# Patient Record
Sex: FEMALE | Race: BLACK OR AFRICAN AMERICAN | NOT HISPANIC OR LATINO | Employment: FULL TIME | URBAN - METROPOLITAN AREA
[De-identification: names, ages, dates, MRNs, and addresses within clinical notes are randomized per-mention and may not be internally consistent; named-entity substitution may affect disease eponyms.]

---

## 2017-01-16 ENCOUNTER — HISTORICAL (OUTPATIENT)
Dept: INTERNAL MEDICINE | Facility: CLINIC | Age: 52
End: 2017-01-16

## 2017-02-20 ENCOUNTER — HISTORICAL (OUTPATIENT)
Dept: INTERNAL MEDICINE | Facility: CLINIC | Age: 52
End: 2017-02-20

## 2017-04-12 ENCOUNTER — HISTORICAL (OUTPATIENT)
Dept: ADMINISTRATIVE | Facility: HOSPITAL | Age: 52
End: 2017-04-12

## 2017-04-18 ENCOUNTER — HISTORICAL (OUTPATIENT)
Dept: RADIOLOGY | Facility: HOSPITAL | Age: 52
End: 2017-04-18

## 2017-05-26 ENCOUNTER — HISTORICAL (OUTPATIENT)
Dept: RADIOLOGY | Facility: HOSPITAL | Age: 52
End: 2017-05-26

## 2017-07-06 ENCOUNTER — HISTORICAL (OUTPATIENT)
Dept: INTERNAL MEDICINE | Facility: CLINIC | Age: 52
End: 2017-07-06

## 2017-07-06 LAB
ABS NEUT (OLG): 2.17 X10(3)/MCL (ref 2.1–9.2)
ALBUMIN SERPL-MCNC: 4 GM/DL (ref 3.4–5)
ALBUMIN/GLOB SERPL: 1 RATIO (ref 1–2)
ALP SERPL-CCNC: 75 UNIT/L (ref 45–117)
ALT SERPL-CCNC: 31 UNIT/L (ref 12–78)
APPEARANCE, UA: ABNORMAL
AST SERPL-CCNC: 19 UNIT/L (ref 15–37)
BACTERIA #/AREA URNS AUTO: ABNORMAL /[HPF]
BASOPHILS # BLD AUTO: 0.03 X10(3)/MCL
BASOPHILS NFR BLD AUTO: 1 % (ref 0–1)
BILIRUB SERPL-MCNC: 0.2 MG/DL (ref 0.2–1)
BILIRUB UR QL STRIP: NEGATIVE
BILIRUBIN DIRECT+TOT PNL SERPL-MCNC: 0.1 MG/DL
BILIRUBIN DIRECT+TOT PNL SERPL-MCNC: 0.1 MG/DL
BUN SERPL-MCNC: 8 MG/DL (ref 7–18)
CALCIUM SERPL-MCNC: 9.5 MG/DL (ref 8.5–10.1)
CHLORIDE SERPL-SCNC: 102 MMOL/L (ref 98–107)
CO2 SERPL-SCNC: 29 MMOL/L (ref 21–32)
COLOR UR: YELLOW
CREAT SERPL-MCNC: 0.7 MG/DL (ref 0.6–1.3)
CREAT UR-MCNC: 140 MG/DL
EOSINOPHIL # BLD AUTO: 0.13 X10(3)/MCL
EOSINOPHIL NFR BLD AUTO: 2 % (ref 0–5)
ERYTHROCYTE [DISTWIDTH] IN BLOOD BY AUTOMATED COUNT: 13.9 % (ref 11.5–14.5)
EST. AVERAGE GLUCOSE BLD GHB EST-MCNC: 189 MG/DL
GLOBULIN SER-MCNC: 4 GM/ML (ref 2.3–3.5)
GLUCOSE (UA): NORMAL
GLUCOSE SERPL-MCNC: 159 MG/DL (ref 74–106)
HBA1C MFR BLD: 8.2 % (ref 4.2–6.3)
HCT VFR BLD AUTO: 39.5 % (ref 35–46)
HGB BLD-MCNC: 12.2 GM/DL (ref 12–16)
HGB UR QL STRIP: NEGATIVE
HYALINE CASTS #/AREA URNS LPF: ABNORMAL /[LPF]
IMM GRANULOCYTES # BLD AUTO: 0.01 10*3/UL
IMM GRANULOCYTES NFR BLD AUTO: 0 %
KETONES UR QL STRIP: NEGATIVE
LEUKOCYTE ESTERASE UR QL STRIP: 500 LEU/UL
LYMPHOCYTES # BLD AUTO: 2.53 X10(3)/MCL
LYMPHOCYTES NFR BLD AUTO: 48 % (ref 15–40)
MCH RBC QN AUTO: 25.5 PG (ref 26–34)
MCHC RBC AUTO-ENTMCNC: 30.9 GM/DL (ref 31–37)
MCV RBC AUTO: 82.6 FL (ref 80–100)
MICROALBUMIN UR-MCNC: 64.6 MG/L (ref 0–19)
MICROALBUMIN/CREAT RATIO PNL UR: 46.1 MCG/MG CR (ref 0–29)
MONOCYTES # BLD AUTO: 0.46 X10(3)/MCL
MONOCYTES NFR BLD AUTO: 9 % (ref 4–12)
NEUTROPHILS # BLD AUTO: 2.17 X10(3)/MCL
NEUTROPHILS NFR BLD AUTO: 41 X10(3)/MCL
NITRITE UR QL STRIP: NEGATIVE
PH UR STRIP: 5.5 [PH] (ref 4.5–8)
PLATELET # BLD AUTO: 270 X10(3)/MCL (ref 130–400)
PMV BLD AUTO: 11.6 FL (ref 7.4–10.4)
POTASSIUM SERPL-SCNC: 3.8 MMOL/L (ref 3.5–5.1)
PROT SERPL-MCNC: 8 GM/DL (ref 6.4–8.2)
PROT UR QL STRIP: 20 MG/DL
RBC # BLD AUTO: 4.78 X10(6)/MCL (ref 4–5.2)
RBC #/AREA URNS AUTO: ABNORMAL /[HPF]
SODIUM SERPL-SCNC: 140 MMOL/L (ref 136–145)
SP GR UR STRIP: 1.01 (ref 1–1.03)
SQUAMOUS #/AREA URNS LPF: >100 /[LPF]
UROBILINOGEN UR STRIP-ACNC: NORMAL
WBC # SPEC AUTO: 5.3 X10(3)/MCL (ref 4.5–11)
WBC #/AREA URNS AUTO: ABNORMAL /HPF

## 2017-08-16 ENCOUNTER — HISTORICAL (OUTPATIENT)
Dept: ADMINISTRATIVE | Facility: HOSPITAL | Age: 52
End: 2017-08-16

## 2017-08-16 LAB
ALBUMIN SERPL-MCNC: 4.2 GM/DL (ref 3.4–5)
ALBUMIN/GLOB SERPL: 1 RATIO (ref 1–2)
ALP SERPL-CCNC: 96 UNIT/L (ref 45–117)
ALT SERPL-CCNC: 32 UNIT/L (ref 12–78)
AST SERPL-CCNC: 20 UNIT/L (ref 15–37)
BILIRUB SERPL-MCNC: 0.2 MG/DL (ref 0.2–1)
BILIRUBIN DIRECT+TOT PNL SERPL-MCNC: <0.1 MG/DL
BILIRUBIN DIRECT+TOT PNL SERPL-MCNC: >0.1 MG/DL
BUN SERPL-MCNC: 11 MG/DL (ref 7–18)
CALCIUM SERPL-MCNC: 10.1 MG/DL (ref 8.5–10.1)
CHLORIDE SERPL-SCNC: 104 MMOL/L (ref 98–107)
CO2 SERPL-SCNC: 29 MMOL/L (ref 21–32)
CREAT SERPL-MCNC: 1 MG/DL (ref 0.6–1.3)
ERYTHROCYTE [DISTWIDTH] IN BLOOD BY AUTOMATED COUNT: 14.2 % (ref 11.5–14.5)
GLOBULIN SER-MCNC: 3.8 GM/ML (ref 2.3–3.5)
GLUCOSE SERPL-MCNC: 176 MG/DL (ref 74–106)
HAV IGM SERPL QL IA: NONREACTIVE
HBV CORE IGM SERPL QL IA: NONREACTIVE
HBV SURFACE AG SERPL QL IA: NEGATIVE
HCT VFR BLD AUTO: 38.2 % (ref 35–46)
HCV AB SERPL QL IA: NONREACTIVE
HGB BLD-MCNC: 12.2 GM/DL (ref 12–16)
MCH RBC QN AUTO: 26.3 PG (ref 26–34)
MCHC RBC AUTO-ENTMCNC: 31.9 GM/DL (ref 31–37)
MCV RBC AUTO: 82.5 FL (ref 80–100)
PLATELET # BLD AUTO: 330 X10(3)/MCL (ref 130–400)
PMV BLD AUTO: 11.9 FL (ref 7.4–10.4)
POTASSIUM SERPL-SCNC: 4.1 MMOL/L (ref 3.5–5.1)
PROT SERPL-MCNC: 8 GM/DL (ref 6.4–8.2)
RBC # BLD AUTO: 4.63 X10(6)/MCL (ref 4–5.2)
SODIUM SERPL-SCNC: 141 MMOL/L (ref 136–145)
T PALLIDUM AB SER QL: NONREACTIVE
WBC # SPEC AUTO: 6.5 X10(3)/MCL (ref 4.5–11)

## 2017-09-08 ENCOUNTER — HISTORICAL (OUTPATIENT)
Dept: ADMINISTRATIVE | Facility: HOSPITAL | Age: 52
End: 2017-09-08

## 2017-09-08 LAB
APPEARANCE, UA: CLEAR
BACTERIA #/AREA URNS AUTO: ABNORMAL /[HPF]
BILIRUB UR QL STRIP: NEGATIVE
COLOR UR: ABNORMAL
GLUCOSE (UA): NORMAL
HGB UR QL STRIP: 0.1 MG/DL
HYALINE CASTS #/AREA URNS LPF: ABNORMAL /[LPF]
KETONES UR QL STRIP: NEGATIVE
LEUKOCYTE ESTERASE UR QL STRIP: 75 LEU/UL
NITRITE UR QL STRIP: NEGATIVE
PH UR STRIP: 5.5 [PH] (ref 4.5–8)
PROT UR QL STRIP: 20 MG/DL
RBC #/AREA URNS AUTO: ABNORMAL /[HPF]
SP GR UR STRIP: 1.02 (ref 1–1.03)
SQUAMOUS #/AREA URNS LPF: ABNORMAL /[LPF]
UROBILINOGEN UR STRIP-ACNC: NORMAL
WBC #/AREA URNS AUTO: ABNORMAL /HPF

## 2017-10-09 ENCOUNTER — HISTORICAL (OUTPATIENT)
Dept: INTERNAL MEDICINE | Facility: CLINIC | Age: 52
End: 2017-10-09

## 2017-10-09 LAB
ALBUMIN SERPL-MCNC: 4.2 GM/DL (ref 3.4–5)
ALBUMIN/GLOB SERPL: 1 RATIO (ref 1–2)
ALP SERPL-CCNC: 85 UNIT/L (ref 45–117)
ALT SERPL-CCNC: 20 UNIT/L (ref 12–78)
AST SERPL-CCNC: 12 UNIT/L (ref 15–37)
BILIRUB SERPL-MCNC: 0.2 MG/DL (ref 0.2–1)
BILIRUBIN DIRECT+TOT PNL SERPL-MCNC: 0.1 MG/DL
BILIRUBIN DIRECT+TOT PNL SERPL-MCNC: 0.1 MG/DL
BUN SERPL-MCNC: 11 MG/DL (ref 7–18)
CALCIUM SERPL-MCNC: 10 MG/DL (ref 8.5–10.1)
CHLORIDE SERPL-SCNC: 103 MMOL/L (ref 98–107)
CHOLEST SERPL-MCNC: 99 MG/DL
CHOLEST/HDLC SERPL: 2.6 {RATIO} (ref 0–4.4)
CO2 SERPL-SCNC: 29 MMOL/L (ref 21–32)
CREAT SERPL-MCNC: 0.8 MG/DL (ref 0.6–1.3)
EST. AVERAGE GLUCOSE BLD GHB EST-MCNC: 206 MG/DL
GLOBULIN SER-MCNC: 3.8 GM/ML (ref 2.3–3.5)
GLUCOSE SERPL-MCNC: 139 MG/DL (ref 74–106)
HBA1C MFR BLD: 8.8 % (ref 4.2–6.3)
HDLC SERPL-MCNC: 38 MG/DL
LDLC SERPL CALC-MCNC: 36 MG/DL (ref 0–130)
POTASSIUM SERPL-SCNC: 4 MMOL/L (ref 3.5–5.1)
PROT SERPL-MCNC: 8 GM/DL (ref 6.4–8.2)
SODIUM SERPL-SCNC: 138 MMOL/L (ref 136–145)
TRIGL SERPL-MCNC: 125 MG/DL
TSH SERPL-ACNC: 1.56 MIU/L (ref 0.36–3.74)
VLDLC SERPL CALC-MCNC: 25 MG/DL

## 2018-03-01 ENCOUNTER — HISTORICAL (OUTPATIENT)
Dept: ADMINISTRATIVE | Facility: HOSPITAL | Age: 53
End: 2018-03-01

## 2018-03-01 LAB
ABS NEUT (OLG): 2.93 X10(3)/MCL (ref 2.1–9.2)
BASOPHILS # BLD AUTO: 0.05 X10(3)/MCL
BASOPHILS NFR BLD AUTO: 1 %
CRP SERPL-MCNC: 0.3 MG/DL
EOSINOPHIL # BLD AUTO: 0.14 X10(3)/MCL
EOSINOPHIL NFR BLD AUTO: 2 %
ERYTHROCYTE [DISTWIDTH] IN BLOOD BY AUTOMATED COUNT: 14.3 % (ref 11.5–14.5)
ERYTHROCYTE [SEDIMENTATION RATE] IN BLOOD: 8 MM/HR (ref 0–20)
EST. AVERAGE GLUCOSE BLD GHB EST-MCNC: 206 MG/DL
HBA1C MFR BLD: 8.8 % (ref 4.2–6.3)
HCT VFR BLD AUTO: 38.9 % (ref 35–46)
HGB BLD-MCNC: 12.4 GM/DL (ref 12–16)
IMM GRANULOCYTES # BLD AUTO: 0.02 10*3/UL
IMM GRANULOCYTES NFR BLD AUTO: 0 %
LYMPHOCYTES # BLD AUTO: 4.07 X10(3)/MCL
LYMPHOCYTES NFR BLD AUTO: 51 % (ref 13–40)
MCH RBC QN AUTO: 26.2 PG (ref 26–34)
MCHC RBC AUTO-ENTMCNC: 31.9 GM/DL (ref 31–37)
MCV RBC AUTO: 82.2 FL (ref 80–100)
MONOCYTES # BLD AUTO: 0.73 X10(3)/MCL
MONOCYTES NFR BLD AUTO: 9 % (ref 4–12)
NEUTROPHILS # BLD AUTO: 2.93 X10(3)/MCL
NEUTROPHILS NFR BLD AUTO: 37 X10(3)/MCL
PLATELET # BLD AUTO: 267 X10(3)/MCL (ref 130–400)
PMV BLD AUTO: 11.5 FL (ref 7.4–10.4)
RBC # BLD AUTO: 4.73 X10(6)/MCL (ref 4–5.2)
RHEUMATOID FACT SERPL-ACNC: 10 IU/ML (ref 0–15)
WBC # SPEC AUTO: 7.9 X10(3)/MCL (ref 4.5–11)

## 2018-03-08 ENCOUNTER — HISTORICAL (OUTPATIENT)
Dept: RADIOLOGY | Facility: HOSPITAL | Age: 53
End: 2018-03-08

## 2018-05-07 ENCOUNTER — HISTORICAL (OUTPATIENT)
Dept: RADIOLOGY | Facility: HOSPITAL | Age: 53
End: 2018-05-07

## 2018-05-28 ENCOUNTER — HISTORICAL (OUTPATIENT)
Dept: ADMINISTRATIVE | Facility: HOSPITAL | Age: 53
End: 2018-05-28

## 2018-05-28 LAB
ERYTHROCYTE [SEDIMENTATION RATE] IN BLOOD: 8 MM/HR (ref 0–20)
EST. AVERAGE GLUCOSE BLD GHB EST-MCNC: 206 MG/DL
HBA1C MFR BLD: 8.8 % (ref 4.2–6.3)

## 2018-09-04 ENCOUNTER — HISTORICAL (OUTPATIENT)
Dept: INTERNAL MEDICINE | Facility: CLINIC | Age: 53
End: 2018-09-04

## 2018-09-04 LAB
EST. AVERAGE GLUCOSE BLD GHB EST-MCNC: 183 MG/DL
HBA1C MFR BLD: 8 % (ref 4.2–6.3)

## 2018-09-24 ENCOUNTER — HOSPITAL ENCOUNTER (OUTPATIENT)
Dept: MEDSURG UNIT | Facility: HOSPITAL | Age: 53
End: 2018-09-26
Attending: INTERNAL MEDICINE | Admitting: SPECIALIST

## 2018-09-24 LAB
ABS NEUT (OLG): 6.64 X10(3)/MCL (ref 2.1–9.2)
ALBUMIN SERPL-MCNC: 3.5 GM/DL (ref 3.4–5)
ALBUMIN/GLOB SERPL: 1 RATIO (ref 1–2)
ALP SERPL-CCNC: 165 UNIT/L (ref 45–117)
ALT SERPL-CCNC: 98 UNIT/L (ref 12–78)
APPEARANCE, UA: CLEAR
APTT PPP: 32.4 SECOND(S) (ref 23.3–37)
AST SERPL-CCNC: 65 UNIT/L (ref 15–37)
BACTERIA #/AREA URNS AUTO: ABNORMAL /[HPF]
BASOPHILS # BLD AUTO: 0.04 X10(3)/MCL
BASOPHILS NFR BLD AUTO: 0 %
BILIRUB SERPL-MCNC: 0.4 MG/DL (ref 0.2–1)
BILIRUB UR QL STRIP: NEGATIVE
BILIRUBIN DIRECT+TOT PNL SERPL-MCNC: 0.1 MG/DL
BILIRUBIN DIRECT+TOT PNL SERPL-MCNC: 0.3 MG/DL
BUN SERPL-MCNC: 7 MG/DL (ref 7–18)
CALCIUM SERPL-MCNC: 9.8 MG/DL (ref 8.5–10.1)
CHLORIDE SERPL-SCNC: 102 MMOL/L (ref 98–107)
CO2 SERPL-SCNC: 26 MMOL/L (ref 21–32)
COLOR UR: ABNORMAL
CREAT SERPL-MCNC: 0.7 MG/DL (ref 0.6–1.3)
EOSINOPHIL # BLD AUTO: 0.04 X10(3)/MCL
EOSINOPHIL NFR BLD AUTO: 0 %
ERYTHROCYTE [DISTWIDTH] IN BLOOD BY AUTOMATED COUNT: 13.6 % (ref 11.5–14.5)
FLUAV AG NPH QL IA: NEGATIVE
FLUBV AG NPH QL IA: NEGATIVE
GLOBULIN SER-MCNC: 4.7 GM/ML (ref 2.3–3.5)
GLUCOSE (UA): NORMAL
GLUCOSE SERPL-MCNC: 161 MG/DL (ref 74–106)
HCT VFR BLD AUTO: 36 % (ref 35–46)
HGB BLD-MCNC: 11.4 GM/DL (ref 12–16)
HGB UR QL STRIP: NEGATIVE
HYALINE CASTS #/AREA URNS LPF: ABNORMAL /[LPF]
IMM GRANULOCYTES # BLD AUTO: 0.04 10*3/UL
IMM GRANULOCYTES NFR BLD AUTO: 0 %
INR PPP: 1.12 (ref 0.9–1.2)
KETONES UR QL STRIP: NEGATIVE
LACTATE SERPL-SCNC: 0.5 MMOL/L (ref 0.4–2)
LEUKOCYTE ESTERASE UR QL STRIP: NEGATIVE
LYMPHOCYTES # BLD AUTO: 3.1 X10(3)/MCL
LYMPHOCYTES NFR BLD AUTO: 28 % (ref 13–40)
MCH RBC QN AUTO: 25.9 PG (ref 26–34)
MCHC RBC AUTO-ENTMCNC: 31.7 GM/DL (ref 31–37)
MCV RBC AUTO: 81.6 FL (ref 80–100)
MONOCYTES # BLD AUTO: 1.28 X10(3)/MCL
MONOCYTES NFR BLD AUTO: 12 % (ref 4–12)
NEUTROPHILS # BLD AUTO: 6.64 X10(3)/MCL
NEUTROPHILS NFR BLD AUTO: 60 X10(3)/MCL
NITRITE UR QL STRIP: NEGATIVE
PH UR STRIP: 6.5 [PH] (ref 4.5–8)
PLATELET # BLD AUTO: 207 X10(3)/MCL (ref 130–400)
PMV BLD AUTO: 11.5 FL (ref 7.4–10.4)
POTASSIUM SERPL-SCNC: 3.7 MMOL/L (ref 3.5–5.1)
PROT SERPL-MCNC: 8.2 GM/DL (ref 6.4–8.2)
PROT UR QL STRIP: 10 MG/DL
PROTHROMBIN TIME: 13.7 SECOND(S) (ref 11.9–14.4)
RBC # BLD AUTO: 4.41 X10(6)/MCL (ref 4–5.2)
RBC #/AREA URNS AUTO: ABNORMAL /[HPF]
SODIUM SERPL-SCNC: 135 MMOL/L (ref 136–145)
SP GR UR STRIP: 1 (ref 1–1.03)
SQUAMOUS #/AREA URNS LPF: ABNORMAL /[LPF]
UROBILINOGEN UR STRIP-ACNC: NORMAL
WBC # SPEC AUTO: 11.1 X10(3)/MCL (ref 4.5–11)
WBC #/AREA URNS AUTO: ABNORMAL /HPF

## 2018-09-25 LAB
ABS NEUT (OLG): 4.61 X10(3)/MCL (ref 2.1–9.2)
ALBUMIN SERPL-MCNC: 3 GM/DL (ref 3.4–5)
ALBUMIN/GLOB SERPL: 1 RATIO (ref 1–2)
ALP SERPL-CCNC: 147 UNIT/L (ref 45–117)
ALT SERPL-CCNC: 95 UNIT/L (ref 12–78)
AST SERPL-CCNC: 80 UNIT/L (ref 15–37)
BASOPHILS # BLD AUTO: 0.03 X10(3)/MCL
BASOPHILS NFR BLD AUTO: 0 %
BILIRUB SERPL-MCNC: 0.2 MG/DL (ref 0.2–1)
BILIRUBIN DIRECT+TOT PNL SERPL-MCNC: 0.1 MG/DL
BILIRUBIN DIRECT+TOT PNL SERPL-MCNC: 0.1 MG/DL
BUN SERPL-MCNC: 8 MG/DL (ref 7–18)
CALCIUM SERPL-MCNC: 9.1 MG/DL (ref 8.5–10.1)
CHLORIDE SERPL-SCNC: 103 MMOL/L (ref 98–107)
CO2 SERPL-SCNC: 28 MMOL/L (ref 21–32)
CREAT SERPL-MCNC: 0.8 MG/DL (ref 0.6–1.3)
EOSINOPHIL # BLD AUTO: 0.1 10*3/UL
EOSINOPHIL NFR BLD AUTO: 1 %
ERYTHROCYTE [DISTWIDTH] IN BLOOD BY AUTOMATED COUNT: 13.9 % (ref 11.5–14.5)
GLOBULIN SER-MCNC: 4.2 GM/ML (ref 2.3–3.5)
GLUCOSE SERPL-MCNC: 238 MG/DL (ref 74–106)
HCT VFR BLD AUTO: 33.7 % (ref 35–46)
HGB BLD-MCNC: 10.4 GM/DL (ref 12–16)
IMM GRANULOCYTES # BLD AUTO: 0.03 10*3/UL
IMM GRANULOCYTES NFR BLD AUTO: 0 %
LYMPHOCYTES # BLD AUTO: 2.02 X10(3)/MCL
LYMPHOCYTES NFR BLD AUTO: 25 % (ref 13–40)
MCH RBC QN AUTO: 26 PG (ref 26–34)
MCHC RBC AUTO-ENTMCNC: 30.9 GM/DL (ref 31–37)
MCV RBC AUTO: 84.3 FL (ref 80–100)
MONOCYTES # BLD AUTO: 1.17 X10(3)/MCL
MONOCYTES NFR BLD AUTO: 15 % (ref 4–12)
NEUTROPHILS # BLD AUTO: 4.61 X10(3)/MCL
NEUTROPHILS NFR BLD AUTO: 58 X10(3)/MCL
PLATELET # BLD AUTO: 205 X10(3)/MCL (ref 130–400)
PMV BLD AUTO: 12.1 FL (ref 7.4–10.4)
POTASSIUM SERPL-SCNC: 4 MMOL/L (ref 3.5–5.1)
PROT SERPL-MCNC: 7.2 GM/DL (ref 6.4–8.2)
RBC # BLD AUTO: 4 X10(6)/MCL (ref 4–5.2)
SODIUM SERPL-SCNC: 136 MMOL/L (ref 136–145)
WBC # SPEC AUTO: 8 X10(3)/MCL (ref 4.5–11)

## 2018-09-26 LAB
ABS NEUT (OLG): 3.29 X10(3)/MCL (ref 2.1–9.2)
ALBUMIN SERPL-MCNC: 3 GM/DL (ref 3.4–5)
ALBUMIN/GLOB SERPL: 1 RATIO (ref 1–2)
ALP SERPL-CCNC: 168 UNIT/L (ref 45–117)
ALT SERPL-CCNC: 103 UNIT/L (ref 12–78)
AST SERPL-CCNC: 83 UNIT/L (ref 15–37)
BASOPHILS # BLD AUTO: 0.04 X10(3)/MCL
BASOPHILS NFR BLD AUTO: 0 %
BILIRUB SERPL-MCNC: 0.2 MG/DL (ref 0.2–1)
BILIRUBIN DIRECT+TOT PNL SERPL-MCNC: <0.1 MG/DL
BILIRUBIN DIRECT+TOT PNL SERPL-MCNC: ABNORMAL MG/DL
BUN SERPL-MCNC: 6 MG/DL (ref 7–18)
CALCIUM SERPL-MCNC: 9.4 MG/DL (ref 8.5–10.1)
CHLORIDE SERPL-SCNC: 105 MMOL/L (ref 98–107)
CO2 SERPL-SCNC: 30 MMOL/L (ref 21–32)
CREAT SERPL-MCNC: 0.7 MG/DL (ref 0.6–1.3)
EOSINOPHIL # BLD AUTO: 0.15 10*3/UL
EOSINOPHIL NFR BLD AUTO: 2 %
ERYTHROCYTE [DISTWIDTH] IN BLOOD BY AUTOMATED COUNT: 14 % (ref 11.5–14.5)
FINAL CULTURE: NORMAL
GLOBULIN SER-MCNC: 4.1 GM/ML (ref 2.3–3.5)
GLUCOSE SERPL-MCNC: 109 MG/DL (ref 74–106)
HCT VFR BLD AUTO: 32.8 % (ref 35–46)
HGB BLD-MCNC: 10.3 GM/DL (ref 12–16)
IMM GRANULOCYTES # BLD AUTO: 0.03 10*3/UL
IMM GRANULOCYTES NFR BLD AUTO: 0 %
LYMPHOCYTES # BLD AUTO: 3.1 X10(3)/MCL
LYMPHOCYTES NFR BLD AUTO: 40 % (ref 13–40)
MCH RBC QN AUTO: 26.2 PG (ref 26–34)
MCHC RBC AUTO-ENTMCNC: 31.4 GM/DL (ref 31–37)
MCV RBC AUTO: 83.5 FL (ref 80–100)
MONOCYTES # BLD AUTO: 1.15 X10(3)/MCL
MONOCYTES NFR BLD AUTO: 15 % (ref 4–12)
NEUTROPHILS # BLD AUTO: 3.29 X10(3)/MCL
NEUTROPHILS NFR BLD AUTO: 42 X10(3)/MCL
PLATELET # BLD AUTO: 228 X10(3)/MCL (ref 130–400)
PMV BLD AUTO: 11.2 FL (ref 7.4–10.4)
POTASSIUM SERPL-SCNC: 4.1 MMOL/L (ref 3.5–5.1)
PROT SERPL-MCNC: 7.1 GM/DL (ref 6.4–8.2)
RAPID GROUP A STREP (OHS): NORMAL
RBC # BLD AUTO: 3.93 X10(6)/MCL (ref 4–5.2)
SODIUM SERPL-SCNC: 139 MMOL/L (ref 136–145)
WBC # SPEC AUTO: 7.8 X10(3)/MCL (ref 4.5–11)

## 2018-09-29 LAB
FINAL CULTURE: NORMAL
FINAL CULTURE: NORMAL

## 2018-11-28 ENCOUNTER — HISTORICAL (OUTPATIENT)
Dept: ADMINISTRATIVE | Facility: HOSPITAL | Age: 53
End: 2018-11-28

## 2018-11-28 LAB
APPEARANCE, UA: CLEAR
BACTERIA #/AREA URNS AUTO: ABNORMAL /[HPF]
BILIRUB UR QL STRIP: NEGATIVE
COLOR UR: NORMAL
CREAT UR-MCNC: 58 MG/DL
GLUCOSE (UA): NORMAL
HGB UR QL STRIP: NEGATIVE
HYALINE CASTS #/AREA URNS LPF: ABNORMAL /[LPF]
KETONES UR QL STRIP: NEGATIVE
LEUKOCYTE ESTERASE UR QL STRIP: NEGATIVE
MICROALBUMIN UR-MCNC: 46.6 MG/L (ref 0–19)
MICROALBUMIN/CREAT RATIO PNL UR: 80.3 MCG/MG CR (ref 0–29)
NITRITE UR QL STRIP: NEGATIVE
PH UR STRIP: 6 [PH] (ref 4.5–8)
PROT UR QL STRIP: NEGATIVE
RBC #/AREA URNS AUTO: ABNORMAL /[HPF]
SP GR UR STRIP: 1.01 (ref 1–1.03)
SQUAMOUS #/AREA URNS LPF: ABNORMAL /[LPF]
UROBILINOGEN UR STRIP-ACNC: NORMAL
WBC #/AREA URNS AUTO: ABNORMAL /HPF

## 2019-01-24 ENCOUNTER — HISTORICAL (OUTPATIENT)
Dept: INTERNAL MEDICINE | Facility: CLINIC | Age: 54
End: 2019-01-24

## 2019-01-24 LAB
ABS NEUT (OLG): 2.54 X10(3)/MCL (ref 2.1–9.2)
ALBUMIN SERPL-MCNC: 4.1 GM/DL (ref 3.4–5)
ALBUMIN/GLOB SERPL: 1.05 RATIO (ref 1.1–2)
ALP SERPL-CCNC: 108 UNIT/L (ref 45–117)
ALT SERPL-CCNC: 32 UNIT/L (ref 12–78)
AST SERPL-CCNC: 19 UNIT/L (ref 15–37)
BASOPHILS # BLD AUTO: 0.04 X10(3)/MCL
BASOPHILS NFR BLD AUTO: 1 %
BILIRUB SERPL-MCNC: 0.4 MG/DL (ref 0.2–1)
BILIRUBIN DIRECT+TOT PNL SERPL-MCNC: 0.1 MG/DL
BILIRUBIN DIRECT+TOT PNL SERPL-MCNC: 0.3 MG/DL
BUN SERPL-MCNC: 10 MG/DL (ref 7–18)
CALCIUM SERPL-MCNC: 9.4 MG/DL (ref 8.5–10.1)
CHLORIDE SERPL-SCNC: 103 MMOL/L (ref 98–107)
CHOLEST SERPL-MCNC: 98 MG/DL
CHOLEST/HDLC SERPL: 2.3 {RATIO} (ref 0–4.4)
CO2 SERPL-SCNC: 29 MMOL/L (ref 21–32)
CREAT SERPL-MCNC: 0.7 MG/DL (ref 0.6–1.3)
EOSINOPHIL # BLD AUTO: 0.1 10*3/UL
EOSINOPHIL NFR BLD AUTO: 2 %
ERYTHROCYTE [DISTWIDTH] IN BLOOD BY AUTOMATED COUNT: 13.8 % (ref 11.5–14.5)
EST. AVERAGE GLUCOSE BLD GHB EST-MCNC: 275 MG/DL
GLOBULIN SER-MCNC: 3.9 GM/ML (ref 2.3–3.5)
GLUCOSE SERPL-MCNC: 182 MG/DL (ref 74–106)
HBA1C MFR BLD: 11.2 % (ref 4.2–6.3)
HCT VFR BLD AUTO: 41.6 % (ref 35–46)
HDLC SERPL-MCNC: 42 MG/DL
HGB BLD-MCNC: 12.7 GM/DL (ref 12–16)
IMM GRANULOCYTES # BLD AUTO: 0.02 10*3/UL
IMM GRANULOCYTES NFR BLD AUTO: 0 %
LDLC SERPL CALC-MCNC: 33 MG/DL (ref 0–130)
LYMPHOCYTES # BLD AUTO: 3.51 X10(3)/MCL
LYMPHOCYTES NFR BLD AUTO: 52 % (ref 13–40)
MCH RBC QN AUTO: 25.2 PG (ref 26–34)
MCHC RBC AUTO-ENTMCNC: 30.5 GM/DL (ref 31–37)
MCV RBC AUTO: 82.5 FL (ref 80–100)
MONOCYTES # BLD AUTO: 0.59 X10(3)/MCL
MONOCYTES NFR BLD AUTO: 9 % (ref 4–12)
NEUTROPHILS # BLD AUTO: 2.54 X10(3)/MCL
NEUTROPHILS NFR BLD AUTO: 37 X10(3)/MCL
PLATELET # BLD AUTO: 274 X10(3)/MCL (ref 130–400)
PMV BLD AUTO: 11.2 FL (ref 7.4–10.4)
POTASSIUM SERPL-SCNC: 4.2 MMOL/L (ref 3.5–5.1)
PROT SERPL-MCNC: 8 GM/DL (ref 6.4–8.2)
RBC # BLD AUTO: 5.04 X10(6)/MCL (ref 4–5.2)
SODIUM SERPL-SCNC: 137 MMOL/L (ref 136–145)
TRIGL SERPL-MCNC: 116 MG/DL
TSH SERPL-ACNC: 1.57 MIU/L (ref 0.36–3.74)
VLDLC SERPL CALC-MCNC: 23 MG/DL
WBC # SPEC AUTO: 6.8 X10(3)/MCL (ref 4.5–11)

## 2019-03-06 ENCOUNTER — HISTORICAL (OUTPATIENT)
Dept: INTERNAL MEDICINE | Facility: CLINIC | Age: 54
End: 2019-03-06

## 2019-03-06 LAB
EST. AVERAGE GLUCOSE BLD GHB EST-MCNC: 232 MG/DL
HBA1C MFR BLD: 9.7 % (ref 4.2–6.3)

## 2019-06-14 ENCOUNTER — HISTORICAL (OUTPATIENT)
Dept: INTERNAL MEDICINE | Facility: CLINIC | Age: 54
End: 2019-06-14

## 2019-06-14 LAB
EST. AVERAGE GLUCOSE BLD GHB EST-MCNC: 206 MG/DL
HBA1C MFR BLD: 8.8 % (ref 4.2–6.3)

## 2019-07-17 ENCOUNTER — HISTORICAL (OUTPATIENT)
Dept: RADIOLOGY | Facility: HOSPITAL | Age: 54
End: 2019-07-17

## 2019-08-07 ENCOUNTER — HISTORICAL (OUTPATIENT)
Dept: RADIOLOGY | Facility: HOSPITAL | Age: 54
End: 2019-08-07

## 2019-09-09 ENCOUNTER — HISTORICAL (OUTPATIENT)
Dept: INTERNAL MEDICINE | Facility: CLINIC | Age: 54
End: 2019-09-09

## 2019-09-09 LAB
ABS NEUT (OLG): 2.45 X10(3)/MCL (ref 2.1–9.2)
BASOPHILS # BLD AUTO: 0 X10(3)/MCL (ref 0–0.2)
BASOPHILS NFR BLD AUTO: 0 %
EOSINOPHIL # BLD AUTO: 0.2 X10(3)/MCL (ref 0–0.9)
EOSINOPHIL NFR BLD AUTO: 2 %
ERYTHROCYTE [DISTWIDTH] IN BLOOD BY AUTOMATED COUNT: 13.8 % (ref 11.5–14.5)
EST. AVERAGE GLUCOSE BLD GHB EST-MCNC: 229 MG/DL
HBA1C MFR BLD: 9.6 % (ref 4.2–6.3)
HCT VFR BLD AUTO: 41.3 % (ref 35–46)
HGB BLD-MCNC: 12.5 GM/DL (ref 12–16)
IMM GRANULOCYTES # BLD AUTO: 0.01 10*3/UL
IMM GRANULOCYTES NFR BLD AUTO: 0 %
LYMPHOCYTES # BLD AUTO: 4.1 X10(3)/MCL (ref 0.6–4.6)
LYMPHOCYTES NFR BLD AUTO: 55 %
MCH RBC QN AUTO: 25.8 PG (ref 26–34)
MCHC RBC AUTO-ENTMCNC: 30.3 GM/DL (ref 31–37)
MCV RBC AUTO: 85.3 FL (ref 80–100)
MONOCYTES # BLD AUTO: 0.6 X10(3)/MCL (ref 0.1–1.3)
MONOCYTES NFR BLD AUTO: 9 %
NEUTROPHILS # BLD AUTO: 2.45 X10(3)/MCL (ref 2.1–9.2)
NEUTROPHILS NFR BLD AUTO: 33 %
PLATELET # BLD AUTO: 266 X10(3)/MCL (ref 130–400)
PMV BLD AUTO: 11 FL (ref 7.4–10.4)
RBC # BLD AUTO: 4.84 X10(6)/MCL (ref 4–5.2)
WBC # SPEC AUTO: 7.4 X10(3)/MCL (ref 4.5–11)

## 2019-12-09 ENCOUNTER — HISTORICAL (OUTPATIENT)
Dept: INTERNAL MEDICINE | Facility: CLINIC | Age: 54
End: 2019-12-09

## 2019-12-10 ENCOUNTER — HISTORICAL (OUTPATIENT)
Dept: ADMINISTRATIVE | Facility: HOSPITAL | Age: 54
End: 2019-12-10

## 2019-12-10 LAB
EST. AVERAGE GLUCOSE BLD GHB EST-MCNC: 226 MG/DL
HBA1C MFR BLD: 9.5 % (ref 4.2–6.3)

## 2020-03-06 ENCOUNTER — HISTORICAL (OUTPATIENT)
Dept: INTERNAL MEDICINE | Facility: CLINIC | Age: 55
End: 2020-03-06

## 2020-03-06 LAB
ALBUMIN SERPL-MCNC: 4.3 GM/DL (ref 3.4–5)
ALBUMIN/GLOB SERPL: 1.1 RATIO (ref 1.1–2)
ALP SERPL-CCNC: 129 UNIT/L (ref 45–117)
ALT SERPL-CCNC: 35 UNIT/L (ref 12–78)
APPEARANCE, UA: CLEAR
AST SERPL-CCNC: 19 UNIT/L (ref 15–37)
BACTERIA #/AREA URNS AUTO: ABNORMAL /HPF
BILIRUB SERPL-MCNC: 0.3 MG/DL (ref 0.2–1)
BILIRUB UR QL STRIP: NEGATIVE
BILIRUBIN DIRECT+TOT PNL SERPL-MCNC: <0.1 MG/DL (ref 0–0.2)
BILIRUBIN DIRECT+TOT PNL SERPL-MCNC: ABNORMAL MG/DL
BUN SERPL-MCNC: 12 MG/DL (ref 7–18)
CALCIUM SERPL-MCNC: 9.6 MG/DL (ref 8.5–10.1)
CHLORIDE SERPL-SCNC: 103 MMOL/L (ref 98–107)
CHOLEST SERPL-MCNC: 108 MG/DL
CHOLEST/HDLC SERPL: 3.6 {RATIO} (ref 0–4.4)
CO2 SERPL-SCNC: 29 MMOL/L (ref 21–32)
COLOR UR: COLORLESS
CREAT SERPL-MCNC: 0.8 MG/DL (ref 0.6–1.3)
CREAT UR-MCNC: 31 MG/DL
GLOBULIN SER-MCNC: 4 GM/ML (ref 2.3–3.5)
GLUCOSE (UA): NEGATIVE
GLUCOSE SERPL-MCNC: 189 MG/DL (ref 74–106)
HDLC SERPL-MCNC: 30 MG/DL (ref 40–59)
HGB UR QL STRIP: NEGATIVE
HYALINE CASTS #/AREA URNS LPF: ABNORMAL /LPF
KETONES UR QL STRIP: ABNORMAL
LDLC SERPL CALC-MCNC: 29 MG/DL
LEUKOCYTE ESTERASE UR QL STRIP: NEGATIVE
MICROALBUMIN UR-MCNC: 36 MG/L (ref 0–19)
MICROALBUMIN/CREAT RATIO PNL UR: 116.1 MCG/MG CR (ref 0–29)
NITRITE UR QL STRIP: NEGATIVE
PH UR STRIP: 5.5 [PH] (ref 4.5–8)
POTASSIUM SERPL-SCNC: 4.1 MMOL/L (ref 3.5–5.1)
PROT SERPL-MCNC: 8.3 GM/DL (ref 6.4–8.2)
PROT UR QL STRIP: NEGATIVE
RBC #/AREA URNS AUTO: ABNORMAL /HPF
SODIUM SERPL-SCNC: 137 MMOL/L (ref 136–145)
SP GR UR STRIP: 1 (ref 1–1.03)
SQUAMOUS #/AREA URNS LPF: ABNORMAL /LPF
TRIGL SERPL-MCNC: 245 MG/DL
TSH SERPL-ACNC: 1.73 MIU/L (ref 0.36–3.74)
UROBILINOGEN UR STRIP-ACNC: NORMAL
VLDLC SERPL CALC-MCNC: 49 MG/DL
WBC #/AREA URNS AUTO: ABNORMAL /HPF

## 2020-03-11 ENCOUNTER — HISTORICAL (OUTPATIENT)
Dept: RADIOLOGY | Facility: HOSPITAL | Age: 55
End: 2020-03-11

## 2020-05-16 ENCOUNTER — HISTORICAL (OUTPATIENT)
Dept: LAB | Facility: HOSPITAL | Age: 55
End: 2020-05-16

## 2020-06-05 ENCOUNTER — HISTORICAL (OUTPATIENT)
Dept: RADIOLOGY | Facility: HOSPITAL | Age: 55
End: 2020-06-05

## 2020-07-01 ENCOUNTER — HISTORICAL (OUTPATIENT)
Dept: LAB | Facility: HOSPITAL | Age: 55
End: 2020-07-01

## 2020-07-01 ENCOUNTER — HISTORICAL (OUTPATIENT)
Dept: ADMINISTRATIVE | Facility: HOSPITAL | Age: 55
End: 2020-07-01

## 2020-07-01 LAB
ALBUMIN SERPL-MCNC: 4.4 GM/DL (ref 3.4–5)
ALBUMIN/GLOB SERPL: 1.3 RATIO (ref 1.1–2)
ALP SERPL-CCNC: 113 UNIT/L (ref 45–117)
ALT SERPL-CCNC: 45 UNIT/L (ref 12–78)
AST SERPL-CCNC: 36 UNIT/L (ref 15–37)
BILIRUB SERPL-MCNC: 0.3 MG/DL (ref 0.2–1)
BILIRUBIN DIRECT+TOT PNL SERPL-MCNC: <0.1 MG/DL (ref 0–0.2)
BILIRUBIN DIRECT+TOT PNL SERPL-MCNC: ABNORMAL MG/DL
BUN SERPL-MCNC: 13 MG/DL (ref 7–18)
CALCIUM SERPL-MCNC: 9.5 MG/DL (ref 8.5–10.1)
CHLORIDE SERPL-SCNC: 104 MMOL/L (ref 98–107)
CHOLEST SERPL-MCNC: 157 MG/DL
CHOLEST/HDLC SERPL: 7.5 {RATIO} (ref 0–4.4)
CO2 SERPL-SCNC: 25 MMOL/L (ref 21–32)
CREAT SERPL-MCNC: 0.8 MG/DL (ref 0.6–1.3)
EST. AVERAGE GLUCOSE BLD GHB EST-MCNC: 220 MG/DL
GLOBULIN SER-MCNC: 3.5 GM/ML (ref 2.3–3.5)
GLUCOSE SERPL-MCNC: 117 MG/DL (ref 74–106)
HBA1C MFR BLD: 9.3 % (ref 4.5–6.2)
HDLC SERPL-MCNC: 21 MG/DL (ref 40–59)
LDLC SERPL CALC-MCNC: ABNORMAL MG/DL
POTASSIUM SERPL-SCNC: 4 MMOL/L (ref 3.5–5.1)
PROT SERPL-MCNC: 7.9 GM/DL (ref 6.4–8.2)
SODIUM SERPL-SCNC: 137 MMOL/L (ref 136–145)
TRIGL SERPL-MCNC: 1041 MG/DL
VLDLC SERPL CALC-MCNC: 208 MG/DL

## 2020-07-03 ENCOUNTER — HISTORICAL (OUTPATIENT)
Dept: ADMINISTRATIVE | Facility: HOSPITAL | Age: 55
End: 2020-07-03

## 2020-07-03 LAB
APPEARANCE, UA: ABNORMAL
BACTERIA SPEC CULT: ABNORMAL
BILIRUB UR QL STRIP: ABNORMAL
COLOR UR: YELLOW
GLUCOSE (UA): 500
HGB UR QL STRIP: NEGATIVE
KETONES UR QL STRIP: NEGATIVE
LEUKOCYTE ESTERASE UR QL STRIP: NEGATIVE
NITRITE UR QL STRIP: NEGATIVE
PH UR STRIP: 5 [PH] (ref 5–9)
PROT UR QL STRIP: 30
RBC #/AREA URNS HPF: ABNORMAL /[HPF]
SP GR UR STRIP: >=1.03 (ref 1–1.03)
SQUAMOUS EPITHELIAL, UA: ABNORMAL
UROBILINOGEN UR STRIP-ACNC: 0.2
WBC #/AREA URNS HPF: ABNORMAL /HPF

## 2020-10-13 ENCOUNTER — HISTORICAL (OUTPATIENT)
Dept: INTERNAL MEDICINE | Facility: CLINIC | Age: 55
End: 2020-10-13

## 2020-10-13 LAB
APPEARANCE, UA: CLEAR
BACTERIA #/AREA URNS AUTO: ABNORMAL /HPF
BILIRUB UR QL STRIP: NEGATIVE
BUN SERPL-MCNC: 11 MG/DL (ref 7–18)
CALCIUM SERPL-MCNC: 10 MG/DL (ref 8.5–10.1)
CHLORIDE SERPL-SCNC: 105 MMOL/L (ref 98–107)
CHOLEST SERPL-MCNC: 120 MG/DL
CHOLEST/HDLC SERPL: 3.2 {RATIO} (ref 0–4.4)
CO2 SERPL-SCNC: 31 MMOL/L (ref 21–32)
COLOR UR: YELLOW
CREAT SERPL-MCNC: 0.8 MG/DL (ref 0.6–1.3)
CREAT/UREA NIT SERPL: 14 MG/DL (ref 12–14)
EST. AVERAGE GLUCOSE BLD GHB EST-MCNC: 223 MG/DL
GLUCOSE (UA): NEGATIVE
GLUCOSE SERPL-MCNC: 210 MG/DL (ref 74–106)
HBA1C MFR BLD: 9.4 % (ref 4.2–6.3)
HDLC SERPL-MCNC: 37 MG/DL (ref 40–59)
HGB UR QL STRIP: NEGATIVE
HYALINE CASTS #/AREA URNS LPF: ABNORMAL /LPF
KETONES UR QL STRIP: NEGATIVE
LDLC SERPL CALC-MCNC: 47 MG/DL
LEUKOCYTE ESTERASE UR QL STRIP: NEGATIVE
NITRITE UR QL STRIP: NEGATIVE
PH UR STRIP: 5.5 [PH] (ref 4.5–8)
POTASSIUM SERPL-SCNC: 4.3 MMOL/L (ref 3.5–5.1)
PROT UR QL STRIP: 30 MG/DL
RBC #/AREA URNS AUTO: ABNORMAL /HPF
SODIUM SERPL-SCNC: 138 MMOL/L (ref 136–145)
SP GR UR STRIP: 1.02 (ref 1–1.03)
SQUAMOUS #/AREA URNS LPF: ABNORMAL /LPF
TRIGL SERPL-MCNC: 179 MG/DL
UROBILINOGEN UR STRIP-ACNC: NORMAL
VLDLC SERPL CALC-MCNC: 36 MG/DL
WBC #/AREA URNS AUTO: ABNORMAL /HPF

## 2021-01-07 ENCOUNTER — HISTORICAL (OUTPATIENT)
Dept: LAB | Facility: HOSPITAL | Age: 56
End: 2021-01-07

## 2021-01-07 LAB
EST. AVERAGE GLUCOSE BLD GHB EST-MCNC: 226 MG/DL
HBA1C MFR BLD: 9.5 %

## 2021-01-27 ENCOUNTER — HISTORICAL (OUTPATIENT)
Dept: INTERNAL MEDICINE | Facility: CLINIC | Age: 56
End: 2021-01-27

## 2021-01-27 LAB
DEPRECATED CALCIDIOL+CALCIFEROL SERPL-MC: 27.6 NG/ML (ref 30–80)
EST. AVERAGE GLUCOSE BLD GHB EST-MCNC: 231.7 MG/DL
HBA1C MFR BLD: 9.7 %
VIT B12 SERPL-MCNC: 849 PG/ML (ref 213–816)

## 2021-03-12 ENCOUNTER — HISTORICAL (OUTPATIENT)
Dept: SLEEP MEDICINE | Facility: HOSPITAL | Age: 56
End: 2021-03-12

## 2021-04-05 ENCOUNTER — HISTORICAL (OUTPATIENT)
Dept: LAB | Facility: HOSPITAL | Age: 56
End: 2021-04-05

## 2021-04-05 LAB
BUN SERPL-MCNC: 10.3 MG/DL (ref 9.8–20.1)
CALCIUM SERPL-MCNC: 10.2 MG/DL (ref 8.4–10.2)
CHLORIDE SERPL-SCNC: 104 MMOL/L (ref 98–107)
CHOLEST SERPL-MCNC: 117 MG/DL
CHOLEST/HDLC SERPL: 3 {RATIO} (ref 0–5)
CO2 SERPL-SCNC: 29 MMOL/L (ref 22–29)
CREAT SERPL-MCNC: 0.81 MG/DL (ref 0.55–1.02)
CREAT UR-MCNC: 140.4 MG/DL (ref 45–106)
CREAT/UREA NIT SERPL: 13
DEPRECATED CALCIDIOL+CALCIFEROL SERPL-MC: 34 NG/ML (ref 30–80)
EST. AVERAGE GLUCOSE BLD GHB EST-MCNC: 243.2 MG/DL
GLUCOSE SERPL-MCNC: 125 MG/DL (ref 74–100)
HBA1C MFR BLD: 10.1 %
HDLC SERPL-MCNC: 42 MG/DL (ref 35–60)
LDLC SERPL CALC-MCNC: 25 MG/DL (ref 50–140)
MICROALBUMIN UR-MCNC: 63.4 MG/L
MICROALBUMIN/CREAT RATIO PNL UR: 45.2 MG/GM CR (ref 0–30)
POTASSIUM SERPL-SCNC: 4.5 MMOL/L (ref 3.5–5.1)
SODIUM SERPL-SCNC: 141 MMOL/L (ref 136–145)
TRIGL SERPL-MCNC: 250 MG/DL (ref 37–140)
TSH SERPL-ACNC: 2.73 UIU/ML (ref 0.35–4.94)
VLDLC SERPL CALC-MCNC: 50 MG/DL

## 2021-05-06 ENCOUNTER — HISTORICAL (OUTPATIENT)
Dept: INTERNAL MEDICINE | Facility: CLINIC | Age: 56
End: 2021-05-06

## 2021-05-06 LAB
BUN SERPL-MCNC: 11.7 MG/DL (ref 9.8–20.1)
CALCIUM SERPL-MCNC: 10.3 MG/DL (ref 8.4–10.2)
CHLORIDE SERPL-SCNC: 105 MMOL/L (ref 98–107)
CO2 SERPL-SCNC: 26 MMOL/L (ref 22–29)
CREAT SERPL-MCNC: 0.81 MG/DL (ref 0.55–1.02)
CREAT/UREA NIT SERPL: 14
EST. AVERAGE GLUCOSE BLD GHB EST-MCNC: 226 MG/DL
GLUCOSE SERPL-MCNC: 202 MG/DL (ref 74–100)
HBA1C MFR BLD: 9.5 %
POTASSIUM SERPL-SCNC: 4.7 MMOL/L (ref 3.5–5.1)
SODIUM SERPL-SCNC: 139 MMOL/L (ref 136–145)

## 2021-06-16 ENCOUNTER — HISTORICAL (OUTPATIENT)
Dept: INTERNAL MEDICINE | Facility: CLINIC | Age: 56
End: 2021-06-16

## 2021-06-16 ENCOUNTER — HISTORICAL (OUTPATIENT)
Dept: RADIOLOGY | Facility: HOSPITAL | Age: 56
End: 2021-06-16

## 2021-06-16 LAB
ALBUMIN SERPL-MCNC: 4.3 GM/DL (ref 3.5–5)
ALBUMIN/GLOB SERPL: 1.2 RATIO (ref 1.1–2)
ALP SERPL-CCNC: 137 UNIT/L (ref 40–150)
ALT SERPL-CCNC: 36 UNIT/L (ref 0–55)
APPEARANCE, UA: CLEAR
AST SERPL-CCNC: 17 UNIT/L (ref 5–34)
BACTERIA #/AREA URNS AUTO: ABNORMAL /HPF
BILIRUB SERPL-MCNC: 0.2 MG/DL
BILIRUB UR QL STRIP: NEGATIVE
BILIRUBIN DIRECT+TOT PNL SERPL-MCNC: 0.1 MG/DL (ref 0–0.5)
BILIRUBIN DIRECT+TOT PNL SERPL-MCNC: 0.1 MG/DL (ref 0–0.8)
BUN SERPL-MCNC: 13.6 MG/DL (ref 9.8–20.1)
CALCIUM SERPL-MCNC: 9.8 MG/DL (ref 8.4–10.2)
CHLORIDE SERPL-SCNC: 103 MMOL/L (ref 98–107)
CHOLEST SERPL-MCNC: 113 MG/DL
CHOLEST/HDLC SERPL: 4 {RATIO} (ref 0–5)
CO2 SERPL-SCNC: 26 MMOL/L (ref 22–29)
COLOR UR: ABNORMAL
CREAT SERPL-MCNC: 0.83 MG/DL (ref 0.55–1.02)
GLOBULIN SER-MCNC: 3.5 GM/DL (ref 2.4–3.5)
GLUCOSE (UA): >1000 MG/DL
GLUCOSE SERPL-MCNC: 270 MG/DL (ref 74–100)
HDLC SERPL-MCNC: 26 MG/DL (ref 35–60)
HGB UR QL STRIP: NEGATIVE
HYALINE CASTS #/AREA URNS LPF: ABNORMAL /LPF
KETONES UR QL STRIP: NEGATIVE
LDLC SERPL CALC-MCNC: 11 MG/DL (ref 50–140)
LEUKOCYTE ESTERASE UR QL STRIP: NEGATIVE
NITRITE UR QL STRIP: NEGATIVE
PH UR STRIP: 5.5 [PH] (ref 4.5–8)
POTASSIUM SERPL-SCNC: 4.1 MMOL/L (ref 3.5–5.1)
PROT SERPL-MCNC: 7.8 GM/DL (ref 6.4–8.3)
PROT UR QL STRIP: 20 MG/DL
RBC #/AREA URNS AUTO: ABNORMAL /HPF
SODIUM SERPL-SCNC: 137 MMOL/L (ref 136–145)
SP GR UR STRIP: 1.02 (ref 1–1.03)
SQUAMOUS #/AREA URNS LPF: ABNORMAL /LPF
TRIGL SERPL-MCNC: 379 MG/DL (ref 37–140)
UROBILINOGEN UR STRIP-ACNC: NORMAL
VLDLC SERPL CALC-MCNC: 76 MG/DL
WBC #/AREA URNS AUTO: ABNORMAL /HPF

## 2021-07-13 ENCOUNTER — HISTORICAL (OUTPATIENT)
Dept: LAB | Facility: HOSPITAL | Age: 56
End: 2021-07-13

## 2021-07-13 LAB
BUN SERPL-MCNC: 10.7 MG/DL (ref 9.8–20.1)
CALCIUM SERPL-MCNC: 9.6 MG/DL (ref 8.4–10.2)
CHLORIDE SERPL-SCNC: 106 MMOL/L (ref 98–107)
CHOLEST SERPL-MCNC: 117 MG/DL
CHOLEST/HDLC SERPL: 3 {RATIO} (ref 0–5)
CO2 SERPL-SCNC: 29 MMOL/L (ref 22–29)
CREAT SERPL-MCNC: 0.88 MG/DL (ref 0.55–1.02)
CREAT/UREA NIT SERPL: 12
EST. AVERAGE GLUCOSE BLD GHB EST-MCNC: 208.7 MG/DL
GLUCOSE SERPL-MCNC: 214 MG/DL (ref 74–100)
HBA1C MFR BLD: 8.9 %
HDLC SERPL-MCNC: 36 MG/DL (ref 35–60)
LDLC SERPL CALC-MCNC: 47 MG/DL (ref 50–140)
POTASSIUM SERPL-SCNC: 5.1 MMOL/L (ref 3.5–5.1)
SODIUM SERPL-SCNC: 142 MMOL/L (ref 136–145)
TRIGL SERPL-MCNC: 172 MG/DL (ref 37–140)
VLDLC SERPL CALC-MCNC: 34 MG/DL

## 2021-11-29 ENCOUNTER — HISTORICAL (OUTPATIENT)
Dept: LAB | Facility: HOSPITAL | Age: 56
End: 2021-11-29

## 2021-11-29 LAB
APPEARANCE, UA: CLEAR
BACTERIA SPEC CULT: NORMAL
BILIRUB UR QL STRIP: NEGATIVE
BUN SERPL-MCNC: 9.3 MG/DL (ref 9.8–20.1)
CALCIUM SERPL-MCNC: 10.1 MG/DL (ref 8.7–10.5)
CHLORIDE SERPL-SCNC: 101 MMOL/L (ref 98–107)
CHOLEST SERPL-MCNC: 127 MG/DL
CHOLEST/HDLC SERPL: 4 {RATIO} (ref 0–5)
CO2 SERPL-SCNC: 28 MMOL/L (ref 22–29)
COLOR UR: YELLOW
CREAT SERPL-MCNC: 0.86 MG/DL (ref 0.55–1.02)
CREAT/UREA NIT SERPL: 11
EST. AVERAGE GLUCOSE BLD GHB EST-MCNC: 228.8 MG/DL
GLUCOSE (UA): NEGATIVE
GLUCOSE SERPL-MCNC: 256 MG/DL (ref 74–100)
HBA1C MFR BLD: 9.6 %
HDLC SERPL-MCNC: 36 MG/DL (ref 35–60)
HGB UR QL STRIP: NEGATIVE
KETONES UR QL STRIP: NEGATIVE
LDLC SERPL CALC-MCNC: 57 MG/DL (ref 50–140)
LEUKOCYTE ESTERASE UR QL STRIP: NEGATIVE
NITRITE UR QL STRIP: NEGATIVE
PH UR STRIP: 7 [PH] (ref 5–9)
POTASSIUM SERPL-SCNC: 5.2 MMOL/L (ref 3.5–5.1)
PROT UR QL STRIP: NEGATIVE
RBC #/AREA URNS HPF: NORMAL /HPF
SODIUM SERPL-SCNC: 137 MMOL/L (ref 136–145)
SP GR UR STRIP: 1.02 (ref 1–1.03)
SQUAMOUS EPITHELIAL, UA: NORMAL
TRIGL SERPL-MCNC: 169 MG/DL (ref 37–140)
UROBILINOGEN UR STRIP-ACNC: 0.2
VLDLC SERPL CALC-MCNC: 34 MG/DL
WBC #/AREA URNS HPF: NORMAL /HPF

## 2021-12-13 ENCOUNTER — HISTORICAL (OUTPATIENT)
Dept: LAB | Facility: HOSPITAL | Age: 56
End: 2021-12-13

## 2021-12-13 LAB
ABS NEUT (OLG): 3.03 X10(3)/MCL (ref 2.1–9.2)
BASOPHILS # BLD AUTO: 0 X10(3)/MCL (ref 0–0.2)
BASOPHILS NFR BLD AUTO: 1 %
CHOLEST SERPL-MCNC: 98 MG/DL
CHOLEST/HDLC SERPL: 3 {RATIO} (ref 0–5)
COLOR STL: NORMAL
CONSISTENCY STL: NORMAL
EOSINOPHIL # BLD AUTO: 0.1 X10(3)/MCL (ref 0–0.9)
EOSINOPHIL NFR BLD AUTO: 1 %
ERYTHROCYTE [DISTWIDTH] IN BLOOD BY AUTOMATED COUNT: 13.6 % (ref 11.5–17)
EST. AVERAGE GLUCOSE BLD GHB EST-MCNC: 226 MG/DL
HBA1C MFR BLD: 9.5 %
HCT VFR BLD AUTO: 42.9 % (ref 37–47)
HDLC SERPL-MCNC: 30 MG/DL (ref 35–60)
HEMOCCULT SP1 STL QL: NEGATIVE
HGB BLD-MCNC: 12.9 GM/DL (ref 12–16)
IMM GRANULOCYTES # BLD AUTO: 0.01 % (ref 0–0.02)
IMM GRANULOCYTES NFR BLD AUTO: 0.1 % (ref 0–0.43)
LDLC SERPL CALC-MCNC: 44 MG/DL (ref 50–140)
LYMPHOCYTES # BLD AUTO: 3.2 X10(3)/MCL (ref 0.6–4.6)
LYMPHOCYTES NFR BLD AUTO: 47 %
MCH RBC QN AUTO: 26.1 PG (ref 27–31)
MCHC RBC AUTO-ENTMCNC: 30.1 GM/DL (ref 33–36)
MCV RBC AUTO: 86.8 FL (ref 80–94)
MONOCYTES # BLD AUTO: 0.5 X10(3)/MCL (ref 0.1–1.3)
MONOCYTES NFR BLD AUTO: 8 %
NEUTROPHILS # BLD AUTO: 3.03 X10(3)/MCL (ref 1.4–7.9)
NEUTROPHILS NFR BLD AUTO: 44 %
PLATELET # BLD AUTO: 285 X10(3)/MCL (ref 130–400)
PMV BLD AUTO: 11 FL (ref 9.4–12.4)
RBC # BLD AUTO: 4.94 X10(6)/MCL (ref 4.2–5.4)
TRIGL SERPL-MCNC: 121 MG/DL (ref 37–140)
VLDLC SERPL CALC-MCNC: 24 MG/DL
WBC # SPEC AUTO: 6.9 X10(3)/MCL (ref 4.5–11.5)

## 2022-01-04 ENCOUNTER — HISTORICAL (OUTPATIENT)
Dept: LAB | Facility: HOSPITAL | Age: 57
End: 2022-01-04

## 2022-01-04 LAB — SARS-COV-2 AG RESP QL IA.RAPID: POSITIVE

## 2022-01-12 ENCOUNTER — HISTORICAL (OUTPATIENT)
Dept: LAB | Facility: HOSPITAL | Age: 57
End: 2022-01-12

## 2022-01-12 LAB
BUN SERPL-MCNC: 9.8 MG/DL (ref 9.8–20.1)
CALCIUM SERPL-MCNC: 10.3 MG/DL (ref 8.7–10.5)
CHLORIDE SERPL-SCNC: 100 MMOL/L (ref 98–107)
CO2 SERPL-SCNC: 31 MMOL/L (ref 22–29)
CREAT SERPL-MCNC: 0.8 MG/DL (ref 0.55–1.02)
CREAT/UREA NIT SERPL: 12
GLUCOSE SERPL-MCNC: 259 MG/DL (ref 74–100)
POTASSIUM SERPL-SCNC: 4.5 MMOL/L (ref 3.5–5.1)
SODIUM SERPL-SCNC: 138 MMOL/L (ref 136–145)

## 2022-01-20 ENCOUNTER — HISTORICAL (OUTPATIENT)
Dept: LAB | Facility: HOSPITAL | Age: 57
End: 2022-01-20

## 2022-01-20 LAB
FLUAV AG UPPER RESP QL IA.RAPID: NEGATIVE
FLUBV AG UPPER RESP QL IA.RAPID: NEGATIVE
SARS-COV-2 RNA RESP QL NAA+PROBE: NEGATIVE

## 2022-03-28 ENCOUNTER — HISTORICAL (OUTPATIENT)
Dept: INTERNAL MEDICINE | Facility: CLINIC | Age: 57
End: 2022-03-28

## 2022-03-28 LAB
BUN SERPL-MCNC: 11.3 MG/DL (ref 9.8–20.1)
CALCIUM SERPL-MCNC: 10.8 MG/DL (ref 8.7–10.5)
CHLORIDE SERPL-SCNC: 102 MMOL/L (ref 98–107)
CO2 SERPL-SCNC: 29 MMOL/L (ref 22–29)
CREAT SERPL-MCNC: 0.83 MG/DL (ref 0.55–1.02)
CREAT/UREA NIT SERPL: 14
EST. AVERAGE GLUCOSE BLD GHB EST-MCNC: 246 MG/DL
GLUCOSE SERPL-MCNC: 290 MG/DL (ref 74–100)
HBA1C MFR BLD: 10.2 %
ICTERIC INTERF INDEX SERPL-ACNC: 0
LIPEMIC INTERF INDEX SERPL-ACNC: 7
POTASSIUM SERPL-SCNC: 4.4 MMOL/L (ref 3.5–5.1)
SODIUM SERPL-SCNC: 139 MMOL/L (ref 136–145)

## 2022-04-10 ENCOUNTER — HISTORICAL (OUTPATIENT)
Dept: ADMINISTRATIVE | Facility: HOSPITAL | Age: 57
End: 2022-04-10
Payer: COMMERCIAL

## 2022-04-27 VITALS
SYSTOLIC BLOOD PRESSURE: 119 MMHG | WEIGHT: 187.38 LBS | DIASTOLIC BLOOD PRESSURE: 77 MMHG | BODY MASS INDEX: 31.99 KG/M2 | OXYGEN SATURATION: 98 % | HEIGHT: 64 IN

## 2022-05-03 DIAGNOSIS — I10 HYPERTENSION: Primary | ICD-10-CM

## 2022-05-03 RX ORDER — SERTRALINE HYDROCHLORIDE 50 MG/1
50 TABLET, FILM COATED ORAL DAILY
COMMUNITY
End: 2022-06-17

## 2022-05-03 RX ORDER — INSULIN GLARGINE 100 [IU]/ML
2-12 INJECTION, SOLUTION SUBCUTANEOUS SEE ADMIN INSTRUCTIONS
COMMUNITY
End: 2022-05-04

## 2022-05-03 RX ORDER — ZOLPIDEM TARTRATE 5 MG/1
5 TABLET ORAL NIGHTLY PRN
COMMUNITY
End: 2022-06-08 | Stop reason: SDUPTHER

## 2022-05-03 RX ORDER — GLIPIZIDE 10 MG/1
10 TABLET ORAL
COMMUNITY
End: 2022-09-26 | Stop reason: SDUPTHER

## 2022-05-03 RX ORDER — INSULIN GLARGINE 100 [IU]/ML
52 INJECTION, SOLUTION SUBCUTANEOUS NIGHTLY
COMMUNITY
End: 2022-05-04 | Stop reason: SDUPTHER

## 2022-05-03 RX ORDER — AMLODIPINE BESYLATE 5 MG/1
5 TABLET ORAL DAILY
Qty: 30 TABLET | Refills: 1 | Status: SHIPPED | OUTPATIENT
Start: 2022-05-03 | End: 2022-07-21 | Stop reason: SDUPTHER

## 2022-05-03 RX ORDER — AMLODIPINE BESYLATE 5 MG/1
TABLET ORAL
COMMUNITY
End: 2022-05-03 | Stop reason: SDUPTHER

## 2022-05-03 RX ORDER — PRAVASTATIN SODIUM 20 MG/1
20 TABLET ORAL DAILY
COMMUNITY

## 2022-05-03 RX ORDER — AMLODIPINE BESYLATE 5 MG/1
5 TABLET ORAL DAILY
COMMUNITY
End: 2022-05-03 | Stop reason: SDUPTHER

## 2022-05-03 RX ORDER — GABAPENTIN 300 MG/1
300 CAPSULE ORAL NIGHTLY
COMMUNITY

## 2022-05-03 RX ORDER — ASPIRIN 81 MG/1
81 TABLET ORAL DAILY
COMMUNITY
End: 2024-01-24

## 2022-05-03 RX ORDER — METFORMIN HYDROCHLORIDE 1000 MG/1
1000 TABLET ORAL 2 TIMES DAILY WITH MEALS
COMMUNITY
End: 2022-09-26 | Stop reason: SDUPTHER

## 2022-05-03 RX ORDER — IBUPROFEN 800 MG/1
800 TABLET ORAL 3 TIMES DAILY PRN
COMMUNITY
End: 2022-12-15 | Stop reason: SDUPTHER

## 2022-05-03 NOTE — HISTORICAL [facility]ER
This is a historical note converted from [employer]. Formatting and pictures may have been removed.  Please reference [employer] for original formatting and attached multimedia. Chief Complaint  follow up  History of Present Illness  52 y/o female here for f/u. ?[1]. Pt states she has an appt with Dr Jarquin on 9-17-18 for CTS and left elbow denervation.Pt has hx HTN, DM2 uncontrolled, Abnl CBC, Mild mitral valve regurg, hx 5 mm right upper lung lobe nodule stable since 2014. [1] Pt was treated for pneumonia 9-26-18.  Review of Systems  Constitutional: negative except as stated in HPI  Eye: negative except as stated in HPI  ENT: negative except as stated in HPI  Respiratory: negative except as stated in HPI  Cardiovascular: negative except as stated in HPI  Gastrointestinal: negative except as stated in HPI  Genitourinary: negative except as stated in HPI  Heme/Lymph: negative except as stated in HPI  Endocrine: negative except as stated in HPI  Immunologic: negative except as stated in HPI  Musculoskeletal: negative except as stated in HPI  Integumentary: negative except as stated in HPI  Neurologic: negative except as stated in HPI  ?   All Other ROS_ negative except as stated in HPI  ?  Physical Exam  Vitals & Measurements  T:?36.5? ?C (Oral)? HR:?91(Peripheral)? RR:?20? BP:?133/85?  HT:?163?cm? HT:?163?cm? WT:?80?kg? WT:?80?kg? BMI:?30.11?  General: Alert and oriented, No acute distress.  Eye: Pupils are equal, round and reactive to light, Extraocular movements are intact.  HENT: Normocephalic.  Neck: Supple, Non-tender, No carotid bruit, No lymphadenopathy.  Respiratory: Lungs are clear to auscultation, Respirations are non-labored, Breath sounds are equal, Symmetrical chest wall expansion.  Cardiovascular: Normal rate, Regular rhythm, No murmur.  Gastrointestinal: Soft, Non-tender, Non-distended, Normal bowel sounds.  Musculoskeletal: Normal range of motion.  Integumentary: Warm, Dry, Intact.  Neurologic: No focal  deficits, Cranial Nerves II-XII are grossly intact.  Assessment/Plan  Abnormal CBC?R79.89  ? ?Labs in?2 months. [2]  Ordered:  CBC w/ Auto Diff, Routine collect, *Est. 01/28/19 3:00:00 CST, Blood, Order for future visit, *Est. Stop date 01/28/19 3:00:00 CST, Lab Collect, Abnormal CBC, 11/28/18 13:47:00 CST  Office/Outpatient Visit Level 4 Established [id number] PC, Abnormal CBC  Arthritis  Bilateral hand pain  CTS (carpal tunnel syndrome)  De Quervains tenosynovitis  Diabetes mellitus type 2, uncontrolled, without complications  Heel pain, bilateral  HTN (hypertension)  Obesity  Well adult exam  Encou...  ?  Arthritis?M19.90  ? Diclofenac 75 mg 1 tab po BID prn pain. [3]  Ordered:  Office/Outpatient Visit Level 4 Established [id number] PC, Abnormal CBC  Arthritis  Bilateral hand pain  CTS (carpal tunnel syndrome)  De Quervains tenosynovitis  Diabetes mellitus type 2, uncontrolled, without complications  Heel pain, bilateral  HTN (hypertension)  Obesity  Well adult exam  Encou...  ?  Bilateral hand pain?M79.641  ? ?Keep appt with Dr Jarquin. [4]  Ordered:  Office/Outpatient Visit Level 4 Established [id number] PC, Abnormal CBC  Arthritis  Bilateral hand pain  CTS (carpal tunnel syndrome)  De Quervains tenosynovitis  Diabetes mellitus type 2, uncontrolled, without complications  Heel pain, bilateral  HTN (hypertension)  Obesity  Well adult exam  Encou...  ?  CTS (carpal tunnel syndrome)?G56.00  ? ?Keep appt with Dr Jarquin for mgmt. [5]  Ordered:  Office/Outpatient Visit Level 4 Established [id number] PC, Abnormal CBC  Arthritis  Bilateral hand pain  CTS (carpal tunnel syndrome)  De Quervains tenosynovitis  Diabetes mellitus type 2, uncontrolled, without complications  Heel pain, bilateral  HTN (hypertension)  Obesity  Well adult exam  Encou...  ?  De Quervains tenosynovitis?M65.4  ? Diclofenac 75 mg 1 tab po BID prn pain. [6]  Ordered:  Office/Outpatient Visit Level 4 Established [id number] PC, Abnormal  CBC  Arthritis  Bilateral hand pain  CTS (carpal tunnel syndrome)  De Quervains tenosynovitis  Diabetes mellitus type 2, uncontrolled, without complications  Heel pain, bilateral  HTN (hypertension)  Obesity  Well adult exam  Encou...  ?  Diabetes mellitus type 2, uncontrolled, without complications?E11.65  ? ADA?diet and exercise. A1c 8.80 Cont DM med as prescribed. ?Last eye exam 5-10-18. Pt states was diagnosed with beginning stages of Glaucoma. Pt UTD with flu and pneumovax. [7] Urine microalbumin level in 2 months. DM foot exam done 3-1-18.  Ordered:  Fructosamine-[facility] [id number], Routine collect, *Est. 01/28/19 3:00:00 CST, Blood, Order for future visit, *Est. Stop date 01/28/19 3:00:00 CST, Lab Collect, Diabetes mellitus type 2, uncontrolled, without complications, 11/28/18 13:47:00 CST  Hemoglobin A1C [facility], Routine collect, *Est. 01/28/19 3:00:00 CST, Blood, Order for future visit, *Est. Stop date 01/28/19 3:00:00 CST, Lab Collect, Diabetes mellitus type 2, uncontrolled, without complications, 11/28/18 13:47:00 CST  Microalbum/Creatinine Ratio Urine (Microalb/Creat), Routine collect, Urine, Order for future visit, *Est. 11/28/18 3:00:00 CST, *Est. Stop date 11/28/18 3:00:00 CST, Nurse collect, Diabetes mellitus type 2, uncontrolled, without complications  Office/Outpatient Visit Level 4 Established [id number] PC, Abnormal CBC  Arthritis  Bilateral hand pain  CTS (carpal tunnel syndrome)  De Quervains tenosynovitis  Diabetes mellitus type 2, uncontrolled, without complications  Heel pain, bilateral  HTN (hypertension)  Obesity  Well adult exam  Encou...  Thyroid Stimulating Hormone, Routine collect, *Est. 01/28/19 3:00:00 CST, Blood, Order for future visit, *Est. Stop date 01/28/19 3:00:00 CST, Lab Collect, Diabetes mellitus type 2, uncontrolled, without complications, 11/28/18 13:47:00 CST  ?  Encounter for screening mammogram for malignant neoplasm of breast?Z12.31  ?MMG in 4  months.  Ordered:  MG Screening, Routine, *Est. 03/28/19 3:00:00 CDT, Screening, None, Ambulatory, Patient Has IV?, Rad Type, Order for future visit, Encounter for screening mammogram for malignant neoplasm of breast, Schedule this test, [facility], Follow Breast...  Office/Outpatient Visit Level 4 Established [id number] PC, Abnormal CBC  Arthritis  Bilateral hand pain  CTS (carpal tunnel syndrome)  De Quervains tenosynovitis  Diabetes mellitus type 2, uncontrolled, without complications  Heel pain, bilateral  HTN (hypertension)  Obesity  Well adult exam  Encou...  ?  Heel pain, bilateral?M79.671  ? Diclofenac 75 mg 1 tab po BID prn pain. [8]  Ordered:  Office/Outpatient Visit Level 4 Established [id number] PC, Abnormal CBC  Arthritis  Bilateral hand pain  CTS (carpal tunnel syndrome)  De Quervains tenosynovitis  Diabetes mellitus type 2, uncontrolled, without complications  Heel pain, bilateral  HTN (hypertension)  Obesity  Well adult exam  Encou...  ?  HTN (hypertension)?I10  ? ??Low fat low salt diet and exercise. cont med as prescribed. [9]  Ordered:  Comprehensive Metabolic Panel, Routine collect, *Est. 01/28/19 3:00:00 CST, Blood, Order for future visit, *Est. Stop date 01/28/19 3:00:00 CST, Lab Collect, HTN (hypertension), 11/28/18 13:47:00 CST  Lipid Panel, Routine collect, *Est. 01/28/19 3:00:00 CST, Blood, Order for future visit, *Est. Stop date 01/28/19 3:00:00 CST, Lab Collect, HTN (hypertension), 11/28/18 13:47:00 CST  Office/Outpatient Visit Level 4 Established [id number] PC, Abnormal CBC  Arthritis  Bilateral hand pain  CTS (carpal tunnel syndrome)  De Quervains tenosynovitis  Diabetes mellitus type 2, uncontrolled, without complications  Heel pain, bilateral  HTN (hypertension)  Obesity  Well adult exam  Encou...  Urinalysis with Microscopic if Indicated, Routine collect, Urine, Order for future visit, *Est. 11/28/18 3:00:00 CST, *Est. Stop date 11/28/18 3:00:00  CST, Nurse collect, HTN (hypertension)  ?  Obesity?E66.9  ? ??Encouraged low fat diet and exercise. Education provided. [10]  Ordered:  Office/Outpatient Visit Level 4 Established [id number] PC, Abnormal CBC  Arthritis  Bilateral hand pain  CTS (carpal tunnel syndrome)  De Quervains tenosynovitis  Diabetes mellitus type 2, uncontrolled, without complications  Heel pain, bilateral  HTN (hypertension)  Obesity  Well adult exam  Encou...  ?  Pneumonia?J18.9  ?Pt was admitted for Comm Aquired Pneumonia 9-28-18. States doing better since discharge. CXR today.  Ordered:  Office/Outpatient Visit Level 4 Established [id number] PC, Abnormal CBC  Arthritis  Bilateral hand pain  CTS (carpal tunnel syndrome)  De Quervains tenosynovitis  Diabetes mellitus type 2, uncontrolled, without complications  Heel pain, bilateral  HTN (hypertension)  Obesity  Well adult exam  Encou...  XR Chest 2 Views, Routine, *Est. 11/28/18 3:00:00 CST, Pneumonia, None, Ambulatory, Patient Has IV?, Rad Type, Order for future visit, Pneumonia, Not Scheduled, *Est. 11/28/18 3:00:00 CST  ?  Vaginal candidiasis?B37.3  ?Pt states she has been having a vaginal yeast infection with itching since hospital discharge. RX Diflucan 150 mg 1 tab po X 1 dose may repeat in 3 days if needed.  Ordered:  Office/Outpatient Visit Level 4 Established [id number] PC, Abnormal CBC  Arthritis  Bilateral hand pain  CTS (carpal tunnel syndrome)  De Quervains tenosynovitis  Diabetes mellitus type 2, uncontrolled, without complications  Heel pain, bilateral  HTN (hypertension)  Obesity  Well adult exam  Encou...  ?  Well adult exam?Z00.00  ? Labs?1 week prior to appt in Jan 2019. Pt UTD with GYN exam. MMG in 4 months. FIT test in 2 months.  Ordered:  Office/Outpatient Visit Level 4 Established [id number] PC, Abnormal CBC  Arthritis  Bilateral hand pain  CTS (carpal tunnel syndrome)  De Quervains tenosynovitis  Diabetes mellitus type 2, uncontrolled, without  complications  Heel pain, bilateral  HTN (hypertension)  Obesity  Well adult exam  Encou...  ?  Keep scheduled appt 1-24-19 with labs 1 week prior to appt.   Problem List/Past Medical History  Ongoing  Bilateral carpal tunnel syndrome  DM2 (diabetes mellitus, type 2)  HTN (hypertension)  Insomnia  Knee pain, bilateral  Musculoskeletal pain  Pelvic pain  Post-menopause bleeding  Pulmonary nodules  Radial styloid tenosynovitis of left hand  Radial styloid tenosynovitis of right hand  Radial styloid tenosynovitis [de quervain]  Shift work sleep disorder  Tricuspid regurgitation  Urinary frequency  Historical  No qualifying data  Medications  Ambien 5 mg oral tablet, 5 mg= 1 tab(s), Oral, Once a day (at bedtime), PRN, 5 refills,? ?Not Taking per Prescriber  amlodipine 5 mg oral tablet, 5 mg= 1 tab(s), Oral, Daily, 11 refills  aspirin 81 mg oral tablet, 81 mg= 1 tab(s), Oral, Daily  AZITHROMYCIN 500 MG TABLET, 500 mg= 1 tab(s), Oral, Daily,? ?Not taking  BENZONATATE 100 MG CAPSULE,? ?Not taking  diclofenac sodium 75 mg oral delayed release tablet, 75 mg= 1 tab(s), Oral, BID, PRN, 6 refills  gabapentin 300 mg oral capsule, 300 mg= 1 cap(s), Oral, Once a day (at bedtime), 6 refills  glipiZIDE 10 mg oral tablet, 10 mg= 1 tab(s), Oral, BID, 3 refills  HumaLOG KwikPen 100 units/mL injectable solution, See Instructions, 6 refills,? ?Not taking: NO TAKING AT THIS TIME  Insulin syringes, See Instructions, 11 refills  Levemir 100 units/mL subcutaneous solution, 22 units, Subcutaneous, Once a day (at bedtime), 3 refills  metFORMIN 1000 mg oral tablet, 1000 mg= 1 tab(s), Oral, BID, 11 refills  Ofirmev, 1000 mg= 100 mL, IV Piggyback, Once  Pen Needles, See Instructions, 3 refills  Premarin 0.625 mg/g vaginal cream with applicator, 1 gm, VAG, qPM,? ?Not taking  PROVIGIL 200MG TAB, 200 mg= 1 tab(s), Oral, Daily,? ?Not Taking per Prescriber  TRULICITY 0.75 MG/0.5 ML PEN,? ?Not taking: CHANGED TO LANTUS  Tylenol with Codeine #3  oral tablet, 1 tab(s), Oral, q6hr, PRN,? ?Not taking: OUT OF MED  ZITHROMAX 500MG TAB, 500 mg= 1 tab(s), Oral, Daily,? ?Not taking  Allergies  No Known Allergies  Social History  Alcohol - Denies Alcohol Use, 2013  Never, 2015  Employment/School  Employed, Activity level: Moderate physical work. Highest education level: Some college. Operates hazardous equipment: No. Workplace hazards: Heavy lifting/twisting, Repetitive motion, Shift/Night work., 2015  Exercise - Does not exercise, 2015  Home/Environment  Lives with Children, Siblings. Living situation: Home/Independent. Alcohol abuse in household: No. Substance abuse in household: No. Smoker in household: Yes. Injuries/Abuse/Neglect in household: No. Feels unsafe at home: No. Safe place to go: Yes. Family/Friends available for support: Yes. Concern for family members at home: No., 2017  Nutrition/Health  Regular, Caffeine intake amount: 4 cups coffee a day. 2 to 3 diet cokes a week. Wants to lose weight: Yes. Sleeping concerns: Yes. Feels highly stressed: Yes., 2015  Other  Sexual - No Sexual Activity, 2015  Sexually active: No., 2015  Substance Abuse - Denies Substance Abuse, 2015  Never, 2015  Tobacco - Denies Tobacco Use, 2013  Never smoker Use:. Previous treatment: None., 2018  Family History  : Mother and Father.  Diabetes mellitus type 2: Mother and Father.  Hypertension.: Mother and Father.  Lung cancer: Father.  Primary malignant neoplasm of brain: Sister.  Renal failure syndrome.: Mother.  Thyroid: Sister.  Immunizations  Vaccine Date Status Comments   influenza virus vaccine, inactivated - Not Given Parent Or Guardian Refuses     influenza virus vaccine, inactivated 2017 Recorded    influenza virus vaccine, inactivated 10/03/2016 Recorded    pneumococcal 23-polyvalent vaccine 06/15/2016 Recorded    Health Maintenance  Health Maintenance  ???Pending?(in the next  year)  ??? ??OverDue  ??? ? ? ?Colorectal Screening due??07/18/16??and every 1??year(s)  ??? ? ? ?Diabetes Maintenance-Microalbumin due??07/06/18??and every 1??year(s)  ??? ? ? ?Diabetes Maintenance-Fasting Lipid Profile due??10/09/18??and every 1??year(s)  ??? ??Due?  ??? ? ? ?ADL Screening due??11/28/18??and every 1??year(s)  ??? ? ? ?Influenza Vaccine due??11/28/18??and every?  ??? ??Refused?  ??? ? ? ?Tetanus Vaccine due??11/28/18??and every 10??year(s)  ??? ??Due In Future?  ??? ? ? ?Diabetes Maintenance-Foot Exam not due until??03/01/19??and every 1??year(s)  ??? ? ? ?Diabetes Maintenance-Eye Exam not due until??05/10/19??and every 1??year(s)  ??? ? ? ?Diabetes Maintenance-HgbA1c not due until??09/04/19??and every 1??year(s)  ??? ? ? ?Diabetes Maintenance-Medication Prescribed not due until??09/04/19??and every 1??year(s)  ??? ? ? ?Alcohol Misuse Screening not due until??09/04/19??and every 1??year(s)  ??? ? ? ?Blood Pressure Screening not due until??09/10/19??and every 1??year(s)  ??? ? ? ?Depression Screening not due until??09/10/19??and every 1??year(s)  ??? ? ? ?Hypertension Management-Blood Pressure not due until??09/10/19??and every 1??year(s)  ??? ? ? ?Body Mass Index Check not due until??09/24/19??and every 1??year(s)  ??? ? ? ?Obesity Screening not due until??09/24/19??and every 1??year(s)  ??? ? ? ?Diabetes Maintenance-Urine Dipstick not due until??09/24/19??and every 1??year(s)  ??? ? ? ?Hypertension Management-BMP not due until??09/26/19??and every 1??year(s)  ??? ? ? ?Diabetes Maintenance-Serum Creatinine not due until??09/26/19??and every 1??year(s)  ??? ? ? ?Aspirin Therapy for CVD Prevention not due until??09/26/19??and every 1??year(s)  ???Satisfied?(in the past 1 year)  ??? ??Satisfied?  ??? ? ? ?Alcohol Misuse Screening on??09/04/18.??Satisfied by Vivien HINES, Kelin AGGARWAL.  ??? ? ? ?Aspirin Therapy for CVD Prevention on??09/26/18.??Satisfied by Arturo STOVER, Wandy Lobo  ??? ? ? ?Blood Pressure  Screening on??09/26/18.??Satisfied by Wandy Morris RN  ??? ? ? ?Body Mass Index Check on??09/24/18.??Satisfied by Ora Joseph RN  ??? ? ? ?Breast Cancer Screening on??03/08/18.??Satisfied by Snow Willson  ??? ? ? ?Depression Screening on??09/10/18.??Satisfied by Karol Daniel LPN  ??? ? ? ?Diabetes Maintenance-Eye Exam on??05/10/18.??Satisfied by Javier Covarrubias  ??? ? ? ?Diabetes Maintenance-Foot Exam on??03/01/18.??Satisfied by Kelin Puga NP.  ??? ? ? ?Diabetes Maintenance-HgbA1c on??09/04/18.??Satisfied by Gilberto Patel Jr.  ??? ? ? ?Diabetes Maintenance-Medication Prescribed on??09/04/18.  ??? ? ? ?Diabetes Maintenance-Serum Creatinine on??09/26/18.??Satisfied by Juani Baptiste  ??? ? ? ?Diabetes Maintenance-Urine Dipstick on??09/24/18.??Satisfied by Jeanette Kumari  ??? ? ? ?Diabetes Screening on??09/26/18.??Satisfied by Juani Baptiste  ??? ? ? ?Hypertension Management-Blood Pressure on??09/26/18.??Satisfied by Wandy Morris RN  ??? ? ? ?Hypertension Management-BMP on??09/26/18.??Satisfied by Juani Baptiste  ??? ? ? ?Influenza Vaccine on??09/24/18.??Satisfied by Wandy Morris RN  ??? ? ? ?Obesity Screening on??09/24/18.??Satisfied by Ora Joseph RN  ??? ? ? ?Smoking Cessation (Coronary Artery Disease) on??09/24/18.??Satisfied by Ora Joseph RN  ??? ? ? ?Smoking Cessation (Diabetes) on??09/24/18.??Satisfied by Jake STOVER, Ora Saba  ??? ??Postponed?  ??? ? ? ?Influenza Vaccine on??03/01/18.??Recorded by Kelin Puga NP  ??? ??Refused?  ??? ? ? ?Tetanus Vaccine on??09/04/18.??Recorded by Kelin Puga NP??Reason: Patient Refuses  ?  ?     [1]?Office Visit Note; Kelin Puga NP 09/04/2018 08:06 CDT  [2]?Office Visit Note; Kelin Puga NP 09/04/2018 08:06 CDT  [3]?Office Visit Note; Kelin Puga NP 09/04/2018 08:06 CDT  [4]?Office Visit Note; Kelin Puga NP 09/04/2018  08:06 CDT  [5]?Office Visit Note; Kelin Puga NP 09/04/2018 08:06 CDT  [6]?Office Visit Note; Kelin Puga NP 09/04/2018 08:06 CDT  [7]?Office Visit Note; Kelin Puga NP 09/04/2018 08:06 CDT  [8]?Office Visit Note; Kelin Puga NP 09/04/2018 08:06 CDT  [9]?Office Visit Note; Kelin Puga NP 09/04/2018 08:06 CDT  [10]?Office Visit Note; Kelin Puga NP 09/04/2018 08:06 CDT

## 2022-05-03 NOTE — HISTORICAL [facility]ERNER
This is a historical note converted from [place]. Formatting and pictures may have been removed.  Please reference [place] for original formatting and attached multimedia. Chief Complaint  feet hands wrist ankle joints pain/hx arthritis  History of Present Illness  53 y/o female here for initial visit. Pt has hx HTN, DM2 uncontrolled, Abnl CBC, Mild mitral valve regurg, hx 5 mm right upper lung lobe nodule stable since 2014. Pt was previously followed by MALIA Gallegos NP. Pt followed in Cardio and Ortho cl. Pt requesting refill Provigil- informed pt I do not prescribe this type of medication as it is controlled and regulated by the [facility]. Pt states well Perry did it. I informed pt that is Maria Teresazette and I will not prescribe this medication. Pt states she was prescribed Provigil in AM to stay awake and prescribed Ambien at night to go to sleep. Pt states she had a Hyst in Dec 2017 and since then has been off insulin- states the MD that discharged her messed up what insulins she was suppose to be taking. It is documented in patients chart that she is non compliant with diet and med regimen- see MALIA Gallegos NP note dated 10-12-17.  Review of Systems  Constitutional: negative except as stated in HPI  Eye: negative except as stated in HPI  ENT: negative except as stated in HPI  Respiratory: negative except as stated in HPI  Cardiovascular: negative except as stated in HPI  Gastrointestinal: negative except as stated in HPI  Genitourinary: negative except as stated in HPI  Heme/Lymph: negative except as stated in HPI  Endocrine: negative except as stated in HPI  Immunologic: negative except as stated in HPI  Musculoskeletal: negative except as stated in HPI  Integumentary: negative except as stated in HPI  Neurologic: negative except as stated in HPI  ?   All Other ROS_ negative except as stated in HPI  ?  Physical Exam  Vitals & Measurements  T:?36.9? ?C (Oral)? HR:?90(Peripheral)? RR:?18? BP:?136/75?  HT:?160?cm? HT:?160?cm?  WT:?78.3?kg? WT:?78.3?kg? BMI:?30.59?  General: Alert and oriented, No acute distress.  Eye: Pupils are equal, round and reactive to light, Extraocular movements are intact.  HENT: Normocephalic.  Neck: Supple, Non-tender, No carotid bruit, No lymphadenopathy.  Respiratory: Lungs are clear to auscultation, Respirations are non-labored, Breath sounds are equal, Symmetrical chest wall expansion.  Cardiovascular: Normal rate, Regular rhythm, No murmur.  Gastrointestinal: Soft, Non-tender, Non-distended, Normal bowel sounds.  Musculoskeletal: Decreased ROM to bilat hands and ankles. + bilat swelling noted to hands up slightly passed wrist area. Pt has difficulty closing fist.  Integumentary: Warm, Dry, Intact.  Neurologic: No focal deficits, Cranial Nerves II-XII are grossly intact.  Assessment/Plan  Abnormal CBC  ? Labs today.  Ordered:  CBC w/ Auto Diff, Routine collect, *Est. 03/01/18 3:00:00 CST, Blood, Order for future visit, *Est. Stop date 03/01/18 3:00:00 CST, Lab Collect, Abnormal CBC, 03/01/18 12:31:00 CST  Office/Outpatient Visit Level 4 Established 60132 PC, DM (diabetes mellitus), type 2, uncontrolled  HTN (hypertension)  Tendinitis, de Quervains  Well adult exam  Abnormal CBC, [facility], 03/01/18 13:25:00 CST  ?  Arthritis  ?XR bilat hands today. Auto immune labs today. D/C Meloxicam per pt request due to not helping joint pain. RX Naproxen 500 mg 1 tab po BID prn pain.  Ordered:  naproxen, 500 mg = 1 tab(s), Oral, BID, PRN PRN as needed for arthritis, # 60 tab(s), 6 Refill(s), Pharmacy: [id number]  Antinuclear Antibodies by I[facility] [id number], Routine collect, *Est. 03/01/18 3:00:00 CST, Blood, Order for future visit, *Est. Stop date 03/01/18 3:00:00 CST, Lab Collect, Arthritis, 03/01/18 13:07:00 CST  CRP, Routine collect, *Est. 03/01/18 3:00:00 CST, Blood, Order for future visit, *Est. Stop date 03/01/18 3:00:00 CST, Lab Collect, Arthritis, 03/01/18 13:07:00 CST  Rheumatoid Factor  Quantitative, Routine collect, *Est. 03/01/18 3:00:00 CST, Blood, Order for future visit, *Est. Stop date 03/01/18 3:00:00 CST, Lab Collect, Arthritis, 03/01/18 13:07:00 CST  Sedimentation Rate, Routine collect, *Est. 03/01/18 3:00:00 CST, Blood, Order for future visit, *Est. Stop date 03/01/18 3:00:00 CST, Lab Collect, Arthritis, 03/01/18 13:07:00 CST  XR Hand Left Minimum 3 Views, Routine, 03/01/18 13:08:00 CST, Pain, None, Ambulatory, Rad Type, Order for future visit, Arthritis, 03/01/18 13:08:00 CST  XR Hand Right Minimum 3 Views, Routine, 03/01/18 13:08:00 CST, Pain, None, Ambulatory, Rad Type, Order for future visit, Arthritis, 03/01/18 13:08:00 CST  ?  DM (diabetes mellitus), type 2, uncontrolled  ? ADA?diet and exercise. A1c 8.8. Cont DM med as prescribed. ?Last eye exam 1-6-17. Refer to eye cl for DM eye exam. Pt UTD with flu and pneumovax.  Ordered:  Clinic Follow up, *Est. 09/01/18 3:00:00 CDT, in 6 months with NAYANA Puga, Order for future visit, DM (diabetes mellitus), type 2, uncontrolled  HTN (hypertension)  Tendinitis, de Quervains  Well adult exam, [facility]  Fructosamine[facility] [id number], Routine collect, *Est. 03/01/18 3:00:00 CST, Blood, Order for future visit, *Est. Stop date 03/01/18 3:00:00 CST, Lab Collect, DM (diabetes mellitus), type 2, uncontrolled, 03/01/18 12:31:00 CST  Hemoglobin A1C [facility], Routine collect, *Est. 03/01/18 3:00:00 CST, Blood, Order for future visit, *Est. Stop date 03/01/18 3:00:00 CST, Lab Collect, DM (diabetes mellitus), type 2, uncontrolled, 03/01/18 12:31:00 CST  Internal Referral to Ophthalmology, DM eye exam, *Est. 03/31/18 3:00:00 CDT, Future Visit?, DM (diabetes mellitus), type 2, uncontrolled  Office/Outpatient Visit Level 4 Established 51012 PC, DM (diabetes mellitus), type 2, uncontrolled  HTN (hypertension)  Tendinitis, de Quervains  Well adult exam  Abnormal CBC, [facility], 03/01/18 13:25:00 CST  ?  Encounter for screening for malignant neoplasm  of colon  ? FIT test in 1?month.  Ordered:  Occult Blood, Fecal, Immunoassay [facility] [id number], Routine collect, *Est. 03/01/18 3:00:00 CST, Stool, Order for future visit, *Est. Stop date 03/01/18 3:00:00 CST, Nurse collect, Encounter for screening for malignant neoplasm of colon, 03/01/18 12:32:00 CST  ?  HTN (hypertension)  ?Low fat low salt diet and exercise. cont med as prescribed.  Ordered:  amLODIPine, 5 mg = 1 tab(s), Oral, Daily, # 30 tab(s), 11 Refill(s), Pharmacy: [id number]  Clinic Follow up, *Est. 09/01/18 3:00:00 CDT, in 6 months with NAYANA Puga, Order for future visit, DM (diabetes mellitus), type 2, uncontrolled  HTN (hypertension)  Tendinitis, de Quervains  Well adult exam, [facility]  Office/Outpatient Visit Level 4 Established [id number] PC, DM (diabetes mellitus), type 2, uncontrolled  HTN (hypertension)  Tendinitis, de Quervains  Well adult exam  Abnormal CBC, [facility], 03/01/18 13:25:00 CST  ?  Obesity  ?Encouraged low fat diet and exercise. Education provided.  ?  Tendinitis, de Quervains  ? Cont pain med as prescribed.  Ordered:  Clinic Follow up, *Est. 09/01/18 3:00:00 CDT, in 6 months with NAYANA Puga, Order for future visit, DM (diabetes mellitus), type 2, uncontrolled  HTN (hypertension)  Tendinitis, de Quervains  Well adult exam, [facility]  Office/Outpatient Visit Level 4 Established 30421 PC, DM (diabetes mellitus), type 2, uncontrolled  HTN (hypertension)  Tendinitis, de Quervains  Well adult exam  Abnormal CBC, [facility], 03/01/18 13:25:00 CST  ?  Well adult exam  ? Labs today. Keep gyn and MMG appts as scheduled.  Ordered:  Clinic Follow up, *Est. 09/01/18 3:00:00 CDT, in 6 months with NAYANA Puga, Order for future visit, DM (diabetes mellitus), type 2, uncontrolled  HTN (hypertension)  Tendinitis, de Quervains  Well adult exam, [facility]  Office/Outpatient Visit Level 4 Established 36216 PC, DM (diabetes mellitus), type 2, uncontrolled  HTN  (hypertension)  Tendinitis, de Quervains  Well adult exam  Abnormal CBC, [facility], 03/01/18 13:25:00 CST  ?  Orders:  glipiZIDE, 5 mg = 1 tab(s), Oral, BID, # 60 tab(s), 11 Refill(s), Pharmacy: [id number]  metFORMIN, 1,000 mg = 1 tab(s), Oral, BID, # 60 tab(s), 11 Refill(s), Pharmacy: [id number]  RTC in?6 months.   Problem List/Past Medical History  Ongoing  DM2 (diabetes mellitus, type 2)  HTN (hypertension)  Insomnia  Knee pain, bilateral  Musculoskeletal pain  Pelvic pain  Post-menopause bleeding  Pulmonary nodules  Shift work sleep disorder  Tricuspid regurgitation  Urinary frequency  Historical  No qualifying data  Procedure/Surgical History  Cystoscopy (None) (08/22/2017), Hysterectomy Laparoscopic Assisted Vaginal (None) (08/22/2017), Inspection of Bladder, Via Natural or Artificial Opening Endoscopic (08/22/2017), Laparoscopy w/ Salpingoophrectomy (None) (08/22/2017), Resection of Bilateral Fallopian Tubes, Via Natural or Artificial Opening With Percutaneous Endoscopic Assistance (08/22/2017), Resection of Bilateral Ovaries, Via Natural or Artificial Opening With Percutaneous Endoscopic Assistance (08/22/2017), Resection of Cervix, Via Natural or Artificial Opening (08/22/2017), Resection of Uterus, Via Natural or Artificial Opening With Percutaneous Endoscopic Assistance (08/22/2017), Cholecystectomy Laparoscopic (.) (12/30/2015), Laparoscopy, surgical; cholecystectomy (12/30/2015), Resection of Gallbladder, Percutaneous Endoscopic Approach (12/30/2015), Tubal ligation (1988), Bilateral tubal ligation.  Medications  Ambien 5 mg oral tablet, 5 mg= 1 tab(s), Oral, Once a day (at bedtime), PRN, 5 refills  amlodipine 5 mg oral tablet, 5 mg= 1 tab(s), Oral, Daily, 11 refills  glipiZIDE 5 mg oral tablet, 5 mg= 1 tab(s), Oral, BID, 11 refills  HumaLOG KwikPen 100 units/mL subcutaneous solution, See Instructions, 11 refills,? ?Not taking: Last Dose Date/Time Unknown  Kaleb George  100 units/mL subcutaneous solution, 17 units, Subcutaneous, Daily, 3 refills,? ?Not taking: Last Dose Date/Time Unknown  metFORMIN 1000 mg oral tablet, 1000 mg= 1 tab(s), Oral, BID, 11 refills  naproxen 500 mg oral tablet, 500 mg= 1 tab(s), Oral, BID, PRN, 6 refills  Ofirmev, 1000 mg= 100 mL, IV Piggyback, Once  Pen Needles, See Instructions, 3 refills  Premarin 0.625 mg/g vaginal cream with applicator, 1 gm, VAG, qPM  PROVIGIL 200MG TAB, 200 mg= 1 tab(s), Oral, Daily  Allergies  No Known Allergies  Social History  Alcohol - Denies Alcohol Use, 2013  Never, 2015  Employment/School  Employed, Activity level: Moderate physical work. Highest education level: Some college. Operates hazardous equipment: No. Workplace hazards: Heavy lifting/twisting, Repetitive motion, Shift/Night work., 2015  Exercise - Does not exercise, 2015  Home/Environment  Lives with Children, Siblings. Living situation: Home/Independent. Alcohol abuse in household: No. Substance abuse in household: No. Smoker in household: Yes. Injuries/Abuse/Neglect in household: No. Feels unsafe at home: No. Safe place to go: Yes. Family/Friends available for support: Yes. Concern for family members at home: No., 2017  Nutrition/Health  Regular, Caffeine intake amount: 4 cups coffee a day. 2 to 3 diet cokes a week. Wants to lose weight: Yes. Sleeping concerns: Yes. Feels highly stressed: Yes., 2015  Other  Sexual - No Sexual Activity, 2015  Sexually active: No., 2015  Substance Abuse - Denies Substance Abuse, 2015  Never, 2015  Tobacco - Denies Tobacco Use, 2013  Never smoker, 2015  Family History  : Mother and Father.  Diabetes mellitus type 2: Mother and Father.  Hypertension.: Mother and Father.  Lung cancer: Father.  Primary malignant neoplasm of brain: Sister.  Renal failure syndrome.: Mother.  Thyroid: Sister.  Immunizations  Vaccine Date Status   influenza virus vaccine,  inactivated 10/03/2016 Recorded

## 2022-05-03 NOTE — HISTORICAL [facility]ER
This is a historical note converted from [employer]. Formatting and pictures may have been removed.  Please reference [facility] for original formatting and attached multimedia. Chief Complaint  follow up/vaccinated  History of Present Illness  53 y/o female here for f/u. ?[1]. Pt states she has an appt with Dr Jarquin on 9-17-18 for CTS and left elbow denervation.Pt has hx HTN, DM2 uncontrolled, Abnl CBC, Mild mitral valve regurg, hx 5 mm right upper lung lobe nodule stable since 2014. [1]  Review of Systems  Constitutional: negative except as stated in HPI  Eye: negative except as stated in HPI  ENT: negative except as stated in HPI  Respiratory: negative except as stated in HPI  Cardiovascular: negative except as stated in HPI  Gastrointestinal: negative except as stated in HPI  Genitourinary: negative except as stated in HPI  Heme/Lymph: negative except as stated in HPI  Endocrine: negative except as stated in HPI  Immunologic: negative except as stated in HPI  Musculoskeletal: negative except as stated in HPI  Integumentary: negative except as stated in HPI  Neurologic: negative except as stated in HPI  ?   All Other ROS_ negative except as stated in HPI  ?  Physical Exam  Vitals & Measurements  T:?36.8? ?C (Oral)? HR:?81(Peripheral)? RR:?18? BP:?125/85?  HT:?163?cm? WT:?82?kg? BMI:?30.86?  General: Alert and oriented, No acute distress.  Eye: Pupils are equal, round and reactive to light, Extraocular movements are intact.  HENT: Normocephalic.  Neck: Supple, Non-tender, No carotid bruit, No lymphadenopathy.  Respiratory: Lungs are clear to auscultation, Respirations are non-labored, Breath sounds are equal, Symmetrical chest wall expansion.  Cardiovascular: Normal rate, Regular rhythm, No murmur.  Gastrointestinal: Soft, Non-tender, Non-distended, Normal bowel sounds.  Musculoskeletal: Normal range of motion.  Integumentary: Warm, Dry, Intact.  Neurologic: No focal deficits, Cranial Nerves II-XII are grossly  intact.  Assessment/Plan  Abnormal CBC?R79.89  ??Resolved. [2]  Ordered:  1160F- Medication reconciliation completed during visit, Abnormal CBC  Arthritis  Bilateral hand pain  CTS (carpal tunnel syndrome)  De Quervains tenosynovitis  Heel pain, bilateral  HTN (hypertension)  Insomnia  Microalbuminuria  Obesity  Trigger finger of right hand  Type 2 diabetes mellitus,...  Clinic Follow up, *Est. 03/10/20 3:00:00 CDT, in 3 months with NAYANA Puga, Order for future visit, Abnormal CBC  Arthritis  CTS (carpal tunnel syndrome)  Bilateral hand pain  De Quervains tenosynovitis  Heel pain, bilateral  Insomnia  HTN (hypertension)  Micr...  Office/Outpatient Visit Level 4 Established [id number] PC, Abnormal CBC  Arthritis  Bilateral hand pain  CTS (carpal tunnel syndrome)  De Quervains tenosynovitis  Heel pain, bilateral  HTN (hypertension)  Insomnia  Microalbuminuria  Obesity  Trigger finger of right hand  Type 2 diabetes mellitus,...  ?  Arthritis?M19.90  Cont ?Ibuprofen 800 mg 1 tab po TID prn pain. [3]  Ordered:  ibuprofen, 800 mg = 1 tab(s), Oral, TID, PRN PRN for pain, # 30 tab(s), 6 Refill(s), Pharmacy: [id number]  1160F- Medication reconciliation completed during visit, Abnormal CBC  Arthritis  Bilateral hand pain  CTS (carpal tunnel syndrome)  De Quervains tenosynovitis  Heel pain, bilateral  HTN (hypertension)  Insomnia  Microalbuminuria  Obesity  Trigger finger of right hand  Type 2 diabetes mellitus,...  Clinic Follow up, *Est. 03/10/20 3:00:00 CDT, in 3 months with NAYANA Puga, Order for future visit, Abnormal CBC  Arthritis  CTS (carpal tunnel syndrome)  Bilateral hand pain  De Quervains tenosynovitis  Heel pain, bilateral  Insomnia  HTN (hypertension)  Micr...  Office/Outpatient Visit Level 4 Established [id number] PC, Abnormal CBC  Arthritis  Bilateral hand pain  CTS (carpal tunnel syndrome)  De Quervains tenosynovitis  Heel pain, bilateral  HTN  (hypertension)  Insomnia  Microalbuminuria  Obesity  Trigger finger of right hand  Type 2 diabetes mellitus,...  ?  Bilateral hand pain?M79.641  ???Keep appt with Dr Jarquin. [4]  Ordered:  1160F- Medication reconciliation completed during visit, Abnormal CBC  Arthritis  Bilateral hand pain  CTS (carpal tunnel syndrome)  De Quervains tenosynovitis  Heel pain, bilateral  HTN (hypertension)  Insomnia  Microalbuminuria  Obesity  Trigger finger of right hand  Type 2 diabetes mellitus,...  Clinic Follow up, *Est. 03/10/20 3:00:00 CDT, in 3 months with NAYANA Puga, Order for future visit, Abnormal CBC  Arthritis  CTS (carpal tunnel syndrome)  Bilateral hand pain  De Quervains tenosynovitis  Heel pain, bilateral  Insomnia  HTN (hypertension)  Micr...  Office/Outpatient Visit Level 4 Established [id number] PC, Abnormal CBC  Arthritis  Bilateral hand pain  CTS (carpal tunnel syndrome)  De Quervains tenosynovitis  Heel pain, bilateral  HTN (hypertension)  Insomnia  Microalbuminuria  Obesity  Trigger finger of right hand  Type 2 diabetes mellitus,...  ?  CTS (carpal tunnel syndrome)?G56.00  ???Keep appt with Dr Jarquin for mgmt. [5]  Ordered:  1160F- Medication reconciliation completed during visit, Abnormal CBC  Arthritis  Bilateral hand pain  CTS (carpal tunnel syndrome)  De Quervains tenosynovitis  Heel pain, bilateral  HTN (hypertension)  Insomnia  Microalbuminuria  Obesity  Trigger finger of right hand  Type 2 diabetes mellitus,...  Clinic Follow up, *Est. 03/10/20 3:00:00 CDT, in 3 months with NAYANA Puga, Order for future visit, Abnormal CBC  Arthritis  CTS (carpal tunnel syndrome)  Bilateral hand pain  De Quervains tenosynovitis  Heel pain, bilateral  Insomnia  HTN (hypertension)  Micr...  Office/Outpatient Visit Level 4 Established [id number] PC, Abnormal CBC  Arthritis  Bilateral hand pain  CTS (carpal tunnel syndrome)  De Quervains tenosynovitis  Heel pain, bilateral   HTN (hypertension)  Insomnia  Microalbuminuria  Obesity  Trigger finger of right hand  Type 2 diabetes mellitus,...  ?  De Quervains tenosynovitis?M65.4  Cont ?Ibuprofen 800 mg 1 tab po TID prn pain. [6]  Ordered:  1160F- Medication reconciliation completed during visit, Abnormal CBC  Arthritis  Bilateral hand pain  CTS (carpal tunnel syndrome)  De Quervains tenosynovitis  Heel pain, bilateral  HTN (hypertension)  Insomnia  Microalbuminuria  Obesity  Trigger finger of right hand  Type 2 diabetes mellitus,...  Clinic Follow up, *Est. 03/10/20 3:00:00 CDT, in 3 months with NAYANA Puga, Order for future visit, Abnormal CBC  Arthritis  CTS (carpal tunnel syndrome)  Bilateral hand pain  De Quervains tenosynovitis  Heel pain, bilateral  Insomnia  HTN (hypertension)  Micr...  Office/Outpatient Visit Level 4 Established [id number] PC, Abnormal CBC  Arthritis  Bilateral hand pain  CTS (carpal tunnel syndrome)  De Quervains tenosynovitis  Heel pain, bilateral  HTN (hypertension)  Insomnia  Microalbuminuria  Obesity  Trigger finger of right hand  Type 2 diabetes mellitus,...  ?  Encounter for screening for malignant neoplasm of colon?Z12.11  ?FIT test in 3 months.  Ordered:  1160F- Medication reconciliation completed during visit, Abnormal CBC  Arthritis  Bilateral hand pain  CTS (carpal tunnel syndrome)  De Quervains tenosynovitis  Heel pain, bilateral  HTN (hypertension)  Insomnia  Microalbuminuria  Obesity  Trigger finger of right hand  Type 2 diabetes mellitus,...  Occult Blood Fecal Immunoassay, Routine collect, Stool, Order for future visit, *Est. 03/10/20 3:00:00 CDT, *Est. Stop date 03/10/20 3:00:00 CDT, Nurse collect, Encounter for screening for malignant neoplasm of colon  Office/Outpatient Visit Level 4 Established [id number] PC, Abnormal CBC  Arthritis  Bilateral hand pain  CTS (carpal tunnel syndrome)  De Quervains tenosynovitis  Heel pain, bilateral  HTN (hypertension)   Insomnia  Microalbuminuria  Obesity  Trigger finger of right hand  Type 2 diabetes mellitus,...  ?  Heel pain, bilateral?M79.671  Cont ?Ibuprofen 800 mg 1 tab po TID prn pain. [7]  Ordered:  1160F- Medication reconciliation completed during visit, Abnormal CBC  Arthritis  Bilateral hand pain  CTS (carpal tunnel syndrome)  De Quervains tenosynovitis  Heel pain, bilateral  HTN (hypertension)  Insomnia  Microalbuminuria  Obesity  Trigger finger of right hand  Type 2 diabetes mellitus,...  Clinic Follow up, *Est. 03/10/20 3:00:00 CDT, in 3 months with NAYANA Puga, Order for future visit, Abnormal CBC  Arthritis  CTS (carpal tunnel syndrome)  Bilateral hand pain  De Quervains tenosynovitis  Heel pain, bilateral  Insomnia  HTN (hypertension)  Micr...  Office/Outpatient Visit Level 4 Established [id number] PC, Abnormal CBC  Arthritis  Bilateral hand pain  CTS (carpal tunnel syndrome)  De Quervains tenosynovitis  Heel pain, bilateral  HTN (hypertension)  Insomnia  Microalbuminuria  Obesity  Trigger finger of right hand  Type 2 diabetes mellitus,...  ?  HTN (hypertension)?I10  ???Low fat low salt diet and exercise. cont med as prescribed. [8]  Ordered:  1160F- Medication reconciliation completed during visit, Abnormal CBC  Arthritis  Bilateral hand pain  CTS (carpal tunnel syndrome)  De Quervains tenosynovitis  Heel pain, bilateral  HTN (hypertension)  Insomnia  Microalbuminuria  Obesity  Trigger finger of right hand  Type 2 diabetes mellitus,...  Clinic Follow up, *Est. 03/10/20 3:00:00 CDT, in 3 months with NAYANA Puga, Order for future visit, Abnormal CBC  Arthritis  CTS (carpal tunnel syndrome)  Bilateral hand pain  De Quervains tenosynovitis  Heel pain, bilateral  Insomnia  HTN (hypertension)  Micr...  Lipid Panel, Routine collect, *Est. 03/10/20 3:00:00 CDT, Blood, Order for future visit, *Est. Stop date 03/10/20 3:00:00 CDT, Lab Collect, HTN (hypertension), 12/10/19  9:29:00 [facility]  Office/Outpatient Visit Level 4 Established [id number] PC, Abnormal CBC  Arthritis  Bilateral hand pain  CTS (carpal tunnel syndrome)  De Quervains tenosynovitis  Heel pain, bilateral  HTN (hypertension)  Insomnia  Microalbuminuria  Obesity  Trigger finger of right hand  Type 2 diabetes mellitus,...  Urinalysis with Microscopic if Indicated, Routine collect, Urine, Order for future visit, *Est. 03/10/20 3:00:00 CDT, *Est. Stop date 03/10/20 3:00:00 CDT, Nurse collect, HTN (hypertension)  ?  Insomnia?G47.00  ?Cont Ambien 5 mg 1 tab po Q hs prn insomnia. [9]  Ordered:  1160F- Medication reconciliation completed during visit, Abnormal CBC  Arthritis  Bilateral hand pain  CTS (carpal tunnel syndrome)  De Quervains tenosynovitis  Heel pain, bilateral  HTN (hypertension)  Insomnia  Microalbuminuria  Obesity  Trigger finger of right hand  Type 2 diabetes mellitus,...  Clinic Follow up, *Est. 03/10/20 3:00:00 CDT, in 3 months with NAYANA Puga, Order for future visit, Abnormal CBC  Arthritis  CTS (carpal tunnel syndrome)  Bilateral hand pain  De Quervains tenosynovitis  Heel pain, bilateral  Insomnia  HTN (hypertension)  Micr...  Office/Outpatient Visit Level 4 Established [id number] PC, Abnormal CBC  Arthritis  Bilateral hand pain  CTS (carpal tunnel syndrome)  De Quervains tenosynovitis  Heel pain, bilateral  HTN (hypertension)  Insomnia  Microalbuminuria  Obesity  Trigger finger of right hand  Type 2 diabetes mellitus,...  ?  Microalbuminuria?R80.9  ?Urine microalbumin in 3 months. [10]  Ordered:  1160F- Medication reconciliation completed during visit, Abnormal CBC  Arthritis  Bilateral hand pain  CTS (carpal tunnel syndrome)  De Quervains tenosynovitis  Heel pain, bilateral  HTN (hypertension)  Insomnia  Microalbuminuria  Obesity  Trigger finger of right hand  Type 2 diabetes mellitus,...  Clinic Follow up, *Est. 03/10/20 3:00:00 CDT, in 3 months with NAYANA Puga,  Order for future visit, Abnormal CBC  Arthritis  CTS (carpal tunnel syndrome)  Bilateral hand pain  De Quervains tenosynovitis  Heel pain, bilateral  Insomnia  HTN (hypertension)  Micr...  Office/Outpatient Visit Level 4 Established [id number] PC, Abnormal CBC  Arthritis  Bilateral hand pain  CTS (carpal tunnel syndrome)  De Quervains tenosynovitis  Heel pain, bilateral  HTN (hypertension)  Insomnia  Microalbuminuria  Obesity  Trigger finger of right hand  Type 2 diabetes mellitus,...  ?  Obesity?E66.9  ??Encouraged low fat diet and exercise. Education provided. [11]  Ordered:  1160F- Medication reconciliation completed during visit, Abnormal CBC  Arthritis  Bilateral hand pain  CTS (carpal tunnel syndrome)  De Quervains tenosynovitis  Heel pain, bilateral  HTN (hypertension)  Insomnia  Microalbuminuria  Obesity  Trigger finger of right hand  Type 2 diabetes mellitus,...  Clinic Follow up, *Est. 03/10/20 3:00:00 CDT, in 3 months with NAYANA Puga, Order for future visit, Abnormal CBC  Arthritis  CTS (carpal tunnel syndrome)  Bilateral hand pain  De Quervains tenosynovitis  Heel pain, bilateral  Insomnia  HTN (hypertension)  Micr...  Office/Outpatient Visit Level 4 Established [id number] PC, Abnormal CBC  Arthritis  Bilateral hand pain  CTS (carpal tunnel syndrome)  De Quervains tenosynovitis  Heel pain, bilateral  HTN (hypertension)  Insomnia  Microalbuminuria  Obesity  Trigger finger of right hand  Type 2 diabetes mellitus,...  ?  Trigger finger of right hand?M65.30  ?Dr Jarquin for eval and mgmt per pt request. [12]  Ordered:  1160F- Medication reconciliation completed during visit, Abnormal CBC  Arthritis  Bilateral hand pain  CTS (carpal tunnel syndrome)  De Quervains tenosynovitis  Heel pain, bilateral  HTN (hypertension)  Insomnia  Microalbuminuria  Obesity  Trigger finger of right hand  Type 2 diabetes mellitus,...  Office/Outpatient Visit Level 4 Established  [id number] PC, Abnormal CBC  Arthritis  Bilateral hand pain  CTS (carpal tunnel syndrome)  De Quervains tenosynovitis  Heel pain, bilateral  HTN (hypertension)  Insomnia  Microalbuminuria  Obesity  Trigger finger of right hand  Type 2 diabetes mellitus,...  ?  Type 2 diabetes mellitus, uncontrolled?E11.65  ??ADA?diet and exercise. A1c?9.6. Pt states she misses doses and contributes to elevated A1c. Cont DM med as prescribed. A1c today.??Last eye exam 1-20-19. Pt states was diagnosed with beginning stages of Glaucoma. Pt UTD with pneumovax. Urine microalbumin in 3 months. DM foot exam done 1-24-19.  Ordered:  1160F- Medication reconciliation completed during visit, Abnormal CBC  Arthritis  Bilateral hand pain  CTS (carpal tunnel syndrome)  De Quervains tenosynovitis  Heel pain, bilateral  HTN (hypertension)  Insomnia  Microalbuminuria  Obesity  Trigger finger of right hand  Type 2 diabetes mellitus,...  Comprehensive Metabolic Panel, Routine collect, *Est. 03/10/20 3:00:00 CDT, Blood, Order for future visit, *Est. Stop date 03/10/20 3:00:00 CDT, Lab Collect, Type 2 diabetes mellitus, uncontrolled, 12/10/19 9:29:00 CST  Fructosamine-[facility] [id number], Routine collect, *Est. 12/10/19 3:00:00 CST, Blood, Order for future visit, *Est. Stop date 12/10/19 3:00:00 CST, Lab Collect, Type 2 diabetes mellitus, uncontrolled, 12/10/19 9:30:00 CST  Hemoglobin A1C [facility], Routine collect, *Est. 12/10/19 3:00:00 CST, Blood, Order for future visit, *Est. Stop date 12/10/19 3:00:00 CST, Lab Collect, Type 2 diabetes mellitus, uncontrolled, 12/10/19 9:29:00 CST  Microalbum/Creatinine Ratio Urine (Microalb/Creat), Routine collect, Urine, Order for future visit, *Est. 03/10/20 3:00:00 CDT, *Est. Stop date 03/10/20 3:00:00 CDT, Nurse collect, Type 2 diabetes mellitus, uncontrolled  Office/Outpatient Visit Level 4 Established [id number] PC, Abnormal CBC  Arthritis  Bilateral hand pain  CTS (carpal tunnel syndrome)  De Quervains  tenosynovitis  Heel pain, bilateral  HTN (hypertension)  Insomnia  Microalbuminuria  Obesity  Trigger finger of right hand  Type 2 diabetes mellitus,...  Thyroid Stimulating Hormone, Routine collect, *Est. 03/10/20 3:00:00 CDT, Blood, Order for future visit, *Est. Stop date 03/10/20 3:00:00 CDT, Lab Collect, Type 2 diabetes mellitus, uncontrolled, 12/10/19 9:29:00 CST  ?  Well adult exam?Z00.00  ???Labs today and?in 3 months.? Pt UTD with MMG and GYN exam.?FIT test in 3 months.  Ordered:  1160F- Medication reconciliation completed during visit, Abnormal CBC  Arthritis  Bilateral hand pain  CTS (carpal tunnel syndrome)  De Quervains tenosynovitis  Heel pain, bilateral  HTN (hypertension)  Insomnia  Microalbuminuria  Obesity  Trigger finger of right hand  Type 2 diabetes mellitus,...  Office/Outpatient Visit Level 4 Established [id number] PC, Abnormal CBC  Arthritis  Bilateral hand pain  CTS (carpal tunnel syndrome)  De Quervains tenosynovitis  Heel pain, bilateral  HTN (hypertension)  Insomnia  Microalbuminuria  Obesity  Trigger finger of right hand  Type 2 diabetes mellitus,...  ?  RTC in 3 months with lab 1 week prior to appt. [13]   Problem List/Past Medical History  Ongoing  Bilateral carpal tunnel syndrome  DM2 (diabetes mellitus, type 2)  HTN (hypertension)  Insomnia  Knee pain, bilateral  Musculoskeletal pain  Pelvic pain  Post-menopause bleeding  Pulmonary nodules  Radial styloid tenosynovitis of left hand  Radial styloid tenosynovitis of right hand  Radial styloid tenosynovitis [de quervain]  Shift work sleep disorder  Tricuspid regurgitation  Trigger ring finger of right hand  Urinary frequency  Historical  No qualifying data  Procedure/Surgical History  Cystoscopy (None) (08/22/2017)  Hysterectomy Laparoscopic Assisted Vaginal (None) (08/22/2017)  Inspection of Bladder, Via Natural or Artificial Opening Endoscopic (08/22/2017)  Laparoscopy w/ Salpingoophrectomy (None)  (08/22/2017)  Resection of Bilateral Fallopian Tubes, Via Natural or Artificial Opening With Percutaneous Endoscopic Assistance (08/22/2017)  Resection of Bilateral Ovaries, Via Natural or Artificial Opening With Percutaneous Endoscopic Assistance (08/22/2017)  Resection of Cervix, Via Natural or Artificial Opening (08/22/2017)  Resection of Uterus, Via Natural or Artificial Opening With Percutaneous Endoscopic Assistance (08/22/2017)  Cholecystectomy Laparoscopic (.) (12/30/2015)  Laparoscopy, surgical; cholecystectomy (12/30/2015)  Resection of Gallbladder, Percutaneous Endoscopic Approach (12/30/2015)  Tubal ligation (1988)   Medications  Ambien 5 mg oral tablet, 5 mg= 1 tab(s), Oral, Once a day (at bedtime), PRN, 5 refills  amlodipine 5 mg oral tablet, 5 mg= 1 tab(s), Oral, Daily, 11 refills  aspirin 81 mg oral tablet, 81 mg= 1 tab(s), Oral, Daily,? ?Not taking  gabapentin 300 mg oral capsule, 300 mg= 1 cap(s), Oral, Once a day (at bedtime), 11 refills  glipiZIDE 10 mg oral tablet, 10 mg= 1 tab(s), Oral, BID, 3 refills  HumaLOG KwikPen 100 units/mL injectable solution, See Instructions, 6 refills  ibuprofen 800 mg oral tablet, 800 mg= 1 tab(s), Oral, TID, PRN, 6 refills  Insulin syringes, See Instructions, 11 refills,? ?Not taking  Lantus Solostar Pen 100 units/mL subcutaneous solution, 25 units, Subcutaneous, Once a day (at bedtime), 3 refills  lisinopril 2.5 mg oral tablet, 2.5 mg= 1 tab(s), Oral, Daily, 11 refills  metFORMIN 1000 mg oral tablet, 1000 mg= 1 tab(s), Oral, BID, 11 refills  Ofirmev, 1000 mg= 100 mL, IV Piggyback, Once  Pen Needles, See Instructions, 3 refills,? ?Not taking: Last Dose Date/Time Unknown  Tylenol with Codeine #3 oral tablet, 1 tab(s), Oral, q6hr, PRN,? ?Not taking: OUT OF MED  Allergies  No Known Allergies  Social History  Abuse/Neglect  No, 10/07/2019  Alcohol - Denies Alcohol Use, 06/07/2013  Never, 05/26/2015  Employment/School  Employed, Activity level: Moderate physical work.  Highest education level: Some college. Operates hazardous equipment: No. Workplace hazards: Heavy lifting/twisting, Repetitive motion, Shift/Night work., 2015  Exercise - Does not exercise, 2015  Home/Environment  Lives with Children, Siblings. Living situation: Home/Independent. Alcohol abuse in household: No. Substance abuse in household: No. Smoker in household: Yes. Injuries/Abuse/Neglect in household: No. Feels unsafe at home: No. Safe place to go: Yes. Family/Friends available for support: Yes. Concern for family members at home: No., 2017  Nutrition/Health  Regular, Caffeine intake amount: 4 cups coffee a day. 2 to 3 diet cokes a week. Wants to lose weight: Yes. Sleeping concerns: Yes. Feels highly stressed: Yes., 2015  Other  Sexual - No Sexual Activity, 2015  Gender Identity Identifies as female., 2019  Sexually active: No., 2015  Spiritual/Cultural  Scientology, 09/10/2019  Substance Use - Denies Substance Abuse, 2015  Never, 2015  Tobacco - Denies Tobacco Use, 2013  Never (less than 100 in lifetime), No, 10/07/2019  Family History  : Mother and Father.  Diabetes mellitus type 2: Mother and Father.  Hypertension.: Mother and Father.  Lung cancer: Father.  Primary malignant neoplasm of brain: Sister.  Renal failure syndrome.: Mother.  Thyroid: Sister.  Immunizations  Vaccine Date Status Comments   influenza virus vaccine, inactivated 10/22/2019 Given Nursing Judgment   influenza virus vaccine, inactivated - Not Given Parent Or Guardian Refuses     influenza virus vaccine, inactivated 2018 Recorded    influenza virus vaccine, inactivated 2017 Recorded    influenza virus vaccine, inactivated 10/03/2016 Recorded    pneumococcal 23-polyvalent vaccine 06/15/2016 Recorded    Health Maintenance  Health Maintenance  ???Pending?(in the next year)  ??? ??OverDue  ??? ? ? ?Influenza Vaccine due??and every?  ??? ? ? ?Diabetes Maintenance-Urine  Dipstick due??11/28/19??and every 1??year(s)  ??? ??Due?  ??? ? ? ?Diabetes Maintenance-Microalbumin due??11/28/19??and every 1??year(s)  ??? ??Refused?  ??? ? ? ?Tetanus Vaccine due??12/10/19??and every 10??year(s)  ??? ??Due In Future?  ??? ? ? ?Alcohol Misuse Screening not due until??01/01/20??and every 1??year(s)  ??? ? ? ?Obesity Screening not due until??01/01/20??and every 1??year(s)  ??? ? ? ?Colorectal Screening not due until??01/24/20??and every 1??year(s)  ??? ? ? ?Diabetes Maintenance-Fasting Lipid Profile not due until??01/24/20??and every 1??year(s)  ??? ? ? ?Diabetes Maintenance-Foot Exam not due until??01/24/20??and every 1??year(s)  ??? ? ? ?Hypertension Management-BMP not due until??01/24/20??and every 1??year(s)  ??? ? ? ?Diabetes Maintenance-Serum Creatinine not due until??01/24/20??and every 1??year(s)  ??? ? ? ?Depression Screening not due until??09/09/20??and every 1??year(s)  ??? ? ? ?ADL Screening not due until??09/10/20??and every 1??year(s)  ??? ? ? ?Aspirin Therapy for CVD Prevention not due until??09/10/20??and every 1??year(s)  ??? ? ? ?Diabetes Maintenance-Medication Prescribed not due until??09/10/20??and every 1??year(s)  ??? ? ? ?Diabetes Maintenance-Eye Exam not due until??09/25/20??and every 1??year(s)  ??? ? ? ?Blood Pressure Screening not due until??10/06/20??and every 1??year(s)  ??? ? ? ?Body Mass Index Check not due until??10/06/20??and every 1??year(s)  ??? ? ? ?Diabetes Maintenance-HgbA1c not due until??10/06/20??and every 1??year(s)  ??? ? ? ?Hypertension Management-Blood Pressure not due until??10/06/20??and every 1??year(s)  ??? ? ? ?TB Skin Test not due until??10/24/20??and every 1??year(s)  ???Satisfied?(in the past 1 year)  ??? ??Satisfied?  ??? ? ? ?ADL Screening on??09/10/19.??Satisfied by Ramone PHAM, Vee Gibbs  ??? ? ? ?Alcohol Misuse Screening on??06/14/19.??Satisfied by Vivien HINES, Kelin AGGARWAL  ??? ? ? ?Aspirin Therapy for CVD Prevention  on??09/10/19.??Satisfied by Kelin Puga NP??Reason: Expectation Satisfied Elsewhere  ??? ? ? ?Blood Pressure Screening on??10/07/19.??Satisfied by Zoila Umanzor LPN  ??? ? ? ?Body Mass Index Check on??10/07/19.??Satisfied by Zoila Umanzor LPN  ??? ? ? ?Breast Cancer Screening on??08/07/19.??Satisfied by Folri Grimaldo  ??? ? ? ?Colorectal Screening on??01/24/19.??Satisfied by Karen Last  ??? ? ? ?Depression Screening on??09/10/19.??Satisfied by Vee Pack LPN  ??? ? ? ?Diabetes Maintenance-Eye Exam on??09/26/19.??Satisfied by Vee Monterroso MD  ??? ? ? ?Diabetes Maintenance-Medication Prescribed on??09/10/19.??Satisfied by Kelin Puga NP  ??? ? ? ?Diabetes Maintenance-HgbA1c on??09/09/19.??Satisfied by Tanvi Haji P.  ??? ? ? ?Diabetes Maintenance-Foot Exam on??01/24/19.??Satisfied by Kelin Puga NP  ??? ? ? ?Diabetes Maintenance-Fasting Lipid Profile on??01/24/19.??Satisfied by Tanvi Haji P.  ??? ? ? ?Diabetes Maintenance-Serum Creatinine on??01/24/19.??Satisfied by Radha Hajiissa P.  ??? ? ? ?Diabetes Screening on??09/09/19.??Satisfied by Adithya Tanvi P.  ??? ? ? ?Hypertension Management-Blood Pressure on??10/07/19.??Satisfied by Zoila Umanzor LPN  ??? ? ? ?Hypertension Management-BMP on??01/24/19.??Satisfied by Radha Hajiissa P.  ??? ? ? ?Influenza Vaccine on??10/23/19.  ??? ? ? ?Lipid Screening on??01/24/19.??Satisfied by Tanvi Haji P.  ??? ? ? ?Obesity Screening on??10/07/19.??Satisfied by Zoila Umanzor LPN  ??? ? ? ?TB Skin Test on??10/24/19.  ??? ??Refused?  ??? ? ? ?Tetanus Vaccine on??09/10/19.??Recorded by Kelin Puga NP??Reason: Patient Refuses  ?     [1]?Office Visit Note; Kelin Puga NP 09/10/2019 08:43 CDT  [2]?Office Visit Note; Kelin Puga NP 09/10/2019 08:43 CDT  [3]?Office Visit Note; Kelin Puga NP 09/10/2019 08:43 CDT  [4]?Office Visit Note; Kelin Puga NP 09/10/2019 08:43 CDT  [5]?Office Visit Note;  Vivien HINES, Kelin JONES 09/10/2019 08:43 CDT  [6]?Office Visit Note; Vivien HINES, Kelin AGGARWAL. 09/10/2019 08:43 CDT  [7]?Office Visit Note; Vivien HINES, Kelin AGGARWAL. 09/10/2019 08:43 CDT  [8]?Office Visit Note; Vivien HINES, Kelin JONES 09/10/2019 08:43 CDT  [9]?Office Visit Note; Vivien HINES, Kelin AGGARWAL. 09/10/2019 08:43 CDT  [10]?Office Visit Note; Vivien HINES, Kelin JONES 09/10/2019 08:43 CDT  [11]?Office Visit Note; Vivien HINES, Kelin AGGARWAL. 09/10/2019 08:43 CDT  [12]?Office Visit Note; Kelin Puga NP 09/10/2019 08:43 CDT  [13] Office Visit Note; Kelin Puga NP 09/10/2019 08:43 CDT

## 2022-05-03 NOTE — ASSESSMENT & PLAN NOTE
ADA diet and exercise. A1c 10.3. Informed to increase  Lantus to 58 units sq Q hs. Informed pt to set an alarm on her phone to remind her to take her insulin as uncontrolled DM put her at risk fir complications such as MI, CVA, kidney failure, blindness, amputations, and death. Will repeat BMP and A1c in 1 month. Pt verbalized understanding. Cont Humalog as prescribed per SS. DM eye exam 9-23-20- will get in clinic today. DM foot exam done today. Pt UTD with pneumovax.

## 2022-05-03 NOTE — HISTORICAL [facility]
This is a historical note converted from [employer]. Formatting and pictures may have been removed.  Please reference [employer] for original formatting and attached multimedia. Chief Complaint  F/U  History of Present Illness  51 y/o female here for f/u c/o hand pain.  Review of Systems  Constitutional: negative except as stated in HPI  Eye: negative except as stated in HPI  ENT: negative except as stated in HPI  Respiratory: negative except as stated in HPI  Cardiovascular: negative except as stated in HPI  Gastrointestinal: negative except as stated in HPI  Genitourinary: negative except as stated in HPI  Heme/Lymph: negative except as stated in HPI  Endocrine: negative except as stated in HPI  Immunologic: negative except as stated in HPI  Musculoskeletal: negative except as stated in HPI  Integumentary: negative except as stated in HPI  Neurologic: negative except as stated in HPI  ?   All Other ROS_ negative except as stated in HPI  ?  Physical Exam  Vitals & Measurements  T:?36.8? ?C (Oral)? HR:?90(Peripheral)? RR:?18? BP:?139/82?  HT:?162.56?cm? HT:?162.56?cm? WT:?80.2?kg? WT:?80.2?kg? BMI:?30.35?  General: Alert and oriented, No acute distress.  Eye: Pupils are equal, round and reactive to light, Extraocular movements are intact.  HENT: Normocephalic.  Neck: Supple, Non-tender, No carotid bruit, No lymphadenopathy.  Respiratory: Lungs are clear to auscultation, Respirations are non-labored, Breath sounds are equal, Symmetrical chest wall expansion.  Cardiovascular: Normal rate, Regular rhythm, No murmur.  Gastrointestinal: Soft, Non-tender, Non-distended, Normal bowel sounds.  Musculoskeletal: Normal range of motion. + swelling and stiffness noted to bilat hands and fingers.  Integumentary: Warm, Dry, Intact.  Neurologic: No focal deficits, Cranial Nerves II-XII are grossly intact.  Assessment/Plan  Bilateral hand pain  ?RF and MICHA neg, XR bilat hands WNL. MRI bilat hands show articular erosion at right hand 4th  digit suggesting psoriatic arthritis. Discussed with Dr Martinez. Will get Sed rate and HLA 27 labs today.  Ordered:  Cyclic Citrullinated Peptide (CCP) Abs, IgA, IgG-[facility] [id number], Routine collect, *Est. 05/28/18 3:00:00 CDT, Blood, Order for future visit, *Est. Stop date 05/28/18 3:00:00 CDT, Lab Collect, Bilateral hand pain, 05/28/18 14:32:00 CDT  HLA [employer]-[facility] [id number], Routine collect, *Est. 05/28/18 3:00:00 CDT, Blood, Order for future visit, *Est. Stop date 05/28/18 3:00:00 CDT, Lab Collect, Bilateral hand pain, 05/28/18 14:34:00 CDT  Office/Outpatient Visit Level 4 Established [id number] PC, Bilateral hand pain  DM (diabetes mellitus), type 2, uncontrolled  Encounter for screening for malignant neoplasm of colon, [facility], 05/28/18 14:51:00 CDT  Sedimentation Rate, Routine collect, *Est. 05/28/18 3:00:00 CDT, Blood, Order for future visit, *Est. Stop date 05/28/18 3:00:00 CDT, Lab Collect, Bilateral hand pain, 05/28/18 14:32:00 CDT  ?  DM (diabetes mellitus), type 2, uncontrolled  ?ADA diet and exercise. A1c 8.8. pt requesting to restart s/s for Humalog- states after last surgery and med was discontinued and states her sugar are running high at home. Refilled Humalog as previously prescribed. Keep A1c appt next month.  Ordered:  insulin lispro, See Instructions, Inject 2-12 units sq per s/s after CBG AC & HS., # 15 mL, 6 Refill(s), Pharmacy: [id number]  Office/Outpatient Visit Level 4 Established [id number] PC, Bilateral hand pain  DM (diabetes mellitus), type 2, uncontrolled  Encounter for screening for malignant neoplasm of colon, [facility], 05/28/18 14:51:00 CDT  ?  Encounter for screening for malignant neoplasm of colon  ?FIT test in 1 month.  Ordered:  Occult Blood Fecal Immunoassay, Routine collect, Stool, Order for future visit, *Est. 05/28/18 3:00:00 CDT, *Est. Stop date 05/28/18 3:00:00 CDT, Nurse collect, Encounter for screening for malignant neoplasm of  colon  Office/Outpatient Visit Level 4 Established [id number] PC, Bilateral hand pain  DM (diabetes mellitus), type 2, uncontrolled  Encounter for screening for malignant neoplasm of colon, [facility], 05/28/18 14:51:00 CDT  ?  Keep f/u appt as scheduled.   Problem List/Past Medical History  Ongoing  DM2 (diabetes mellitus, type 2)  HTN (hypertension)  Insomnia  Knee pain, bilateral  Musculoskeletal pain  Pelvic pain  Post-menopause bleeding  Pulmonary nodules  Shift work sleep disorder  Tricuspid regurgitation  Urinary frequency  Historical  No qualifying data  Procedure/Surgical History  Cystoscopy (None) (08/22/2017), Hysterectomy Laparoscopic Assisted Vaginal (None) (08/22/2017), Inspection of Bladder, Via Natural or Artificial Opening Endoscopic (08/22/2017), Laparoscopy w/ Salpingoophrectomy (None) (08/22/2017), Resection of Bilateral Fallopian Tubes, Via Natural or Artificial Opening With Percutaneous Endoscopic Assistance (08/22/2017), Resection of Bilateral Ovaries, Via Natural or Artificial Opening With Percutaneous Endoscopic Assistance (08/22/2017), Resection of Cervix, Via Natural or Artificial Opening (08/22/2017), Resection of Uterus, Via Natural or Artificial Opening With Percutaneous Endoscopic Assistance (08/22/2017), Cholecystectomy Laparoscopic (.) (12/30/2015), Laparoscopy, surgical; cholecystectomy (12/30/2015), Resection of Gallbladder, Percutaneous Endoscopic Approach (12/30/2015), Tubal ligation (1988), Bilateral tubal ligation.  Medications  Ambien 5 mg oral tablet, 5 mg= 1 tab(s), Oral, Once a day (at bedtime), PRN, 5 refills,? ?Not Taking per Prescriber  amlodipine 5 mg oral tablet, 5 mg= 1 tab(s), Oral, Daily, 11 refills  glipiZIDE 10 mg oral tablet, 10 mg= 1 tab(s), Oral, BID, 3 refills  HumaLOG KwikPen 100 units/mL injectable solution, See Instructions, 6 refills  HumaLOG KwikPen 100 units/mL subcutaneous solution, See Instructions, 11 refills,? ?Not taking: Last Dose Date/Time  Unknown  Lantus Solostar Pen 100 units/mL subcutaneous solution, 17 units, Subcutaneous, Daily, 3 refills,? ?Not taking: Last Dose Date/Time Unknown  metFORMIN 1000 mg oral tablet, 1000 mg= 1 tab(s), Oral, BID, 11 refills  naproxen 500 mg oral tablet, 500 mg= 1 tab(s), Oral, BID, PRN, 6 refills  Ofirmev, 1000 mg= 100 mL, IV Piggyback, Once  Pen Needles, See Instructions, 3 refills  Premarin 0.625 mg/g vaginal cream with applicator, 1 gm, VAG, qPM  PROVIGIL 200MG TAB, 200 mg= 1 tab(s), Oral, Daily,? ?Not Taking per Prescriber  Tylenol with Codeine #3 oral tablet, 1 tab(s), Oral, q6hr, PRN  Allergies  No Known Allergies  Social History  Alcohol - Denies Alcohol Use, 2013  Never, 2015  Employment/School  Employed, Activity level: Moderate physical work. Highest education level: Some college. Operates hazardous equipment: No. Workplace hazards: Heavy lifting/twisting, Repetitive motion, Shift/Night work., 2015  Exercise - Does not exercise, 2015  Home/Environment  Lives with Children, Siblings. Living situation: Home/Independent. Alcohol abuse in household: No. Substance abuse in household: No. Smoker in household: Yes. Injuries/Abuse/Neglect in household: No. Feels unsafe at home: No. Safe place to go: Yes. Family/Friends available for support: Yes. Concern for family members at home: No., 2017  Nutrition/Health  Regular, Caffeine intake amount: 4 cups coffee a day. 2 to 3 diet cokes a week. Wants to lose weight: Yes. Sleeping concerns: Yes. Feels highly stressed: Yes., 2015  Other  Sexual - No Sexual Activity, 2015  Sexually active: No., 2015  Substance Abuse - Denies Substance Abuse, 2015  Never, 2015  Tobacco - Denies Tobacco Use, 2013  Never smoker, 2015  Family History  : Mother and Father.  Diabetes mellitus type 2: Mother and Father.  Hypertension.: Mother and Father.  Lung cancer: Father.  Primary malignant neoplasm of brain:  Sister.  Renal failure syndrome.: Mother.  Thyroid: Sister.  Immunizations  Vaccine Date Status   influenza virus vaccine, inactivated 12/21/2017 Recorded   influenza virus vaccine, inactivated 10/03/2016 Recorded   pneumococcal 23-polyvalent vaccine 06/15/2016 Recorded

## 2022-05-03 NOTE — HISTORICAL [facility]
This is a historical note converted from [employer]. Formatting and pictures may have been removed.  Please reference [employer] for original formatting and attached multimedia. Chief Complaint  IOP check and OCT RNFL.  History of Present Illness  No visual or ocular complaints.  No changes in health.  Physical Exam  Vitals & Measurements  HT:?163.00?cm? WT:?81.000?kg? BMI:?30.49?  OD sc : ??20/25?  OS sc : ??20/20?  Visual Acuity Comments : ??OU PHNI?  OS 20/20 -3?  Tonometry Method : ??Electronic indentation?  Tonometry Time of Day : ??12:36 CST?  OD Intraocular Pressure : ??13 mmHg?  OS Intraocular Pressure : ??17 mmHg?  ?  Ta: 13 OU  ?   Slit Lamp examination:  Lids/lash/Adnexae: wnl OU  Conjunctiva/sclera:?White and quiet?OU  Cornea: Clear?OU  Anterior chamber: Deep and Quiet?OU  Iris: RR OU  Lens: Trace NSC  ?   Gonio: 9/23/2020  Open to  OU  ?   Dilated Fundus Exam: 12/2019  ON: 0.45, pink/sharp  M: flat ou  V: nl caliber  P: flat, no breaks, holes, or tears 360 ou, multiple peripheral small yellow spots, some with central hyperpigmentation?OU  ?   OCT RNFL: 9/23/2020  OD: 105, green NST, white I  OS: 104, green INT, white S  ?   HVF: 12/27/2020  OD: 7/11 fixation losses, unreliable, nasal step and inferior arcuate defect  OS: 8/10 fixation losses, unreliable, full  Assessment/Plan  1. At High Risk for POAG OU  - Increased CDR  - Fam Hx: mother was blind from glaucoma  - Discussed with patient about importance of follow up  - Due for HVF and DFE 12/2020  -RTC 3 month for HVF, IOP, DFE  ?   2. NSC  - NVS, monitor  ?   3.?E[place]chnig Spots OU  -?monitor  - encouraged to?follow with PCP  ???????  3. Presbyopia  - Can use OTC readers  ?  RTC 3 month for HVF, IOP, DFE   Problem List/Past Medical History  Ongoing  Bilateral carpal tunnel syndrome  DM2 (diabetes mellitus, type 2)  HTN (hypertension)  Insomnia  Knee pain, bilateral  Musculoskeletal pain  Pelvic pain  Post-menopause bleeding  Pulmonary nodules  Radial  styloid tenosynovitis of left hand  Radial styloid tenosynovitis of right hand  Radial styloid tenosynovitis [de quervain]  Shift work sleep disorder  Tricuspid regurgitation  Trigger ring finger of right hand  Urinary frequency  Historical  Pregnant  Pregnant  Procedure/Surgical History  Cystoscopy (None) (08/22/2017)  Hysterectomy Laparoscopic Assisted Vaginal (None) (08/22/2017)  Inspection of Bladder, Via Natural or Artificial Opening Endoscopic (08/22/2017)  Laparoscopy w/ Salpingoophrectomy (None) (08/22/2017)  Resection of Bilateral Fallopian Tubes, Via Natural or Artificial Opening With Percutaneous Endoscopic Assistance (08/22/2017)  Resection of Bilateral Ovaries, Via Natural or Artificial Opening With Percutaneous Endoscopic Assistance (08/22/2017)  Resection of Cervix, Via Natural or Artificial Opening (08/22/2017)  Resection of Uterus, Via Natural or Artificial Opening With Percutaneous Endoscopic Assistance (08/22/2017)  Cholecystectomy Laparoscopic (.) (12/30/2015)  Laparoscopy, surgical; cholecystectomy (12/30/2015)  Resection of Gallbladder, Percutaneous Endoscopic Approach (12/30/2015)  Tubal ligation (1988)   Medications  amlodipine 5 mg oral tablet, 5 mg= 1 tab(s), Oral, Daily, 11 refills  aspirin 81 mg oral tablet, 81 mg= 1 tab(s), Oral, Daily  gabapentin 300 mg oral capsule, 300 mg= 1 cap(s), Oral, Once a day (at bedtime), 2 refills  glipiZIDE 10 mg oral tablet, 10 mg= 1 tab(s), Oral, BID, 3 refills  HumaLOG KwikPen 100 units/mL injectable solution, See Instructions, 6 refills  ibuprofen 800 mg oral tablet, 800 mg= 1 tab(s), Oral, TID, PRN, 6 refills  indomethacin 25 mg oral capsule, 25 mg= 1 cap(s), Oral, TID, PRN  Lantus Solostar Pen 100 units/mL subcutaneous solution, 30 units, Subcutaneous, Once a day (at bedtime), 3 refills  lisinopril 2.5 mg oral tablet, 2.5 mg= 1 tab(s), Oral, Daily, 2 refills  metFORMIN 1000 mg oral tablet, 1000 mg= 1 tab(s), Oral, BID, 2 refills  Ofirmev, 1000 mg= 100  mL, IV Piggyback, Once  Pen Needles, See Instructions, 3 refills  Tylenol with Codeine #3 oral tablet, 1 tab(s), Oral, q6hr, PRN,? ?Not taking  zolpidem 5 mg oral tablet, See Instructions, 5 refills  Allergies  No Known Allergies  Social History  Abuse/Neglect  No, 2020  Alcohol - Denies Alcohol Use, 2013  Never, 2015  Employment/School  Employed, Activity level: Moderate physical work. Highest education level: Some college. Operates hazardous equipment: No. Workplace hazards: Heavy lifting/twisting, Repetitive motion, Shift/Night work., 2015  Exercise - Does not exercise, 2015  Home/Environment  Lives with Children, Siblings. Living situation: Home/Independent. Alcohol abuse in household: No. Substance abuse in household: No. Smoker in household: Yes. Injuries/Abuse/Neglect in household: No. Feels unsafe at home: No. Safe place to go: Yes. Family/Friends available for support: Yes. Concern for family members at home: No., 2017  Nutrition/Health  Regular, Caffeine intake amount: 4 cups coffee a day. 2 to 3 diet cokes a week. Wants to lose weight: Yes. Sleeping concerns: Yes. Feels highly stressed: Yes., 2015  Other  Sexual - No Sexual Activity, 2015  Gender Identity Identifies as female., 2019  Spiritual/Cultural  Oriental orthodox, 09/10/2019  Substance Use - Denies Substance Abuse, 2015  Never, 2015  Tobacco - Denies Tobacco Use, 2013  Never (less than 100 in lifetime), N/A, 2020  Family History  : Mother and Father.  Diabetes mellitus type 2: Mother and Father.  Hypertension.: Mother and Father.  Lung cancer: Father.  Primary malignant neoplasm of brain: Sister.  Renal failure syndrome.: Mother.  Thyroid: Sister.  Immunizations  Vaccine Date Status Comments   influenza virus vaccine, inactivated 10/22/2019 Given Nursing Judgment   influenza virus vaccine, inactivated - Not Given Parent Or Guardian Refuses     influenza virus vaccine,  inactivated 09/21/2018 Recorded    influenza virus vaccine, inactivated 12/21/2017 Recorded    influenza virus vaccine, inactivated 10/03/2016 Recorded    pneumococcal 23-polyvalent vaccine 06/15/2016 Recorded    Health Maintenance  Health Maintenance  ???Pending?(in the next year)  ??? ??OverDue  ??? ? ? ?Diabetes Maintenance-Microalbumin due??and every?  ??? ? ? ?Influenza Vaccine due??and every?  ??? ? ? ?Colorectal Screening due??01/24/20??and every 1??year(s)  ??? ??Due?  ??? ? ? ?Aspirin Therapy for CVD Prevention due??09/10/20??and every 1??year(s)  ??? ? ? ?Zoster Vaccine due??09/23/20??and every?  ??? ??Refused?  ??? ? ? ?Tetanus Vaccine due??09/23/20??and every 10??year(s)  ??? ??Due In Future?  ??? ? ? ?TB Skin Test not due until??10/24/20??and every 1??year(s)  ??? ? ? ?Diabetes Maintenance-Eye Exam not due until??12/26/20??and every 1??year(s)  ??? ? ? ?Obesity Screening not due until??01/01/21??and every 1??year(s)  ??? ? ? ?Alcohol Misuse Screening not due until??01/02/21??and every 1??year(s)  ??? ? ? ?Diabetes Maintenance-Foot Exam not due until??03/10/21??and every 1??year(s)  ??? ? ? ?Diabetes Maintenance-HgbA1c not due until??07/01/21??and every 1??year(s)  ??? ? ? ?Hypertension Management-BMP not due until??07/01/21??and every 1??year(s)  ??? ? ? ?Diabetes Maintenance-Fasting Lipid Profile not due until??07/01/21??and every 1??year(s)  ??? ? ? ?Diabetes Maintenance-Serum Creatinine not due until??07/01/21??and every 1??year(s)  ??? ? ? ?Blood Pressure Screening not due until??07/09/21??and every 1??year(s)  ??? ? ? ?Depression Screening not due until??07/09/21??and every 1??year(s)  ??? ? ? ?Hypertension Management-Blood Pressure not due until??07/09/21??and every 1??year(s)  ??? ? ? ?ADL Screening not due until??07/09/21??and every 1??year(s)  ??? ? ? ?Diabetes Maintenance-Medication Prescribed not due until??07/09/21??and every 1??year(s)  ???Satisfied?(in the past 1 year)  ???  ??Satisfied?  ??? ? ? ?ADL Screening on??07/09/20.??Satisfied by Karol Urena LPN  ??? ? ? ?Alcohol Misuse Screening on??03/10/20.??Satisfied by Kelin Puga NP  ??? ? ? ?Blood Pressure Screening on??07/09/20.??Satisfied by Karol Urena LPN  ??? ? ? ?Body Mass Index Check on??09/23/20.??Satisfied by Danelle Covarrubias  ??? ? ? ?Breast Cancer Screening on??06/05/20.??Satisfied by Snow Willson  ??? ? ? ?Depression Screening on??07/09/20.??Satisfied by Karol Urena LPN  ??? ? ? ?Diabetes Maintenance-Medication Prescribed on??07/09/20.??Satisfied by Abigail Marlow  ??? ? ? ?Diabetes Maintenance-HgbA1c on??07/01/20.??Satisfied by Mario Wooten  ??? ? ? ?Diabetes Maintenance-Fasting Lipid Profile on??07/01/20.??Satisfied by Adina Sykes  ??? ? ? ?Diabetes Maintenance-Serum Creatinine on??07/01/20.??Satisfied by Adina Sykes  ??? ? ? ?Diabetes Maintenance-Foot Exam on??03/10/20.??Satisfied by Kelin Puga NP  ??? ? ? ?Diabetes Maintenance-Microalbumin on??03/06/20.??Satisfied by Juani Baptiste  ??? ? ? ?Diabetes Maintenance-Eye Exam on??12/27/19.??Satisfied by Niki Doshi  ??? ? ? ?Diabetes Screening on??07/01/20.??Satisfied by Mario Wooten  ??? ? ? ?Hypertension Management-Blood Pressure on??07/09/20.??Satisfied by Karol Urena LPN  ??? ? ? ?Influenza Vaccine on??10/23/19.  ??? ? ? ?Lipid Screening on??07/01/20.??Satisfied by Adina Sykes  ??? ? ? ?Obesity Screening on??09/23/20.??Satisfied by Danelle Covarrubias  ??? ? ? ?TB Skin Test on??10/24/19.  ??? ??Refused?  ??? ? ? ?Tetanus Vaccine on??07/09/20.??Recorded by Karol Urena LPN??Reason: Patient Refuses  ?      I saw the patient and agree with the ?plan of this resident.

## 2022-05-03 NOTE — HISTORICAL [facility]ERNER
This is a historical note converted from [place]. Formatting and pictures may have been removed.  Please reference [place] for original formatting and attached multimedia. Chief Complaint  post op LAVH  History of Present Illness  51 yo  presenting for a postop visit. She is s/p LAVH/BSO/Modified McCalls/cystoscopy for post-menopausal bleeding on 17. Patient reports doing well since surgery, although is complaining of some pain in her lower abdomen/pelvis when her bladder is distended and right before she has to have a BM. Denies any difficulty or pain with urination, and reports regular daily, soft bowel movements (no straining). Has some mild pinkish vaginal discharge on wiping. No yaritza bleeding. 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 Denies any sexual activity. Taking Ibuprofen as needed for pain. No additional complaints today.  Review of Systems  14-point ROS performed and negative except as documented above.  Physical Exam  Vitals & Measurements  HR:?97?(Peripheral)? RR:?20? BP:?123/80?  HT:?160?cm? HT:?160?cm? WT:?77?kg? WT:?77?kg? BMI:?30.08?  General: Alert and oriented, No acute distress.  Neck: Supple, Non-tender, No lymphadenopathy.  Respiratory: Lungs are clear to auscultation, Respirations are non-labored, Breath sounds are equal, Symmetrical chest wall expansion.  Cardiovascular: Normal rate, Regular rhythm, No murmur.  Gastrointestinal: Soft, Non-tender, Non-distended, Normal bowel sounds.  : mild suprapubic tenderness. No CVA tenderness.  Integumentary: Warm, Dry.  ?  Surgical Path: Final Diagnosis  Uterus with left tube & ovary attached, right ovary and cervix, Excision:  - Cervix: ? ? ? ? ? ? ? ? ? ? ? ?No significant histopathologic abnormality.  - Endometrium: ? ? ? ? ? ?Basalis endometrium.  - Myometrium: ? ? ? ? ? ? ?Adenomyosis.  - Right fallopian tube: ?Hydrosalpinx.  - Right ovary: ? ? ? ? ? ? ? ?No significant histopathologic  abnormality.  - Left fallopian tube: ? ? No significant histopathologic abnormality.  - Left ovary: ? ? ? ? ? ? ? ? ? Benign serous cyst.  ?   *Electronically Signed*  ?? Jennifer Givens MD  Verified: 08/24/17 09:49  ?  Assessment/Plan  1.?S/p JAZMINE-BSO  Progressing as expected. Reassurance provided.  U/A today for bladder symptoms  No heavy lifting or sexual activity  Ordered:  Clinic Follow up  Urinalysis with Microscopic if Indicated  ?  ?  RTC in 4 weeks for cuff check   Problem List/Past Medical History  Ongoing  DM2 (diabetes mellitus, type 2)  HTN (hypertension)  Insomnia  Knee pain, bilateral  Musculoskeletal pain  Pelvic pain  Post-menopause bleeding  Pulmonary nodules  Shift work sleep disorder  Tricuspid regurgitation  Urinary frequency  Historical  Procedure/Surgical History  Cystoscopy (None) (08/22/2017)  Hysterectomy Laparoscopic Assisted Vaginal (None) (08/22/2017)  Inspection of Bladder, Via Natural or Artificial Opening Endoscopic (08/22/2017)  Laparoscopy w/ Salpingoophrectomy (None) (08/22/2017)  Resection of Bilateral Fallopian Tubes, Via Natural or Artificial Opening With Percutaneous Endoscopic Assistance (08/22/2017)  Resection of Bilateral Ovaries, Via Natural or Artificial Opening With Percutaneous Endoscopic Assistance (08/22/2017)  Resection of Cervix, Via Natural or Artificial Opening (08/22/2017)  Resection of Uterus, Via Natural or Artificial Opening With Percutaneous Endoscopic Assistance (08/22/2017)  Cholecystectomy Laparoscopic (.) (12/30/2015)  Laparoscopy, surgical; cholecystectomy (12/30/2015)  Resection of Gallbladder, Percutaneous Endoscopic Approach (12/30/2015)  Tubal ligation (1988)  Bilateral tubal ligation  Medications  Ambien 5 mg oral tablet, 5 mg= 1 tab(s), Oral, Once a day (at bedtime), PRN, 5 refills  amlodipine 5 mg oral tablet, 5 mg= 1 tab(s), Oral, Daily, 11 refills  Colace 100 mg oral capsule, 100 mg= 1 cap(s), Oral, BID, PRN,? ?Not Taking, Completed Rx  glipiZIDE  5 mg oral tablet, 5 mg= 1 tab(s), Oral, BID, 11 refills  HumaLOG KwikPen 100 units/mL subcutaneous solution, See Instructions, 11 refills  Lantus Solostar Pen 100 units/mL subcutaneous solution, 17 units, Subcutaneous, Daily, 3 refills,? ?Not taking  metFORMIN 1000 mg oral tablet, 1000 mg= 1 tab(s), Oral, BID, 11 refills  Ofirmev, 1000 mg= 100 mL, IV Piggyback, Once  Pen Needles, See Instructions, 3 refills  Phenergan 25 mg Tab, 25 mg= 1 tab(s), Oral, Once a day (at bedtime),? ?Not Taking, Completed Rx  prednisONE 10 mg oral tablet, 20 mg= 2 tab(s), Oral, Daily,? ?Not taking  PROVIGIL 200MG TAB, 200 mg= 1 tab(s), Oral, Daily  Toujeo SoloStar 300 units/mL subcutaneous solution, 17 units, Subcutaneous, Daily, 11 refills,? ?Not taking: has not taken for about 2months  Trulicity Pen 0.75 mg/0.5 mL subcutaneous solution, 0.75 mg= 0.5 mL, Subcutaneous, qWeek, 3 refills,? ?Not taking  Voltaren 1% topical gel, 1 tracy, TOP, QID, PRN, 6 refills  Allergies  No Known Allergies  Social History  Alcohol - Denies Alcohol Use, 04/18/2017  Never  Employment/School - 04/18/2017  Employed, Activity level: Moderate physical work. Highest education level: Some college. Operates hazardous equipment: No. Workplace hazards: Heavy lifting/twisting, Repetitive motion, Shift/Night work.  Exercise - Does not exercise, 04/18/2017  Home/Environment - 04/18/2017  Lives with Children, Siblings. Living situation: Home/Independent. Alcohol abuse in household: No. Substance abuse in household: No. Smoker in household: Yes. Injuries/Abuse/Neglect in household: No. Feels unsafe at home: No. Safe place to go: Yes. Family/Friends available for support: Yes. Concern for family members at home: No.  Nutrition/Health - 04/18/2017  Regular, Caffeine intake amount: 4 cups coffee a day. 2 to 3 diet cokes a week. Wants to lose weight: Yes. Sleeping concerns: Yes. Feels highly stressed: Yes.  Other - 04/18/2017  Sexual - No Sexual Activity, 04/18/2017  Sexually  active: No.  Substance Abuse - Denies Substance Abuse, 2017  Never  Tobacco - Denies Tobacco Use, 2017  Never smoker  Family History  : Mother and Father.  Diabetes mellitus type 2: Mother and Father.  Hypertension.: Mother and Father.  Lung cancer: Father.  Primary malignant neoplasm of brain: Sister.  Renal failure syndrome.: Mother.  Thyroid: Sister.      Reviewed the patients medical history, residents findings on physical exam, and the diagnosis with treatment plan. Care provided was reasonable and necessary.

## 2022-05-04 ENCOUNTER — OFFICE VISIT (OUTPATIENT)
Dept: INTERNAL MEDICINE | Facility: CLINIC | Age: 57
End: 2022-05-04
Payer: COMMERCIAL

## 2022-05-04 ENCOUNTER — LAB VISIT (OUTPATIENT)
Dept: LAB | Facility: HOSPITAL | Age: 57
End: 2022-05-04
Attending: NURSE PRACTITIONER
Payer: COMMERCIAL

## 2022-05-04 ENCOUNTER — CLINICAL SUPPORT (OUTPATIENT)
Dept: INTERNAL MEDICINE | Facility: CLINIC | Age: 57
End: 2022-05-04
Payer: COMMERCIAL

## 2022-05-04 VITALS
HEIGHT: 63 IN | DIASTOLIC BLOOD PRESSURE: 76 MMHG | TEMPERATURE: 99 F | BODY MASS INDEX: 33.13 KG/M2 | SYSTOLIC BLOOD PRESSURE: 122 MMHG | WEIGHT: 187 LBS | RESPIRATION RATE: 18 BRPM | HEART RATE: 93 BPM

## 2022-05-04 DIAGNOSIS — E11.9 DM (DIABETES MELLITUS), TYPE 2: Primary | ICD-10-CM

## 2022-05-04 DIAGNOSIS — Z13.6 SCREENING FOR HYPERTENSION: ICD-10-CM

## 2022-05-04 DIAGNOSIS — E11.65 UNCONTROLLED TYPE 2 DIABETES MELLITUS WITH HYPERGLYCEMIA: ICD-10-CM

## 2022-05-04 DIAGNOSIS — R25.1 OCCASIONAL TREMORS: Primary | ICD-10-CM

## 2022-05-04 DIAGNOSIS — E11.65 UNCONTROLLED TYPE 2 DIABETES MELLITUS WITH HYPERGLYCEMIA: Primary | ICD-10-CM

## 2022-05-04 LAB
ANION GAP SERPL CALC-SCNC: 8 MEQ/L
BUN SERPL-MCNC: 9.2 MG/DL (ref 9.8–20.1)
CALCIUM SERPL-MCNC: 10.6 MG/DL (ref 8.4–10.2)
CHLORIDE SERPL-SCNC: 101 MMOL/L (ref 98–107)
CO2 SERPL-SCNC: 29 MMOL/L (ref 22–29)
CREAT SERPL-MCNC: 0.9 MG/DL (ref 0.55–1.02)
CREAT/UREA NIT SERPL: 10
EST. AVERAGE GLUCOSE BLD GHB EST-MCNC: 248.9 MG/DL
GLUCOSE SERPL-MCNC: 264 MG/DL (ref 74–100)
HBA1C MFR BLD: 10.3 %
POTASSIUM SERPL-SCNC: 4.3 MMOL/L (ref 3.5–5.1)
SODIUM SERPL-SCNC: 138 MMOL/L (ref 136–145)

## 2022-05-04 PROCEDURE — 3008F BODY MASS INDEX DOCD: CPT | Mod: CPTII,,, | Performed by: NURSE PRACTITIONER

## 2022-05-04 PROCEDURE — 92228 IMG RTA DETC/MNTR DS PHY/QHP: CPT | Mod: TC,PBBFAC | Performed by: INTERNAL MEDICINE

## 2022-05-04 PROCEDURE — 36415 COLL VENOUS BLD VENIPUNCTURE: CPT

## 2022-05-04 PROCEDURE — 3008F PR BODY MASS INDEX (BMI) DOCUMENTED: ICD-10-PCS | Mod: CPTII,,, | Performed by: NURSE PRACTITIONER

## 2022-05-04 PROCEDURE — 1160F RVW MEDS BY RX/DR IN RCRD: CPT | Mod: CPTII,,, | Performed by: NURSE PRACTITIONER

## 2022-05-04 PROCEDURE — 99214 OFFICE O/P EST MOD 30 MIN: CPT | Mod: S$PBB,,, | Performed by: NURSE PRACTITIONER

## 2022-05-04 PROCEDURE — 1160F PR REVIEW ALL MEDS BY PRESCRIBER/CLIN PHARMACIST DOCUMENTED: ICD-10-PCS | Mod: CPTII,,, | Performed by: NURSE PRACTITIONER

## 2022-05-04 PROCEDURE — 83036 HEMOGLOBIN GLYCOSYLATED A1C: CPT

## 2022-05-04 PROCEDURE — 99215 OFFICE O/P EST HI 40 MIN: CPT | Mod: PBBFAC | Performed by: NURSE PRACTITIONER

## 2022-05-04 PROCEDURE — 99214 PR OFFICE/OUTPT VISIT, EST, LEVL IV, 30-39 MIN: ICD-10-PCS | Mod: S$PBB,,, | Performed by: NURSE PRACTITIONER

## 2022-05-04 PROCEDURE — 1159F MED LIST DOCD IN RCRD: CPT | Mod: CPTII,,, | Performed by: NURSE PRACTITIONER

## 2022-05-04 PROCEDURE — 2025F 7 FLD RTA PHOTO W/O RTNOPTHY: CPT | Mod: PBBFAC,CPTII | Performed by: INTERNAL MEDICINE

## 2022-05-04 PROCEDURE — 80048 BASIC METABOLIC PNL TOTAL CA: CPT

## 2022-05-04 PROCEDURE — 3074F SYST BP LT 130 MM HG: CPT | Mod: CPTII,,, | Performed by: NURSE PRACTITIONER

## 2022-05-04 PROCEDURE — 3074F PR MOST RECENT SYSTOLIC BLOOD PRESSURE < 130 MM HG: ICD-10-PCS | Mod: CPTII,,, | Performed by: NURSE PRACTITIONER

## 2022-05-04 PROCEDURE — 3078F PR MOST RECENT DIASTOLIC BLOOD PRESSURE < 80 MM HG: ICD-10-PCS | Mod: CPTII,,, | Performed by: NURSE PRACTITIONER

## 2022-05-04 PROCEDURE — 1159F PR MEDICATION LIST DOCUMENTED IN MEDICAL RECORD: ICD-10-PCS | Mod: CPTII,,, | Performed by: NURSE PRACTITIONER

## 2022-05-04 PROCEDURE — 3078F DIAST BP <80 MM HG: CPT | Mod: CPTII,,, | Performed by: NURSE PRACTITIONER

## 2022-05-04 RX ORDER — INSULIN GLARGINE 100 [IU]/ML
55 INJECTION, SOLUTION SUBCUTANEOUS NIGHTLY
Qty: 60 ML | Refills: 3 | Status: SHIPPED | OUTPATIENT
Start: 2022-05-04 | End: 2022-09-23

## 2022-05-04 RX ORDER — INSULIN LISPRO 100 [IU]/ML
2-12 INJECTION, SOLUTION INTRAVENOUS; SUBCUTANEOUS
COMMUNITY
End: 2022-09-27

## 2022-05-04 NOTE — ASSESSMENT & PLAN NOTE
Pt states she has been noticing her hands and head shaking at times. Denies related to blood sugar level. Pt states father had beginning of parkinson's and sister had brain tumor and she is concerned and request to be seen by specialist. Refer to Neuro for further eval and mgmt.

## 2022-05-04 NOTE — PROGRESS NOTES
Subjective:       Patient ID: Foreign Rosas is a 56 y.o. female.    Chief Complaint: Follow-up    57 y/o female for f/u Uncontrolled DM/ insulin titration.  Pt has hx HTN, DM2 uncontrolled, Abnl CBC, Mild mitral valve regurg, hx 5 mm right upper lung lobe nodule stable since 2014.     Review of Systems   Constitutional: Negative.    HENT: Negative.    Eyes: Negative.    Respiratory: Negative.    Cardiovascular: Negative.    Gastrointestinal: Negative.    Endocrine: Negative.    Genitourinary: Negative.    Musculoskeletal: Negative.    Integumentary:  Negative.   Allergic/Immunologic: Negative.    Neurological: Negative.    Hematological: Negative.    Psychiatric/Behavioral: Negative.          Objective:      Physical Exam  Vitals reviewed.   Constitutional:       Appearance: Normal appearance. She is normal weight.   HENT:      Head: Normocephalic.   Cardiovascular:      Rate and Rhythm: Normal rate and regular rhythm.      Pulses: Normal pulses.      Heart sounds: Normal heart sounds.   Pulmonary:      Effort: Pulmonary effort is normal.      Breath sounds: Normal breath sounds.   Abdominal:      General: Abdomen is flat.      Palpations: Abdomen is soft.   Musculoskeletal:         General: Normal range of motion.      Cervical back: Normal range of motion.   Skin:     General: Skin is warm and dry.   Neurological:      Mental Status: She is alert.   Psychiatric:         Mood and Affect: Mood normal.         Assessment:       Problem List Items Addressed This Visit        Endocrine    DM (diabetes mellitus), type 2, uncontrolled     ADA diet and exercise. A1c 10.3. Informed to increase  Lantus to 58 units sq Q hs. Informed pt to set an alarm on her phone to remind her to take her insulin as uncontrolled DM put her at risk fir complications such as MI, CVA, kidney failure, blindness, amputations, and death. Will repeat BMP and A1c in 1 month. Pt verbalized understanding. Cont Humalog as prescribed per SS. DM eye  exam 9-23-20- will get in clinic today. DM foot exam done today. Pt UTD with pneumovax.               Relevant Medications    insulin glargine (LANTUS) 100 unit/mL injection    metFORMIN (GLUCOPHAGE) 1000 MG tablet    glipiZIDE (GLUCOTROL) 10 MG tablet    insulin glargine (LANTUS) 100 unit/mL injection          Plan:       ***

## 2022-05-24 ENCOUNTER — OFFICE VISIT (OUTPATIENT)
Dept: ORTHOPEDICS | Facility: CLINIC | Age: 57
End: 2022-05-24
Payer: COMMERCIAL

## 2022-05-24 ENCOUNTER — HOSPITAL ENCOUNTER (OUTPATIENT)
Dept: RADIOLOGY | Facility: HOSPITAL | Age: 57
Discharge: HOME | End: 2022-05-24
Attending: STUDENT IN AN ORGANIZED HEALTH CARE EDUCATION/TRAINING PROGRAM
Payer: COMMERCIAL

## 2022-05-24 ENCOUNTER — HOSPITAL ENCOUNTER (OUTPATIENT)
Dept: RADIOLOGY | Facility: HOSPITAL | Age: 57
Discharge: HOME | End: 2022-05-24
Attending: NURSE PRACTITIONER
Payer: COMMERCIAL

## 2022-05-24 VITALS — HEIGHT: 63 IN | BODY MASS INDEX: 31.54 KG/M2 | WEIGHT: 178 LBS

## 2022-05-24 DIAGNOSIS — R25.1 OCCASIONAL TREMORS: ICD-10-CM

## 2022-05-24 DIAGNOSIS — E11.65 UNCONTROLLED TYPE 2 DIABETES MELLITUS WITH HYPERGLYCEMIA: ICD-10-CM

## 2022-05-24 DIAGNOSIS — M17.0 PRIMARY OSTEOARTHRITIS OF BOTH KNEES: Primary | ICD-10-CM

## 2022-05-24 DIAGNOSIS — E66.9 OBESITY, UNSPECIFIED CLASSIFICATION, UNSPECIFIED OBESITY TYPE, UNSPECIFIED WHETHER SERIOUS COMORBIDITY PRESENT: ICD-10-CM

## 2022-05-24 DIAGNOSIS — M17.0 PRIMARY OSTEOARTHRITIS OF BOTH KNEES: ICD-10-CM

## 2022-05-24 PROCEDURE — 1160F RVW MEDS BY RX/DR IN RCRD: CPT | Mod: CPTII,,, | Performed by: STUDENT IN AN ORGANIZED HEALTH CARE EDUCATION/TRAINING PROGRAM

## 2022-05-24 PROCEDURE — 1160F PR REVIEW ALL MEDS BY PRESCRIBER/CLIN PHARMACIST DOCUMENTED: ICD-10-PCS | Mod: CPTII,,, | Performed by: STUDENT IN AN ORGANIZED HEALTH CARE EDUCATION/TRAINING PROGRAM

## 2022-05-24 PROCEDURE — 99214 OFFICE O/P EST MOD 30 MIN: CPT | Mod: PBBFAC | Performed by: STUDENT IN AN ORGANIZED HEALTH CARE EDUCATION/TRAINING PROGRAM

## 2022-05-24 PROCEDURE — 73564 X-RAY EXAM KNEE 4 OR MORE: CPT | Mod: TC,RT

## 2022-05-24 PROCEDURE — 73564 X-RAY EXAM KNEE 4 OR MORE: CPT | Mod: TC,LT

## 2022-05-24 PROCEDURE — 3008F BODY MASS INDEX DOCD: CPT | Mod: CPTII,,, | Performed by: STUDENT IN AN ORGANIZED HEALTH CARE EDUCATION/TRAINING PROGRAM

## 2022-05-24 PROCEDURE — 1159F PR MEDICATION LIST DOCUMENTED IN MEDICAL RECORD: ICD-10-PCS | Mod: CPTII,,, | Performed by: STUDENT IN AN ORGANIZED HEALTH CARE EDUCATION/TRAINING PROGRAM

## 2022-05-24 PROCEDURE — 99214 OFFICE O/P EST MOD 30 MIN: CPT | Mod: S$PBB,,, | Performed by: STUDENT IN AN ORGANIZED HEALTH CARE EDUCATION/TRAINING PROGRAM

## 2022-05-24 PROCEDURE — 1159F MED LIST DOCD IN RCRD: CPT | Mod: CPTII,,, | Performed by: STUDENT IN AN ORGANIZED HEALTH CARE EDUCATION/TRAINING PROGRAM

## 2022-05-24 PROCEDURE — 99214 PR OFFICE/OUTPT VISIT, EST, LEVL IV, 30-39 MIN: ICD-10-PCS | Mod: S$PBB,,, | Performed by: STUDENT IN AN ORGANIZED HEALTH CARE EDUCATION/TRAINING PROGRAM

## 2022-05-24 PROCEDURE — 3008F PR BODY MASS INDEX (BMI) DOCUMENTED: ICD-10-PCS | Mod: CPTII,,, | Performed by: STUDENT IN AN ORGANIZED HEALTH CARE EDUCATION/TRAINING PROGRAM

## 2022-05-24 PROCEDURE — 70450 CT HEAD/BRAIN W/O DYE: CPT | Mod: TC

## 2022-05-24 NOTE — PROGRESS NOTES
"  Subjective:       Existing pt, last seen April 2021   Patient ID: Foreign Rosas is a 56 y.o. female. Non-smoker. Employment HX:  at [employer] in [place], currently employed.        EMR Review: completed with noted [facility] visit April 2021 reviewed    Chief Complaint: Pain of the Left Knee, Pain of the Right Knee, and Follow-up    HPI:    56 Years RHD Female presents [facility] for follow up visit for b/l knee pain. patient was last seen here for this issue January 2022, prior note reviewed. Patient was managed with CSI years ago but we have not repeated 2./2 uncontroled DM2. Today, she states she states she has worsening knee pain interfering with ADLs.       Onset: insidious over years  Current pain level: 4 /10 "rated as worsening"  Quality described as: aching, throbbing, sharp  Modifying factors: worse with activity; stiffness after immobilization; stiffness improved with <30min of activity  Previous treatment: conservative tx with HEP and medications ; previous CSI >3mo ago with improvement  Previous injuries: denies  Associated symptoms: crepitus/grinding; no numbness/tingling; weakness; intermittent swelling; no skin changes; no decrease in ROM  Activity: sedentary, full ADLs; pain interferes with function/daily activity "moderately"      Note: Pt also seen for b/l CTS      Current Outpatient Medications:     amLODIPine (NORVASC) 5 MG tablet, Take 1 tablet (5 mg total) by mouth once daily., Disp: 30 tablet, Rfl: 1    aspirin (ECOTRIN) 81 MG EC tablet, Take 81 mg by mouth once daily., Disp: , Rfl:     gabapentin (NEURONTIN) 300 MG capsule, Take 300 mg by mouth every evening., Disp: , Rfl:     glipiZIDE (GLUCOTROL) 10 MG tablet, Take 10 mg by mouth 2 (two) times daily before meals., Disp: , Rfl:     ibuprofen (ADVIL,MOTRIN) 800 MG tablet, Take 800 mg by mouth 3 (three) times daily as needed for Pain., Disp: , Rfl:     insulin glargine (LANTUS) 100 unit/mL injection, " "Inject 55 Units into the skin every evening., Disp: 60 mL, Rfl: 3    insulin lispro 100 unit/mL injection, Inject 2-12 Units into the skin as needed., Disp: , Rfl:     metFORMIN (GLUCOPHAGE) 1000 MG tablet, Take 1,000 mg by mouth 2 (two) times daily with meals., Disp: , Rfl:     PEN NEEDLE, DIABETIC MISC, 1 each by Misc.(Non-Drug; Combo Route) route 2 (two) times a day. Before AC and HS., Disp: , Rfl:     pravastatin (PRAVACHOL) 20 MG tablet, Take 20 mg by mouth once daily., Disp: , Rfl:     sertraline (ZOLOFT) 50 MG tablet, Take 50 mg by mouth once daily., Disp: , Rfl:     zolpidem (AMBIEN) 5 MG Tab, Take 5 mg by mouth nightly as needed for Insomnia., Disp: , Rfl:     Review of patient's allergies indicates:  No Known Allergies    Hemoglobin A1c:  10.3 from 05/04/2022.  Body mass index is 31.53 kg/m².   Vitals:    05/24/22 0922   Weight: 80.7 kg (178 lb)   Height: 5' 3" (1.6 m)   PainSc:   4        ROS    Review of Systems:  A ten-point review of systems was performed and is negative, except as mentioned above.    Objective:      General: well developed; well nourished; cooperative  PSYCH: alert and oriented with appropriate mood and affect  SKIN: inspection and palpation of skin and soft tissue normal;  CV: vascular integrity noted; +2 symmetrical pulses  Resp: Normal work of breathing, quiet breathing    Left knee   Inspection:   Antalgic gait/station; full weight bearing; normal alignment; mild swelling; no erythema; no bruising; Quad deconditioning noted   Palpation: non-tender; Crepitus: present  ROM:   Active, passive Flexion (0-140): 110,110  Active, passive Extension : 0,0  Strength: Flexion 4/5, Extension 4/5  Special Tests:  Ballotable Effusion: Negative  Fluid Wave: Negative   Anterior Drawer: Negative  Lachman: Negative  Pivot Shift: Negative   Posterior Sag Sign: Negative  Posterior Drawer: Negative  Dial Test: not tested   Varus Stress: LCL stable at 0 and 30 degrees   Valgus Stress: MCL stable " at 0 and 30 degrees   Patellar Grind: Negative   Patella Tracking: normal  Patella Crepitation: none  Ling: Negative  Apleys Compression: Negative  Thessaly: Negative   Noble Compression: not tested  Marcie: not tested   Neurovascular: Intact; 2+ distal pulse, sensation intact to light touch  Reflexes: 2+    Right knee   Inspection:   Antalgic gait/station; full weight bearing; normal alignment; mild swelling; no erythema; no bruising; Quad deconditioning noted   Palpation: non-tender; Crepitus: present  ROM:   Active, passive Flexion (0-140): 110,110  Active, passive Extension : 0,0  Strength: Flexion 4/5, Extension 4/5  Special Tests:  Ballotable Effusion: Negative  Fluid Wave: Negative   Anterior Drawer:Negative   Lachman: Negative  Pivot Shift:Negative   Posterior Sag Sign: Negative   Posterior Drawer: Negative  Dial Test: not tested   Varus Stress: LCL stable at 0 and 30 degrees   Valgus Stress: MCL stable at 0 and 30 degrees   Patellar Grind: Negative   Patella Tracking: normal  Patella Crepitation: none  Ling: Negative   Apleys Compression: Negative  Thessaly: Negative   Noble Compression:not tested  Marcie: not tested   Neurovascular: Intact; 2+ distal pulse, sensation intact to light touch  Reflexes: 2+      Imaging:  X-ray reviewed and independently interpreted by me.  Discussed with patient. As per my interpretation of this patient's bilateralknee plain films there are changes consistent with moderate  DJD as evidenced by medial joint space narrowing, tricompartmental osteophyte presence and subchondral sclerosis. No fracture/dislocations noted.      Assessment:             1. Primary osteoarthritis of both knees    2. Obesity, unspecified classification, unspecified obesity type, unspecified whether serious comorbidity present    3. Uncontrolled type 2 diabetes mellitus with hyperglycemia          Plan:       Orders Placed This Encounter    X-Ray Knee Complete 4 or More Views Left    X-Ray Knee  Complete 4 Or More Views Right     56 Years old patient returning for evaluation of bilateral knee pain likely secondary to osteoarthritis.       Moderate chronic exacerbation  Radiological studies ordered and interpreted by me, my independent interpretation attached, reviewed my findings with patient and showed them their images  CSI discussed however given her elevated hemoglobin A1c steroid injections are not an ideal choice.  we will hold off at this time until her A1c is less than 9.5 for her safety.  Viscosupplementation discussed today and patient is interested given her worsening pain.  We will start Hyalgan prior authorization process.   Activity as tolerated, behavior modification encouraged  Formal PT x 12wks, 3 times weekly, HEP daily, Ice/Heat prn, NSAIDs/Tylenol/topical anasthetics PRN, patient already has prescription strength ibuprofen from outside provider which she will continue, med precautions given,   Follow up p.r.n. when prior authorization has been approved, she will return to start Hyalgan series and bilateral knees    Labs reviewed by myself today, most recent hemoglobin A1c:  10.3 from 05/04/2022.  Hemoglobin A1c will need to be less than 9.5 to consider steroid injections, for her safety.    BMI today: Body mass index is 31.53 kg/m².     Goal BMI: <30    Exercise prescription given to patient to increase fitness and facilitate weight loss. Prescription to include goals of 150 to 300 minutes/week of moderate intensity exercise include activities like brisk walking, light biking or water exercise and/or vigorous activity 75 to 150 minutes/week to include activities like jogging, swimming, fast bicycling or tennis. They were given goal of 7,500-10,000 steps per day. Muscle strength training was also discussed and recommended goal of 2 to 3 days a week to include activities like activities with elastic bands, body weight exercises or dumbbells.      Gerda Doyle MD

## 2022-06-03 ENCOUNTER — PROCEDURE VISIT (OUTPATIENT)
Dept: ORTHOPEDICS | Facility: CLINIC | Age: 57
End: 2022-06-03
Payer: COMMERCIAL

## 2022-06-03 DIAGNOSIS — M17.0 PRIMARY OSTEOARTHRITIS OF BOTH KNEES: Primary | ICD-10-CM

## 2022-06-03 PROCEDURE — 20610 DRAIN/INJ JOINT/BURSA W/O US: CPT | Mod: 50,PBBFAC | Performed by: STUDENT IN AN ORGANIZED HEALTH CARE EDUCATION/TRAINING PROGRAM

## 2022-06-03 PROCEDURE — 99499 UNLISTED E&M SERVICE: CPT | Mod: S$PBB,,, | Performed by: STUDENT IN AN ORGANIZED HEALTH CARE EDUCATION/TRAINING PROGRAM

## 2022-06-03 PROCEDURE — 99499 NO LOS: ICD-10-PCS | Mod: S$PBB,,, | Performed by: STUDENT IN AN ORGANIZED HEALTH CARE EDUCATION/TRAINING PROGRAM

## 2022-06-03 PROCEDURE — 20610: ICD-10-PCS | Mod: 50,S$PBB,, | Performed by: STUDENT IN AN ORGANIZED HEALTH CARE EDUCATION/TRAINING PROGRAM

## 2022-06-03 RX ORDER — INSULIN GLARGINE 100 [IU]/ML
INJECTION, SOLUTION SUBCUTANEOUS
COMMUNITY
Start: 2022-01-13 | End: 2022-06-17

## 2022-06-03 RX ORDER — PRAVASTATIN SODIUM 20 MG/1
20 TABLET ORAL
COMMUNITY
Start: 2021-12-01 | End: 2022-06-17

## 2022-06-03 RX ORDER — IBUPROFEN 800 MG/1
800 TABLET ORAL
COMMUNITY
Start: 2021-12-01 | End: 2022-06-17

## 2022-06-03 RX ORDER — METFORMIN HYDROCHLORIDE 1000 MG/1
1000 TABLET ORAL
COMMUNITY
Start: 2021-12-01 | End: 2022-06-17

## 2022-06-03 RX ORDER — GLIPIZIDE 10 MG/1
10 TABLET ORAL
COMMUNITY
Start: 2021-06-21 | End: 2022-06-17

## 2022-06-03 RX ORDER — SERTRALINE HYDROCHLORIDE 50 MG/1
TABLET, FILM COATED ORAL
COMMUNITY
Start: 2021-12-01 | End: 2022-06-17

## 2022-06-03 RX ORDER — INSULIN LISPRO 100 [IU]/ML
INJECTION, SOLUTION INTRAVENOUS; SUBCUTANEOUS
COMMUNITY
Start: 2021-06-21 | End: 2022-09-27 | Stop reason: SDUPTHER

## 2022-06-03 RX ORDER — ZOLPIDEM TARTRATE 5 MG/1
TABLET ORAL
COMMUNITY
Start: 2021-12-01 | End: 2022-06-27

## 2022-06-03 NOTE — PROCEDURES
Large Joint Aspiration/Injection: B/L knee: bilateral knee    Date/Time: 6/3/2022 7:30 AM  Performed by: Gerda Doyle MD  Authorized by: Gerda Doyle MD     Consent Done?:  Yes (Written)  Indications:  Pain and arthritis  Site marked: the procedure site was marked    Timeout: prior to procedure the correct patient, procedure, and site was verified    Prep: patient was prepped and draped in usual sterile fashion      Local anesthesia used?: Yes    Local anesthetic:  Topical anesthetic    Details:  Needle Size:  21 G  Ultrasonic Guidance for needle placement?: No    Approach:  Anterolateral  Location:  Knee  Laterality:  Bilateral  Site:  Bilateral knee  Patient tolerance:  Patient tolerated the procedure well with no immediate complications     Procedure: Each joint injected: with Hyalgan 10mg/mL, 2cc.     Post Procedure: Patient reports improvement in pain and ROM. Patient alert, moving all extremities. Good peripheral pulses, no signs of vascular compromise, range of motion intact. Patient tolerated procedure well. Aftercare instructions were given to patient at time of discharge. Relative rest for 3 days - avoiding excessive activity. Place ice on area for 15 minutes every 4 to 6 hours. Tylenol 1000mg twice a day or ibuprofen 600 mg three times per day for next 3-4 days if not on medication already. Protect the area for the next 1-8 hours if anesthetic was used. Avoid excessive activity for next 3 to 4 weeks. ER precautions for fever, severe joint pain, or allergic reaction or other new symptoms related to joint injection.

## 2022-06-08 DIAGNOSIS — G47.00 INSOMNIA, UNSPECIFIED TYPE: Primary | ICD-10-CM

## 2022-06-10 ENCOUNTER — PROCEDURE VISIT (OUTPATIENT)
Dept: ORTHOPEDICS | Facility: CLINIC | Age: 57
End: 2022-06-10
Payer: COMMERCIAL

## 2022-06-10 VITALS — HEIGHT: 63 IN | BODY MASS INDEX: 31.54 KG/M2 | WEIGHT: 178 LBS

## 2022-06-10 DIAGNOSIS — M17.0 PRIMARY OSTEOARTHRITIS OF BOTH KNEES: Primary | ICD-10-CM

## 2022-06-10 PROCEDURE — 20610 DRAIN/INJ JOINT/BURSA W/O US: CPT | Mod: 50,PBBFAC | Performed by: STUDENT IN AN ORGANIZED HEALTH CARE EDUCATION/TRAINING PROGRAM

## 2022-06-10 PROCEDURE — 20610: ICD-10-PCS | Mod: 50,S$PBB,, | Performed by: STUDENT IN AN ORGANIZED HEALTH CARE EDUCATION/TRAINING PROGRAM

## 2022-06-10 PROCEDURE — 99499 UNLISTED E&M SERVICE: CPT | Mod: S$PBB,,, | Performed by: STUDENT IN AN ORGANIZED HEALTH CARE EDUCATION/TRAINING PROGRAM

## 2022-06-10 PROCEDURE — 99499 NO LOS: ICD-10-PCS | Mod: S$PBB,,, | Performed by: STUDENT IN AN ORGANIZED HEALTH CARE EDUCATION/TRAINING PROGRAM

## 2022-06-10 RX ADMIN — Medication 20 MG: at 07:06

## 2022-06-10 NOTE — PROCEDURES
Large Joint Aspiration/Injection: bilateral knee Hyalgan #2    Date/Time: 6/10/2022 7:30 AM  Performed by: Gerda Doyle MD  Authorized by: Gerda Doyle MD     Consent Done?:  Yes (Written)  Indications:  Pain and arthritis  Site marked: the procedure site was marked    Timeout: prior to procedure the correct patient, procedure, and site was verified    Prep: patient was prepped and draped in usual sterile fashion      Local anesthesia used?: Yes    Local anesthetic:  Topical anesthetic    Details:  Needle Size:  21 G  Ultrasonic Guidance for needle placement?: No    Approach:  Anterolateral  Location:  Knee  Laterality:  Bilateral  Site:  Bilateral knee  Medications (Right):  20 mg sodium hyaluronate (supartz) 10 mg/mL  Medications (Left):  20 mg sodium hyaluronate (supartz) 10 mg/mL  Patient tolerance:  Patient tolerated the procedure well with no immediate complications     Procedure: Each joint injected: with Hyalgan 10mg/mL, 2cc.     Post Procedure: Patient reports improvement in pain and ROM. Patient alert, moving all extremities. Good peripheral pulses, no signs of vascular compromise, range of motion intact. Patient tolerated procedure well. Aftercare instructions were given to patient at time of discharge. Relative rest for 3 days - avoiding excessive activity. Place ice on area for 15 minutes every 4 to 6 hours. Tylenol 1000mg twice a day or ibuprofen 600 mg three times per day for next 3-4 days if not on medication already. Protect the area for the next 1-8 hours if anesthetic was used. Avoid excessive activity for next 3 to 4 weeks. ER precautions for fever, severe joint pain, or allergic reaction or other new symptoms related to joint injection.

## 2022-06-14 ENCOUNTER — LAB VISIT (OUTPATIENT)
Dept: LAB | Facility: HOSPITAL | Age: 57
End: 2022-06-14
Attending: NURSE PRACTITIONER
Payer: COMMERCIAL

## 2022-06-14 LAB
ANION GAP SERPL CALC-SCNC: 8 MEQ/L
BUN SERPL-MCNC: 11.7 MG/DL (ref 9.8–20.1)
CALCIUM SERPL-MCNC: 10.6 MG/DL (ref 8.4–10.2)
CHLORIDE SERPL-SCNC: 104 MMOL/L (ref 98–107)
CO2 SERPL-SCNC: 29 MMOL/L (ref 22–29)
CREAT SERPL-MCNC: 0.83 MG/DL (ref 0.55–1.02)
CREAT/UREA NIT SERPL: 14
EST. AVERAGE GLUCOSE BLD GHB EST-MCNC: 240.3 MG/DL
GLUCOSE SERPL-MCNC: 197 MG/DL (ref 74–100)
HBA1C MFR BLD: 10 %
POTASSIUM SERPL-SCNC: 4.5 MMOL/L (ref 3.5–5.1)
SODIUM SERPL-SCNC: 141 MMOL/L (ref 136–145)

## 2022-06-14 PROCEDURE — 80048 BASIC METABOLIC PNL TOTAL CA: CPT

## 2022-06-14 PROCEDURE — 36415 COLL VENOUS BLD VENIPUNCTURE: CPT

## 2022-06-14 PROCEDURE — 83036 HEMOGLOBIN GLYCOSYLATED A1C: CPT

## 2022-06-15 NOTE — PROGRESS NOTES
Foreign Rosas is a 56 y.o. female here for a diabetic eye screening with non-dilated fundus photos per NAYANA Puga NP.    Patient cooperative?: Yes  Small pupils?: No  Last eye exam: unknown    For exam results, see Encounter Report.

## 2022-06-16 ENCOUNTER — OFFICE VISIT (OUTPATIENT)
Dept: OPHTHALMOLOGY | Facility: CLINIC | Age: 57
End: 2022-06-16
Payer: COMMERCIAL

## 2022-06-16 VITALS — BODY MASS INDEX: 31.53 KG/M2 | WEIGHT: 177.94 LBS | HEIGHT: 63 IN

## 2022-06-16 DIAGNOSIS — H25.813 COMBINED FORM OF AGE-RELATED CATARACT, BOTH EYES: ICD-10-CM

## 2022-06-16 DIAGNOSIS — H52.4 PRESBYOPIA OF BOTH EYES: ICD-10-CM

## 2022-06-16 DIAGNOSIS — E11.9 DIABETES MELLITUS TYPE 2 WITHOUT RETINOPATHY: ICD-10-CM

## 2022-06-16 DIAGNOSIS — H40.059: ICD-10-CM

## 2022-06-16 DIAGNOSIS — H31.8: ICD-10-CM

## 2022-06-16 DIAGNOSIS — H40.023 OPEN ANGLE WITH BORDERLINE FINDINGS AND HIGH GLAUCOMA RISK IN BOTH EYES: Primary | ICD-10-CM

## 2022-06-16 PROCEDURE — 99214 OFFICE O/P EST MOD 30 MIN: CPT | Mod: PBBFAC,PO | Performed by: STUDENT IN AN ORGANIZED HEALTH CARE EDUCATION/TRAINING PROGRAM

## 2022-06-16 PROCEDURE — 92250 FUNDUS PHOTOGRAPHY W/I&R: CPT | Mod: 26,PBBFAC,PO | Performed by: STUDENT IN AN ORGANIZED HEALTH CARE EDUCATION/TRAINING PROGRAM

## 2022-06-16 NOTE — PROGRESS NOTES
Assessment /Plan     For exam results, see Encounter Report.    Open angle with borderline findings and high glaucoma risk in both eyes    Age-related nuclear cataract of both eyes    Presbyopia of both eyes    Diabetes mellitus type 2 without retinopathy      1. Open angle glaucoma, borderline findings, high risk (3/5) OU  - Increased CDR  - Fam Hx: mother was blind from glaucoma; +AA  - CCT thin 12/15/21 490 // 496  - Gonio: 9/23/2020 - open to  OU  - HVF 12/15/21 unreliable OD; OS with few nasal changes but does not correlate with OCT  - OCT 12/15/21 stable OU  - CTM off drops    2. Combined cataract, ou  - NVS, monitor    3. Hypertensive choroidopathy (Elschnig Spots) OU  - monitor  - encouraged to follow with PCP    4. Presbyopia  - Can use OTC readers    5. ID-DM type 2 without retinopathy  - encouraged good PCP follow up  - last fundus photos 6/16/22    RTC 6 mo IOP check, OCT RNFL, HVF24-2

## 2022-06-17 ENCOUNTER — PROCEDURE VISIT (OUTPATIENT)
Dept: ORTHOPEDICS | Facility: CLINIC | Age: 57
End: 2022-06-17
Payer: COMMERCIAL

## 2022-06-17 ENCOUNTER — PATIENT MESSAGE (OUTPATIENT)
Dept: ADMINISTRATIVE | Facility: HOSPITAL | Age: 57
End: 2022-06-17
Payer: COMMERCIAL

## 2022-06-17 ENCOUNTER — OFFICE VISIT (OUTPATIENT)
Dept: INTERNAL MEDICINE | Facility: CLINIC | Age: 57
End: 2022-06-17
Payer: COMMERCIAL

## 2022-06-17 VITALS — BODY MASS INDEX: 31.89 KG/M2 | HEIGHT: 63 IN | WEIGHT: 180 LBS

## 2022-06-17 DIAGNOSIS — E83.52 HYPERCALCEMIA: ICD-10-CM

## 2022-06-17 DIAGNOSIS — E11.65 UNCONTROLLED TYPE 2 DIABETES MELLITUS WITH HYPERGLYCEMIA: Primary | ICD-10-CM

## 2022-06-17 DIAGNOSIS — M17.0 PRIMARY OSTEOARTHRITIS OF BOTH KNEES: Primary | ICD-10-CM

## 2022-06-17 PROBLEM — G89.29 CHRONIC PAIN OF BOTH KNEES: Status: ACTIVE | Noted: 2022-06-17

## 2022-06-17 PROBLEM — R35.0 INCREASED FREQUENCY OF URINATION: Status: ACTIVE | Noted: 2022-06-17

## 2022-06-17 PROBLEM — M65.4 RADIAL STYLOID TENOSYNOVITIS OF LEFT HAND: Status: ACTIVE | Noted: 2022-06-17

## 2022-06-17 PROBLEM — M25.562 CHRONIC PAIN OF BOTH KNEES: Status: ACTIVE | Noted: 2022-06-17

## 2022-06-17 PROBLEM — N95.0 POSTMENOPAUSAL BLEEDING: Status: ACTIVE | Noted: 2022-06-17

## 2022-06-17 PROBLEM — H25.10 NUCLEAR SENILE CATARACT: Status: ACTIVE | Noted: 2022-06-17

## 2022-06-17 PROBLEM — G56.00 CARPAL TUNNEL SYNDROME: Status: ACTIVE | Noted: 2022-06-17

## 2022-06-17 PROBLEM — M79.18 MUSCULOSKELETAL PAIN: Status: ACTIVE | Noted: 2022-06-17

## 2022-06-17 PROBLEM — I07.1 TRICUSPID VALVE INSUFFICIENCY: Status: ACTIVE | Noted: 2022-06-17

## 2022-06-17 PROBLEM — M25.561 CHRONIC PAIN OF BOTH KNEES: Status: ACTIVE | Noted: 2022-06-17

## 2022-06-17 PROBLEM — R91.8 MULTIPLE PULMONARY NODULES: Status: ACTIVE | Noted: 2022-06-17

## 2022-06-17 PROBLEM — R10.2 PAIN IN PELVIS: Status: ACTIVE | Noted: 2022-06-17

## 2022-06-17 PROBLEM — M65.30 TRIGGERING OF DIGIT: Status: ACTIVE | Noted: 2022-06-17

## 2022-06-17 PROBLEM — I10 HYPERTENSION: Status: ACTIVE | Noted: 2022-06-17

## 2022-06-17 PROBLEM — G47.00 INSOMNIA: Status: ACTIVE | Noted: 2022-06-17

## 2022-06-17 PROCEDURE — 20610 DRAIN/INJ JOINT/BURSA W/O US: CPT | Mod: 50,PBBFAC | Performed by: STUDENT IN AN ORGANIZED HEALTH CARE EDUCATION/TRAINING PROGRAM

## 2022-06-17 PROCEDURE — 99214 PR OFFICE/OUTPT VISIT, EST, LEVL IV, 30-39 MIN: ICD-10-PCS | Mod: 95,,, | Performed by: NURSE PRACTITIONER

## 2022-06-17 PROCEDURE — 1159F MED LIST DOCD IN RCRD: CPT | Mod: CPTII,95,, | Performed by: NURSE PRACTITIONER

## 2022-06-17 PROCEDURE — 99499 NO LOS: ICD-10-PCS | Mod: S$PBB,,, | Performed by: STUDENT IN AN ORGANIZED HEALTH CARE EDUCATION/TRAINING PROGRAM

## 2022-06-17 PROCEDURE — 1160F RVW MEDS BY RX/DR IN RCRD: CPT | Mod: CPTII,95,, | Performed by: NURSE PRACTITIONER

## 2022-06-17 PROCEDURE — 1159F PR MEDICATION LIST DOCUMENTED IN MEDICAL RECORD: ICD-10-PCS | Mod: CPTII,95,, | Performed by: NURSE PRACTITIONER

## 2022-06-17 PROCEDURE — 1160F PR REVIEW ALL MEDS BY PRESCRIBER/CLIN PHARMACIST DOCUMENTED: ICD-10-PCS | Mod: CPTII,95,, | Performed by: NURSE PRACTITIONER

## 2022-06-17 PROCEDURE — 99499 UNLISTED E&M SERVICE: CPT | Mod: S$PBB,,, | Performed by: STUDENT IN AN ORGANIZED HEALTH CARE EDUCATION/TRAINING PROGRAM

## 2022-06-17 PROCEDURE — 20610: ICD-10-PCS | Mod: 50,S$PBB,, | Performed by: STUDENT IN AN ORGANIZED HEALTH CARE EDUCATION/TRAINING PROGRAM

## 2022-06-17 PROCEDURE — 99214 OFFICE O/P EST MOD 30 MIN: CPT | Mod: 95,,, | Performed by: NURSE PRACTITIONER

## 2022-06-17 RX ADMIN — Medication 20 MG: at 07:06

## 2022-06-17 NOTE — PROCEDURES
Large Joint Aspiration/Injection: bilateral knee Hyalgan #3    Date/Time: 6/17/2022 7:30 AM  Performed by: Gerda Doyle MD  Authorized by: Gerda Doyle MD     Consent Done?:  Yes (Written)  Indications:  Pain and arthritis  Site marked: the procedure site was marked    Timeout: prior to procedure the correct patient, procedure, and site was verified    Prep: patient was prepped and draped in usual sterile fashion      Local anesthesia used?: Yes    Local anesthetic:  Topical anesthetic    Details:  Needle Size:  21 G  Ultrasonic Guidance for needle placement?: No    Approach:  Anterolateral  Location:  Knee  Laterality:  Bilateral  Site:  Bilateral knee  Medications (Right):  20 mg sodium hyaluronate (supartz) 10 mg/mL  Medications (Left):  20 mg sodium hyaluronate (supartz) 10 mg/mL  Patient tolerance:  Patient tolerated the procedure well with no immediate complications     Procedure: Each joint injected: with Hyalgan 10mg/mL, 2cc.     Post Procedure: Patient reports improvement in pain and ROM. Patient alert, moving all extremities. Good peripheral pulses, no signs of vascular compromise, range of motion intact. Patient tolerated procedure well. Aftercare instructions were given to patient at time of discharge. Relative rest for 3 days - avoiding excessive activity. Place ice on area for 15 minutes every 4 to 6 hours. Tylenol 1000mg twice a day or ibuprofen 600 mg three times per day for next 3-4 days if not on medication already. Protect the area for the next 1-8 hours if anesthetic was used. Avoid excessive activity for next 3 to 4 weeks. ER precautions for fever, severe joint pain, or allergic reaction or other new symptoms related to joint injection.

## 2022-06-17 NOTE — PROGRESS NOTES
[facility]  NORBERTO Baptiste     Patient Name: Foreign Rosas   : 1965  MRN:[id number]     Review of patient's allergies indicates:  No Known Allergies   Medication List with Changes/Refills   Current Medications    AMLODIPINE (NORVASC) 5 MG TABLET    Take 1 tablet (5 mg total) by mouth once daily.    ASPIRIN (ECOTRIN) 81 MG EC TABLET    Take 81 mg by mouth once daily.    GABAPENTIN (NEURONTIN) 300 MG CAPSULE    Take 300 mg by mouth every evening.    GLIPIZIDE (GLUCOTROL) 10 MG TABLET    Take 10 mg by mouth 2 (two) times daily before meals.    IBUPROFEN (ADVIL,MOTRIN) 800 MG TABLET    Take 800 mg by mouth 3 (three) times daily as needed for Pain.    INSULIN GLARGINE (LANTUS) 100 UNIT/ML INJECTION    Inject 55 Units into the skin every evening.    INSULIN LISPRO 100 UNIT/ML INJECTION    Inject 2-12 Units into the skin as needed.    INSULIN LISPRO 100 UNIT/ML PEN      See Instructions, Inject 2-12 units sq per s/s after CBG AC & HS., # 15 mL, 6 Refill(s), Pharmacy: [facility], 162, cm, Height/Length Dosing, 21 7:49:00 CDT, 81, kg, Weight Dosing, 21 7:49:00 CDT    METFORMIN (GLUCOPHAGE) 1000 MG TABLET    Take 1,000 mg by mouth 2 (two) times daily with meals.    PEN NEEDLE, DIABETIC MISC    1 each by Misc.(Non-Drug; Combo Route) route 2 (two) times a day. Before AC and HS.    PRAVASTATIN (PRAVACHOL) 20 MG TABLET    Take 20 mg by mouth once daily.    ZOLPIDEM (AMBIEN) 5 MG TAB    Take 5 mg by mouth nightly as needed for Insomnia.    ZOLPIDEM (AMBIEN) 5 MG TAB      See Instructions, PRN PRN insomnia, TAKE 1 TABLET BY MOUTH ONCE DAILY AT BEDTIME AS NEEDED FOR INSOMNIA, # 30 tab(s), 5 Refill(s), Pharmacy: [id number], 162, cm, Height/Length Dosing, 21 9:05:00 CDT, 82.2, kg, Weight Dosing, 21 9...   Discontinued Medications    GLIPIZIDE (GLUCOTROL) 10 MG TABLET    Take 10 mg by mouth.    IBUPROFEN (ADVIL,MOTRIN) 800 MG TABLET    Take 800 mg by  mouth.    INSULIN GLARGINE (LANTUS) 100 UNIT/ML INJECTION    Inject into the skin.    METFORMIN (GLUCOPHAGE) 1000 MG TABLET    Take 1,000 mg by mouth.    PRAVASTATIN (PRAVACHOL) 20 MG TABLET    Take 20 mg by mouth.    SERTRALINE (ZOLOFT) 50 MG TABLET    Take 50 mg by mouth once daily.    SERTRALINE (ZOLOFT) 50 MG TABLET      See Instructions, Take 1/2 tab po Q hs X 7 days then increase to 1 tab po  Qhs., # 30 tab(s), 3 Refill(s), Pharmacy: [id number], 162, cm, Height/Length Dosing, 08/17/21 9:05:00 CDT, 82.2, kg, Weight Dosing, 08/17/21 9:05:00 CDT        CC:  Chief Complaint   Patient presents with    Diabetes    Follow-up        HPI  55 y/o female for telemedicine visit for f/u Uncontrolled DM/ insulin titration.  Pt has hx HTN, DM2 uncontrolled, Abnl CBC, Mild mitral valve regurg, hx 5 mm right upper lung lobe nodule stable since 2014.            ROS  Review of Systems   Constitutional: Negative.    HENT: Negative.    Eyes: Negative.    Respiratory: Negative.    Cardiovascular: Negative.    Gastrointestinal: Negative.    Endocrine: Negative.    Genitourinary: Negative.    Musculoskeletal: Negative.    Integumentary:  Negative.   Allergic/Immunologic: Negative.    Neurological: Negative.    Hematological: Negative.    Psychiatric/Behavioral: Negative.         Physical Examination:  There were no vitals filed for this visit.       Physical Exam  Pulmonary:      Effort: Pulmonary effort is normal.   Neurological:      Mental Status: She is alert.   Psychiatric:         Mood and Affect: Mood normal.         Behavior: Behavior normal.            Labs/Imaging:  Reviewed    Assessment/Plan:  1. Uncontrolled type 2 diabetes mellitus with hyperglycemia  - Basic Metabolic Panel; Future  - Hemoglobin A1C; Future  - Microalbumin, Random Urine (w/o Creat); Future  - Microalbumin, Random Urine (w/o Creat)  A1c 10.0 down from 10.3. ADA diet and exercise. Pt admits to only taking Lantus 45 units Q hs. Informed to  increase lantus to 50 units sq Q hs. Will repeat A1c and BMP in 1 month. Encouraged stricter dietary mgmt.    2. Hypercalcemia  - PTH, intact; Future   Calcium level 10.6- same as on 5-4-22. Will get PTH intact with next labs in 1 month.      RTC in 1 month with labs 1 week prior to appt.       Established Patient - Audio Only Telehealth Visit     The patient location is: home  The chief complaint leading to consultation is: f/u uncontrolled DM  Visit type: Virtual visit with audio only (telephone)  Total time spent with patient: 34 minutes       The reason for the audio only service rather than synchronous audio and video virtual visit was related to technical difficulties or patient preference/necessity.     Each patient to whom I provide medical services by telemedicine is:  (1) informed of the relationship between the physician and patient and the respective role of any other health care provider with respect to management of the patient; and (2) notified that they may decline to receive medical services by telemedicine and may withdraw from such care at any time. Patient verbally consented to receive this service via voice-only telephone call.       HPI: see above     Assessment and plan:  See above                      This service was not originating from a related E/M service provided within the previous 7 days nor will  to an E/M service or procedure within the next 24 hours or my soonest available appointment.  Prevailing standard of care was able to be met in this audio-only visit.

## 2022-06-20 ENCOUNTER — HOSPITAL ENCOUNTER (OUTPATIENT)
Dept: RADIOLOGY | Facility: HOSPITAL | Age: 57
Discharge: HOME | End: 2022-06-20
Attending: NURSE PRACTITIONER
Payer: COMMERCIAL

## 2022-06-20 DIAGNOSIS — Z12.31 BREAST CANCER SCREENING BY MAMMOGRAM: ICD-10-CM

## 2022-06-20 PROCEDURE — 77067 SCR MAMMO BI INCL CAD: CPT | Mod: 26,,, | Performed by: RADIOLOGY

## 2022-06-20 PROCEDURE — 77067 SCR MAMMO BI INCL CAD: CPT | Mod: TC

## 2022-06-20 PROCEDURE — 77067 MAMMO DIGITAL SCREENING BILAT: ICD-10-PCS | Mod: 26,,, | Performed by: RADIOLOGY

## 2022-06-27 RX ORDER — ZOLPIDEM TARTRATE 5 MG/1
5 TABLET ORAL NIGHTLY PRN
Qty: 30 TABLET | Refills: 5 | Status: SHIPPED | OUTPATIENT
Start: 2022-06-27 | End: 2023-04-20 | Stop reason: SDUPTHER

## 2022-08-03 ENCOUNTER — CLINICAL SUPPORT (OUTPATIENT)
Dept: OTHER | Facility: CLINIC | Age: 57
End: 2022-08-03
Payer: COMMERCIAL

## 2022-08-03 DIAGNOSIS — Z00.8 ENCOUNTER FOR OTHER GENERAL EXAMINATION: ICD-10-CM

## 2022-08-04 VITALS
DIASTOLIC BLOOD PRESSURE: 81 MMHG | WEIGHT: 175.81 LBS | BODY MASS INDEX: 29.29 KG/M2 | HEIGHT: 65 IN | SYSTOLIC BLOOD PRESSURE: 152 MMHG

## 2022-08-04 LAB
GLUCOSE SERPL-MCNC: 372 MG/DL (ref 60–140)
HDLC SERPL-MCNC: 26 MG/DL
POC CHOLESTEROL, TOTAL: 99 MG/DL
TRIGL SERPL-MCNC: 301 MG/DL

## 2022-09-07 DIAGNOSIS — E11.65 UNCONTROLLED TYPE 2 DIABETES MELLITUS WITH HYPERGLYCEMIA: Primary | ICD-10-CM

## 2022-09-07 DIAGNOSIS — I10 HYPERTENSION: ICD-10-CM

## 2022-09-07 DIAGNOSIS — E83.52 HYPERCALCEMIA: ICD-10-CM

## 2022-09-21 ENCOUNTER — LAB VISIT (OUTPATIENT)
Dept: LAB | Facility: HOSPITAL | Age: 57
End: 2022-09-21
Attending: NURSE PRACTITIONER
Payer: COMMERCIAL

## 2022-09-21 DIAGNOSIS — E11.65 UNCONTROLLED TYPE 2 DIABETES MELLITUS WITH HYPERGLYCEMIA: ICD-10-CM

## 2022-09-21 DIAGNOSIS — E83.52 HYPERCALCEMIA: ICD-10-CM

## 2022-09-21 LAB
CREAT UR-MCNC: 323 MG/DL (ref 47–110)
MICROALBUMIN UR-MCNC: 177.4 UG/ML
MICROALBUMIN/CREAT RATIO PNL UR: 54.9 MG/GM CR (ref 0–30)
PTH-INTACT SERPL-MCNC: 87.8 PG/ML (ref 8.7–77)

## 2022-09-21 PROCEDURE — 80048 BASIC METABOLIC PNL TOTAL CA: CPT

## 2022-09-21 PROCEDURE — 36415 COLL VENOUS BLD VENIPUNCTURE: CPT

## 2022-09-21 PROCEDURE — 83036 HEMOGLOBIN GLYCOSYLATED A1C: CPT

## 2022-09-21 PROCEDURE — 82043 UR ALBUMIN QUANTITATIVE: CPT

## 2022-09-21 PROCEDURE — 83970 ASSAY OF PARATHORMONE: CPT

## 2022-09-22 ENCOUNTER — OFFICE VISIT (OUTPATIENT)
Dept: INTERNAL MEDICINE | Facility: CLINIC | Age: 57
End: 2022-09-22
Payer: COMMERCIAL

## 2022-09-22 DIAGNOSIS — E11.65 UNCONTROLLED TYPE 2 DIABETES MELLITUS WITH HYPERGLYCEMIA: Primary | ICD-10-CM

## 2022-09-22 DIAGNOSIS — R79.89 ELEVATED PTHRP LEVEL: ICD-10-CM

## 2022-09-22 PROCEDURE — 99213 PR OFFICE/OUTPT VISIT, EST, LEVL III, 20-29 MIN: ICD-10-PCS | Mod: 95,,, | Performed by: NURSE PRACTITIONER

## 2022-09-22 PROCEDURE — 1160F PR REVIEW ALL MEDS BY PRESCRIBER/CLIN PHARMACIST DOCUMENTED: ICD-10-PCS | Mod: CPTII,95,, | Performed by: NURSE PRACTITIONER

## 2022-09-22 PROCEDURE — 1160F RVW MEDS BY RX/DR IN RCRD: CPT | Mod: CPTII,95,, | Performed by: NURSE PRACTITIONER

## 2022-09-22 PROCEDURE — 99213 OFFICE O/P EST LOW 20 MIN: CPT | Mod: 95,,, | Performed by: NURSE PRACTITIONER

## 2022-09-22 PROCEDURE — 1159F MED LIST DOCD IN RCRD: CPT | Mod: CPTII,95,, | Performed by: NURSE PRACTITIONER

## 2022-09-22 PROCEDURE — 3066F NEPHROPATHY DOC TX: CPT | Mod: CPTII,95,, | Performed by: NURSE PRACTITIONER

## 2022-09-22 PROCEDURE — 3060F POS MICROALBUMINURIA REV: CPT | Mod: CPTII,95,, | Performed by: NURSE PRACTITIONER

## 2022-09-22 PROCEDURE — 1159F PR MEDICATION LIST DOCUMENTED IN MEDICAL RECORD: ICD-10-PCS | Mod: CPTII,95,, | Performed by: NURSE PRACTITIONER

## 2022-09-22 PROCEDURE — 3060F PR POS MICROALBUMINURIA RESULT DOCUMENTED/REVIEW: ICD-10-PCS | Mod: CPTII,95,, | Performed by: NURSE PRACTITIONER

## 2022-09-22 PROCEDURE — 3066F PR DOCUMENTATION OF TREATMENT FOR NEPHROPATHY: ICD-10-PCS | Mod: CPTII,95,, | Performed by: NURSE PRACTITIONER

## 2022-09-22 RX ORDER — SERTRALINE HYDROCHLORIDE 50 MG/1
TABLET, FILM COATED ORAL
COMMUNITY
Start: 2022-07-19 | End: 2023-03-09 | Stop reason: SDUPTHER

## 2022-09-22 NOTE — PROGRESS NOTES
Internal Medicine Clinic  NORBERTO Baptiste     Patient Name: Foreign Rosas   : 1965  MRN:[id number]     Review of patient's allergies indicates:  No Known Allergies   Medication List with Changes/Refills   Current Medications    AMLODIPINE (NORVASC) 5 MG TABLET    Take 1 tablet (5 mg total) by mouth once daily.    ASPIRIN (ECOTRIN) 81 MG EC TABLET    Take 81 mg by mouth once daily.    GABAPENTIN (NEURONTIN) 300 MG CAPSULE    Take 300 mg by mouth every evening.    GLIPIZIDE (GLUCOTROL) 10 MG TABLET    Take 10 mg by mouth 2 (two) times daily before meals.    IBUPROFEN (ADVIL,MOTRIN) 800 MG TABLET    Take 800 mg by mouth 3 (three) times daily as needed for Pain.    INSULIN GLARGINE (LANTUS) 100 UNIT/ML INJECTION    Inject 55 Units into the skin every evening.    INSULIN LISPRO 100 UNIT/ML INJECTION    Inject 2-12 Units into the skin as needed.    INSULIN LISPRO 100 UNIT/ML PEN      See Instructions, Inject 2-12 units sq per s/s after CBG AC & HS., # 15 mL, 6 Refill(s), Pharmacy: [facility], 162, cm, Height/Length Dosing, 21 7:49:00 CDT, 81, kg, Weight Dosing, 21 7:49:00 CDT    METFORMIN (GLUCOPHAGE) 1000 MG TABLET    Take 1,000 mg by mouth 2 (two) times daily with meals.    PEN NEEDLE, DIABETIC MISC    1 each by Misc.(Non-Drug; Combo Route) route 2 (two) times a day. Before AC and HS.    PRAVASTATIN (PRAVACHOL) 20 MG TABLET    Take 20 mg by mouth once daily.    SERTRALINE (ZOLOFT) 50 MG TABLET    TAKE 1/2 (ONE-HALF) TABLET BY MOUTH ONCE DAILY AT BEDTIME FOR 7 DAYS THEN INCREASE TO 1 TABLET BEDTIME THEREAFTER    ZOLPIDEM (AMBIEN) 5 MG TAB    Take 1 tablet (5 mg total) by mouth nightly as needed.        CC:  Chief Complaint   Patient presents with    lab results        HPI  58 y/o female for telemedicine visit for f/u lab results. Pt had labs done yesterday however lab only collected PTH and urine microalbumin test and did not collect A1c, BMP tests.  Pt has hx HTN, DM2  uncontrolled, Abnl CBC, Mild mitral valve regurg, hx 5 mm right upper lung lobe nodule stable since 2014.            ROS  Review of Systems   Constitutional: Negative.    HENT: Negative.     Eyes: Negative.    Respiratory: Negative.     Cardiovascular: Negative.    Gastrointestinal: Negative.    Endocrine: Negative.    Genitourinary: Negative.    Musculoskeletal: Negative.    Integumentary:  Negative.   Allergic/Immunologic: Negative.    Neurological: Negative.    Hematological: Negative.    Psychiatric/Behavioral: Negative.        Physical Examination:  There were no vitals filed for this visit.       Physical Exam  Pulmonary:      Effort: Pulmonary effort is normal.   Neurological:      Mental Status: She is alert.   Psychiatric:         Mood and Affect: Mood normal.          Labs/Imaging:  Reviewed    Assessment/Plan:  1. Uncontrolled type 2 diabetes mellitus with hyperglycemia  - Basic Metabolic Panel; Future  - Hemoglobin A1C; Future  A1c and BMP not collected by lab as ordered. Pt states she will come in tomorrow for labs. Will schedule pt tomorrow for telemed visit for lab results.     2. Elevated PTHrP level  - US Soft Tissue Head Neck Thyroid; Future   PTH level 87.8. Will get parathyroid US in 2 weeks. RTC in 1 month for US results.    RTC 1 month for parathyroid US results.     Established Patient - Audio Only Telehealth Visit     The patient location is: home  The chief complaint leading to consultation is: f/u lab results  Visit type: Virtual visit with audio only (telephone)  Total time spent with patient: 20 minutes       The reason for the audio only service rather than synchronous audio and video virtual visit was related to technical difficulties or patient preference/necessity.     Each patient to whom I provide medical services by telemedicine is:  (1) informed of the relationship between the physician and patient and the respective role of any other health care provider with respect to  management of the patient; and (2) notified that they may decline to receive medical services by telemedicine and may withdraw from such care at any time. Patient verbally consented to receive this service via voice-only telephone call.       HPI: see above     Assessment and plan:  see above                        This service was not originating from a related E/M service provided within the previous 7 days nor will  to an E/M service or procedure within the next 24 hours or my soonest available appointment.  Prevailing standard of care was able to be met in this audio-only visit.

## 2022-09-23 ENCOUNTER — OFFICE VISIT (OUTPATIENT)
Dept: INTERNAL MEDICINE | Facility: CLINIC | Age: 57
End: 2022-09-23
Payer: COMMERCIAL

## 2022-09-23 DIAGNOSIS — E11.65 UNCONTROLLED TYPE 2 DIABETES MELLITUS WITH HYPERGLYCEMIA: Primary | ICD-10-CM

## 2022-09-23 LAB
ANION GAP SERPL CALC-SCNC: 8 MEQ/L
BUN SERPL-MCNC: 9.6 MG/DL (ref 9.8–20.1)
CALCIUM SERPL-MCNC: 10.2 MG/DL (ref 8.4–10.2)
CHLORIDE SERPL-SCNC: 102 MMOL/L (ref 98–107)
CO2 SERPL-SCNC: 29 MMOL/L (ref 22–29)
CREAT SERPL-MCNC: 0.9 MG/DL (ref 0.55–1.02)
CREAT/UREA NIT SERPL: 11
EST. AVERAGE GLUCOSE BLD GHB EST-MCNC: 263.3 MG/DL
GFR SERPLBLD CREATININE-BSD FMLA CKD-EPI: >60 MLS/MIN/1.73/M2
GLUCOSE SERPL-MCNC: 219 MG/DL (ref 74–100)
HBA1C MFR BLD: 10.8 %
POTASSIUM SERPL-SCNC: 4.1 MMOL/L (ref 3.5–5.1)
SODIUM SERPL-SCNC: 139 MMOL/L (ref 136–145)

## 2022-09-23 PROCEDURE — 3060F PR POS MICROALBUMINURIA RESULT DOCUMENTED/REVIEW: ICD-10-PCS | Mod: CPTII,95,, | Performed by: NURSE PRACTITIONER

## 2022-09-23 PROCEDURE — 99213 PR OFFICE/OUTPT VISIT, EST, LEVL III, 20-29 MIN: ICD-10-PCS | Mod: 95,,, | Performed by: NURSE PRACTITIONER

## 2022-09-23 PROCEDURE — 3066F NEPHROPATHY DOC TX: CPT | Mod: CPTII,95,, | Performed by: NURSE PRACTITIONER

## 2022-09-23 PROCEDURE — 1160F RVW MEDS BY RX/DR IN RCRD: CPT | Mod: CPTII,95,, | Performed by: NURSE PRACTITIONER

## 2022-09-23 PROCEDURE — 99213 OFFICE O/P EST LOW 20 MIN: CPT | Mod: 95,,, | Performed by: NURSE PRACTITIONER

## 2022-09-23 PROCEDURE — 1159F MED LIST DOCD IN RCRD: CPT | Mod: CPTII,95,, | Performed by: NURSE PRACTITIONER

## 2022-09-23 PROCEDURE — 1160F PR REVIEW ALL MEDS BY PRESCRIBER/CLIN PHARMACIST DOCUMENTED: ICD-10-PCS | Mod: CPTII,95,, | Performed by: NURSE PRACTITIONER

## 2022-09-23 PROCEDURE — 3060F POS MICROALBUMINURIA REV: CPT | Mod: CPTII,95,, | Performed by: NURSE PRACTITIONER

## 2022-09-23 PROCEDURE — 1159F PR MEDICATION LIST DOCUMENTED IN MEDICAL RECORD: ICD-10-PCS | Mod: CPTII,95,, | Performed by: NURSE PRACTITIONER

## 2022-09-23 PROCEDURE — 3066F PR DOCUMENTATION OF TREATMENT FOR NEPHROPATHY: ICD-10-PCS | Mod: CPTII,95,, | Performed by: NURSE PRACTITIONER

## 2022-09-23 RX ORDER — INSULIN GLARGINE 100 [IU]/ML
60 INJECTION, SOLUTION SUBCUTANEOUS NIGHTLY
Qty: 60 ML | Refills: 3 | Status: SHIPPED | OUTPATIENT
Start: 2022-09-23 | End: 2022-10-24

## 2022-09-23 NOTE — PROGRESS NOTES
[facility] Medicine Clinic  NORBERTO Baptiste     Patient Name: Foreign Rosas   : 1965  MRN:[id number]     Review of patient's allergies indicates:  No Known Allergies   Medication List with Changes/Refills   Current Medications    AMLODIPINE (NORVASC) 5 MG TABLET    Take 1 tablet (5 mg total) by mouth once daily.    ASPIRIN (ECOTRIN) 81 MG EC TABLET    Take 81 mg by mouth once daily.    GABAPENTIN (NEURONTIN) 300 MG CAPSULE    Take 300 mg by mouth every evening.    GLIPIZIDE (GLUCOTROL) 10 MG TABLET    Take 10 mg by mouth 2 (two) times daily before meals.    IBUPROFEN (ADVIL,MOTRIN) 800 MG TABLET    Take 800 mg by mouth 3 (three) times daily as needed for Pain.    INSULIN LISPRO 100 UNIT/ML INJECTION    Inject 2-12 Units into the skin as needed.    INSULIN LISPRO 100 UNIT/ML PEN      See Instructions, Inject 2-12 units sq per s/s after CBG AC & HS., # 15 mL, 6 Refill(s), Pharmacy: [facility], 162, cm, Height/Length Dosing, 21 7:49:00 CDT, 81, kg, Weight Dosing, 21 7:49:00 CDT    METFORMIN (GLUCOPHAGE) 1000 MG TABLET    Take 1,000 mg by mouth 2 (two) times daily with meals.    PEN NEEDLE, DIABETIC MISC    1 each by Misc.(Non-Drug; Combo Route) route 2 (two) times a day. Before AC and HS.    PRAVASTATIN (PRAVACHOL) 20 MG TABLET    Take 20 mg by mouth once daily.    SERTRALINE (ZOLOFT) 50 MG TABLET    TAKE 1/2 (ONE-HALF) TABLET BY MOUTH ONCE DAILY AT BEDTIME FOR 7 DAYS THEN INCREASE TO 1 TABLET BEDTIME THEREAFTER    ZOLPIDEM (AMBIEN) 5 MG TAB    Take 1 tablet (5 mg total) by mouth nightly as needed.   Changed and/or Refilled Medications    Modified Medication Previous Medication    INSULIN GLARGINE (LANTUS) 100 UNIT/ML INJECTION insulin glargine (LANTUS) 100 unit/mL injection       Inject 60 Units into the skin every evening.    Inject 55 Units into the skin every evening.        CC:  Chief Complaint   Patient presents with    lab results        HPI  58 y/o female for  telemedicine visit for f/u A1c lab results.  Pt has hx HTN, DM2 uncontrolled, Abnl CBC, Mild mitral valve regurg, hx 5 mm right upper lung lobe nodule stable since 2014.          ROS  Review of Systems   Constitutional: Negative.    HENT: Negative.     Eyes: Negative.    Respiratory: Negative.     Cardiovascular: Negative.    Gastrointestinal: Negative.    Endocrine: Negative.    Genitourinary: Negative.    Musculoskeletal: Negative.    Integumentary:  Negative.   Allergic/Immunologic: Negative.    Neurological: Negative.    Hematological: Negative.    Psychiatric/Behavioral: Negative.        Physical Examination:  There were no vitals filed for this visit.       Physical Exam  Pulmonary:      Effort: Pulmonary effort is normal.   Neurological:      Mental Status: She is alert.   Psychiatric:         Mood and Affect: Mood normal.          Labs/Imaging:  Reviewed    Assessment/Plan:  1. Uncontrolled type 2 diabetes mellitus with hyperglycemia  - insulin glargine (LANTUS) 100 unit/mL injection; Inject 60 Units into the skin every evening.  Dispense: 60 mL; Refill: 3   A1c done today- 10.8 up from 10.0. ADA Diet and exercise. Increase Lantus insulin to 60 units sq Q hs. A1c, BMP in 1 month.     RTC in 1 month with labs 1 week prior to appt.     Established Patient - Audio Only Telehealth Visit     The patient location is: home  The chief complaint leading to consultation is: f/u A1c results  Visit type: Virtual visit with audio only (telephone)  Total time spent with patient: 13 minutes       The reason for the audio only service rather than synchronous audio and video virtual visit was related to technical difficulties or patient preference/necessity.     Each patient to whom I provide medical services by telemedicine is:  (1) informed of the relationship between the physician and patient and the respective role of any other health care provider with respect to management of the patient; and (2) notified that they may  decline to receive medical services by telemedicine and may withdraw from such care at any time. Patient verbally consented to receive this service via voice-only telephone call.       HPI: See above     Assessment and plan:  See above                        This service was not originating from a related E/M service provided within the previous 7 days nor will  to an E/M service or procedure within the next 24 hours or my soonest available appointment.  Prevailing standard of care was able to be met in this audio-only visit.

## 2022-09-26 DIAGNOSIS — I10 HYPERTENSION: ICD-10-CM

## 2022-09-26 RX ORDER — AMLODIPINE BESYLATE 5 MG/1
5 TABLET ORAL DAILY
Qty: 90 TABLET | Refills: 3 | Status: SHIPPED | OUTPATIENT
Start: 2022-09-26 | End: 2023-11-04 | Stop reason: SDUPTHER

## 2022-09-26 RX ORDER — GLIPIZIDE 10 MG/1
10 TABLET ORAL
Qty: 180 TABLET | Refills: 3 | Status: SHIPPED | OUTPATIENT
Start: 2022-09-26 | End: 2023-09-22 | Stop reason: SDUPTHER

## 2022-09-26 RX ORDER — AMLODIPINE BESYLATE 5 MG/1
5 TABLET ORAL DAILY
Qty: 30 TABLET | Refills: 0 | Status: CANCELLED | OUTPATIENT
Start: 2022-09-26 | End: 2023-09-26

## 2022-09-26 RX ORDER — GLIPIZIDE 10 MG/1
10 TABLET ORAL
Status: CANCELLED | OUTPATIENT
Start: 2022-09-26

## 2022-09-26 RX ORDER — METFORMIN HYDROCHLORIDE 1000 MG/1
1000 TABLET ORAL 2 TIMES DAILY WITH MEALS
Qty: 180 TABLET | Refills: 3 | Status: SHIPPED | OUTPATIENT
Start: 2022-09-26 | End: 2023-09-22 | Stop reason: SDUPTHER

## 2022-09-26 RX ORDER — METFORMIN HYDROCHLORIDE 1000 MG/1
1000 TABLET ORAL 2 TIMES DAILY WITH MEALS
Status: CANCELLED | OUTPATIENT
Start: 2022-09-26

## 2022-09-26 NOTE — PROGRESS NOTES
Med refills as requested.Refilled Amlodipine, Metformin, and Glipizide as previously prescribed.

## 2022-09-27 RX ORDER — INSULIN LISPRO 100 [IU]/ML
INJECTION, SOLUTION INTRAVENOUS; SUBCUTANEOUS
Qty: 15 ML | Refills: 6 | Status: SHIPPED | OUTPATIENT
Start: 2022-09-27 | End: 2023-04-20 | Stop reason: SDUPTHER

## 2022-09-27 RX ORDER — INSULIN LISPRO 100 [IU]/ML
INJECTION, SOLUTION INTRAVENOUS; SUBCUTANEOUS
Qty: 15 ML | Refills: 6 | Status: SHIPPED | OUTPATIENT
Start: 2022-09-27 | End: 2022-09-27 | Stop reason: SDUPTHER

## 2022-09-27 NOTE — PROGRESS NOTES
Pt request refill RX Humalog sent to [facility] pharmacy. Refilled Humalog insulin as prescribed and sent to [facility] pharmacy as requested.

## 2022-10-20 ENCOUNTER — HOSPITAL ENCOUNTER (OUTPATIENT)
Dept: RADIOLOGY | Facility: HOSPITAL | Age: 57
Discharge: HOME | End: 2022-10-20
Attending: NURSE PRACTITIONER
Payer: COMMERCIAL

## 2022-10-20 DIAGNOSIS — R79.89 ELEVATED PTHRP LEVEL: ICD-10-CM

## 2022-10-20 PROCEDURE — 76536 US EXAM OF HEAD AND NECK: CPT | Mod: TC

## 2022-10-21 PROBLEM — Z79.4 UNCONTROLLED DIABETES MELLITUS WITH HYPERGLYCEMIA, WITH LONG-TERM CURRENT USE OF INSULIN: Status: ACTIVE | Noted: 2022-10-21

## 2022-10-21 PROBLEM — E11.65 UNCONTROLLED DIABETES MELLITUS WITH HYPERGLYCEMIA, WITH LONG-TERM CURRENT USE OF INSULIN: Status: ACTIVE | Noted: 2022-10-21

## 2022-10-24 ENCOUNTER — OFFICE VISIT (OUTPATIENT)
Dept: INTERNAL MEDICINE | Facility: CLINIC | Age: 57
End: 2022-10-24
Payer: COMMERCIAL

## 2022-10-24 DIAGNOSIS — E11.65 UNCONTROLLED TYPE 2 DIABETES MELLITUS WITH HYPERGLYCEMIA: ICD-10-CM

## 2022-10-24 DIAGNOSIS — I10 PRIMARY HYPERTENSION: Primary | ICD-10-CM

## 2022-10-24 DIAGNOSIS — E11.65 UNCONTROLLED TYPE 2 DIABETES MELLITUS WITH HYPERGLYCEMIA: Primary | ICD-10-CM

## 2022-10-24 DIAGNOSIS — E04.2 MULTIPLE THYROID NODULES: ICD-10-CM

## 2022-10-24 PROCEDURE — 1160F RVW MEDS BY RX/DR IN RCRD: CPT | Mod: CPTII,95,, | Performed by: NURSE PRACTITIONER

## 2022-10-24 PROCEDURE — 99215 OFFICE O/P EST HI 40 MIN: CPT | Mod: 95,,, | Performed by: NURSE PRACTITIONER

## 2022-10-24 PROCEDURE — 1159F PR MEDICATION LIST DOCUMENTED IN MEDICAL RECORD: ICD-10-PCS | Mod: CPTII,95,, | Performed by: NURSE PRACTITIONER

## 2022-10-24 PROCEDURE — 3066F PR DOCUMENTATION OF TREATMENT FOR NEPHROPATHY: ICD-10-PCS | Mod: CPTII,95,, | Performed by: NURSE PRACTITIONER

## 2022-10-24 PROCEDURE — 3060F POS MICROALBUMINURIA REV: CPT | Mod: CPTII,95,, | Performed by: NURSE PRACTITIONER

## 2022-10-24 PROCEDURE — 1159F MED LIST DOCD IN RCRD: CPT | Mod: CPTII,95,, | Performed by: NURSE PRACTITIONER

## 2022-10-24 PROCEDURE — 3060F PR POS MICROALBUMINURIA RESULT DOCUMENTED/REVIEW: ICD-10-PCS | Mod: CPTII,95,, | Performed by: NURSE PRACTITIONER

## 2022-10-24 PROCEDURE — 3066F NEPHROPATHY DOC TX: CPT | Mod: CPTII,95,, | Performed by: NURSE PRACTITIONER

## 2022-10-24 PROCEDURE — 1160F PR REVIEW ALL MEDS BY PRESCRIBER/CLIN PHARMACIST DOCUMENTED: ICD-10-PCS | Mod: CPTII,95,, | Performed by: NURSE PRACTITIONER

## 2022-10-24 PROCEDURE — 99215 PR OFFICE/OUTPT VISIT, EST, LEVL V, 40-54 MIN: ICD-10-PCS | Mod: 95,,, | Performed by: NURSE PRACTITIONER

## 2022-10-24 RX ORDER — INSULIN GLARGINE 100 [IU]/ML
INJECTION, SOLUTION SUBCUTANEOUS
Qty: 70 ML | Refills: 3 | Status: SHIPPED | OUTPATIENT
Start: 2022-10-24 | End: 2023-02-09 | Stop reason: SDUPTHER

## 2022-10-24 NOTE — PROGRESS NOTES
Internal Medicine Clinic  NORBERTO Baptiste     Patient Name: Foreign Rosas   : 1965  MRN:[id number]     Review of patient's allergies indicates:  No Known Allergies   Medication List with Changes/Refills   Current Medications    AMLODIPINE (NORVASC) 5 MG TABLET    Take 1 tablet (5 mg total) by mouth once daily.    ASPIRIN (ECOTRIN) 81 MG EC TABLET    Take 81 mg by mouth once daily.    GABAPENTIN (NEURONTIN) 300 MG CAPSULE    Take 300 mg by mouth every evening.    GLIPIZIDE (GLUCOTROL) 10 MG TABLET    Take 1 tablet (10 mg total) by mouth 2 (two) times daily before meals.    IBUPROFEN (ADVIL,MOTRIN) 800 MG TABLET    Take 800 mg by mouth 3 (three) times daily as needed for Pain.    INSULIN LISPRO 100 UNIT/ML PEN    See Instructions, Inject 2-12 units sq per s/s after CBG AC & HS., # 15 mL, 6 Refill(s), Pharmacy: [facility], 162, cm, Height/Length Dosing, 21 7:49:00 CDT, 81, kg, Weight Dosing, 21 7:49:00 CDT Strength: 100 Units/mL    METFORMIN (GLUCOPHAGE) 1000 MG TABLET    Take 1 tablet (1,000 mg total) by mouth 2 (two) times daily with meals.    PEN NEEDLE, DIABETIC MISC    1 each by Misc.(Non-Drug; Combo Route) route 2 (two) times a day. Before AC and HS.    PRAVASTATIN (PRAVACHOL) 20 MG TABLET    Take 20 mg by mouth once daily.    SERTRALINE (ZOLOFT) 50 MG TABLET    TAKE 1/2 (ONE-HALF) TABLET BY MOUTH ONCE DAILY AT BEDTIME FOR 7 DAYS THEN INCREASE TO 1 TABLET BEDTIME THEREAFTER    ZOLPIDEM (AMBIEN) 5 MG TAB    Take 1 tablet (5 mg total) by mouth nightly as needed.   Changed and/or Refilled Medications    Modified Medication Previous Medication    INSULIN GLARGINE (LANTUS) 100 UNIT/ML INJECTION insulin glargine (LANTUS) 100 unit/mL injection       Inject 10 units sq Q am and 60 units sq Q hs.    Inject 60 Units into the skin every evening.        CC:  Chief Complaint   Patient presents with    lab results        HPI  58 y/o female for telemedicine visit for f/u lab  results.  Pt has hx HTN, DM2 uncontrolled, Abnl CBC, Mild mitral valve regurg, hx 5 mm right upper lung lobe nodule stable since 2014.          ROS  Review of Systems   Constitutional: Negative.    HENT: Negative.     Eyes: Negative.    Respiratory: Negative.     Cardiovascular: Negative.    Gastrointestinal: Negative.    Endocrine: Negative.    Genitourinary: Negative.    Musculoskeletal: Negative.    Integumentary:  Negative.   Allergic/Immunologic: Negative.    Neurological: Negative.    Hematological: Negative.    Psychiatric/Behavioral: Negative.        Physical Examination:  There were no vitals filed for this visit.       Physical Exam  Pulmonary:      Effort: Pulmonary effort is normal.   Neurological:      Mental Status: She is alert.   Psychiatric:         Mood and Affect: Mood normal.          Labs/Imaging:  Reviewed    Assessment/Plan:  1. Primary hypertension  Low fat low salt diet and exercise. Cont Amlodipine as prescribed.     2. Uncontrolled type 2 diabetes mellitus with hyperglycemia  - Hemoglobin A1C; Future  - Basic Metabolic Panel; Future  - TSH; Future  - T4, Free; Future  - insulin glargine (LANTUS) 100 unit/mL injection; Inject 10 units sq Q am and 60 units sq Q hs.  Dispense: 70 mL; Refill: 3  A1c 10.8. ADA diet and exercise. Increase Lantus to 10 units sq in AM and continue 60 units sq in PM. A1c BMP in 1 month.     3. Multiple thyroid nodules  - US Soft Tissue Head Neck Thyroid; Future   Pt had Thyroid US done 10-20-22 showing  US Soft Tissue Head Neck Thyroid  Order: [id number]  Status: Final result     Visible to patient: Yes (seen)     Next appt: 12/16/2022 at 09:45 AM in Ophthalmology ([facility], US OPHTH)     Dx: Elevated PTHrP level     0 Result Notes  Details    Reading Physician Reading Date Result Priority   Wandy Macias MD  [phone] 10/20/2022 Routine     Narrative & Impression  EXAMINATION:  US SOFT TISSUE HEAD NECK THYROID     CLINICAL HISTORY:  Elevated PTH and  calcium levels;.  Other specified abnormal findings of blood chemistry     TECHNIQUE:  Ultrasound of the thyroid and cervical lymph nodes was performed.     COMPARISON:  No relevant prior available for comparison.     FINDINGS:  SIZE:     Right lobe: 4.1 x 1.8 x 1.7 cm     Left lobe: 4.1 x 1.9 x 1.7 cm     Isthmus: 0.8 cm     PARENCHYMA:     Normal parenchymal echotexture.     NODULES:     Multiple, small, subcentimeter hypoechoic nodules in the both lobes of the thyroid.  The largest in the right lobe measures 7 mm in diameter (TR 4).    The largest in the left lobe measures 12 mm in diameter (TR 4).  There is an isoechoic nodule at the isthmus measuring 11 mm in diameter (TR 3).     LYMPH NODES:     Morphologically normal cervical chain lymph nodes seen.     Impression:     Multinodular goiter.  No nodule meeting imaging criteria for consideration of biopsy.    Recommend 1 year follow-up.     Reference: ACR Thyroid Imaging, Reporting and Data System (TI-RADS): White Paper of the ACR TI-RADS Committee.  2017.     TR1.  Benign.  No FNA.     TR2.  Not suspicious.  No FNA.     TR3.  Mildly suspicious.  FNA if 2.5 cm or greater.  Follow up at 1, 3 and 5 years if >/= 1.5 cm     TR4.  Moderately suspicious.  FNA if 1.5 cm or greater.  Follow up at 1, 2, 3, and 5 years if 1 cm or greater.     TR5.  Highly suspicious.  FNA if 1 cm or greater.  If 0.5 cm or greater recommend annual follow up x 5 years.        Electronically signed by: Wandy Macias  Date:                                            10/20/2022  Time:                                           10:59           Exam Ended: 10/20/22 10:17 Last Resulted: 10/20/22 10:59         Pt informed recommendation is to repeat thyroid US in 1 year due to 7 mm and 11 mm size of nodules. Pt concerned due to sister having thyroid CA and insist to be seen by ENT specialist. Informed will refer to ENT however may not obtain biopsy due to size of nodules. Refer to ENT as  requested.      RTC in 1 month with labs 1 week prior to appt for uncontrolled DM.     Established Patient - Audio Only Telehealth Visit     The patient location is: home  The chief complaint leading to consultation is: f/u lab results  Visit type: Virtual visit with audio only (telephone)  Total time spent with patient: 40 minutes         The reason for the audio only service rather than synchronous audio and video virtual visit was related to technical difficulties or patient preference/necessity.     Each patient to whom I provide medical services by telemedicine is:  (1) informed of the relationship between the physician and patient and the respective role of any other health care provider with respect to management of the patient; and (2) notified that they may decline to receive medical services by telemedicine and may withdraw from such care at any time. Patient verbally consented to receive this service via voice-only telephone call.       HPI: see above     Assessment and plan:  see above                        This service was not originating from a related E/M service provided within the previous 7 days nor will  to an E/M service or procedure within the next 24 hours or my soonest available appointment.  Prevailing standard of care was able to be met in this audio-only visit.

## 2022-10-27 ENCOUNTER — PATIENT MESSAGE (OUTPATIENT)
Dept: ADMINISTRATIVE | Facility: OTHER | Age: 57
End: 2022-10-27
Payer: COMMERCIAL

## 2022-10-31 ENCOUNTER — OFFICE VISIT (OUTPATIENT)
Dept: OTOLARYNGOLOGY | Facility: CLINIC | Age: 57
End: 2022-10-31
Payer: COMMERCIAL

## 2022-10-31 DIAGNOSIS — E04.2 MULTIPLE THYROID NODULES: ICD-10-CM

## 2022-10-31 PROCEDURE — 1159F MED LIST DOCD IN RCRD: CPT | Mod: CPTII,95,, | Performed by: NURSE PRACTITIONER

## 2022-10-31 PROCEDURE — 99212 OFFICE O/P EST SF 10 MIN: CPT | Mod: 95,,, | Performed by: NURSE PRACTITIONER

## 2022-10-31 PROCEDURE — 3060F PR POS MICROALBUMINURIA RESULT DOCUMENTED/REVIEW: ICD-10-PCS | Mod: CPTII,95,, | Performed by: NURSE PRACTITIONER

## 2022-10-31 PROCEDURE — 99212 PR OFFICE/OUTPT VISIT, EST, LEVL II, 10-19 MIN: ICD-10-PCS | Mod: 95,,, | Performed by: NURSE PRACTITIONER

## 2022-10-31 PROCEDURE — 3066F NEPHROPATHY DOC TX: CPT | Mod: CPTII,95,, | Performed by: NURSE PRACTITIONER

## 2022-10-31 PROCEDURE — 1159F PR MEDICATION LIST DOCUMENTED IN MEDICAL RECORD: ICD-10-PCS | Mod: CPTII,95,, | Performed by: NURSE PRACTITIONER

## 2022-10-31 PROCEDURE — 3066F PR DOCUMENTATION OF TREATMENT FOR NEPHROPATHY: ICD-10-PCS | Mod: CPTII,95,, | Performed by: NURSE PRACTITIONER

## 2022-10-31 PROCEDURE — 3060F POS MICROALBUMINURIA REV: CPT | Mod: CPTII,95,, | Performed by: NURSE PRACTITIONER

## 2022-10-31 NOTE — PROGRESS NOTES
Audio Only Telehealth Visit     The patient location is: [facility]  The chief complaint leading to consultation is: thyroid nodules  Visit type: Virtual visit with audio only (telephone)  Total time spent on visit: 16     The reason for the audio only service rather than synchronous audio and video virtual visit was related to technical difficulties or patient preference/necessity.     Each patient to whom I provide medical services by telemedicine is:  (1) informed of the relationship between the physician and patient and the respective role of any other health care provider with respect to management of the patient; and (2) notified that they may decline to receive medical services by telemedicine and may withdraw from such care at any time. Patient verbally consented to receive this service via voice-only telephone call.       HPI: 57yoF referred for thyroid nodules. She denies any dysphonia, dysphagia, or compressive SOB. She requested referral to ENT due to family hx. Her sister had thyroid cancer with total thyroidectomy, dx at age 39. No other family hx of thyroid dz or thyroid cancer. Denies radiation exposure.       Imaging:       Assessment and plan: 57yoF with subcentimeter thyroid nodules. Reviewed u/s results and reassurance provided that she does not have any nodules meeting FNA criteria at this time. TSH WNL 4/5/21. Will follow with u/s in one year per pt request. If stable, will defer to PCP for 3 & 5 year imaging.   - Thyroid u/s in one year  - RTC 1 year         This service was not originating from a related E/M service provided within the previous 7 days nor will  to an E/M service or procedure within the next 24 hours or my soonest available appointment.  Prevailing standard of care was able to be met in this audio-only visit.

## 2023-01-02 RX ORDER — IBUPROFEN 800 MG/1
800 TABLET ORAL 3 TIMES DAILY PRN
OUTPATIENT
Start: 2023-01-02

## 2023-01-02 RX ORDER — IBUPROFEN 800 MG/1
800 TABLET ORAL 3 TIMES DAILY PRN
Qty: 90 TABLET | Refills: 2 | Status: SHIPPED | OUTPATIENT
Start: 2023-01-02 | End: 2023-04-20 | Stop reason: SDUPTHER

## 2023-01-30 ENCOUNTER — OFFICE VISIT (OUTPATIENT)
Dept: OPHTHALMOLOGY | Facility: CLINIC | Age: 58
End: 2023-01-30
Payer: COMMERCIAL

## 2023-01-30 VITALS — WEIGHT: 175.69 LBS | BODY MASS INDEX: 29.27 KG/M2 | HEIGHT: 65 IN

## 2023-01-30 DIAGNOSIS — H40.023 OPEN ANGLE WITH BORDERLINE FINDINGS AND HIGH GLAUCOMA RISK IN BOTH EYES: Primary | ICD-10-CM

## 2023-01-30 PROCEDURE — 92133 CPTRZD OPH DX IMG PST SGM ON: CPT | Mod: PBBFAC,PO,GC | Performed by: STUDENT IN AN ORGANIZED HEALTH CARE EDUCATION/TRAINING PROGRAM

## 2023-01-30 PROCEDURE — 99213 OFFICE O/P EST LOW 20 MIN: CPT | Mod: PBBFAC,PO | Performed by: STUDENT IN AN ORGANIZED HEALTH CARE EDUCATION/TRAINING PROGRAM

## 2023-01-30 PROCEDURE — 92083 EXTENDED VISUAL FIELD XM: CPT | Mod: PBBFAC,PO,GC | Performed by: STUDENT IN AN ORGANIZED HEALTH CARE EDUCATION/TRAINING PROGRAM

## 2023-01-30 NOTE — PROGRESS NOTES
HPI    6 month IOP HVF and OCT   Last edited by Aggie Fernandes MA on 1/30/2023 11:45 AM.            Assessment /Plan     For exam results, see Encounter Report.    Open angle with borderline findings and high glaucoma risk in both eyes                   OCT N 104//100 green or white ou    HVF 1/30/23 OD unreliable uninterpretable // OS borderline unreliable grossly full      1. Open angle glaucoma, borderline findings, high risk (3/5) OU   - Increased CDR  - Fam Hx: mother was blind from glaucoma; +AA  - CCT thin 12/15/21 490 // 496  - Gonio: 9/23/2020 - open to  OU  - HVF 12/15/21 unreliable OD; OS with few nasal changes but does not correlate with OCT  -1/30/23 both unreliable, OS borderline and grossly full  - OCT 1/23 stable OU  - CTM off drops  - Given unreliable fields and stable OCTs with normal pressures, will space out to yearly DFE and OCT RNFL. Back to VF if changes in exam or OCTn    2. Combined cataract, ou  - NVS, monitor    3. Hypertensive choroidopathy ([place] Spots) OU  - monitor  - encouraged to follow with PCP     4. Presbyopia  - Can use OTC readers    5. ID-DM type 2 without retinopathy  - encouraged good PCP follow up  - last fundus photos 6/16/22    RTC 1 year DFE, OCT RNFL

## 2023-02-01 ENCOUNTER — LAB VISIT (OUTPATIENT)
Dept: LAB | Facility: HOSPITAL | Age: 58
End: 2023-02-01
Attending: NURSE PRACTITIONER
Payer: COMMERCIAL

## 2023-02-01 DIAGNOSIS — E11.65 UNCONTROLLED DIABETES MELLITUS WITH HYPERGLYCEMIA, WITH LONG-TERM CURRENT USE OF INSULIN: ICD-10-CM

## 2023-02-01 DIAGNOSIS — Z79.4 UNCONTROLLED DIABETES MELLITUS WITH HYPERGLYCEMIA, WITH LONG-TERM CURRENT USE OF INSULIN: ICD-10-CM

## 2023-02-01 LAB
EST. AVERAGE GLUCOSE BLD GHB EST-MCNC: 228.8 MG/DL
HBA1C MFR BLD: 9.6 %

## 2023-02-01 PROCEDURE — 83036 HEMOGLOBIN GLYCOSYLATED A1C: CPT

## 2023-02-01 PROCEDURE — 36415 COLL VENOUS BLD VENIPUNCTURE: CPT

## 2023-02-01 PROCEDURE — 82043 UR ALBUMIN QUANTITATIVE: CPT

## 2023-02-02 LAB
CREAT UR-MCNC: 81.1 MG/DL (ref 47–110)
MICROALBUMIN UR-MCNC: 29.6 UG/ML
MICROALBUMIN/CREAT RATIO PNL UR: 36.5 MG/GM CR (ref 0–30)

## 2023-02-08 ENCOUNTER — PATIENT MESSAGE (OUTPATIENT)
Dept: ADMINISTRATIVE | Facility: HOSPITAL | Age: 58
End: 2023-02-08
Payer: COMMERCIAL

## 2023-02-09 ENCOUNTER — OFFICE VISIT (OUTPATIENT)
Dept: INTERNAL MEDICINE | Facility: CLINIC | Age: 58
End: 2023-02-09
Payer: COMMERCIAL

## 2023-02-09 DIAGNOSIS — E11.65 UNCONTROLLED TYPE 2 DIABETES MELLITUS WITH HYPERGLYCEMIA: ICD-10-CM

## 2023-02-09 DIAGNOSIS — R25.1 TREMOR OF RIGHT HAND: ICD-10-CM

## 2023-02-09 DIAGNOSIS — N39.41 URGE INCONTINENCE OF URINE: Primary | ICD-10-CM

## 2023-02-09 PROBLEM — R32 URINARY INCONTINENCE: Status: ACTIVE | Noted: 2023-02-09

## 2023-02-09 LAB
APPEARANCE UR: CLEAR
BACTERIA #/AREA URNS AUTO: ABNORMAL /HPF
BILIRUB UR QL STRIP.AUTO: NEGATIVE MG/DL
CASTS URNS MICRO: ABNORMAL /LPF
COLOR UR AUTO: ABNORMAL
GLUCOSE UR QL STRIP.AUTO: ABNORMAL MG/DL
HYALINE CASTS #/AREA URNS LPF: ABNORMAL /LPF
KETONES UR QL STRIP.AUTO: NEGATIVE MG/DL
LEUKOCYTE ESTERASE UR QL STRIP.AUTO: 75 UNIT/L
MUCOUS THREADS URNS QL MICRO: ABNORMAL /LPF
NITRITE UR QL STRIP.AUTO: NEGATIVE
PH UR STRIP.AUTO: 6 [PH]
PROT UR QL STRIP.AUTO: ABNORMAL MG/DL
RBC #/AREA URNS AUTO: ABNORMAL /HPF
RBC UR QL AUTO: NEGATIVE UNIT/L
SP GR UR STRIP.AUTO: 1.02
SQUAMOUS #/AREA URNS LPF: ABNORMAL /HPF
UROBILINOGEN UR STRIP-ACNC: NORMAL MG/DL
WBC #/AREA URNS AUTO: ABNORMAL /HPF

## 2023-02-09 PROCEDURE — 81001 URINALYSIS AUTO W/SCOPE: CPT | Performed by: NURSE PRACTITIONER

## 2023-02-09 PROCEDURE — 99214 OFFICE O/P EST MOD 30 MIN: CPT | Mod: S$PBB,,, | Performed by: NURSE PRACTITIONER

## 2023-02-09 PROCEDURE — 1160F RVW MEDS BY RX/DR IN RCRD: CPT | Mod: CPTII,,, | Performed by: NURSE PRACTITIONER

## 2023-02-09 PROCEDURE — 1159F MED LIST DOCD IN RCRD: CPT | Mod: CPTII,,, | Performed by: NURSE PRACTITIONER

## 2023-02-09 PROCEDURE — 99214 PR OFFICE/OUTPT VISIT, EST, LEVL IV, 30-39 MIN: ICD-10-PCS | Mod: S$PBB,,, | Performed by: NURSE PRACTITIONER

## 2023-02-09 PROCEDURE — 3060F POS MICROALBUMINURIA REV: CPT | Mod: CPTII,,, | Performed by: NURSE PRACTITIONER

## 2023-02-09 PROCEDURE — 3066F PR DOCUMENTATION OF TREATMENT FOR NEPHROPATHY: ICD-10-PCS | Mod: CPTII,,, | Performed by: NURSE PRACTITIONER

## 2023-02-09 PROCEDURE — 1159F PR MEDICATION LIST DOCUMENTED IN MEDICAL RECORD: ICD-10-PCS | Mod: CPTII,,, | Performed by: NURSE PRACTITIONER

## 2023-02-09 PROCEDURE — 3072F LOW RISK FOR RETINOPATHY: CPT | Mod: CPTII,,, | Performed by: NURSE PRACTITIONER

## 2023-02-09 PROCEDURE — 3072F PR LOW RISK FOR RETINOPATHY: ICD-10-PCS | Mod: CPTII,,, | Performed by: NURSE PRACTITIONER

## 2023-02-09 PROCEDURE — 3066F NEPHROPATHY DOC TX: CPT | Mod: CPTII,,, | Performed by: NURSE PRACTITIONER

## 2023-02-09 PROCEDURE — 1160F PR REVIEW ALL MEDS BY PRESCRIBER/CLIN PHARMACIST DOCUMENTED: ICD-10-PCS | Mod: CPTII,,, | Performed by: NURSE PRACTITIONER

## 2023-02-09 PROCEDURE — 99214 OFFICE O/P EST MOD 30 MIN: CPT | Mod: PBBFAC | Performed by: NURSE PRACTITIONER

## 2023-02-09 PROCEDURE — 3060F PR POS MICROALBUMINURIA RESULT DOCUMENTED/REVIEW: ICD-10-PCS | Mod: CPTII,,, | Performed by: NURSE PRACTITIONER

## 2023-02-09 RX ORDER — NITROFURANTOIN 25; 75 MG/1; MG/1
100 CAPSULE ORAL 2 TIMES DAILY
Qty: 14 CAPSULE | Refills: 0 | Status: SHIPPED | OUTPATIENT
Start: 2023-02-09 | End: 2023-02-16

## 2023-02-09 RX ORDER — INSULIN DETEMIR 100 [IU]/ML
INJECTION, SOLUTION SUBCUTANEOUS
Qty: 60 ML | Refills: 3 | Status: SHIPPED | OUTPATIENT
Start: 2023-02-09 | End: 2023-10-20

## 2023-02-09 NOTE — PROGRESS NOTES
Patient ID: [id number]     Chief Complaint: BLADDER PROBLEMS        HPI:     HPI      Foreign Rosas is a 57 y.o. female here today for a follow up.         ----------------------------  Glaucoma  HTN (hypertension)  Type 2 diabetes mellitus with unspecified diabetic retinopathy   without macular edema     Past Surgical History:   Procedure Laterality Date    CHOLECYSTECTOMY      HYSTERECTOMY, VAGINAL, LAPAROSCOPY-ASSISTED, WITH SALPINGO-OOPHORECTOY Bilateral 08/22/2017    TUBAL LIGATION         Review of patient's allergies indicates:  No Known Allergies    Current Outpatient Medications   Medication Instructions    amLODIPine (NORVASC) 5 mg, Oral, Daily    aspirin (ECOTRIN) 81 mg, Oral, Daily    gabapentin (NEURONTIN) 300 mg, Oral, Nightly    glipiZIDE (GLUCOTROL) 10 mg, Oral, 2 times daily before meals    ibuprofen (ADVIL,MOTRIN) 800 mg, Oral, 3 times daily PRN    insulin detemir U-100 (LEVEMIR FLEXPEN) 100 unit/mL (3 mL) InPn pen Inject 15 units sq in AM and 42 units sq in PM.    insulin lispro 100 unit/mL pen See Instructions, Inject 2-12 units sq per s/s after CBG AC & HS., # 15 mL, 6 Refill(s), Pharmacy: [facility], 162, cm, Height/Length Dosing, 06/21/21 7:49:00 CDT, 81, kg, Weight Dosing, 06/21/21 7:49:00 CDT Strength: 100 Units/mL    metFORMIN (GLUCOPHAGE) 1,000 mg, Oral, 2 times daily with meals    nitrofurantoin, macrocrystal-monohydrate, (MACROBID) 100 MG capsule 100 mg, Oral, 2 times daily    PEN NEEDLE, DIABETIC MISC 1 each, Misc.(Non-Drug; Combo Route), 2 times daily, Before AC and HS.    pravastatin (PRAVACHOL) 20 mg, Oral, Daily    sertraline (ZOLOFT) 50 MG tablet TAKE 1/2 (ONE-HALF) TABLET BY MOUTH ONCE DAILY AT BEDTIME FOR 7 DAYS THEN INCREASE TO 1 TABLET BEDTIME THEREAFTER    zolpidem (AMBIEN) 5 mg, Oral, Nightly PRN       Social History     Socioeconomic History    Marital status: Single   Tobacco Use    Smoking status: Never    Smokeless tobacco: Never   Substance and  Sexual Activity    Alcohol use: Never    Drug use: Never    Sexual activity: Not Currently     Partners: Male     Birth control/protection: Abstinence, Injection     Social Determinants of Health     Physical Activity: Inactive    Days of Exercise per Week: 0 days    Minutes of Exercise per Session: 0 min        Family History   Problem Relation Age of Onset    Diabetes Mother     Hypertension Mother     Kidney failure Mother     Asthma Mother     Lung cancer Father     Diabetes Father     Hypertension Father     Thyroid disease Sister     Brain cancer Sister     Diabetes Sister     Hypertension Sister     Cancer Sister         Thyroid    Thyroid disease Sister     Migraines Sister     Cancer Brother         Pancreatic    Hypertension Brother     Seizures Brother     Diabetes Sister     Hypertension Sister         Patient Care Team:  NORBERTO Franks as PCP - General (Family Medicine)  Gilberto Adams MD as Diabetes Digital Medicine Responsible Provider (Cardiology)  Gilberto Adams MD as Hypertension Digital Medicine Responsible Provider (Cardiology)  Jade Carr as Digital Medicine Health   Eliane Shaw PharmD as Hypertension Digital Medicine Clinician  Belkis MitchellD as Diabetes Digital Medicine Clinician  [employer] as Hypertension Digital Medicine Contract  [employer] as Diabetes Digital Medicine Contract     Subjective:     Review of Systems     See HPI for details    Constitutional: Denies Change in appetite. Denies Chills. Denies Fever. Denies Night sweats.  Eye: Denies Blurred vision. Denies Discharge. Denies Eye pain.  ENT: Denies Decreased hearing. Denies Sore throat. Denies Swollen glands.  Respiratory: Denies Cough. Denies Shortness of breath. Denies Shortness of breath with exertion. Denies Wheezing.  Cardiovascular: DeniesChest pain at rest. Denies Chest pain with exertion. Denies Irregular heartbeat. Denies Palpitations. Denies  "Edema.  Gastrointestinal: Denies Abdominal pain. DeniesDiarrhea. Denies Nausea. Denies Vomiting. Denies Hematemesis or Hematochezia.  Genitourinary: Denies Dysuria. Denies Urinary frequency. AdmitsUrinary urgency. Denies Blood in urine.  Endocrine: Denies Cold intolerance. Denies Excessive thirst. Denies Heat intolerance. Denies Weight loss. Denies Weight gain.  Musculoskeletal: Denies Painful joints. Denies Weakness.  Integumentary: Denies Rash. Denies Itching. Denies Dry skin.  Neurologic: Denies Dizziness. Denies Fainting. Denies Headache. Admits right hand tremor.  Psychiatric: Denies Depression. Denies Anxiety. Denies Suicidal/Homicidal ideations.    All Other ROS: Negative except as stated in HPI.       Objective:     Visit Vitals  Resp (P) 18   Ht (P) 5' 5" (1.651 m)   Wt (P) 80.3 kg (177 lb)   BMI (P) 29.45 kg/m²       Physical Exam    General: Alert and oriented, No acute distress.  Head: Normocephalic, Atraumatic.  Eye: Pupils are equal, round and reactive to light, Extraocular movements are intact, Sclera non-icteric.  Ears/Nose/Throat: Normal, Mucosa moist,Clear.  Neck/Thyroid: Supple, Non-tender, No carotid bruit, No lymphadenopathy, No JVD, Full range of motion.  Respiratory: Clear to auscultation bilaterally; No wheezes, rales or rhonchi,Non-labored respirations, Symmetrical chest wall expansion.  Cardiovascular: Regular rate and rhythm, S1/S2 normal, No murmurs, rubs or gallops.  Gastrointestinal: Soft, Non-tender, Non-distended, Normal bowel sounds, No palpable organomegaly.  Musculoskeletal: Normal range of motion.  Integumentary: Warm, Dry, Intact, No suspicious lesions or rashes.  Extremities: No clubbing, cyanosis or edema  Neurologic: No focal deficits, Cranial Nerves II-XII are grossly intact, Motor strength normal upper and lower extremities, Sensory exam intact. + fine tremor noted to right hand with writing.  Psychiatric: Normal interaction, Coherent speech, Euthymic mood, Appropriate " affect       Labs Reviewed:     Chemistry:  Lab Results   Component Value Date     10/24/2022    K 4.0 10/24/2022    CHLORIDE 103 10/24/2022    BUN 10.1 10/24/2022    CREATININE 0.98 10/24/2022    EGFRNORACEVR >60 10/24/2022    GLUCOSE 302 (H) 10/24/2022    CALCIUM 10.4 (H) 10/24/2022    ALKPHOS 118 10/24/2022    LABPROT 7.7 10/24/2022    ALBUMIN 4.3 10/24/2022    BILIDIR 0.1 06/16/2021    IBILI 0.10 06/16/2021    AST 19 10/24/2022    ALT 27 10/24/2022    GEHEKWOR80FN 34.0 04/05/2021        Lab Results   Component Value Date    HGBA1C 9.6 (H) 02/01/2023        Hematology:  Lab Results   Component Value Date    WBC 7.2 10/24/2022    HGB 13.7 10/24/2022    HCT 44.6 10/24/2022     10/24/2022       Lipid Panel:  Lab Results   Component Value Date    CHOL 98 12/13/2021    HDL 30 (L) 12/13/2021    LDL 44.00 (L) 12/13/2021    TRIG 121 12/13/2021    TOTALCHOLEST 3 12/13/2021        Urine:  Lab Results   Component Value Date    APPEARANCEUA Clear 11/29/2021    PHUA 5.5 08/22/2017    PROTEINUA Negative 11/29/2021    LEUKOCYTESUR Negative 11/29/2021    RBCUA 0-2 11/29/2021    WBCUA 0-2 11/29/2021    BACTERIA None Seen 11/29/2021    SQEPUA  (A) 06/16/2021    HYALINECASTS 0-2 (A) 06/16/2021    CREATRANDUR 81.1 02/01/2023        Assessment:       ICD-10-CM ICD-9-CM   1. Urge incontinence of urine  N39.41 788.31   2. Uncontrolled type 2 diabetes mellitus with hyperglycemia  E11.65 250.02   3. Tremor of right hand  R25.1 781.0        Plan:     1. Urge incontinence of urine  Pt states she has been having urge incontinence approx 2-3 months. Denies pain or burning but has urinary leakage for unknown reasons. Pt followed in [place] for borderline glaucoma. Will treat empircally with Macrobid 100 mg 1 tab po BID X 7 days. Refer to Uro cl for eval and mgmt.     2. Uncontrolled Diabetes type 2 with hyperglycemia  A1c 9.6.  ADA diet and exercise. Lantus no longer covered on her insurance. Will switch to Levemir flexpen  inject 15 units sq in AM and 42 units sq in PM. A1c in 1 month. Dm foot done 5-4-22. DM eye exam done in eye clinic 6-16-22. Keep appts. Urine microalbumin 2-1-23.     3. Tremor right hand.   Pt c/o fine tremor to right hand X 9 months and worsening over the past few months. Pt states her father had the same thing. Refer to neuro cl for eval and mgmt.         Follow up in about 1 month (around 3/9/2023) for with labs 1 week prior to appt. In addition to their scheduled follow up, the patient has also been instructed to follow up on as needed basis.     Future Appointments   Date Time Provider Department Center   2/28/2023  9:30 AM NORBERTO Johansen [facility] GYN [place]   11/1/2023  8:30 AM NORBERTO Ca [facility] ENT [place] [facility]   1/30/2024  8:30 AM PROVIDERS, NORBERTO Pugh

## 2023-02-17 ENCOUNTER — OFFICE VISIT (OUTPATIENT)
Dept: UROLOGY | Facility: CLINIC | Age: 58
End: 2023-02-17
Payer: COMMERCIAL

## 2023-02-17 VITALS
TEMPERATURE: 98 F | OXYGEN SATURATION: 100 % | HEIGHT: 65 IN | HEART RATE: 88 BPM | DIASTOLIC BLOOD PRESSURE: 83 MMHG | SYSTOLIC BLOOD PRESSURE: 131 MMHG | RESPIRATION RATE: 20 BRPM | WEIGHT: 173.06 LBS | BODY MASS INDEX: 28.83 KG/M2

## 2023-02-17 DIAGNOSIS — N32.81 OVERACTIVE BLADDER: ICD-10-CM

## 2023-02-17 DIAGNOSIS — N39.41 URGE INCONTINENCE OF URINE: ICD-10-CM

## 2023-02-17 LAB — POC RESIDUAL URINE VOLUME: 0 ML (ref 0–100)

## 2023-02-17 PROCEDURE — 3008F PR BODY MASS INDEX (BMI) DOCUMENTED: ICD-10-PCS | Mod: CPTII,,, | Performed by: NURSE PRACTITIONER

## 2023-02-17 PROCEDURE — 3075F PR MOST RECENT SYSTOLIC BLOOD PRESS GE 130-139MM HG: ICD-10-PCS | Mod: CPTII,,, | Performed by: NURSE PRACTITIONER

## 2023-02-17 PROCEDURE — 3072F PR LOW RISK FOR RETINOPATHY: ICD-10-PCS | Mod: CPTII,,, | Performed by: NURSE PRACTITIONER

## 2023-02-17 PROCEDURE — 3075F SYST BP GE 130 - 139MM HG: CPT | Mod: CPTII,,, | Performed by: NURSE PRACTITIONER

## 2023-02-17 PROCEDURE — 99215 OFFICE O/P EST HI 40 MIN: CPT | Mod: S$PBB,,, | Performed by: NURSE PRACTITIONER

## 2023-02-17 PROCEDURE — 3079F PR MOST RECENT DIASTOLIC BLOOD PRESSURE 80-89 MM HG: ICD-10-PCS | Mod: CPTII,,, | Performed by: NURSE PRACTITIONER

## 2023-02-17 PROCEDURE — 3060F POS MICROALBUMINURIA REV: CPT | Mod: CPTII,,, | Performed by: NURSE PRACTITIONER

## 2023-02-17 PROCEDURE — 3060F PR POS MICROALBUMINURIA RESULT DOCUMENTED/REVIEW: ICD-10-PCS | Mod: CPTII,,, | Performed by: NURSE PRACTITIONER

## 2023-02-17 PROCEDURE — 3066F NEPHROPATHY DOC TX: CPT | Mod: CPTII,,, | Performed by: NURSE PRACTITIONER

## 2023-02-17 PROCEDURE — 99215 PR OFFICE/OUTPT VISIT, EST, LEVL V, 40-54 MIN: ICD-10-PCS | Mod: S$PBB,,, | Performed by: NURSE PRACTITIONER

## 2023-02-17 PROCEDURE — 51798 US URINE CAPACITY MEASURE: CPT | Mod: PBBFAC | Performed by: NURSE PRACTITIONER

## 2023-02-17 PROCEDURE — 1159F PR MEDICATION LIST DOCUMENTED IN MEDICAL RECORD: ICD-10-PCS | Mod: CPTII,,, | Performed by: NURSE PRACTITIONER

## 2023-02-17 PROCEDURE — 3066F PR DOCUMENTATION OF TREATMENT FOR NEPHROPATHY: ICD-10-PCS | Mod: CPTII,,, | Performed by: NURSE PRACTITIONER

## 2023-02-17 PROCEDURE — 3079F DIAST BP 80-89 MM HG: CPT | Mod: CPTII,,, | Performed by: NURSE PRACTITIONER

## 2023-02-17 PROCEDURE — 3008F BODY MASS INDEX DOCD: CPT | Mod: CPTII,,, | Performed by: NURSE PRACTITIONER

## 2023-02-17 PROCEDURE — 1159F MED LIST DOCD IN RCRD: CPT | Mod: CPTII,,, | Performed by: NURSE PRACTITIONER

## 2023-02-17 PROCEDURE — 99215 OFFICE O/P EST HI 40 MIN: CPT | Mod: PBBFAC | Performed by: NURSE PRACTITIONER

## 2023-02-17 PROCEDURE — 3072F LOW RISK FOR RETINOPATHY: CPT | Mod: CPTII,,, | Performed by: NURSE PRACTITIONER

## 2023-02-17 RX ORDER — OXYBUTYNIN CHLORIDE 5 MG/1
5 TABLET, EXTENDED RELEASE ORAL DAILY
Qty: 90 TABLET | Refills: 3 | Status: SHIPPED | OUTPATIENT
Start: 2023-02-17 | End: 2024-02-17

## 2023-02-17 NOTE — PROGRESS NOTES
Placed in room. Seen by Jose Patel NP. Spoke with patient. Bladder scan at 0 ml. RTC in 6 weeks.

## 2023-02-17 NOTE — PROGRESS NOTES
Chief Complaint:   Chief Complaint   Patient presents with    urge incontinence       HPI:  Patient is a 57-year-old female presenting with urinary urge incontinence.  Patient seen by PCP for urinary urgency no treatment rendered.  Patient presents today with urinary urgency and frequency patient denies any nocturia no greater than 2 times per night.  Patient denies dysuria, incontinence, gross hematuria.  Bladder scan 0 ml       Allergies:  Review of patient's allergies indicates:  No Known Allergies    Medications:  Current Outpatient Medications   Medication Sig Dispense Refill    amLODIPine (NORVASC) 5 MG tablet Take 1 tablet (5 mg total) by mouth once daily. 90 tablet 3    aspirin (ECOTRIN) 81 MG EC tablet Take 81 mg by mouth once daily.      gabapentin (NEURONTIN) 300 MG capsule Take 300 mg by mouth every evening.      glipiZIDE (GLUCOTROL) 10 MG tablet Take 1 tablet (10 mg total) by mouth 2 (two) times daily before meals. 180 tablet 3    ibuprofen (ADVIL,MOTRIN) 800 MG tablet Take 1 tablet (800 mg total) by mouth 3 (three) times daily as needed for Pain. 90 tablet 2    insulin detemir U-100 (LEVEMIR FLEXPEN) 100 unit/mL (3 mL) InPn pen Inject 15 units sq in AM and 42 units sq in PM. 60 mL 3    insulin lispro 100 unit/mL pen See Instructions, Inject 2-12 units sq per s/s after CBG AC & HS., # 15 mL, 6 Refill(s), Pharmacy: [facility], 162, cm, Height/Length Dosing, 06/21/21 7:49:00 CDT, 81, kg, Weight Dosing, 06/21/21 7:49:00 CDT Strength: 100 Units/mL 15 mL 6    metFORMIN (GLUCOPHAGE) 1000 MG tablet Take 1 tablet (1,000 mg total) by mouth 2 (two) times daily with meals. 180 tablet 3    PEN NEEDLE, DIABETIC MISC 1 each by Misc.(Non-Drug; Combo Route) route 2 (two) times a day. Before AC and HS.      pravastatin (PRAVACHOL) 20 MG tablet Take 20 mg by mouth once daily.      sertraline (ZOLOFT) 50 MG tablet TAKE 1/2 (ONE-HALF) TABLET BY MOUTH ONCE DAILY AT BEDTIME FOR 7 DAYS THEN INCREASE TO 1  TABLET BEDTIME THEREAFTER      zolpidem (AMBIEN) 5 MG Tab Take 1 tablet (5 mg total) by mouth nightly as needed. 30 tablet 5     No current facility-administered medications for this visit.       Review of Systems:  General: No fever, chills, fatigability, or weight loss.  Skin: No rashes, itching, or changes in color or texture of skin.  Chest: Denies FINK, cyanosis, wheezing, cough, and sputum production.  Abdomen: Appetite fine. No weight loss. Denies diarrhea, abdominal pain, hematemesis, or blood in stool.  Musculoskeletal: No joint stiffness or swelling. Denies back pain.  : As above.  All other review of systems negative.    PMH:  Past Medical History:   Diagnosis Date    Glaucoma     HTN (hypertension)     Type 2 diabetes mellitus with unspecified diabetic retinopathy without macular edema        PSH:  Past Surgical History:   Procedure Laterality Date    CHOLECYSTECTOMY      HYSTERECTOMY, VAGINAL, LAPAROSCOPY-ASSISTED, WITH SALPINGO-OOPHORECTOY Bilateral 08/22/2017    TUBAL LIGATION         FamHx:  Family History   Problem Relation Age of Onset    Diabetes Mother     Hypertension Mother     Kidney failure Mother     Asthma Mother     Lung cancer Father     Diabetes Father     Hypertension Father     Thyroid disease Sister     Brain cancer Sister     Diabetes Sister     Hypertension Sister     Cancer Sister         Thyroid    Thyroid disease Sister     Migraines Sister     Cancer Brother         Pancreatic    Hypertension Brother     Seizures Brother     Diabetes Sister     Hypertension Sister        SocHx:  Social History     Socioeconomic History    Marital status: Single   Tobacco Use    Smoking status: Never     Passive exposure: Past    Smokeless tobacco: Never   Substance and Sexual Activity    Alcohol use: Never    Drug use: Never    Sexual activity: Not Currently     Partners: Male     Birth control/protection: Abstinence, Injection     Social Determinants of Health     Physical Activity: Inactive     Days of Exercise per Week: 0 days    Minutes of Exercise per Session: 0 min       Physical Exam:  Vitals:    02/17/23 1101   BP: 131/83   Pulse: 88   Resp: 20   Temp: 98.2 °F (36.8 °C)     General: A&Ox3, no apparent distress, no deformities  Neck: No masses, normal thyroid  Lungs: CTA [place], no use of accessory muscles  Heart: RRR, no arrhythmias  Abdomen: Soft, NT, ND, no masses, no hernias, no hepatosplenomegaly  Lymphatic: Neck and groin nodes negative  Skin: The skin is warm and dry. No jaundice.  Ext: No c/c/e.        Impression:  Urinary urinary urgency and frequency      Plan: Instructed patient start oxybutynin XL 5 mg p.o. daily instructed patient if symptoms persist in 2 weeks increase to 2 tabs x2 weeks symptoms still persist may increase to 3 tabs do not exceed 3 tabs a day.  Instructed patient if symptoms develop of dry mouth or constipation prior to 6 week appointment notify clinic may change to mirabegron 25 mg p.o. daily on timed voiding every 2-3 hrs, double voiding, avoidance of bladder irritants such as alcohol, citrus foods, chocolate, caffeinated drinks, energy drinks, spicy foods, sugar, caffeine products, sodas. Instructed patient to avoid drinking fluids 1-2 hours prior to bedtime & void immediately before bedtime. RTC 6 weeks.

## 2023-02-28 ENCOUNTER — OFFICE VISIT (OUTPATIENT)
Dept: GYNECOLOGY | Facility: CLINIC | Age: 58
End: 2023-02-28
Payer: COMMERCIAL

## 2023-02-28 VITALS
DIASTOLIC BLOOD PRESSURE: 83 MMHG | WEIGHT: 174 LBS | TEMPERATURE: 99 F | HEART RATE: 102 BPM | OXYGEN SATURATION: 95 % | HEIGHT: 65 IN | RESPIRATION RATE: 16 BRPM | BODY MASS INDEX: 28.99 KG/M2 | SYSTOLIC BLOOD PRESSURE: 134 MMHG

## 2023-02-28 DIAGNOSIS — Z12.31 SCREENING MAMMOGRAM FOR BREAST CANCER: ICD-10-CM

## 2023-02-28 DIAGNOSIS — Z12.11 COLON CANCER SCREENING: ICD-10-CM

## 2023-02-28 DIAGNOSIS — Z01.419 WOMEN'S ANNUAL ROUTINE GYNECOLOGICAL EXAMINATION: Primary | ICD-10-CM

## 2023-02-28 PROCEDURE — 3075F PR MOST RECENT SYSTOLIC BLOOD PRESS GE 130-139MM HG: ICD-10-PCS | Mod: CPTII,,, | Performed by: NURSE PRACTITIONER

## 2023-02-28 PROCEDURE — 99396 PR PREVENTIVE VISIT,EST,40-64: ICD-10-PCS | Mod: S$PBB,,, | Performed by: NURSE PRACTITIONER

## 2023-02-28 PROCEDURE — 3008F PR BODY MASS INDEX (BMI) DOCUMENTED: ICD-10-PCS | Mod: CPTII,,, | Performed by: NURSE PRACTITIONER

## 2023-02-28 PROCEDURE — 1160F PR REVIEW ALL MEDS BY PRESCRIBER/CLIN PHARMACIST DOCUMENTED: ICD-10-PCS | Mod: CPTII,,, | Performed by: NURSE PRACTITIONER

## 2023-02-28 PROCEDURE — 3072F PR LOW RISK FOR RETINOPATHY: ICD-10-PCS | Mod: CPTII,,, | Performed by: NURSE PRACTITIONER

## 2023-02-28 PROCEDURE — 3075F SYST BP GE 130 - 139MM HG: CPT | Mod: CPTII,,, | Performed by: NURSE PRACTITIONER

## 2023-02-28 PROCEDURE — 1160F RVW MEDS BY RX/DR IN RCRD: CPT | Mod: CPTII,,, | Performed by: NURSE PRACTITIONER

## 2023-02-28 PROCEDURE — 1159F MED LIST DOCD IN RCRD: CPT | Mod: CPTII,,, | Performed by: NURSE PRACTITIONER

## 2023-02-28 PROCEDURE — 99396 PREV VISIT EST AGE 40-64: CPT | Mod: S$PBB,,, | Performed by: NURSE PRACTITIONER

## 2023-02-28 PROCEDURE — 3079F PR MOST RECENT DIASTOLIC BLOOD PRESSURE 80-89 MM HG: ICD-10-PCS | Mod: CPTII,,, | Performed by: NURSE PRACTITIONER

## 2023-02-28 PROCEDURE — 3066F NEPHROPATHY DOC TX: CPT | Mod: CPTII,,, | Performed by: NURSE PRACTITIONER

## 2023-02-28 PROCEDURE — 3079F DIAST BP 80-89 MM HG: CPT | Mod: CPTII,,, | Performed by: NURSE PRACTITIONER

## 2023-02-28 PROCEDURE — 3008F BODY MASS INDEX DOCD: CPT | Mod: CPTII,,, | Performed by: NURSE PRACTITIONER

## 2023-02-28 PROCEDURE — 1159F PR MEDICATION LIST DOCUMENTED IN MEDICAL RECORD: ICD-10-PCS | Mod: CPTII,,, | Performed by: NURSE PRACTITIONER

## 2023-02-28 PROCEDURE — 3072F LOW RISK FOR RETINOPATHY: CPT | Mod: CPTII,,, | Performed by: NURSE PRACTITIONER

## 2023-02-28 PROCEDURE — 3060F PR POS MICROALBUMINURIA RESULT DOCUMENTED/REVIEW: ICD-10-PCS | Mod: CPTII,,, | Performed by: NURSE PRACTITIONER

## 2023-02-28 PROCEDURE — 99215 OFFICE O/P EST HI 40 MIN: CPT | Mod: PBBFAC | Performed by: NURSE PRACTITIONER

## 2023-02-28 PROCEDURE — 3066F PR DOCUMENTATION OF TREATMENT FOR NEPHROPATHY: ICD-10-PCS | Mod: CPTII,,, | Performed by: NURSE PRACTITIONER

## 2023-02-28 PROCEDURE — 3060F POS MICROALBUMINURIA REV: CPT | Mod: CPTII,,, | Performed by: NURSE PRACTITIONER

## 2023-02-28 NOTE — PROGRESS NOTES
"Patient ID: Foreign Rosas is a 57 y.o. female.    Chief Complaint: Well Woman      Review of patient's allergies indicates:  No Known Allergies      Past Medical History:   Diagnosis Date    Anxiety disorder, unspecified     Glaucoma     HTN (hypertension)     Type 2 diabetes mellitus with unspecified diabetic retinopathy without macular edema             HPI:  Pt is  here for annual gyn exam. Pt had LAVH with BSO in 2017 secondary to AUB-L. Pt denies vaginal bleeding or discharge. Denies vaginal dryness/dyspareunia. Pt states is followed by urology for OAB/urge incontinence. Denies dysuria.. Denies any breast complaints.  Followed in [facility] for primary care. Pt works at [employer]. States was given cologuard test but never collected sample and misplaced the package. Will reorder today. Denies fly hx of colon cancer.   Review of Systems:   Negative except for findings in HPI     Objective:   /83   Pulse 102   Temp 99 °F (37.2 °C)   Resp 16   Ht 5' 5" (1.651 m)   Wt 78.9 kg (174 lb)   SpO2 95%   BMI 28.96 kg/m²    Physical Exam:  GENERAL: Pt is aware and alert and  in no acute distress.  BREASTS: Bilateral-No masses, nipple discharge, skin changes, or tenderness.  ABDOMEN: Soft, non tender.  VULVA:  No lesions or skin changes.  URETHRA: No lesions  BLADDER: No tenderness.  VAGINA: Mucosa pale;no discharge or lesions.  CERVIX:  absent  BIMANUAL EXAM:  The uterus is absent. Ceasar adnexa reveal no evidence of masses; no fullness   SKIN: Warm and Dry  PSYCHIATRIC: Patient is awake and alert. Mood and affect are normal.    Assessment:   Women's annual routine gynecological examination    Screening mammogram for breast cancer  -     Mammo Digital Screening Bilat w/ Eriberto; Future; Expected date: 2023    Colon cancer screening  -     Cologuard Screening (Multitarget Stool DNA); Future; Expected date: 2023            1. Women's annual routine gynecological examination    2. Screening " mammogram for breast cancer    3. Colon cancer screening               -pelvic; pap deferred secondary to hyst for benign conditions  -Encouraged dietary or supplemental intake of calcium 600mg +Vit D 400 IU twice daily for osteoporosis prevention.    Plan:       Follow up in about 1 year (around 2/28/2024).

## 2023-03-07 ENCOUNTER — LAB VISIT (OUTPATIENT)
Dept: LAB | Facility: HOSPITAL | Age: 58
End: 2023-03-07
Attending: NURSE PRACTITIONER
Payer: COMMERCIAL

## 2023-03-07 DIAGNOSIS — E11.65 UNCONTROLLED TYPE 2 DIABETES MELLITUS WITH HYPERGLYCEMIA: ICD-10-CM

## 2023-03-07 LAB
ANION GAP SERPL CALC-SCNC: 8 MEQ/L
BUN SERPL-MCNC: 17.5 MG/DL (ref 9.8–20.1)
CALCIUM SERPL-MCNC: 10.8 MG/DL (ref 8.4–10.2)
CHLORIDE SERPL-SCNC: 103 MMOL/L (ref 98–107)
CO2 SERPL-SCNC: 30 MMOL/L (ref 22–29)
CREAT SERPL-MCNC: 0.95 MG/DL (ref 0.55–1.02)
CREAT/UREA NIT SERPL: 18
EST. AVERAGE GLUCOSE BLD GHB EST-MCNC: 205.9 MG/DL
GFR SERPLBLD CREATININE-BSD FMLA CKD-EPI: >60 MLS/MIN/1.73/M2
GLUCOSE SERPL-MCNC: 146 MG/DL (ref 74–100)
HBA1C MFR BLD: 8.8 %
POTASSIUM SERPL-SCNC: 4.7 MMOL/L (ref 3.5–5.1)
SODIUM SERPL-SCNC: 141 MMOL/L (ref 136–145)

## 2023-03-07 PROCEDURE — 36415 COLL VENOUS BLD VENIPUNCTURE: CPT

## 2023-03-07 PROCEDURE — 83036 HEMOGLOBIN GLYCOSYLATED A1C: CPT

## 2023-03-07 PROCEDURE — 80048 BASIC METABOLIC PNL TOTAL CA: CPT

## 2023-03-09 ENCOUNTER — OFFICE VISIT (OUTPATIENT)
Dept: INTERNAL MEDICINE | Facility: CLINIC | Age: 58
End: 2023-03-09
Payer: COMMERCIAL

## 2023-03-09 DIAGNOSIS — R25.1 TREMOR: ICD-10-CM

## 2023-03-09 DIAGNOSIS — Z00.00 WELL ADULT EXAM: ICD-10-CM

## 2023-03-09 DIAGNOSIS — Z79.4 UNCONTROLLED DIABETES MELLITUS WITH HYPERGLYCEMIA, WITH LONG-TERM CURRENT USE OF INSULIN: ICD-10-CM

## 2023-03-09 DIAGNOSIS — E83.52 HYPERCALCEMIA: ICD-10-CM

## 2023-03-09 DIAGNOSIS — R79.89 ELEVATED PTHRP LEVEL: ICD-10-CM

## 2023-03-09 DIAGNOSIS — E11.65 UNCONTROLLED DIABETES MELLITUS WITH HYPERGLYCEMIA, WITH LONG-TERM CURRENT USE OF INSULIN: ICD-10-CM

## 2023-03-09 DIAGNOSIS — N39.41 URGE INCONTINENCE OF URINE: Primary | ICD-10-CM

## 2023-03-09 PROCEDURE — 1160F RVW MEDS BY RX/DR IN RCRD: CPT | Mod: CPTII,95,, | Performed by: NURSE PRACTITIONER

## 2023-03-09 PROCEDURE — 1159F PR MEDICATION LIST DOCUMENTED IN MEDICAL RECORD: ICD-10-PCS | Mod: CPTII,95,, | Performed by: NURSE PRACTITIONER

## 2023-03-09 PROCEDURE — 1160F PR REVIEW ALL MEDS BY PRESCRIBER/CLIN PHARMACIST DOCUMENTED: ICD-10-PCS | Mod: CPTII,95,, | Performed by: NURSE PRACTITIONER

## 2023-03-09 PROCEDURE — 99215 OFFICE O/P EST HI 40 MIN: CPT | Mod: 95,,, | Performed by: NURSE PRACTITIONER

## 2023-03-09 PROCEDURE — 99215 PR OFFICE/OUTPT VISIT, EST, LEVL V, 40-54 MIN: ICD-10-PCS | Mod: 95,,, | Performed by: NURSE PRACTITIONER

## 2023-03-09 PROCEDURE — 3072F PR LOW RISK FOR RETINOPATHY: ICD-10-PCS | Mod: CPTII,95,, | Performed by: NURSE PRACTITIONER

## 2023-03-09 PROCEDURE — 1159F MED LIST DOCD IN RCRD: CPT | Mod: CPTII,95,, | Performed by: NURSE PRACTITIONER

## 2023-03-09 PROCEDURE — 3060F PR POS MICROALBUMINURIA RESULT DOCUMENTED/REVIEW: ICD-10-PCS | Mod: CPTII,95,, | Performed by: NURSE PRACTITIONER

## 2023-03-09 PROCEDURE — 3066F PR DOCUMENTATION OF TREATMENT FOR NEPHROPATHY: ICD-10-PCS | Mod: CPTII,95,, | Performed by: NURSE PRACTITIONER

## 2023-03-09 PROCEDURE — 3072F LOW RISK FOR RETINOPATHY: CPT | Mod: CPTII,95,, | Performed by: NURSE PRACTITIONER

## 2023-03-09 PROCEDURE — 3060F POS MICROALBUMINURIA REV: CPT | Mod: CPTII,95,, | Performed by: NURSE PRACTITIONER

## 2023-03-09 PROCEDURE — 3066F NEPHROPATHY DOC TX: CPT | Mod: CPTII,95,, | Performed by: NURSE PRACTITIONER

## 2023-03-09 RX ORDER — SERTRALINE HYDROCHLORIDE 50 MG/1
TABLET, FILM COATED ORAL
Qty: 30 TABLET | Refills: 6 | Status: SHIPPED | OUTPATIENT
Start: 2023-03-09 | End: 2023-11-29 | Stop reason: SDUPTHER

## 2023-03-09 NOTE — PROGRESS NOTES
Patient ID: [id number]     Chief Complaint: LAB RESULTS      HPI:     This is a telemedicine note. Patient was treated using telemedicine, real time audio and video, according to [facility] protocols. I, Kelin GREGORY, conducted the visit from the [facility]. The patient participated in the visit at a non-[facility] location selected by the patient, identified below. I am licensed in the state where the patient stated they are located. The patient stated that they understood and accepted the privacy and security risks to their information at their location. This visit is not recorded.    Patient was located at the patient's home.       Foreign Rosas is a 57 y.o. female here today for a telemedicine visit.       ----------------------------  Anxiety disorder, unspecified  Glaucoma  HTN (hypertension)  Type 2 diabetes mellitus with unspecified diabetic retinopathy   without macular edema     Past Surgical History:   Procedure Laterality Date    CHOLECYSTECTOMY      HYSTERECTOMY, VAGINAL, LAPAROSCOPY-ASSISTED, WITH SALPINGO-OOPHORECTOY Bilateral 08/22/2017    TUBAL LIGATION         Review of patient's allergies indicates:  No Known Allergies    Outpatient Medications Marked as Taking for the 3/9/23 encounter (Office Visit) with NORBERTO Franks   Medication Sig Dispense Refill    amLODIPine (NORVASC) 5 MG tablet Take 1 tablet (5 mg total) by mouth once daily. 90 tablet 3    aspirin (ECOTRIN) 81 MG EC tablet Take 81 mg by mouth once daily.      gabapentin (NEURONTIN) 300 MG capsule Take 300 mg by mouth every evening.      glipiZIDE (GLUCOTROL) 10 MG tablet Take 1 tablet (10 mg total) by mouth 2 (two) times daily before meals. 180 tablet 3    ibuprofen (ADVIL,MOTRIN) 800 MG tablet Take 1 tablet (800 mg total) by mouth 3 (three) times daily as needed for Pain. 90 tablet 2    insulin detemir U-100 (LEVEMIR FLEXPEN) 100 unit/mL (3 mL) InPn pen Inject 15 units sq in AM and 42 units sq in PM.  60 mL 3    insulin lispro 100 unit/mL pen See Instructions, Inject 2-12 units sq per s/s after CBG AC & HS., # 15 mL, 6 Refill(s), Pharmacy: [facility], 162, cm, Height/Length Dosing, 06/21/21 7:49:00 CDT, 81, kg, Weight Dosing, 06/21/21 7:49:00 CDT Strength: 100 Units/mL 15 mL 6    metFORMIN (GLUCOPHAGE) 1000 MG tablet Take 1 tablet (1,000 mg total) by mouth 2 (two) times daily with meals. 180 tablet 3    oxybutynin (DITROPAN-XL) 5 MG TR24 Take 1 tablet (5 mg total) by mouth once daily. 90 tablet 3    PEN NEEDLE, DIABETIC MISC 1 each by Misc.(Non-Drug; Combo Route) route 2 (two) times a day. Before AC and HS.      pravastatin (PRAVACHOL) 20 MG tablet Take 20 mg by mouth once daily.      zolpidem (AMBIEN) 5 MG Tab Take 1 tablet (5 mg total) by mouth nightly as needed. 30 tablet 5    [DISCONTINUED] sertraline (ZOLOFT) 50 MG tablet TAKE 1/2 (ONE-HALF) TABLET BY MOUTH ONCE DAILY AT BEDTIME FOR 7 DAYS THEN INCREASE TO 1 TABLET BEDTIME THEREAFTER         Social History     Socioeconomic History    Marital status: Single   Tobacco Use    Smoking status: Never     Passive exposure: Past    Smokeless tobacco: Never   Substance and Sexual Activity    Alcohol use: Never    Drug use: Never    Sexual activity: Yes     Partners: Male     Birth control/protection: Abstinence, Injection     Social Determinants of Health     Physical Activity: Inactive    Days of Exercise per Week: 0 days    Minutes of Exercise per Session: 0 min        Family History   Problem Relation Age of Onset    Diabetes Mother     Hypertension Mother     Kidney failure Mother     Asthma Mother     Lung cancer Father     Diabetes Father     Hypertension Father     Thyroid disease Sister     Brain cancer Sister     Diabetes Sister     Hypertension Sister     Cancer Sister         Thyroid    Thyroid disease Sister     Migraines Sister     Cancer Brother         Pancreatic    Hypertension Brother     Seizures Brother     Diabetes Sister      Hypertension Sister         Patient Care Team:  NORBERTO Franks as PCP - General (Family Medicine)  Gilberto Adams MD as Diabetes Digital Medicine Responsible Provider (Cardiology)  Gilberto Adams MD as Hypertension Digital Medicine Responsible Provider (Cardiology)  Jade Carr as Digital Medicine Health   Eliane Shaw, PharmD as Hypertension Digital Medicine Clinician  Eliane Shaw PharmD as Diabetes Digital Medicine Clinician  [facility] [employer] as Hypertension Digital Medicine Contract  [facility] [employer] Plan - [employer] as Diabetes Digital Medicine Contract      Subjective:       Review of Systems       See HPI for details    Constitutional: Denies Change in appetite. Denies Chills. Denies Fever. Denies Night sweats.  Eye: Denies Blurred vision. Denies Discharge. Denies Eye pain.  ENT: Denies Decreased hearing. Denies Sore throat. Denies Swollen glands.  Respiratory: Denies Cough. Denies Shortness of breath. Denies Shortness of breath with exertion. Denies Wheezing.  Cardiovascular: DeniesChest pain at rest. Denies Chest pain with exertion. Denies Irregular heartbeat. Denies Palpitations. Denies Edema.  Gastrointestinal: Denies Abdominal pain. DeniesDiarrhea. Denies Nausea. Denies Vomiting. Denies Hematemesis or Hematochezia.  Genitourinary: Denies Dysuria. Denies Urinary frequency. Denies Urinary urgency. Denies Blood in urine.  Endocrine: Denies Cold intolerance. Denies Excessive thirst. Denies Heat intolerance. Denies Weight loss. Denies Weight gain.  Musculoskeletal: Denies Painful joints. Denies Weakness.  Integumentary: Denies Rash. Denies Itching. Denies Dry skin.  Neurologic: Denies Dizziness. Denies Fainting. Denies Headache.  Psychiatric: Denies Depression. Denies Anxiety. Denies Suicidal/Homicidal ideations.    All Other ROS: Negative except as stated in HPI.       Objective:     There were no vitals taken for this visit.    Physical Exam    Physical Exam: LIMITED DUE TO  TELEMEDICINE RESTRICTIONS.  General: Alert and oriented, No acute distress.  Respiratory: Non-labored respirations  Psychiatric: Normal interaction, Coherent speech, Euthymic mood, Appropriate affect       Assessment:       ICD-10-CM ICD-9-CM   1. Urge incontinence of urine  N39.41 788.31   2. Uncontrolled diabetes mellitus with hyperglycemia, with long-term current use of insulin  E11.65 250.02    Z79.4 V58.67   3. Tremor  R25.1 781.0   4. Hypercalcemia  E83.52 275.42   5. Elevated PTHrP level  R79.89 790.6   6. Well adult exam  Z00.00 V70.0        Plan:     1. Urge incontinence of urine  Pt followed in Uro cl. Keep appts.     2. Uncontrolled diabetes mellitus with hyperglycemia, with long-term current use of insulin  A1c 8.8.  ADA diet and exercise. Was going to increase Levemir dosage however  she states she has forgotten to take a few doses and wishes to stay on current dosage until next labs. Cont Levemir as prescribed. A1c in 3 months. Dm foot done 5-4-22. DM eye exam done in eye clinic 6-16-22. Keep appts. Urine microalbumin 2-1-23.     3. Tremor  Pt c/o fine tremor to right hand X 9 months and worsening over the past few months. Pt states her father had the same thing. Refer to neuro cl for eval and mgmt.    4. Hypercalcemia  Calcium level 10.8. PTH 87.8 9-21-22. Will order CT parathyroid in 1 month.     5. Elevated PTHrP level   Calcium level 10.8. PTH 87.8 9-21-22. Will order CT parathyroid in 1 month.     6. Well adult  Labs in 3 months. UTD GYN.        Orders Placed This Encounter   Procedures    CT Neck Parathyroid (4D)     Standing Status:   Future     Standing Expiration Date:   4/9/2023     Order Specific Question:   Is the patient allergic to iodine contrast?     Answer:   No     Order Specific Question:   Does this patient have impaired renal function?     Answer:   No     Order Specific Question:   May the Radiologist modify the order per protocol to meet the clinical needs of the patient?      Answer:   Yes     Order Specific Question:   Reason for Exam:     Answer:   Hypercalcemia, Elevated PTH     Order Specific Question:   [facility] ONLY: Performing/Resulting Location     Answer:   [facility]    Hemoglobin A1C     Standing Status:   Future     Standing Expiration Date:   6/9/2023    Comprehensive Metabolic Panel     Standing Status:   Future     Standing Expiration Date:   6/9/2023    Urinalysis, Reflex to Urine Culture     Standing Status:   Future     Standing Expiration Date:   6/9/2023     Order Specific Question:   Specimen Source     Answer:   Urine    HIV 1/2 Ag/Ab (4th Gen)     Standing Status:   Future     Standing Expiration Date:   6/9/2023     Order Specific Question:   Release to patient     Answer:   Immediate          Follow up in about 1 month (around 4/9/2023) for F2F for CT results. In addition to their scheduled follow up, the patient has also been instructed to follow up on as needed basis.       Video Time Documentation:  Spent 10 minutes with patient face to face discussed health concerns. More than 50% of this time was spent in counseling and coordination of care.Established Patient - Audio Only Telehealth Visit     The patient location is: home  The chief complaint leading to consultation is: lab results  Visit type: Virtual visit with audio only (telephone)  Total time spent with patient:40 minutes       The reason for the audio only service rather than synchronous audio and video virtual visit was related to technical difficulties or patient preference/necessity.     Each patient to whom I provide medical services by telemedicine is:  (1) informed of the relationship between the physician and patient and the respective role of any other health care provider with respect to management of the patient; and (2) notified that they may decline to receive medical services by telemedicine and may withdraw from such care at any time. Patient verbally consented to receive  this service via voice-only telephone call.       HPI: see above     Assessment and plan:  see above                        This service was not originating from a related E/M service provided within the previous 7 days nor will  to an E/M service or procedure within the next 24 hours or my soonest available appointment.  Prevailing standard of care was able to be met in this audio-only visit.

## 2023-03-17 ENCOUNTER — HOSPITAL ENCOUNTER (OUTPATIENT)
Dept: RADIOLOGY | Facility: HOSPITAL | Age: 58
Discharge: HOME | End: 2023-03-17
Attending: NURSE PRACTITIONER
Payer: COMMERCIAL

## 2023-03-17 DIAGNOSIS — E83.52 HYPERCALCEMIA: ICD-10-CM

## 2023-03-17 DIAGNOSIS — R79.89 ELEVATED PTHRP LEVEL: ICD-10-CM

## 2023-03-17 PROCEDURE — 70492 CT SFT TSUE NCK W/O & W/DYE: CPT | Mod: TC

## 2023-03-17 PROCEDURE — 25500020 PHARM REV CODE 255: Performed by: NURSE PRACTITIONER

## 2023-03-17 RX ADMIN — IOPAMIDOL 100 ML: 755 INJECTION, SOLUTION INTRAVENOUS at 03:03

## 2023-03-29 ENCOUNTER — OFFICE VISIT (OUTPATIENT)
Dept: NEUROLOGY | Facility: CLINIC | Age: 58
End: 2023-03-29
Payer: COMMERCIAL

## 2023-03-29 VITALS
SYSTOLIC BLOOD PRESSURE: 141 MMHG | BODY MASS INDEX: 29.36 KG/M2 | DIASTOLIC BLOOD PRESSURE: 83 MMHG | WEIGHT: 176.19 LBS | TEMPERATURE: 98 F | OXYGEN SATURATION: 97 % | HEART RATE: 97 BPM | HEIGHT: 65 IN

## 2023-03-29 DIAGNOSIS — E83.52 HYPERCALCEMIA: ICD-10-CM

## 2023-03-29 DIAGNOSIS — R25.1 PHYSIOLOGICAL TREMOR: Primary | ICD-10-CM

## 2023-03-29 DIAGNOSIS — R25.1 TREMOR OF RIGHT HAND: ICD-10-CM

## 2023-03-29 PROCEDURE — 99204 PR OFFICE/OUTPT VISIT, NEW, LEVL IV, 45-59 MIN: ICD-10-PCS | Mod: S$PBB,,, | Performed by: PSYCHIATRY & NEUROLOGY

## 2023-03-29 PROCEDURE — 3060F PR POS MICROALBUMINURIA RESULT DOCUMENTED/REVIEW: ICD-10-PCS | Mod: CPTII,,, | Performed by: PSYCHIATRY & NEUROLOGY

## 2023-03-29 PROCEDURE — 3008F BODY MASS INDEX DOCD: CPT | Mod: CPTII,,, | Performed by: PSYCHIATRY & NEUROLOGY

## 2023-03-29 PROCEDURE — 99204 OFFICE O/P NEW MOD 45 MIN: CPT | Mod: S$PBB,,, | Performed by: PSYCHIATRY & NEUROLOGY

## 2023-03-29 PROCEDURE — 3077F SYST BP >= 140 MM HG: CPT | Mod: CPTII,,, | Performed by: PSYCHIATRY & NEUROLOGY

## 2023-03-29 PROCEDURE — 1159F PR MEDICATION LIST DOCUMENTED IN MEDICAL RECORD: ICD-10-PCS | Mod: CPTII,,, | Performed by: PSYCHIATRY & NEUROLOGY

## 2023-03-29 PROCEDURE — 3008F PR BODY MASS INDEX (BMI) DOCUMENTED: ICD-10-PCS | Mod: CPTII,,, | Performed by: PSYCHIATRY & NEUROLOGY

## 2023-03-29 PROCEDURE — 99215 OFFICE O/P EST HI 40 MIN: CPT | Mod: PBBFAC | Performed by: PSYCHIATRY & NEUROLOGY

## 2023-03-29 PROCEDURE — 1159F MED LIST DOCD IN RCRD: CPT | Mod: CPTII,,, | Performed by: PSYCHIATRY & NEUROLOGY

## 2023-03-29 PROCEDURE — 3066F NEPHROPATHY DOC TX: CPT | Mod: CPTII,,, | Performed by: PSYCHIATRY & NEUROLOGY

## 2023-03-29 PROCEDURE — 3072F LOW RISK FOR RETINOPATHY: CPT | Mod: CPTII,,, | Performed by: PSYCHIATRY & NEUROLOGY

## 2023-03-29 PROCEDURE — 3079F PR MOST RECENT DIASTOLIC BLOOD PRESSURE 80-89 MM HG: ICD-10-PCS | Mod: CPTII,,, | Performed by: PSYCHIATRY & NEUROLOGY

## 2023-03-29 PROCEDURE — 3066F PR DOCUMENTATION OF TREATMENT FOR NEPHROPATHY: ICD-10-PCS | Mod: CPTII,,, | Performed by: PSYCHIATRY & NEUROLOGY

## 2023-03-29 PROCEDURE — 3072F PR LOW RISK FOR RETINOPATHY: ICD-10-PCS | Mod: CPTII,,, | Performed by: PSYCHIATRY & NEUROLOGY

## 2023-03-29 PROCEDURE — 3079F DIAST BP 80-89 MM HG: CPT | Mod: CPTII,,, | Performed by: PSYCHIATRY & NEUROLOGY

## 2023-03-29 PROCEDURE — 3077F PR MOST RECENT SYSTOLIC BLOOD PRESSURE >= 140 MM HG: ICD-10-PCS | Mod: CPTII,,, | Performed by: PSYCHIATRY & NEUROLOGY

## 2023-03-29 PROCEDURE — 3060F POS MICROALBUMINURIA REV: CPT | Mod: CPTII,,, | Performed by: PSYCHIATRY & NEUROLOGY

## 2023-03-29 NOTE — PROGRESS NOTES
[facility] Neurology Initial Office Visit Note    Initial Visit Date: 3/29/2023  Current Visit Date:  03/29/2023    Chief Complaint:     Chief Complaint   Patient presents with    Patient presents today with hx of carpol tunnel and mostly     Patient complains of pain in her legs and feet       History of Present Illness:      This is a 57 y.o. Right hand dominant female with history of IDDM, Cataract, borderline glaucoma, hypercalcemia, overactive bladder, depression, insomnia who is referred for abnormal movements.    Age of Onset: 57 years old     Description of movements: right hand with use, intermittent.    Exacerbation factors: denied     Relieving factors: denied     Associated Symptoms:  Changes in smell or taste: Not Applicable   Dysphagia: No   Voice/phonation change: No    Changes in gait/falls:  No    Orthostatic hypotension:  No   Sleep disorder: Yes trouble sleeping at night    Visual Hallucinations/Delusions: Not Applicable   Mood disturbance: Yes depression     Cognitive decline: Not Applicable    Handwriting changes: Yes erratic     Trouble using a fork: No   Trouble using a spoon: Yes occasional    Trouble drinking out of a cup: No      Risk Factors:   Family history of movement disorder: Yes father and sister with tremor disorder.    Cardiovascular risk factors: Yes as above    Antipsychotics/Mood stabilizer/Antidepressant/Traditional antiemetic exposure: Yes Zoloft    Stimulant use: No    Tobacco use: No   Alcohol use: Yes occasional      Recreational drug use: No    Herbicide exposure: Not Applicable   Agent Orange exposure: Not Applicable     Medications:     Current:   Gabapentin 300 mg at bedtime     Prior:   Denied    Devices/Interventions:     DBS:   Gamma knife/Radiofrequency ablation:   PT/OT:     Labs:     Results for orders placed or performed in visit on 03/07/23   Hemoglobin A1C   Result Value Ref Range    Hemoglobin A1c 8.8 (H) <=7.0 %    Estimated Average Glucose 205.9 mg/dL   Basic  Metabolic Panel   Result Value Ref Range    Sodium Level 141 136 - 145 mmol/L    Potassium Level 4.7 3.5 - 5.1 mmol/L    Chloride 103 98 - 107 mmol/L    Carbon Dioxide 30 (H) 22 - 29 mmol/L    Glucose Level 146 (H) 74 - 100 mg/dL    Blood Urea Nitrogen 17.5 9.8 - 20.1 mg/dL    Creatinine 0.95 0.55 - 1.02 mg/dL    BUN/Creatinine Ratio 18     Calcium Level Total 10.8 (H) 8.4 - 10.2 mg/dL    Anion Gap 8.0 mEq/L    eGFR >60 mls/min/1.73/m2       Studies:      Imaging:   MRI Brain:     Review of Systems:     Review of Systems   All other systems reviewed and are negative.    Physical Exams:     Vitals:    03/29/23 1308   BP: (!) 141/83   Pulse: 97   Temp: 98.2 °F (36.8 °C)       Physical Exam  Vitals and nursing note reviewed.   Constitutional:       Appearance: Normal appearance.   HENT:      Head: Normocephalic and atraumatic.      Nose: Nose normal.      Mouth/Throat:      Mouth: Mucous membranes are moist.      Pharynx: Oropharynx is clear.   Eyes:      Conjunctiva/sclera: Conjunctivae normal.   Cardiovascular:      Rate and Rhythm: Normal rate and regular rhythm.      Pulses: Normal pulses.   Pulmonary:      Effort: Pulmonary effort is normal.      Breath sounds: Normal breath sounds.   Abdominal:      General: Abdomen is flat.   Musculoskeletal:         General: Normal range of motion.      Cervical back: Normal range of motion.   Skin:     General: Skin is warm.   Neurological:      Mental Status: She is alert.       Comprehensive Neurological Exam:  Mental Status: Alert Oriented to Self, Date, and Place. Comprehension wnl. No dysarthria.   CN II - XII: LANDRY, No APD, VFFC, No ptosis OU, EOMI without nystagmus, LT/Temp symmetric in CN V1-3 distribution, Hearing grossly intact, Face Symmetric, Tongue and Uvula midline, Trapezius symmetric bilateral.   Motor: tone and bulk wnl throughout, no abnormal involuntary or voluntary movements, 5/5 to confrontation, Fine finger movements wnl b/l, No pronator drift.   Sensory:  LT, Proprioception, Vibration, PP, Temp symmetric. No sensory simultagnosia.   Reflexes: 1+ throughout, plantar reflexes downward bilateral.   Cerebellar: FNF wnl b/l, RAHM wnl b/l  Gait: normal. Heel Gait, Toe Gait, Tandem Gait wnl.     Handwriting:  No evidence of dystonia.  Non tremulous.  Not erratic.  Archimedean Spiral:  Loose and Tight spiral are both normal.     Assessment:     This is a 57 y.o. Right hand dominant female with history of  IDDM, Cataract, borderline glaucoma, hypercalcemia, overactive bladder, depression, insomnia who is referred for intermittent tremor disorder not evident on exam today. Complaints of tremor likely related to hypercalcemia.      Problem List Items Addressed This Visit          Neuro    Physiological tremor - Primary       Renal/    Hypercalcemia     Other Visit Diagnoses       Tremor of right hand                Plan:     [] Reassurance provided.  Would recommend treating underlying hypercalcemia.  [] Will monitor clinically    RTC 6 months    I have explained the treatment plan, diagnosis, and prognosis to patient. All questions are answered to the best of my knowledge.     Face to face time 45 minutes, including documentation, chart review, counseling, education, review of test results, relevant medical records, and coordination of care.       Ella Benoit MD   General Neurology  03/29/2023

## 2023-03-29 NOTE — LETTER
March 29, 2023      [facility]  [address] [address]  Phone: [phone]       Patient: Foreign Rosas   YOB: 1965  Date of Visit: 03/29/2023    To Whom It May Concern:    Samuel Rosas  was at [facility] on 03/29/2023. The patient may return to work/school on 03/30/2023 With no restrictions. If you have any questions or concerns, or if I can be of further assistance, please do not hesitate to contact me.    Sincerely,    Juani Johnson MA

## 2023-04-06 ENCOUNTER — OFFICE VISIT (OUTPATIENT)
Dept: INTERNAL MEDICINE | Facility: CLINIC | Age: 58
End: 2023-04-06
Payer: COMMERCIAL

## 2023-04-06 VITALS
BODY MASS INDEX: 28.82 KG/M2 | SYSTOLIC BLOOD PRESSURE: 138 MMHG | WEIGHT: 173 LBS | HEIGHT: 65 IN | DIASTOLIC BLOOD PRESSURE: 82 MMHG | HEART RATE: 91 BPM | TEMPERATURE: 99 F

## 2023-04-06 DIAGNOSIS — D35.1 PARATHYROID ADENOMA: ICD-10-CM

## 2023-04-06 DIAGNOSIS — E83.52 HYPERCALCEMIA: Primary | ICD-10-CM

## 2023-04-06 PROCEDURE — 3075F PR MOST RECENT SYSTOLIC BLOOD PRESS GE 130-139MM HG: ICD-10-PCS | Mod: CPTII,,, | Performed by: NURSE PRACTITIONER

## 2023-04-06 PROCEDURE — 3008F PR BODY MASS INDEX (BMI) DOCUMENTED: ICD-10-PCS | Mod: CPTII,,, | Performed by: NURSE PRACTITIONER

## 2023-04-06 PROCEDURE — 3008F BODY MASS INDEX DOCD: CPT | Mod: CPTII,,, | Performed by: NURSE PRACTITIONER

## 2023-04-06 PROCEDURE — 1159F PR MEDICATION LIST DOCUMENTED IN MEDICAL RECORD: ICD-10-PCS | Mod: CPTII,,, | Performed by: NURSE PRACTITIONER

## 2023-04-06 PROCEDURE — 3066F NEPHROPATHY DOC TX: CPT | Mod: CPTII,,, | Performed by: NURSE PRACTITIONER

## 2023-04-06 PROCEDURE — 3072F PR LOW RISK FOR RETINOPATHY: ICD-10-PCS | Mod: CPTII,,, | Performed by: NURSE PRACTITIONER

## 2023-04-06 PROCEDURE — 3079F DIAST BP 80-89 MM HG: CPT | Mod: CPTII,,, | Performed by: NURSE PRACTITIONER

## 2023-04-06 PROCEDURE — 3066F PR DOCUMENTATION OF TREATMENT FOR NEPHROPATHY: ICD-10-PCS | Mod: CPTII,,, | Performed by: NURSE PRACTITIONER

## 2023-04-06 PROCEDURE — 99213 PR OFFICE/OUTPT VISIT, EST, LEVL III, 20-29 MIN: ICD-10-PCS | Mod: S$PBB,,, | Performed by: NURSE PRACTITIONER

## 2023-04-06 PROCEDURE — 1160F PR REVIEW ALL MEDS BY PRESCRIBER/CLIN PHARMACIST DOCUMENTED: ICD-10-PCS | Mod: CPTII,,, | Performed by: NURSE PRACTITIONER

## 2023-04-06 PROCEDURE — 99215 OFFICE O/P EST HI 40 MIN: CPT | Mod: PBBFAC | Performed by: NURSE PRACTITIONER

## 2023-04-06 PROCEDURE — 3079F PR MOST RECENT DIASTOLIC BLOOD PRESSURE 80-89 MM HG: ICD-10-PCS | Mod: CPTII,,, | Performed by: NURSE PRACTITIONER

## 2023-04-06 PROCEDURE — 3060F POS MICROALBUMINURIA REV: CPT | Mod: CPTII,,, | Performed by: NURSE PRACTITIONER

## 2023-04-06 PROCEDURE — 1159F MED LIST DOCD IN RCRD: CPT | Mod: CPTII,,, | Performed by: NURSE PRACTITIONER

## 2023-04-06 PROCEDURE — 99213 OFFICE O/P EST LOW 20 MIN: CPT | Mod: S$PBB,,, | Performed by: NURSE PRACTITIONER

## 2023-04-06 PROCEDURE — 3072F LOW RISK FOR RETINOPATHY: CPT | Mod: CPTII,,, | Performed by: NURSE PRACTITIONER

## 2023-04-06 PROCEDURE — 1160F RVW MEDS BY RX/DR IN RCRD: CPT | Mod: CPTII,,, | Performed by: NURSE PRACTITIONER

## 2023-04-06 PROCEDURE — 3075F SYST BP GE 130 - 139MM HG: CPT | Mod: CPTII,,, | Performed by: NURSE PRACTITIONER

## 2023-04-06 PROCEDURE — 3060F PR POS MICROALBUMINURIA RESULT DOCUMENTED/REVIEW: ICD-10-PCS | Mod: CPTII,,, | Performed by: NURSE PRACTITIONER

## 2023-04-06 NOTE — PROGRESS NOTES
Patient ID: [id number]     Chief Complaint: CT RESULTS        HPI:     RICHMOND Rosas is a 57 y.o. female here today for a follow up.         ----------------------------  Anxiety disorder, unspecified  Glaucoma  HTN (hypertension)  Type 2 diabetes mellitus with unspecified diabetic retinopathy   without macular edema     Past Surgical History:   Procedure Laterality Date    CHOLECYSTECTOMY      HYSTERECTOMY, VAGINAL, LAPAROSCOPY-ASSISTED, WITH SALPINGO-OOPHORECTOY Bilateral 08/22/2017    TUBAL LIGATION         Review of patient's allergies indicates:  No Known Allergies    Current Outpatient Medications   Medication Instructions    amLODIPine (NORVASC) 5 mg, Oral, Daily    aspirin (ECOTRIN) 81 mg, Oral, Daily    gabapentin (NEURONTIN) 300 mg, Oral, Nightly    glipiZIDE (GLUCOTROL) 10 mg, Oral, 2 times daily before meals    ibuprofen (ADVIL,MOTRIN) 800 mg, Oral, 3 times daily PRN    insulin detemir U-100 (LEVEMIR FLEXPEN) 100 unit/mL (3 mL) InPn pen Inject 15 units sq in AM and 42 units sq in PM.    insulin lispro 100 unit/mL pen See Instructions, Inject 2-12 units sq per s/s after CBG AC & HS., # 15 mL, 6 Refill(s), Pharmacy: [facility], 162, cm, Height/Length Dosing, 06/21/21 7:49:00 CDT, 81, kg, Weight Dosing, 06/21/21 7:49:00 CDT Strength: 100 Units/mL    metFORMIN (GLUCOPHAGE) 1,000 mg, Oral, 2 times daily with meals    oxybutynin (DITROPAN-XL) 5 mg, Oral, Daily    PEN NEEDLE, DIABETIC MISC 1 each, Misc.(Non-Drug; Combo Route), 2 times daily, Before AC and HS.    pravastatin (PRAVACHOL) 20 mg, Oral, Daily    sertraline (ZOLOFT) 50 MG tablet TAKE  1 TABLET po at BEDTIME    zolpidem (AMBIEN) 5 mg, Oral, Nightly PRN       Social History     Socioeconomic History    Marital status: Single   Tobacco Use    Smoking status: Never     Passive exposure: Past    Smokeless tobacco: Never   Substance and Sexual Activity    Alcohol use: Never     Comment: special occassion    Drug use:  Never    Sexual activity: Yes     Partners: Male     Birth control/protection: Abstinence, Injection     Social Determinants of Health     Physical Activity: Inactive    Days of Exercise per Week: 0 days    Minutes of Exercise per Session: 0 min        Family History   Problem Relation Age of Onset    Diabetes Mother     Hypertension Mother     Kidney failure Mother     Asthma Mother     Lung cancer Father     Diabetes Father     Hypertension Father     Thyroid disease Sister     Brain cancer Sister     Diabetes Sister     Hypertension Sister     Cancer Sister         Thyroid    Thyroid disease Sister     Migraines Sister     Cancer Brother         Pancreatic    Hypertension Brother     Seizures Brother     Diabetes Sister     Hypertension Sister         Patient Care Team:  NORBERTO Franks as PCP - General (Family Medicine)  Gilberto Adams MD as Diabetes Digital Medicine Responsible Provider (Cardiology)  Gilberto Adams MD as Hypertension Digital Medicine Responsible Provider (Cardiology)  Jade Carr as Digital Medicine Health   Eliane Shaw PharmD as Hypertension Digital Medicine Clinician  Eliane Shaw PharmD as Diabetes Digital Medicine Clinician  [facility] [employer] as Hypertension Digital Medicine Contract  [facility] Employee Plan - [employer] as Diabetes Digital Medicine Contract     Subjective:     Review of Systems     See HPI for details    Constitutional: Denies Change in appetite. Denies Chills. Denies Fever. Denies Night sweats.  Eye: Denies Blurred vision. Denies Discharge. Denies Eye pain.  ENT: Denies Decreased hearing. Denies Sore throat. Denies Swollen glands.  Respiratory: Denies Cough. Denies Shortness of breath. Denies Shortness of breath with exertion. Denies Wheezing.  Cardiovascular: DeniesChest pain at rest. Denies Chest pain with exertion. Denies Irregular heartbeat. Denies Palpitations. Denies Edema.  Gastrointestinal: Denies Abdominal pain. DeniesDiarrhea.  "Denies Nausea. Denies Vomiting. Denies Hematemesis or Hematochezia.  Genitourinary: Denies Dysuria. Denies Urinary frequency. Denies Urinary urgency. Denies Blood in urine.  Endocrine: Denies Cold intolerance. Denies Excessive thirst. Denies Heat intolerance. Denies Weight loss. Denies Weight gain.  Musculoskeletal: Denies Painful joints. Denies Weakness.  Integumentary: Denies Rash. Denies Itching. Denies Dry skin.  Neurologic: Denies Dizziness. Denies Fainting. Denies Headache.  Psychiatric: Denies Depression. Denies Anxiety. Denies Suicidal/Homicidal ideations.    All Other ROS: Negative except as stated in HPI.       Objective:     Visit Vitals  /82 (BP Location: Right arm, Patient Position: Sitting, BP Method: Medium (Manual))   Pulse 91   Temp 98.6 °F (37 °C) (Oral)   Ht 5' 5" (1.651 m)   Wt 78.5 kg (173 lb)   BMI 28.79 kg/m²       Physical Exam    General: Alert and oriented, No acute distress.  Head: Normocephalic, Atraumatic.  Eye: Pupils are equal, round and reactive to light, Extraocular movements are intact, Sclera non-icteric.  Ears/Nose/Throat: Normal, Mucosa moist,Clear.  Neck/Thyroid: Supple, Non-tender, No carotid bruit, No lymphadenopathy, No JVD, Full range of motion.  Respiratory: Clear to auscultation bilaterally; No wheezes, rales or rhonchi,Non-labored respirations, Symmetrical chest wall expansion.  Cardiovascular: Regular rate and rhythm, S1/S2 normal, No murmurs, rubs or gallops.  Gastrointestinal: Soft, Non-tender, Non-distended, Normal bowel sounds, No palpable organomegaly.  Musculoskeletal: Normal range of motion.  Integumentary: Warm, Dry, Intact, No suspicious lesions or rashes.  Extremities: No clubbing, cyanosis or edema  Neurologic: No focal deficits, Cranial Nerves II-XII are grossly intact, Motor strength normal upper and lower extremities, Sensory exam intact.  Psychiatric: Normal interaction, Coherent speech, Euthymic mood, Appropriate affect       Labs Reviewed: "     Chemistry:  Lab Results   Component Value Date     03/07/2023    K 4.7 03/07/2023    CHLORIDE 103 03/07/2023    BUN 17.5 03/07/2023    CREATININE 0.95 03/07/2023    EGFRNORACEVR >60 03/07/2023    GLUCOSE 146 (H) 03/07/2023    CALCIUM 10.8 (H) 03/07/2023    ALKPHOS 118 10/24/2022    LABPROT 7.7 10/24/2022    ALBUMIN 4.3 10/24/2022    BILIDIR 0.1 06/16/2021    IBILI 0.10 06/16/2021    AST 19 10/24/2022    ALT 27 10/24/2022    [id number] 34.0 04/05/2021        Lab Results   Component Value Date    HGBA1C 8.8 (H) 03/07/2023        Hematology:  Lab Results   Component Value Date    WBC 7.2 10/24/2022    HGB 13.7 10/24/2022    HCT 44.6 10/24/2022     10/24/2022       Lipid Panel:  Lab Results   Component Value Date    CHOL 98 12/13/2021    HDL 30 (L) 12/13/2021    LDL 44.00 (L) 12/13/2021    TRIG 121 12/13/2021    TOTALCHOLEST 3 12/13/2021        Urine:  Lab Results   Component Value Date    COLORUA Light-Yellow 02/09/2023    APPEARANCEUA Clear 02/09/2023    SGUA 1.019 02/09/2023    PHUA 6.0 02/09/2023    PROTEINUA Trace (A) 02/09/2023    GLUCOSEUA 3+ (A) 02/09/2023    KETONESUA Negative 02/09/2023    BLOODUA Negative 02/09/2023    NITRITESUA Negative 02/09/2023    LEUKOCYTESUR 75 (A) 02/09/2023    RBCUA 0-5 02/09/2023    WBCUA 6-10 (A) 02/09/2023    BACTERIA Trace (A) 02/09/2023    SQEPUA Trace (A) 02/09/2023    HYALINECASTS None Seen 02/09/2023    CREATRANDUR 81.1 02/01/2023        Assessment:       ICD-10-CM ICD-9-CM   1. Hypercalcemia  E83.52 275.42   2. Parathyroid adenoma  D35.1 227.1        Plan:     1. Hypercalcemia  Calcium level 10.8. Elevated PTH. CT Parathyroid done 3-17-23 showing:  CT Neck Parathyroid (4D)  Order: [id number]  Status: Final result     Visible to patient: Yes (seen)     Next appt: 04/10/2023 at 07:30 AM in Urology (ZINA ECHEVERRIA NP)     Dx: Hypercalcemia; Elevated PTHrP level     0 Result Notes  Details    Reading Physician Reading Date Result Priority   Zina Márquez,  MD  [phone] 3/17/2023 Routine     Narrative & Impression  EXAMINATION:  CT NECK PARATHYROID (4D)     CLINICAL HISTORY:  Hypercalcemia, Elevated PTH;  Hypercalcemia     TECHNIQUE:  Multiple cross-sectional images were obtained from the skull base to the thoracic inlet before and after the intravenous administration 100 mL of Isovue 370.  Coronal and sagittal reformatted images were obtained.  An automated dose exposure technique was utilized this limits radiation does the patient.     COMPARISON:  None     FINDINGS:  The thyroid gland is heterogeneous.  Scattered nodules are identified especially within the isthmus.  Involving the lower pole of the right thyroid gland in the tracheoesophageal groove a 0.7 by 0.4 cm hyperenhancing lesion is identified on image number 95 of series 5.  This demonstrates washout characteristics and is hypodense when compared to the adjacent thyroid gland on the noncontrast enhanced CT.  Polar vessel is identified.  No other suspicious nodules identified.     The carotid arteries and jugular veins are patent.  Incidental note of retropharyngeal course of the right common carotid artery.  Nonenlarged lymph nodes are identified.     Dependent atelectatic changes lungs.  The visualized mediastinum is normal.  No suspicious osseous lesions.  Spondylotic changes are identified.  Dental amalgam is identified.     Impression:     Likely parathyroid adenoma on the right involving the tracheoesophageal groove in the region of the lower pole thyroid        Electronically signed by: Dean Márquez MD  Date:                                            03/17/2023  Time:                                           21:00           Exam Ended: 03/17/23 15:50 Last Resulted: 03/17/23 21:00           Discussed with Dr Martinez. Informed to order NM Uptake scan for further eval and if meets criteria for surgery. Will get DEXA in 1 month.     2. Parathyroid adenoma  Calcium level 10.8. Elevated PTH. CT  Parathyroid done 3-17-23 showing:  CT Neck Parathyroid (4D)  Order: [id number]  Status: Final result     Visible to patient: Yes (seen)     Next appt: 04/10/2023 at 07:30 AM in Urology (ZINA ECHEVERRIA NP)     Dx: Hypercalcemia; Elevated PTHrP level     0 Result Notes  Details    Reading Physician Reading Date Result Priority   Zina Márquez MD  [phone] 3/17/2023 Routine     Narrative & Impression  EXAMINATION:  CT NECK PARATHYROID (4D)     CLINICAL HISTORY:  Hypercalcemia, Elevated PTH;  Hypercalcemia     TECHNIQUE:  Multiple cross-sectional images were obtained from the skull base to the thoracic inlet before and after the intravenous administration 100 mL of Isovue 370.  Coronal and sagittal reformatted images were obtained.  An automated dose exposure technique was utilized this limits radiation does the patient.     COMPARISON:  None     FINDINGS:  The thyroid gland is heterogeneous.  Scattered nodules are identified especially within the isthmus.  Involving the lower pole of the right thyroid gland in the tracheoesophageal groove a 0.7 by 0.4 cm hyperenhancing lesion is identified on image number 95 of series 5.  This demonstrates washout characteristics and is hypodense when compared to the adjacent thyroid gland on the noncontrast enhanced CT.  Polar vessel is identified.  No other suspicious nodules identified.     The carotid arteries and jugular veins are patent.  Incidental note of retropharyngeal course of the right common carotid artery.  Nonenlarged lymph nodes are identified.     Dependent atelectatic changes lungs.  The visualized mediastinum is normal.  No suspicious osseous lesions.  Spondylotic changes are identified.  Dental amalgam is identified.     Impression:     Likely parathyroid adenoma on the right involving the tracheoesophageal groove in the region of the lower pole thyroid        Electronically signed by: Zina Márquez MD  Date:                                             03/17/2023  Time:                                           21:00           Exam Ended: 03/17/23 15:50 Last Resulted: 03/17/23 21:00           Discussed with Dr Martinez. Informed to order NM Uptake scan for further eval and if meets criteria for surgery.   Will get uptake scan in 1 week.          Follow up in about 2 weeks (around 4/20/2023) for for NM parathyroid results. In addition to their scheduled follow up, the patient has also been instructed to follow up on as needed basis.     Future Appointments   Date Time Provider Department Center   4/10/2023  7:30 AM Dean Patel NP [facility] UROLO [place]   9/26/2023  8:45 AM Ella Benoit MD [facility] NEURO [place]   11/1/2023  8:30 AM NORBERTO Ca [facility] ENT [place]   1/30/2024  8:30 AM [facility], [facility] [place]   2/29/2024  9:30 AM NORBERTO Johansen [facility] GYN [place]        NORBERTO Baptiste

## 2023-04-12 ENCOUNTER — HOSPITAL ENCOUNTER (OUTPATIENT)
Dept: RADIOLOGY | Facility: HOSPITAL | Age: 58
Discharge: HOME | End: 2023-04-12
Attending: NURSE PRACTITIONER
Payer: COMMERCIAL

## 2023-04-12 DIAGNOSIS — E83.52 HYPERCALCEMIA: ICD-10-CM

## 2023-04-12 DIAGNOSIS — D35.1 PARATHYROID ADENOMA: ICD-10-CM

## 2023-04-12 PROCEDURE — 77080 DXA BONE DENSITY AXIAL: CPT | Mod: TC

## 2023-04-13 ENCOUNTER — HOSPITAL ENCOUNTER (OUTPATIENT)
Dept: RADIOLOGY | Facility: HOSPITAL | Age: 58
Discharge: HOME | End: 2023-04-13
Attending: NURSE PRACTITIONER
Payer: COMMERCIAL

## 2023-04-13 DIAGNOSIS — D35.1 PARATHYROID ADENOMA: ICD-10-CM

## 2023-04-13 DIAGNOSIS — E83.52 HYPERCALCEMIA: ICD-10-CM

## 2023-04-13 PROCEDURE — 78070 PARATHYROID PLANAR IMAGING: CPT | Mod: TC

## 2023-04-15 LAB — NONINV COLON CA DNA+OCC BLD SCRN STL QL: NEGATIVE

## 2023-04-18 ENCOUNTER — TELEPHONE (OUTPATIENT)
Dept: GYNECOLOGY | Facility: CLINIC | Age: 58
End: 2023-04-18
Payer: COMMERCIAL

## 2023-04-18 NOTE — TELEPHONE ENCOUNTER
Please inform pt that her cologuard for colon cancer screening is negative. We will repeat in 3 years

## 2023-04-20 ENCOUNTER — OFFICE VISIT (OUTPATIENT)
Dept: INTERNAL MEDICINE | Facility: CLINIC | Age: 58
End: 2023-04-20
Payer: COMMERCIAL

## 2023-04-20 VITALS
HEIGHT: 65 IN | DIASTOLIC BLOOD PRESSURE: 78 MMHG | HEART RATE: 99 BPM | WEIGHT: 172 LBS | BODY MASS INDEX: 28.66 KG/M2 | RESPIRATION RATE: 18 BRPM | SYSTOLIC BLOOD PRESSURE: 133 MMHG | TEMPERATURE: 99 F

## 2023-04-20 DIAGNOSIS — G47.00 INSOMNIA, UNSPECIFIED TYPE: ICD-10-CM

## 2023-04-20 DIAGNOSIS — Z00.00 WELL ADULT EXAM: ICD-10-CM

## 2023-04-20 DIAGNOSIS — E11.65 UNCONTROLLED TYPE 2 DIABETES MELLITUS WITH HYPERGLYCEMIA: ICD-10-CM

## 2023-04-20 DIAGNOSIS — E83.52 HYPERCALCEMIA: Primary | ICD-10-CM

## 2023-04-20 PROCEDURE — 3075F SYST BP GE 130 - 139MM HG: CPT | Mod: CPTII,,, | Performed by: NURSE PRACTITIONER

## 2023-04-20 PROCEDURE — 3060F PR POS MICROALBUMINURIA RESULT DOCUMENTED/REVIEW: ICD-10-PCS | Mod: CPTII,,, | Performed by: NURSE PRACTITIONER

## 2023-04-20 PROCEDURE — 99214 PR OFFICE/OUTPT VISIT, EST, LEVL IV, 30-39 MIN: ICD-10-PCS | Mod: S$PBB,,, | Performed by: NURSE PRACTITIONER

## 2023-04-20 PROCEDURE — 3072F PR LOW RISK FOR RETINOPATHY: ICD-10-PCS | Mod: CPTII,,, | Performed by: NURSE PRACTITIONER

## 2023-04-20 PROCEDURE — 1159F PR MEDICATION LIST DOCUMENTED IN MEDICAL RECORD: ICD-10-PCS | Mod: CPTII,,, | Performed by: NURSE PRACTITIONER

## 2023-04-20 PROCEDURE — 1160F PR REVIEW ALL MEDS BY PRESCRIBER/CLIN PHARMACIST DOCUMENTED: ICD-10-PCS | Mod: CPTII,,, | Performed by: NURSE PRACTITIONER

## 2023-04-20 PROCEDURE — 3072F LOW RISK FOR RETINOPATHY: CPT | Mod: CPTII,,, | Performed by: NURSE PRACTITIONER

## 2023-04-20 PROCEDURE — 99214 OFFICE O/P EST MOD 30 MIN: CPT | Mod: PBBFAC | Performed by: NURSE PRACTITIONER

## 2023-04-20 PROCEDURE — 3078F PR MOST RECENT DIASTOLIC BLOOD PRESSURE < 80 MM HG: ICD-10-PCS | Mod: CPTII,,, | Performed by: NURSE PRACTITIONER

## 2023-04-20 PROCEDURE — 1160F RVW MEDS BY RX/DR IN RCRD: CPT | Mod: CPTII,,, | Performed by: NURSE PRACTITIONER

## 2023-04-20 PROCEDURE — 3066F NEPHROPATHY DOC TX: CPT | Mod: CPTII,,, | Performed by: NURSE PRACTITIONER

## 2023-04-20 PROCEDURE — 3078F DIAST BP <80 MM HG: CPT | Mod: CPTII,,, | Performed by: NURSE PRACTITIONER

## 2023-04-20 PROCEDURE — 1159F MED LIST DOCD IN RCRD: CPT | Mod: CPTII,,, | Performed by: NURSE PRACTITIONER

## 2023-04-20 PROCEDURE — 3066F PR DOCUMENTATION OF TREATMENT FOR NEPHROPATHY: ICD-10-PCS | Mod: CPTII,,, | Performed by: NURSE PRACTITIONER

## 2023-04-20 PROCEDURE — 3008F BODY MASS INDEX DOCD: CPT | Mod: CPTII,,, | Performed by: NURSE PRACTITIONER

## 2023-04-20 PROCEDURE — 3008F PR BODY MASS INDEX (BMI) DOCUMENTED: ICD-10-PCS | Mod: CPTII,,, | Performed by: NURSE PRACTITIONER

## 2023-04-20 PROCEDURE — 3075F PR MOST RECENT SYSTOLIC BLOOD PRESS GE 130-139MM HG: ICD-10-PCS | Mod: CPTII,,, | Performed by: NURSE PRACTITIONER

## 2023-04-20 PROCEDURE — 99214 OFFICE O/P EST MOD 30 MIN: CPT | Mod: S$PBB,,, | Performed by: NURSE PRACTITIONER

## 2023-04-20 PROCEDURE — 3060F POS MICROALBUMINURIA REV: CPT | Mod: CPTII,,, | Performed by: NURSE PRACTITIONER

## 2023-04-20 RX ORDER — ZOLPIDEM TARTRATE 5 MG/1
5 TABLET ORAL NIGHTLY PRN
Qty: 30 TABLET | Refills: 5 | Status: SHIPPED | OUTPATIENT
Start: 2023-04-20 | End: 2023-11-29 | Stop reason: SDUPTHER

## 2023-04-20 RX ORDER — INSULIN LISPRO 100 [IU]/ML
INJECTION, SOLUTION INTRAVENOUS; SUBCUTANEOUS
Qty: 15 ML | Refills: 6 | Status: SHIPPED | OUTPATIENT
Start: 2023-04-20 | End: 2023-11-29 | Stop reason: SDUPTHER

## 2023-04-20 RX ORDER — IBUPROFEN 800 MG/1
800 TABLET ORAL 3 TIMES DAILY PRN
Qty: 90 TABLET | Refills: 2 | Status: SHIPPED | OUTPATIENT
Start: 2023-04-20 | End: 2023-11-29 | Stop reason: SDUPTHER

## 2023-04-20 NOTE — PROGRESS NOTES
Patient ID: [id number]     Chief Complaint: Results        HPI:     RICHMOND Rosas is a 57 y.o. female here today for a follow Parathyroid scan and Bone Density results.         ----------------------------  Anxiety disorder, unspecified  Glaucoma  HTN (hypertension)  Type 2 diabetes mellitus with unspecified diabetic retinopathy   without macular edema     Past Surgical History:   Procedure Laterality Date    CHOLECYSTECTOMY      HYSTERECTOMY, VAGINAL, LAPAROSCOPY-ASSISTED, WITH SALPINGO-OOPHORECTOY Bilateral 08/22/2017    TUBAL LIGATION         Review of patient's allergies indicates:  No Known Allergies    Current Outpatient Medications   Medication Instructions    amLODIPine (NORVASC) 5 mg, Oral, Daily    aspirin (ECOTRIN) 81 mg, Oral, Daily    gabapentin (NEURONTIN) 300 mg, Oral, Nightly    glipiZIDE (GLUCOTROL) 10 mg, Oral, 2 times daily before meals    ibuprofen (ADVIL,MOTRIN) 800 mg, Oral, 3 times daily PRN    insulin detemir U-100 (LEVEMIR FLEXPEN) 100 unit/mL (3 mL) InPn pen Inject 15 units sq in AM and 42 units sq in PM.    insulin lispro 100 unit/mL pen See Instructions, Inject 2-12 units sq per s/s after CBG AC & HS., # 15 mL, 6 Refill(s), Pharmacy: [facility], 162, cm, Height/Length Dosing, 06/21/21 7:49:00 CDT, 81, kg, Weight Dosing, 06/21/21 7:49:00 CDT Strength: 100 Units/mL    metFORMIN (GLUCOPHAGE) 1,000 mg, Oral, 2 times daily with meals    oxybutynin (DITROPAN-XL) 5 mg, Oral, Daily    PEN NEEDLE, DIABETIC MISC 1 each, Misc.(Non-Drug; Combo Route), 2 times daily, Before AC and HS.    pravastatin (PRAVACHOL) 20 mg, Oral, Daily    sertraline (ZOLOFT) 50 MG tablet TAKE  1 TABLET po at BEDTIME    zolpidem (AMBIEN) 5 mg, Oral, Nightly PRN       Social History     Socioeconomic History    Marital status: Single   Tobacco Use    Smoking status: Never     Passive exposure: Past    Smokeless tobacco: Never   Substance and Sexual Activity    Alcohol use: Never      Comment: special occassion    Drug use: Never    Sexual activity: Yes     Partners: Male     Birth control/protection: Abstinence, Injection     Social Determinants of Health     Physical Activity: Inactive    Days of Exercise per Week: 0 days    Minutes of Exercise per Session: 0 min        Family History   Problem Relation Age of Onset    Diabetes Mother     Hypertension Mother     Kidney failure Mother     Asthma Mother     Lung cancer Father     Diabetes Father     Hypertension Father     Thyroid disease Sister     Brain cancer Sister     Diabetes Sister     Hypertension Sister     Cancer Sister         Thyroid    Thyroid disease Sister     Migraines Sister     Cancer Brother         Pancreatic    Hypertension Brother     Seizures Brother     Diabetes Sister     Hypertension Sister         Patient Care Team:  NORBERTO Franks as PCP - General (Family Medicine)  Gilberto Adams MD as Diabetes Digital Medicine Responsible Provider (Cardiology)  Gilberto Adams MD as Hypertension Digital Medicine Responsible Provider (Cardiology)  Jade Carr as Digital Medicine Health   Eliane Shaw PharmD as Hypertension Digital Medicine Clinician  Belkis MitchellD as Diabetes Digital Medicine Clinician  [employer] Employee Plan - [employer] as Hypertension Digital Medicine Contract  [employer] Employee Plan - [employer] as Diabetes Digital Medicine Contract     Subjective:     Review of Systems     See HPI for details    Constitutional: Denies Change in appetite. Denies Chills. Denies Fever. Denies Night sweats.  Eye: Denies Blurred vision. Denies Discharge. Denies Eye pain.  ENT: Denies Decreased hearing. Denies Sore throat. Denies Swollen glands.  Respiratory: Denies Cough. Denies Shortness of breath. Denies Shortness of breath with exertion. Denies Wheezing.  Cardiovascular: DeniesChest pain at rest. Denies Chest pain with exertion. Denies Irregular heartbeat. Denies Palpitations. Denies Edema.  Gastrointestinal:  "Denies Abdominal pain. DeniesDiarrhea. Denies Nausea. Denies Vomiting. Denies Hematemesis or Hematochezia.  Genitourinary: Denies Dysuria. Denies Urinary frequency. Denies Urinary urgency. Denies Blood in urine.  Endocrine: Denies Cold intolerance. Denies Excessive thirst. Denies Heat intolerance. Denies Weight loss. Denies Weight gain.  Musculoskeletal: Denies Painful joints. Denies Weakness.  Integumentary: Denies Rash. Denies Itching. Denies Dry skin.  Neurologic: Denies Dizziness. Denies Fainting. Denies Headache.  Psychiatric: Denies Depression. Denies Anxiety. Denies Suicidal/Homicidal ideations.    All Other ROS: Negative except as stated in HPI.       Objective:     Visit Vitals  /78 (BP Location: Left arm, Patient Position: Sitting, BP Method: Large (Automatic))   Pulse 99   Temp 98.5 °F (36.9 °C) (Oral)   Resp 18   Ht 5' 5" (1.651 m)   Wt 78 kg (172 lb)   BMI 28.62 kg/m²       Physical Exam    General: Alert and oriented, No acute distress.  Head: Normocephalic, Atraumatic.  Eye: Pupils are equal, round and reactive to light, Extraocular movements are intact, Sclera non-icteric.  Ears/Nose/Throat: Normal, Mucosa moist,Clear.  Neck/Thyroid: Supple, Non-tender, No carotid bruit, No lymphadenopathy, No JVD, Full range of motion.  Respiratory: Clear to auscultation bilaterally; No wheezes, rales or rhonchi,Non-labored respirations, Symmetrical chest wall expansion.  Cardiovascular: Regular rate and rhythm, S1/S2 normal, No murmurs, rubs or gallops.  Gastrointestinal: Soft, Non-tender, Non-distended, Normal bowel sounds, No palpable organomegaly.  Musculoskeletal: Normal range of motion.  Integumentary: Warm, Dry, Intact, No suspicious lesions or rashes.  Extremities: No clubbing, cyanosis or edema  Neurologic: No focal deficits, Cranial Nerves II-XII are grossly intact, Motor strength normal upper and lower extremities, Sensory exam intact.  Psychiatric: Normal interaction, Coherent speech, Euthymic " mood, Appropriate affect       Labs Reviewed:     Chemistry:  Lab Results   Component Value Date     03/07/2023    K 4.7 03/07/2023    CHLORIDE 103 03/07/2023    BUN 17.5 03/07/2023    CREATININE 0.95 03/07/2023    EGFRNORACEVR >60 03/07/2023    GLUCOSE 146 (H) 03/07/2023    CALCIUM 10.8 (H) 03/07/2023    ALKPHOS 118 10/24/2022    LABPROT 7.7 10/24/2022    ALBUMIN 4.3 10/24/2022    BILIDIR 0.1 06/16/2021    IBILI 0.10 06/16/2021    AST 19 10/24/2022    ALT 27 10/24/2022    [id number] 34.0 04/05/2021        Lab Results   Component Value Date    HGBA1C 8.8 (H) 03/07/2023        Hematology:  Lab Results   Component Value Date    WBC 7.2 10/24/2022    HGB 13.7 10/24/2022    HCT 44.6 10/24/2022     10/24/2022       Lipid Panel:  Lab Results   Component Value Date    CHOL 98 12/13/2021    HDL 30 (L) 12/13/2021    LDL 44.00 (L) 12/13/2021    TRIG 121 12/13/2021    TOTALCHOLEST 3 12/13/2021        Urine:  Lab Results   Component Value Date    COLORUA Light-Yellow 02/09/2023    APPEARANCEUA Clear 02/09/2023    SGUA 1.019 02/09/2023    PHUA 6.0 02/09/2023    PROTEINUA Trace (A) 02/09/2023    GLUCOSEUA 3+ (A) 02/09/2023    KETONESUA Negative 02/09/2023    BLOODUA Negative 02/09/2023    NITRITESUA Negative 02/09/2023    LEUKOCYTESUR 75 (A) 02/09/2023    RBCUA 0-5 02/09/2023    WBCUA 6-10 (A) 02/09/2023    BACTERIA Trace (A) 02/09/2023    SQEPUA Trace (A) 02/09/2023    HYALINECASTS None Seen 02/09/2023    CREATRANDUR 81.1 02/01/2023        Assessment:       ICD-10-CM ICD-9-CM   1. Hypercalcemia  E83.52 275.42   2. Uncontrolled type 2 diabetes mellitus with hyperglycemia  E11.65 250.02   3. Insomnia, unspecified type  G47.00 780.52        Plan:     1. Hypercalcemia  Pt had elevated PTH 87.8 9-2122. NM Parathyroid scan done 4-13-23 showing:  NM Parathyroid Scan  Order: [id number]  Status: Final result     Visible to patient: Yes (seen)     Next appt: 07/12/2023 at 08:45 AM in Radiology ([place] [id number])     Dx:  Hypercalcemia; Parathyroid adenoma     0 Result Notes  Details    Reading Physician Reading Date Result Priority   Noah Dixon MD  [phone] 4/13/2023 Routine     Narrative & Impression  EXAMINATION:  NM PARATHYROID SCAN PLANAR     CLINICAL HISTORY:  Parathyroid adenoma;Hypercalcemia     TECHNIQUE:  Dual phase parathyroid scintigraphy was performed following intravenous administration of 23.3 mCi Technetium-99m Sestamibi. Anterior projection early scan was performed at 30 minutes and delayed scans were obtained at 2, 3  and 4  hours.     COMPARISON:  CT soft tissue neck March 17, 2023.     FINDINGS:  Initial images performed at 30 minutes show heterogeneity of sestamibi activity within bilateral thyroid glands secondary to bilateral thyroid glands known nodules.  Delayed images performed at 2 hour show heterogeneity of sestamibi washout from both thyroid glands.  There was further washout of sestamibi from the thyroid glands on images performed at 3 hour and 4 hour.  There is no definite differential clearance with focal asymmetric delayed retention to suggest parathyroid adenoma.  Previous CT of the neck report described parathyroid adenoma adjacent to the lower pole of right thyroid gland which is small for adequate scintigraphic resolution.  Detection of parathyroid hyperplasia is not accurate or sensitive with this scan.     Impression:     No definite parathyroid adenoma with sestamibi scan identified.        Electronically signed by: Noah Dixon  Date:                                            04/13/2023  Time:                                           15:51           Exam Ended: 04/13/23 15:21 Last Resulted: 04/13/23 15:51            2. Uncontrolled DM2  Refilled lispro as prescribed. A1c in 3 months.    3. Insomnia  Refilled Ambien as prescribed.       Follow up in about 3 months (around 7/20/2023) for with labs 1 week prior to appt. In addition to their scheduled follow up, the patient has also  been instructed to follow up on as needed basis.     Future Appointments   Date Time Provider Department Center   7/12/2023  8:45 AM [facility] MAMMO2 [facility] MAMMO [place]   9/26/2023  8:45 AM Ella Benoit MD [facility] NEURO [place]   11/1/2023  8:30 AM NORBERTO Ca [facility] ENT [place]   1/30/2024  8:30 AM [facility] [place]    2/29/2024  9:30 AM NORBERTO Johansen [facility] GYN [place]        NORBERTO Baptiste

## 2023-06-12 PROBLEM — Z00.00 WELL ADULT EXAM: Status: RESOLVED | Noted: 2023-03-09 | Resolved: 2023-06-12

## 2023-07-12 ENCOUNTER — HOSPITAL ENCOUNTER (OUTPATIENT)
Dept: RADIOLOGY | Facility: HOSPITAL | Age: 58
Discharge: HOME | End: 2023-07-12
Attending: NURSE PRACTITIONER
Payer: COMMERCIAL

## 2023-07-12 DIAGNOSIS — Z12.31 SCREENING MAMMOGRAM FOR BREAST CANCER: ICD-10-CM

## 2023-07-12 PROCEDURE — 77063 MAMMO DIGITAL SCREENING BILAT WITH TOMO: ICD-10-PCS | Mod: 26,,, | Performed by: RADIOLOGY

## 2023-07-12 PROCEDURE — 77067 SCR MAMMO BI INCL CAD: CPT | Mod: 26,,, | Performed by: RADIOLOGY

## 2023-07-12 PROCEDURE — 77067 SCR MAMMO BI INCL CAD: CPT | Mod: TC

## 2023-07-12 PROCEDURE — 77063 BREAST TOMOSYNTHESIS BI: CPT | Mod: 26,,, | Performed by: RADIOLOGY

## 2023-07-12 PROCEDURE — 77067 MAMMO DIGITAL SCREENING BILAT WITH TOMO: ICD-10-PCS | Mod: 26,,, | Performed by: RADIOLOGY

## 2023-07-26 DIAGNOSIS — Z79.4 UNCONTROLLED DIABETES MELLITUS WITH HYPERGLYCEMIA, WITH LONG-TERM CURRENT USE OF INSULIN: ICD-10-CM

## 2023-07-26 DIAGNOSIS — E11.65 UNCONTROLLED DIABETES MELLITUS WITH HYPERGLYCEMIA, WITH LONG-TERM CURRENT USE OF INSULIN: ICD-10-CM

## 2023-07-26 DIAGNOSIS — I10 PRIMARY HYPERTENSION: Primary | ICD-10-CM

## 2023-09-21 ENCOUNTER — HOSPITAL ENCOUNTER (OUTPATIENT)
Dept: RADIOLOGY | Facility: HOSPITAL | Age: 58
Discharge: HOME | End: 2023-09-21
Attending: NURSE PRACTITIONER
Payer: COMMERCIAL

## 2023-09-21 ENCOUNTER — HOSPITAL ENCOUNTER (EMERGENCY)
Facility: HOSPITAL | Age: 58
Discharge: HOME | End: 2023-09-21
Attending: STUDENT IN AN ORGANIZED HEALTH CARE EDUCATION/TRAINING PROGRAM
Payer: COMMERCIAL

## 2023-09-21 VITALS
SYSTOLIC BLOOD PRESSURE: 138 MMHG | HEART RATE: 94 BPM | BODY MASS INDEX: 29.28 KG/M2 | WEIGHT: 171.5 LBS | HEIGHT: 64 IN | DIASTOLIC BLOOD PRESSURE: 89 MMHG | RESPIRATION RATE: 16 BRPM | OXYGEN SATURATION: 99 % | TEMPERATURE: 98 F

## 2023-09-21 DIAGNOSIS — R04.0 EPISTAXIS: Primary | ICD-10-CM

## 2023-09-21 DIAGNOSIS — E04.2 MULTIPLE THYROID NODULES: ICD-10-CM

## 2023-09-21 PROCEDURE — 99281 EMR DPT VST MAYX REQ PHY/QHP: CPT

## 2023-09-21 PROCEDURE — 25000003 PHARM REV CODE 250: Performed by: STUDENT IN AN ORGANIZED HEALTH CARE EDUCATION/TRAINING PROGRAM

## 2023-09-21 PROCEDURE — 76536 US EXAM OF HEAD AND NECK: CPT | Mod: TC

## 2023-09-21 RX ORDER — OXYMETAZOLINE HCL 0.05 %
1 SPRAY, NON-AEROSOL (ML) NASAL
Status: DISCONTINUED | OUTPATIENT
Start: 2023-09-21 | End: 2023-09-21

## 2023-09-21 RX ADMIN — PHENYLEPHRINE HYDROCHLORIDE 1 SPRAY: 0.5 SPRAY NASAL at 06:09

## 2023-09-21 NOTE — DISCHARGE INSTRUCTIONS
If nosebleed is to occur hold direct pressure as you were shown, until tried 4, do this for 10 minutes.  If there is brisk bleeding or you have difficulty breathing swallowing, please return promptly to the ER.

## 2023-09-21 NOTE — ED PROVIDER NOTES
Encounter Date: 9/21/2023       History     Chief Complaint   Patient presents with    Epistaxis     States has been having random nose bleeds that have stopped over 10 or 15 minutes.  States left nare bleeding this am x 45 minutes.  Still continues to ooze in ED.  States large clot was in mouth.       Patient presents to the emergency department due to a nosebleed.  She states she gets nosebleeds off and on but this morning had 1 that lasted about 45 minutes.  She was states he felt like he was dripping down her throat and she did spit up some blood.  Now that I am evaluating areas appears a nosebleed as completely stopped.  She denies being on any anticoagulation.    The history is provided by the patient.     Review of patient's allergies indicates:  No Known Allergies  Past Medical History:   Diagnosis Date    Anxiety disorder, unspecified     Glaucoma     HTN (hypertension)     Type 2 diabetes mellitus with unspecified diabetic retinopathy without macular edema      Past Surgical History:   Procedure Laterality Date    CHOLECYSTECTOMY      HYSTERECTOMY, VAGINAL, LAPAROSCOPY-ASSISTED, WITH SALPINGO-OOPHORECTOY Bilateral 08/22/2017    TUBAL LIGATION       Family History   Problem Relation Age of Onset    Diabetes Mother     Hypertension Mother     Kidney failure Mother     Asthma Mother     Lung cancer Father     Diabetes Father     Hypertension Father     Thyroid disease Sister     Brain cancer Sister     Diabetes Sister     Hypertension Sister     Cancer Sister         Thyroid    Thyroid disease Sister     Migraines Sister     Cancer Brother         Pancreatic    Hypertension Brother     Seizures Brother     Diabetes Sister     Hypertension Sister      Social History     Tobacco Use    Smoking status: Never     Passive exposure: Past    Smokeless tobacco: Never   Substance Use Topics    Alcohol use: Never     Comment: special occassion    Drug use: Never     Review of Systems   Constitutional:  Negative for  chills and fever.   HENT:  Positive for nosebleeds. Negative for congestion and sore throat.    Respiratory:  Negative for cough and shortness of breath.    Cardiovascular:  Negative for chest pain and palpitations.   Gastrointestinal:  Negative for abdominal pain and nausea.   Genitourinary:  Negative for dysuria and hematuria.   Musculoskeletal:  Negative for arthralgias and myalgias.   Neurological:  Negative for dizziness and weakness.       Physical Exam     Initial Vitals [09/21/23 0625]   BP Pulse Resp Temp SpO2   (!) 141/97 106 18 98.4 °F (36.9 °C) 98 %      MAP       --         Physical Exam    Nursing note and vitals reviewed.  Constitutional: She appears well-developed and well-nourished.   HENT:   Head: Normocephalic and atraumatic.   No epistaxis   Eyes: EOM are normal. Pupils are equal, round, and reactive to light.   Neck: Neck supple.   Normal range of motion.  Cardiovascular:  Normal rate and regular rhythm.           Pulmonary/Chest: Breath sounds normal. No respiratory distress.   Abdominal: Abdomen is soft. There is no abdominal tenderness.   Musculoskeletal:         General: No edema. Normal range of motion.      Cervical back: Normal range of motion and neck supple.     Neurological: She is alert and oriented to person, place, and time.   Skin: Skin is warm and dry.         ED Course   Procedures  Labs Reviewed - No data to display       Imaging Results    None          Medications   phenylephrine HCL 0.5% nasal spray 1 spray (1 spray Each Nostril Given by Provider 9/21/23 7970)     Medical Decision Making  Afrin was applied and patient was observed, vital signs stable, no evidence of rebleeding in the ER, discharge.    Risk  OTC drugs.                               Clinical Impression:   Final diagnoses:  [R04.0] Epistaxis (Primary)        ED Disposition Condition    Discharge Stable          ED Prescriptions    None       Follow-up Information       Follow up With Specialties Details Why  Contact Info    [facility] Dept Emergency Medicine Go to  As needed [address] [facility] [place] [id number] [phone]             Severino Knight MD  09/25/23 3008

## 2023-09-22 ENCOUNTER — OFFICE VISIT (OUTPATIENT)
Dept: INTERNAL MEDICINE | Facility: CLINIC | Age: 58
End: 2023-09-22
Payer: COMMERCIAL

## 2023-09-22 VITALS
HEART RATE: 99 BPM | HEIGHT: 63 IN | DIASTOLIC BLOOD PRESSURE: 84 MMHG | WEIGHT: 173 LBS | TEMPERATURE: 98 F | BODY MASS INDEX: 30.65 KG/M2 | SYSTOLIC BLOOD PRESSURE: 136 MMHG | RESPIRATION RATE: 18 BRPM

## 2023-09-22 DIAGNOSIS — E11.65 UNCONTROLLED DIABETES MELLITUS WITH HYPERGLYCEMIA, WITH LONG-TERM CURRENT USE OF INSULIN: ICD-10-CM

## 2023-09-22 DIAGNOSIS — E04.2 MULTIPLE THYROID NODULES: ICD-10-CM

## 2023-09-22 DIAGNOSIS — Z79.4 UNCONTROLLED DIABETES MELLITUS WITH HYPERGLYCEMIA, WITH LONG-TERM CURRENT USE OF INSULIN: ICD-10-CM

## 2023-09-22 DIAGNOSIS — R80.9 MICROALBUMINURIA: ICD-10-CM

## 2023-09-22 DIAGNOSIS — B37.31 VAGINAL CANDIDIASIS: ICD-10-CM

## 2023-09-22 DIAGNOSIS — G56.00 CARPAL TUNNEL SYNDROME, UNSPECIFIED LATERALITY: Primary | ICD-10-CM

## 2023-09-22 PROCEDURE — 99214 OFFICE O/P EST MOD 30 MIN: CPT | Mod: S$PBB,,, | Performed by: NURSE PRACTITIONER

## 2023-09-22 PROCEDURE — 3079F PR MOST RECENT DIASTOLIC BLOOD PRESSURE 80-89 MM HG: ICD-10-PCS | Mod: CPTII,,, | Performed by: NURSE PRACTITIONER

## 2023-09-22 PROCEDURE — 3046F PR MOST RECENT HEMOGLOBIN A1C LEVEL > 9.0%: ICD-10-PCS | Mod: CPTII,,, | Performed by: NURSE PRACTITIONER

## 2023-09-22 PROCEDURE — 1160F PR REVIEW ALL MEDS BY PRESCRIBER/CLIN PHARMACIST DOCUMENTED: ICD-10-PCS | Mod: CPTII,,, | Performed by: NURSE PRACTITIONER

## 2023-09-22 PROCEDURE — 3075F SYST BP GE 130 - 139MM HG: CPT | Mod: CPTII,,, | Performed by: NURSE PRACTITIONER

## 2023-09-22 PROCEDURE — 99215 OFFICE O/P EST HI 40 MIN: CPT | Mod: PBBFAC | Performed by: NURSE PRACTITIONER

## 2023-09-22 PROCEDURE — 3066F NEPHROPATHY DOC TX: CPT | Mod: CPTII,,, | Performed by: NURSE PRACTITIONER

## 2023-09-22 PROCEDURE — 3008F BODY MASS INDEX DOCD: CPT | Mod: CPTII,,, | Performed by: NURSE PRACTITIONER

## 2023-09-22 PROCEDURE — 3008F PR BODY MASS INDEX (BMI) DOCUMENTED: ICD-10-PCS | Mod: CPTII,,, | Performed by: NURSE PRACTITIONER

## 2023-09-22 PROCEDURE — 3079F DIAST BP 80-89 MM HG: CPT | Mod: CPTII,,, | Performed by: NURSE PRACTITIONER

## 2023-09-22 PROCEDURE — 1159F PR MEDICATION LIST DOCUMENTED IN MEDICAL RECORD: ICD-10-PCS | Mod: CPTII,,, | Performed by: NURSE PRACTITIONER

## 2023-09-22 PROCEDURE — 3075F PR MOST RECENT SYSTOLIC BLOOD PRESS GE 130-139MM HG: ICD-10-PCS | Mod: CPTII,,, | Performed by: NURSE PRACTITIONER

## 2023-09-22 PROCEDURE — 3046F HEMOGLOBIN A1C LEVEL >9.0%: CPT | Mod: CPTII,,, | Performed by: NURSE PRACTITIONER

## 2023-09-22 PROCEDURE — 1160F RVW MEDS BY RX/DR IN RCRD: CPT | Mod: CPTII,,, | Performed by: NURSE PRACTITIONER

## 2023-09-22 PROCEDURE — 99214 PR OFFICE/OUTPT VISIT, EST, LEVL IV, 30-39 MIN: ICD-10-PCS | Mod: S$PBB,,, | Performed by: NURSE PRACTITIONER

## 2023-09-22 PROCEDURE — 1159F MED LIST DOCD IN RCRD: CPT | Mod: CPTII,,, | Performed by: NURSE PRACTITIONER

## 2023-09-22 PROCEDURE — 3066F PR DOCUMENTATION OF TREATMENT FOR NEPHROPATHY: ICD-10-PCS | Mod: CPTII,,, | Performed by: NURSE PRACTITIONER

## 2023-09-22 PROCEDURE — 3060F PR POS MICROALBUMINURIA RESULT DOCUMENTED/REVIEW: ICD-10-PCS | Mod: CPTII,,, | Performed by: NURSE PRACTITIONER

## 2023-09-22 PROCEDURE — 3060F POS MICROALBUMINURIA REV: CPT | Mod: CPTII,,, | Performed by: NURSE PRACTITIONER

## 2023-09-22 RX ORDER — LOSARTAN POTASSIUM 25 MG/1
25 TABLET ORAL DAILY
Qty: 90 TABLET | Refills: 1 | Status: SHIPPED | OUTPATIENT
Start: 2023-09-22 | End: 2024-01-10 | Stop reason: SDUPTHER

## 2023-09-22 RX ORDER — GLIPIZIDE 10 MG/1
10 TABLET ORAL
Qty: 180 TABLET | Refills: 1 | Status: SHIPPED | OUTPATIENT
Start: 2023-09-22 | End: 2024-01-10 | Stop reason: SDUPTHER

## 2023-09-22 RX ORDER — METFORMIN HYDROCHLORIDE 1000 MG/1
1000 TABLET ORAL 2 TIMES DAILY WITH MEALS
Qty: 180 TABLET | Refills: 1 | Status: SHIPPED | OUTPATIENT
Start: 2023-09-22 | End: 2024-01-10 | Stop reason: SDUPTHER

## 2023-09-22 RX ORDER — FLUCONAZOLE 150 MG/1
150 TABLET ORAL DAILY
Qty: 2 TABLET | Refills: 0 | Status: SHIPPED | OUTPATIENT
Start: 2023-09-22 | End: 2024-09-12

## 2023-09-22 NOTE — PROGRESS NOTES
Patient ID: [id number]     Chief Complaint: LAB RESULTS        HPI:     RICHMOND Rosas is a 58 y.o. female here today for a follow up lab results and c/o CTS symptoms left wrist/hand. Pt states she has been having symptoms > 1 year with worsening symptoms over the past 2 months.  Pt had thyroid US done yesterday however results still pending. Pt had labs done yesterday.         ----------------------------  Anxiety disorder, unspecified  Glaucoma  HTN (hypertension)  Type 2 diabetes mellitus with unspecified diabetic retinopathy   without macular edema     Past Surgical History:   Procedure Laterality Date    CHOLECYSTECTOMY      HYSTERECTOMY, VAGINAL, LAPAROSCOPY-ASSISTED, WITH SALPINGO-OOPHORECTOY Bilateral 08/22/2017    TUBAL LIGATION         Review of patient's allergies indicates:  No Known Allergies    Current Outpatient Medications   Medication Instructions    amLODIPine (NORVASC) 5 mg, Oral, Daily    aspirin (ECOTRIN) 81 mg, Oral, Daily    gabapentin (NEURONTIN) 300 mg, Oral, Nightly    glipiZIDE (GLUCOTROL) 10 mg, Oral, 2 times daily before meals    ibuprofen (ADVIL,MOTRIN) 800 mg, Oral, 3 times daily PRN    insulin detemir U-100 (LEVEMIR FLEXPEN) 100 unit/mL (3 mL) InPn pen Inject 15 units sq in AM and 42 units sq in PM.    insulin lispro 100 unit/mL pen See Instructions, Inject 2-12 units sq per s/s after CBG AC & HS., # 15 mL, 6 Refill(s), Pharmacy: [facility], 162, cm, Height/Length Dosing, 06/21/21 7:49:00 CDT, 81, kg, Weight Dosing, 06/21/21 7:49:00 CDT Strength: 100 Units/mL    metFORMIN (GLUCOPHAGE) 1,000 mg, Oral, 2 times daily with meals    oxybutynin (DITROPAN-XL) 5 mg, Oral, Daily    PEN NEEDLE, DIABETIC MISC 1 each, Misc.(Non-Drug; Combo Route), 2 times daily, Before AC and HS.    pravastatin (PRAVACHOL) 20 mg, Oral, Daily    sertraline (ZOLOFT) 50 MG tablet TAKE  1 TABLET po at BEDTIME    zolpidem (AMBIEN) 5 mg, Oral, Nightly PRN       Social History      Socioeconomic History    Marital status: Single   Tobacco Use    Smoking status: Never     Passive exposure: Past    Smokeless tobacco: Never   Substance and Sexual Activity    Alcohol use: Never     Comment: special occassion    Drug use: Never    Sexual activity: Yes     Partners: Male     Birth control/protection: Abstinence, Injection     Social Determinants of Health     Financial Resource Strain: Low Risk  (9/22/2023)    Overall Financial Resource Strain (CARDIA)     Difficulty of Paying Living Expenses: Not hard at all   Food Insecurity: No Food Insecurity (9/22/2023)    Hunger Vital Sign     Worried About Running Out of Food in the Last Year: Never true     Ran Out of Food in the Last Year: Never true   Transportation Needs: No Transportation Needs (9/22/2023)    PRAPARE - Transportation     Lack of Transportation (Medical): No     Lack of Transportation (Non-Medical): No   Physical Activity: Inactive (9/22/2023)    Exercise Vital Sign     Days of Exercise per Week: 0 days     Minutes of Exercise per Session: 0 min   Stress: No Stress Concern Present (9/22/2023)    [employer] - Occupational Stress Questionnaire     Feeling of Stress : Not at all   Social Connections: Moderately Isolated (9/22/2023)    Social Connection and Isolation Panel [NHANES]     Frequency of Communication with Friends and Family: More than three times a week     Frequency of Social Gatherings with Friends and Family: Three times a week     Attends Tenriism Services: More than 4 times per year     Active Member of Clubs or Organizations: No     Attends Club or Organization Meetings: Never     Marital Status:    Housing Stability: Low Risk  (9/22/2023)    Housing Stability Vital Sign     Unable to Pay for Housing in the Last Year: No     Number of Places Lived in the Last Year: 1     Unstable Housing in the Last Year: No        Family History   Problem Relation Age of Onset    Diabetes Mother      Hypertension Mother     Kidney failure Mother     Asthma Mother     Lung cancer Father     Diabetes Father     Hypertension Father     Thyroid disease Sister     Brain cancer Sister     Diabetes Sister     Hypertension Sister     Cancer Sister         Thyroid    Thyroid disease Sister     Migraines Sister     Cancer Brother         Pancreatic    Hypertension Brother     Seizures Brother     Diabetes Sister     Hypertension Sister         Patient Care Team:  Kelin Puga FNP as PCP - General (Family Medicine)  Kelin Puga FNP (Family Medicine)  Gilberto Adams MD as Diabetes Digital Medicine Responsible Provider (Cardiology)  Gilberto Adams MD as Hypertension Digital Medicine Responsible Provider (Cardiology)  Jade Carr as Digital Medicine Health   Eliane Shaw PharmD as Hypertension Digital Medicine Clinician  Eliane Shaw PharmD as Diabetes Digital Medicine Clinician  1, [facility] [employer] as Hypertension Digital Medicine Contract  1, [facility] [employer] as Diabetes Digital Medicine Contract     Subjective:     Review of Systems     See HPI for details    Constitutional: Denies Change in appetite. Denies Chills. Denies Fever. Denies Night sweats.  Eye: Denies Blurred vision. Denies Discharge. Denies Eye pain.  ENT: Denies Decreased hearing. Denies Sore throat. Denies Swollen glands.  Respiratory: Denies Cough. Denies Shortness of breath. Denies Shortness of breath with exertion. Denies Wheezing.  Cardiovascular: DeniesChest pain at rest. Denies Chest pain with exertion. Denies Irregular heartbeat. Denies Palpitations. Denies Edema.  Gastrointestinal: Denies Abdominal pain. DeniesDiarrhea. Denies Nausea. Denies Vomiting. Denies Hematemesis or Hematochezia.  Genitourinary: Denies Dysuria. Denies Urinary frequency. Denies Urinary urgency. Denies Blood in urine.  Endocrine: Denies Cold intolerance. Denies Excessive thirst. Denies Heat intolerance. Denies Weight  "loss. Denies Weight gain.  Musculoskeletal: Denies Painful joints. Denies Weakness. Admits Left hand numbness and pain.  Integumentary: Denies Rash. Denies Itching. Denies Dry skin.  Neurologic: Denies Dizziness. Denies Fainting. Denies Headache.  Psychiatric: Denies Depression. Denies Anxiety. Denies Suicidal/Homicidal ideations.    All Other ROS: Negative except as stated in HPI.       Objective:     Visit Vitals  /84 (BP Location: Left arm, Patient Position: Sitting, BP Method: Large (Automatic))   Pulse 99   Temp 98 °F (36.7 °C) (Oral)   Resp 18   Ht 5' 3" (1.6 m)   Wt 78.5 kg (173 lb)   BMI 30.65 kg/m²       Physical Exam    General: Alert and oriented, No acute distress.  Head: Normocephalic, Atraumatic.  Eye: Pupils are equal, round and reactive to light, Extraocular movements are intact, Sclera non-icteric.  Ears/Nose/Throat: Normal, Mucosa moist,Clear.  Neck/Thyroid: Supple, Non-tender, No carotid bruit, No lymphadenopathy, No JVD, Full range of motion.  Respiratory: Clear to auscultation bilaterally; No wheezes, rales or rhonchi,Non-labored respirations, Symmetrical chest wall expansion.  Cardiovascular: Regular rate and rhythm, S1/S2 normal, No murmurs, rubs or gallops.  Gastrointestinal: Soft, Non-tender, Non-distended, Normal bowel sounds, No palpable organomegaly.  Musculoskeletal: Normal range of motion.  Integumentary: Warm, Dry, Intact, No suspicious lesions or rashes.  Extremities: No clubbing, cyanosis or edema  Neurologic: No focal deficits, Cranial Nerves II-XII are grossly intact, Motor strength normal upper and lower extremities, Sensory exam intact. + Tinel test left wrist.  Psychiatric: Normal interaction, Coherent speech, Euthymic mood, Appropriate affect       Labs Reviewed:     Chemistry:  Lab Results   Component Value Date     03/07/2023    K 4.7 03/07/2023    CHLORIDE 103 03/07/2023    BUN 17.5 03/07/2023    CREATININE 0.95 03/07/2023    EGFRNORACEVR >60 03/07/2023    " GLUCOSE 146 (H) 03/07/2023    CALCIUM 10.8 (H) 03/07/2023    ALKPHOS 118 10/24/2022    LABPROT 7.7 10/24/2022    ALBUMIN 4.3 10/24/2022    BILIDIR 0.1 06/16/2021    IBILI 0.10 06/16/2021    AST 19 10/24/2022    ALT 27 10/24/2022    DIPPJDXA44SJ 34.0 04/05/2021        Lab Results   Component Value Date    HGBA1C 10.6 (H) 09/21/2023        Hematology:  Lab Results   Component Value Date    WBC 7.2 10/24/2022    HGB 13.7 10/24/2022    HCT 44.6 10/24/2022     10/24/2022       Lipid Panel:  Lab Results   Component Value Date    CHOL 98 12/13/2021    HDL 30 (L) 12/13/2021    LDL 44.00 (L) 12/13/2021    TRIG 121 12/13/2021    TOTALCHOLEST 3 12/13/2021        Urine:  Lab Results   Component Value Date    COLORUA Light-Yellow 02/09/2023    APPEARANCEUA Clear 02/09/2023    SGUA 1.019 02/09/2023    PHUA 6.0 02/09/2023    PROTEINUA Trace (A) 02/09/2023    GLUCOSEUA 3+ (A) 02/09/2023    KETONESUA Negative 02/09/2023    BLOODUA Negative 02/09/2023    NITRITESUA Negative 02/09/2023    LEUKOCYTESUR 75 (A) 02/09/2023    RBCUA 0-5 02/09/2023    WBCUA 6-10 (A) 02/09/2023    BACTERIA Trace (A) 02/09/2023    SQEPUA Trace (A) 02/09/2023    HYALINECASTS None Seen 02/09/2023    CREATRANDUR 76.4 09/21/2023        Assessment:       ICD-10-CM ICD-9-CM   1. Carpal tunnel syndrome, unspecified laterality  G56.00 354.0   2. Multiple thyroid nodules  E04.2 241.1   3. Uncontrolled diabetes mellitus with hyperglycemia, with long-term current use of insulin  E11.65 250.02    Z79.4 V58.67        Plan:     1. Carpal tunnel syndrome, unspecified laterality  [facility] for nerve conduction testing for CTS. Encouraged wrist splint at night.      2. Multiple thyroid nodules  Pt had US thyroid done yesterday however results still pending. Will RS pt in 1 month for US results.     3. Uncontrolled Diabetes with hyperglycemia  A1c 10.6. ADA diet and exercise. Pt states she has been off DM meds X 1 month. Cont Glipizide,  Metformin, Lispro. Levemir as prescribed.  Urine microalbumin done 9-21-23 147.1. encouraged low protein diet, drink plenty of water, avoid NSAIDs, maintain good BP and CBG controls. Will start on Losartan 25 mg 1 tab po daily. DM foot exam done today. DM eye exam done 1-30-23.     Protective Sensation (w/ 10 gram monofilament):  Right: Intact  Left: Intact    Visual Inspection:  Normal -  Bilateral    Pedal Pulses:   Right: Present  Left: Present    Posterior Tibialis Pulses:   Right:Present  Left: Present     Follow up in about 1 month (around 10/22/2023) for with labs 1 week prior to appt. . In addition to their scheduled follow up, the patient has also been instructed to follow up on as needed basis.     Future Appointments   Date Time Provider Department Center   9/26/2023  8:45 AM Ella Benoit MD [facility] [place] [facility]   11/1/2023  8:30 AM Narda Son FNP [facility]    1/30/2024  8:00 AM PROVIDERS, [facility]    2/29/2024  9:30 AM Jennifer Cruz FNP [facility]        NORBERTO Baptiste

## 2023-09-22 NOTE — PROGRESS NOTES
[facility] Neurology Follow Up Office Visit Note    Initial Visit Date: 3/29/2023  Last Visit Date: 3/29/2023  Current Visit Date:  09/26/2023    Chief Complaint:     Chief Complaint   Patient presents with    Patient presents today with a hx of a tremor     Patient states she notices the tremor only in her hands       History of Present Illness:      This is a 58 y.o. right hand dominant female with history of  IDDM, Cataract, borderline glaucoma, hypercalcemia, overactive bladder, depression, insomnia who is referred for intermittent tremor disorder. Complains of worsening right hand tremor with use. Also reports that she had not been using wrist splint at night time, and she has been having worsening left wrist pain.      Age of Onset: 57 years old      Description of movements: right hand with use, intermittent.     Exacerbation factors: denied      Relieving factors: denied      Associated Symptoms:  Changes in smell or taste: Not Applicable   Dysphagia: No   Voice/phonation change: No    Changes in gait/falls:  No    Orthostatic hypotension:  No   Sleep disorder: Yes trouble sleeping at night    Visual Hallucinations/Delusions: Not Applicable   Mood disturbance: Yes depression     Cognitive decline: Not Applicable    Handwriting changes: Yes erratic     Trouble using a fork: No   Trouble using a spoon: Yes occasional    Trouble drinking out of a cup: No       Risk Factors:   Family history of movement disorder: Yes father and sister with tremor disorder.    Cardiovascular risk factors: Yes as above    Antipsychotics/Mood stabilizer/Antidepressant/Traditional antiemetic exposure: Yes Zoloft    Stimulant use: No    Tobacco use: No   Alcohol use: Yes occasional      Recreational drug use: No    Herbicide exposure: Not Applicable   Agent Orange exposure: Not Applicable        Medications:     Current:   Gabapentin 300 mg at bedtime      Prior:   Denied    Devices/Interventions:     DBS:   Gamma knife/Radiofrequency  ablation:   PT/OT:     Labs:     Results for orders placed or performed in visit on 09/21/23   Hemoglobin A1c   Result Value Ref Range    Hemoglobin A1c 10.6 (H) <=7.0 %    Estimated Average Glucose 257.5 mg/dL   Microalbumin/creatinine urine ratio   Result Value Ref Range    Urine Microalbumin 112.4 (H) <=30.0 ug/ml    Urine Creatinine 76.4 45.0 - 106.0 mg/dL    Microalbumin Creatinine Ratio 147.1 (H) 0.0 - 30.0 mg/gm Cr       Studies:      Imaging:   MRI Brain:     Review of Systems:     Review of Systems   All other systems reviewed and are negative.      Physical Exams:     Vitals:    09/26/23 0853   BP: (!) 134/94   Pulse: 94   Temp: 98.1 °F (36.7 °C)       Physical Exam  Vitals and nursing note reviewed.   Constitutional:       Appearance: Normal appearance.   HENT:      Head: Normocephalic and atraumatic.      Nose: Nose normal.      Mouth/Throat:      Mouth: Mucous membranes are moist.      Pharynx: Oropharynx is clear.   Eyes:      Conjunctiva/sclera: Conjunctivae normal.   Cardiovascular:      Rate and Rhythm: Normal rate and regular rhythm.      Pulses: Normal pulses.   Pulmonary:      Effort: Pulmonary effort is normal.      Breath sounds: Normal breath sounds.   Abdominal:      General: Abdomen is flat.   Musculoskeletal:         General: Tenderness present. Normal range of motion.      Cervical back: Normal range of motion.      Comments: Left wrist tenderness to pressure, edematous, with delayed Phalen's Sign   Skin:     General: Skin is warm.   Neurological:      Mental Status: She is alert.         Comprehensive Neurological Exam:  Mental Status: Alert Oriented to Self, Date, and Place. Comprehension wnl. No dysarthria.   CN II - XII: LANDRY, No APD, VFFC, No ptosis OU, EOMI without nystagmus, LT/Temp symmetric in CN V1-3 distribution, Hearing grossly intact, Face Symmetric, Tongue and Uvula midline, Trapezius symmetric bilateral.   Motor: tone and bulk wnl throughout, right hand postural and action  tremor, 5/5 to confrontation, Fine finger movements wnl b/l, No pronator drift.   Sensory: LT, Proprioception, Vibration, PP, Temp symmetric. No sensory simultagnosia.   Reflexes: 1+ throughout, plantar reflexes downward bilateral.   Cerebellar: FNF wnl b/l, RAHM wnl b/l  Gait: normal. Heel Gait, Toe Gait, Tandem Gait wnl.     Assessment:     This is a 58 y.o. right hand dominant female with history of IDDM, Cataract, borderline glaucoma, hypercalcemia, overactive bladder, depression, insomnia who is referred for exaggerated physiological tremor and left carpal tunnel due to left wrist tenosynovitis.     Problem List Items Addressed This Visit          Neuro    Physiological tremor - Primary       Renal/    Hypercalcemia       Plan:     [] start Propranolol 20 mg twice a day  [] left wrist splint at night  [] Will monitor clinically    RTC 3 months    I have explained the treatment plan, diagnosis, and prognosis to patient. All questions are answered to the best of my knowledge.     Face to face time 30 minutes, including documentation, chart review, counseling, education, review of test results, relevant medical records, and coordination of care.       Ella Benoit MD   General Neurology  09/26/2023

## 2023-09-26 ENCOUNTER — OFFICE VISIT (OUTPATIENT)
Dept: NEUROLOGY | Facility: CLINIC | Age: 58
End: 2023-09-26
Payer: COMMERCIAL

## 2023-09-26 VITALS
DIASTOLIC BLOOD PRESSURE: 94 MMHG | WEIGHT: 173.63 LBS | HEART RATE: 94 BPM | OXYGEN SATURATION: 100 % | HEIGHT: 63 IN | SYSTOLIC BLOOD PRESSURE: 134 MMHG | BODY MASS INDEX: 30.77 KG/M2 | TEMPERATURE: 98 F

## 2023-09-26 DIAGNOSIS — M65.9 TENOSYNOVITIS OF LEFT WRIST: ICD-10-CM

## 2023-09-26 DIAGNOSIS — E83.52 HYPERCALCEMIA: ICD-10-CM

## 2023-09-26 DIAGNOSIS — R25.1 PHYSIOLOGICAL TREMOR: Primary | ICD-10-CM

## 2023-09-26 DIAGNOSIS — G56.02 CARPAL TUNNEL SYNDROME OF LEFT WRIST: ICD-10-CM

## 2023-09-26 PROBLEM — M65.932 TENOSYNOVITIS OF LEFT WRIST: Status: ACTIVE | Noted: 2023-09-26

## 2023-09-26 PROCEDURE — 3066F NEPHROPATHY DOC TX: CPT | Mod: CPTII,,, | Performed by: PSYCHIATRY & NEUROLOGY

## 2023-09-26 PROCEDURE — 3080F DIAST BP >= 90 MM HG: CPT | Mod: CPTII,,, | Performed by: PSYCHIATRY & NEUROLOGY

## 2023-09-26 PROCEDURE — 3060F PR POS MICROALBUMINURIA RESULT DOCUMENTED/REVIEW: ICD-10-PCS | Mod: CPTII,,, | Performed by: PSYCHIATRY & NEUROLOGY

## 2023-09-26 PROCEDURE — 3008F PR BODY MASS INDEX (BMI) DOCUMENTED: ICD-10-PCS | Mod: CPTII,,, | Performed by: PSYCHIATRY & NEUROLOGY

## 2023-09-26 PROCEDURE — 3060F POS MICROALBUMINURIA REV: CPT | Mod: CPTII,,, | Performed by: PSYCHIATRY & NEUROLOGY

## 2023-09-26 PROCEDURE — 3008F BODY MASS INDEX DOCD: CPT | Mod: CPTII,,, | Performed by: PSYCHIATRY & NEUROLOGY

## 2023-09-26 PROCEDURE — 3075F PR MOST RECENT SYSTOLIC BLOOD PRESS GE 130-139MM HG: ICD-10-PCS | Mod: CPTII,,, | Performed by: PSYCHIATRY & NEUROLOGY

## 2023-09-26 PROCEDURE — 99214 PR OFFICE/OUTPT VISIT, EST, LEVL IV, 30-39 MIN: ICD-10-PCS | Mod: S$PBB,,, | Performed by: PSYCHIATRY & NEUROLOGY

## 2023-09-26 PROCEDURE — 3066F PR DOCUMENTATION OF TREATMENT FOR NEPHROPATHY: ICD-10-PCS | Mod: CPTII,,, | Performed by: PSYCHIATRY & NEUROLOGY

## 2023-09-26 PROCEDURE — 99214 OFFICE O/P EST MOD 30 MIN: CPT | Mod: S$PBB,,, | Performed by: PSYCHIATRY & NEUROLOGY

## 2023-09-26 PROCEDURE — 3046F HEMOGLOBIN A1C LEVEL >9.0%: CPT | Mod: CPTII,,, | Performed by: PSYCHIATRY & NEUROLOGY

## 2023-09-26 PROCEDURE — 3046F PR MOST RECENT HEMOGLOBIN A1C LEVEL > 9.0%: ICD-10-PCS | Mod: CPTII,,, | Performed by: PSYCHIATRY & NEUROLOGY

## 2023-09-26 PROCEDURE — 4010F PR ACE/ARB THEARPY RXD/TAKEN: ICD-10-PCS | Mod: CPTII,,, | Performed by: PSYCHIATRY & NEUROLOGY

## 2023-09-26 PROCEDURE — 1159F MED LIST DOCD IN RCRD: CPT | Mod: CPTII,,, | Performed by: PSYCHIATRY & NEUROLOGY

## 2023-09-26 PROCEDURE — 1159F PR MEDICATION LIST DOCUMENTED IN MEDICAL RECORD: ICD-10-PCS | Mod: CPTII,,, | Performed by: PSYCHIATRY & NEUROLOGY

## 2023-09-26 PROCEDURE — 4010F ACE/ARB THERAPY RXD/TAKEN: CPT | Mod: CPTII,,, | Performed by: PSYCHIATRY & NEUROLOGY

## 2023-09-26 PROCEDURE — 3075F SYST BP GE 130 - 139MM HG: CPT | Mod: CPTII,,, | Performed by: PSYCHIATRY & NEUROLOGY

## 2023-09-26 PROCEDURE — 99215 OFFICE O/P EST HI 40 MIN: CPT | Mod: PBBFAC | Performed by: PSYCHIATRY & NEUROLOGY

## 2023-09-26 PROCEDURE — 3080F PR MOST RECENT DIASTOLIC BLOOD PRESSURE >= 90 MM HG: ICD-10-PCS | Mod: CPTII,,, | Performed by: PSYCHIATRY & NEUROLOGY

## 2023-09-26 RX ORDER — PROPRANOLOL HYDROCHLORIDE 20 MG/1
20 TABLET ORAL 2 TIMES DAILY
Qty: 60 TABLET | Refills: 3 | Status: SHIPPED | OUTPATIENT
Start: 2023-09-26 | End: 2024-02-05 | Stop reason: SDUPTHER

## 2023-10-17 ENCOUNTER — LAB VISIT (OUTPATIENT)
Dept: LAB | Facility: HOSPITAL | Age: 58
End: 2023-10-17
Attending: NURSE PRACTITIONER
Payer: COMMERCIAL

## 2023-10-17 DIAGNOSIS — E11.65 UNCONTROLLED DIABETES MELLITUS WITH HYPERGLYCEMIA, WITH LONG-TERM CURRENT USE OF INSULIN: ICD-10-CM

## 2023-10-17 DIAGNOSIS — Z79.4 UNCONTROLLED DIABETES MELLITUS WITH HYPERGLYCEMIA, WITH LONG-TERM CURRENT USE OF INSULIN: ICD-10-CM

## 2023-10-17 LAB
ALBUMIN SERPL-MCNC: 4.3 G/DL (ref 3.4–4.8)
ALBUMIN/GLOB SERPL: 1.3 RATIO (ref 1.1–2)
ALP SERPL-CCNC: 89 UNIT/L (ref 40–150)
ALT SERPL-CCNC: 29 UNIT/L (ref 0–55)
AST SERPL-CCNC: 19 UNIT/L (ref 5–34)
BILIRUB SERPL-MCNC: 0.4 MG/DL
BUN SERPL-MCNC: 8.6 MG/DL (ref 9.8–20.1)
CALCIUM SERPL-MCNC: 10 MG/DL (ref 8.4–10.2)
CHLORIDE SERPL-SCNC: 103 MMOL/L (ref 98–107)
CO2 SERPL-SCNC: 28 MMOL/L (ref 23–31)
CREAT SERPL-MCNC: 0.86 MG/DL (ref 0.55–1.02)
EST. AVERAGE GLUCOSE BLD GHB EST-MCNC: 254.7 MG/DL
GFR SERPLBLD CREATININE-BSD FMLA CKD-EPI: >60 MLS/MIN/1.73/M2
GLOBULIN SER-MCNC: 3.2 GM/DL (ref 2.4–3.5)
GLUCOSE SERPL-MCNC: 273 MG/DL (ref 82–115)
HBA1C MFR BLD: 10.5 %
POTASSIUM SERPL-SCNC: 4.7 MMOL/L (ref 3.5–5.1)
PROT SERPL-MCNC: 7.5 GM/DL (ref 5.8–7.6)
SODIUM SERPL-SCNC: 140 MMOL/L (ref 136–145)

## 2023-10-17 PROCEDURE — 83036 HEMOGLOBIN GLYCOSYLATED A1C: CPT

## 2023-10-17 PROCEDURE — 36415 COLL VENOUS BLD VENIPUNCTURE: CPT

## 2023-10-17 PROCEDURE — 80053 COMPREHEN METABOLIC PANEL: CPT

## 2023-10-20 ENCOUNTER — OFFICE VISIT (OUTPATIENT)
Dept: INTERNAL MEDICINE | Facility: CLINIC | Age: 58
End: 2023-10-20
Payer: COMMERCIAL

## 2023-10-20 VITALS
SYSTOLIC BLOOD PRESSURE: 134 MMHG | DIASTOLIC BLOOD PRESSURE: 83 MMHG | HEIGHT: 63 IN | WEIGHT: 173 LBS | HEART RATE: 85 BPM | BODY MASS INDEX: 30.65 KG/M2 | RESPIRATION RATE: 18 BRPM | TEMPERATURE: 99 F

## 2023-10-20 DIAGNOSIS — Z79.4 UNCONTROLLED DIABETES MELLITUS WITH HYPERGLYCEMIA, WITH LONG-TERM CURRENT USE OF INSULIN: ICD-10-CM

## 2023-10-20 DIAGNOSIS — Z00.00 WELL ADULT EXAM: ICD-10-CM

## 2023-10-20 DIAGNOSIS — R91.1 LUNG NODULE: ICD-10-CM

## 2023-10-20 DIAGNOSIS — G56.00 CTS (CARPAL TUNNEL SYNDROME): Primary | ICD-10-CM

## 2023-10-20 DIAGNOSIS — E11.65 UNCONTROLLED TYPE 2 DIABETES MELLITUS WITH HYPERGLYCEMIA: ICD-10-CM

## 2023-10-20 DIAGNOSIS — E04.2 MULTIPLE THYROID NODULES: ICD-10-CM

## 2023-10-20 DIAGNOSIS — E11.65 UNCONTROLLED DIABETES MELLITUS WITH HYPERGLYCEMIA, WITH LONG-TERM CURRENT USE OF INSULIN: ICD-10-CM

## 2023-10-20 DIAGNOSIS — G56.00 CARPAL TUNNEL SYNDROME, UNSPECIFIED LATERALITY: Primary | ICD-10-CM

## 2023-10-20 PROCEDURE — 3075F SYST BP GE 130 - 139MM HG: CPT | Mod: CPTII,,, | Performed by: NURSE PRACTITIONER

## 2023-10-20 PROCEDURE — 3075F PR MOST RECENT SYSTOLIC BLOOD PRESS GE 130-139MM HG: ICD-10-PCS | Mod: CPTII,,, | Performed by: NURSE PRACTITIONER

## 2023-10-20 PROCEDURE — 4010F ACE/ARB THERAPY RXD/TAKEN: CPT | Mod: CPTII,,, | Performed by: NURSE PRACTITIONER

## 2023-10-20 PROCEDURE — 99214 PR OFFICE/OUTPT VISIT, EST, LEVL IV, 30-39 MIN: ICD-10-PCS | Mod: S$PBB,,, | Performed by: NURSE PRACTITIONER

## 2023-10-20 PROCEDURE — 99215 OFFICE O/P EST HI 40 MIN: CPT | Mod: PBBFAC | Performed by: NURSE PRACTITIONER

## 2023-10-20 PROCEDURE — 1160F RVW MEDS BY RX/DR IN RCRD: CPT | Mod: CPTII,,, | Performed by: NURSE PRACTITIONER

## 2023-10-20 PROCEDURE — 99214 OFFICE O/P EST MOD 30 MIN: CPT | Mod: S$PBB,,, | Performed by: NURSE PRACTITIONER

## 2023-10-20 PROCEDURE — 3066F PR DOCUMENTATION OF TREATMENT FOR NEPHROPATHY: ICD-10-PCS | Mod: CPTII,,, | Performed by: NURSE PRACTITIONER

## 2023-10-20 PROCEDURE — 3060F PR POS MICROALBUMINURIA RESULT DOCUMENTED/REVIEW: ICD-10-PCS | Mod: CPTII,,, | Performed by: NURSE PRACTITIONER

## 2023-10-20 PROCEDURE — 3079F DIAST BP 80-89 MM HG: CPT | Mod: CPTII,,, | Performed by: NURSE PRACTITIONER

## 2023-10-20 PROCEDURE — 4010F PR ACE/ARB THEARPY RXD/TAKEN: ICD-10-PCS | Mod: CPTII,,, | Performed by: NURSE PRACTITIONER

## 2023-10-20 PROCEDURE — 3008F PR BODY MASS INDEX (BMI) DOCUMENTED: ICD-10-PCS | Mod: CPTII,,, | Performed by: NURSE PRACTITIONER

## 2023-10-20 PROCEDURE — 3079F PR MOST RECENT DIASTOLIC BLOOD PRESSURE 80-89 MM HG: ICD-10-PCS | Mod: CPTII,,, | Performed by: NURSE PRACTITIONER

## 2023-10-20 PROCEDURE — 3008F BODY MASS INDEX DOCD: CPT | Mod: CPTII,,, | Performed by: NURSE PRACTITIONER

## 2023-10-20 PROCEDURE — 1159F MED LIST DOCD IN RCRD: CPT | Mod: CPTII,,, | Performed by: NURSE PRACTITIONER

## 2023-10-20 PROCEDURE — 3060F POS MICROALBUMINURIA REV: CPT | Mod: CPTII,,, | Performed by: NURSE PRACTITIONER

## 2023-10-20 PROCEDURE — 3046F PR MOST RECENT HEMOGLOBIN A1C LEVEL > 9.0%: ICD-10-PCS | Mod: CPTII,,, | Performed by: NURSE PRACTITIONER

## 2023-10-20 PROCEDURE — 3046F HEMOGLOBIN A1C LEVEL >9.0%: CPT | Mod: CPTII,,, | Performed by: NURSE PRACTITIONER

## 2023-10-20 PROCEDURE — 1159F PR MEDICATION LIST DOCUMENTED IN MEDICAL RECORD: ICD-10-PCS | Mod: CPTII,,, | Performed by: NURSE PRACTITIONER

## 2023-10-20 PROCEDURE — 3066F NEPHROPATHY DOC TX: CPT | Mod: CPTII,,, | Performed by: NURSE PRACTITIONER

## 2023-10-20 PROCEDURE — 1160F PR REVIEW ALL MEDS BY PRESCRIBER/CLIN PHARMACIST DOCUMENTED: ICD-10-PCS | Mod: CPTII,,, | Performed by: NURSE PRACTITIONER

## 2023-10-20 RX ORDER — INSULIN DETEMIR 100 [IU]/ML
INJECTION, SOLUTION SUBCUTANEOUS
Qty: 60 ML | Refills: 3 | Status: SHIPPED | OUTPATIENT
Start: 2023-10-20 | End: 2023-11-29 | Stop reason: SDUPTHER

## 2023-10-20 NOTE — PROGRESS NOTES
Patient ID: [id number]     Chief Complaint: LAB RESULTS        HPI:     RICHMOND Rosas is a 58 y.o. female here today for a follow up.         Optometrist:  Dentist:  Breast Cancer Screening:  [unfilled]   Cervical Cancer Screening:     Colorectal Cancer Screening:  Diabetic Eye Exam:  Diabetic Foot Exam:  Lung Cancer Screening:    Prostate Cancer Screening:  AAA Screening:  Osteoporosis Screening:  Medicare Wellness:   Immunizations:   Immunization History   Administered Date(s) Administered    COVID-19, MRNA, LN-S, PF (Pfizer) (Purple Cap) 06/21/2021, 06/21/2021, 07/19/2021, 07/19/2021    DTP 1965, [birth year], 10/22/1968, 07/24/1975, 05/03/1978    Influenza 10/26/2014    Influenza - Quadrivalent - PF *Preferred* (6 months and older) 09/29/2021    Influenza - Trivalent - PF (ADULT) 10/03/2016, 12/21/2017, 09/21/2018, 10/22/2019    MMR 01/15/2010    Measles 12/07/1967, 05/03/1978    OPV 1965, 1965, 01/23/1964, 10/14/1969, 07/24/1975    Pneumococcal Polysaccharide - 23 Valent 06/15/2016    Rubella 12/18/1969    Td (ADULT) 10/26/1999, 10/12/2005        ----------------------------  Anxiety disorder, unspecified  Glaucoma  HTN (hypertension)  Type 2 diabetes mellitus with unspecified diabetic retinopathy   without macular edema     Past Surgical History:   Procedure Laterality Date    CHOLECYSTECTOMY      HYSTERECTOMY, VAGINAL, LAPAROSCOPY-ASSISTED, WITH SALPINGO-OOPHORECTOY Bilateral 08/22/2017    TUBAL LIGATION         Review of patient's allergies indicates:  No Known Allergies    Current Outpatient Medications   Medication Instructions    amLODIPine (NORVASC) 5 mg, Oral, Daily    arm brace Misc 1 each, Misc.(Non-Drug; Combo Route), Nightly, Left carpal tunnel wrist splint, nightly    aspirin (ECOTRIN) 81 mg, Oral, Daily    fluconazole (DIFLUCAN) 150 mg, Oral, Daily, Take 1 tab po X 1 dose. Repeat 2nd dose in 3 days if needed.    gabapentin (NEURONTIN) 300 mg, Oral,  Nightly    glipiZIDE (GLUCOTROL) 10 mg, Oral, 2 times daily before meals    ibuprofen (ADVIL,MOTRIN) 800 mg, Oral, 3 times daily PRN    insulin detemir U-100, Levemir, (LEVEMIR FLEXPEN) 100 unit/mL (3 mL) InPn pen Inject 20 units sq in AM and 45 units sq in PM.    insulin lispro 100 unit/mL pen See Instructions, Inject 2-12 units sq per s/s after CBG AC & HS., # 15 mL, 6 Refill(s), Pharm[facility]: [facility], 162, cm, Height/Length Dosing, 06/21/21 7:49:00 CDT, 81, kg, Weight Dosing, 06/21/21 7:49:00 CDT Strength: 100 Units/mL    losartan (COZAAR) 25 mg, Oral, Daily    metFORMIN (GLUCOPHAGE) 1,000 mg, Oral, 2 times daily with meals    oxybutynin (DITROPAN-XL) 5 mg, Oral, Daily    PEN NEEDLE, DIABETIC MISC 1 each, Misc.(Non-Drug; Combo Route), 2 times daily, Before AC and HS.    pravastatin (PRAVACHOL) 20 mg, Oral, Daily    propranoloL (INDERAL) 20 mg, Oral, 2 times daily    sertraline (ZOLOFT) 50 MG tablet TAKE  1 TABLET po at BEDTIME    zolpidem (AMBIEN) 5 mg, Oral, Nightly PRN       Social History     Socioeconomic History    Marital status: Single   Tobacco Use    Smoking status: Never     Passive exposure: Past    Smokeless tobacco: Never   Substance and Sexual Activity    Alcohol use: Never     Comment: special occassion    Drug use: Never    Sexual activity: Yes     Partners: Male     Birth control/protection: Abstinence, Injection     Social Determinants of Health     Financial Resource Strain: Low Risk  (9/22/2023)    Overall Financial Resource Strain (CARDIA)     Difficulty of Paying Living Expenses: Not hard at all   Food Insecurity: No Food Insecurity (9/22/2023)    Hunger Vital Sign     Worried About Running Out of Food in the Last Year: Never true     Ran Out of Food in the Last Year: Never true   Transportation Needs: No Transportation Needs (9/22/2023)    PRAPARE - Transportation     Lack of Transportation (Medical): No     Lack of Transportation (Non-Medical): No   Physical Activity:  Inactive (9/22/2023)    Exercise Vital Sign     Days of Exercise per Week: 0 days     Minutes of Exercise per Session: 0 min   Stress: No Stress Concern Present (9/22/2023)    [employer] - Occupational Stress Questionnaire     Feeling of Stress : Not at all   Social Connections: Moderately Isolated (9/22/2023)    Social Connection and Isolation Panel [NHANES]     Frequency of Communication with Friends and Family: More than three times a week     Frequency of Social Gatherings with Friends and Family: Three times a week     Attends Spiritism Services: More than 4 times per year     Active Member of Clubs or Organizations: No     Attends Club or Organization Meetings: Never     Marital Status:    Housing Stability: Low Risk  (9/22/2023)    Housing Stability Vital Sign     Unable to Pay for Housing in the Last Year: No     Number of Places Lived in the Last Year: 1     Unstable Housing in the Last Year: No        Family History   Problem Relation Age of Onset    Diabetes Mother     Hypertension Mother     Kidney failure Mother     Asthma Mother     Lung cancer Father     Diabetes Father     Hypertension Father     Thyroid disease Sister     Brain cancer Sister     Diabetes Sister     Hypertension Sister     Cancer Sister         Thyroid    Thyroid disease Sister     Migraines Sister     Cancer Brother         Pancreatic    Hypertension Brother     Seizures Brother     Diabetes Sister     Hypertension Sister         Patient Care Team:  Kelin Puga FNP as PCP - General (Family Medicine)  Kelin Puga FNP (Family Medicine)  Gilberto Adams MD as Diabetes Digital Medicine Responsible Provider (Cardiology)  Gilberto Adams MD as Hypertension Digital Medicine Responsible Provider (Cardiology)  Jade Carr as Digital Medicine Health   Eliane Shaw, PharmD as Hypertension Digital Medicine Clinician  Eliane Shaw PharmD as Diabetes Digital Medicine Clinician  1,  "[employer] [employer] [facility] as Hypertension Digital Medicine Contract  1, [employer] [employer] - [facility] as Diabetes Digital Medicine Contract     Subjective:     Review of Systems     See HPI for details    Constitutional: Denies Change in appetite. Denies Chills. Denies Fever. Denies Night sweats.  Eye: Denies Blurred vision. Denies Discharge. Denies Eye pain.  ENT: Denies Decreased hearing. Denies Sore throat. Denies Swollen glands.  Respiratory: Denies Cough. Denies Shortness of breath. Denies Shortness of breath with exertion. Denies Wheezing.  Cardiovascular: DeniesChest pain at rest. Denies Chest pain with exertion. Denies Irregular heartbeat. Denies Palpitations. Denies Edema.  Gastrointestinal: Denies Abdominal pain. DeniesDiarrhea. Denies Nausea. Denies Vomiting. Denies Hematemesis or Hematochezia.  Genitourinary: Denies Dysuria. Denies Urinary frequency. Denies Urinary urgency. Denies Blood in urine.  Endocrine: Denies Cold intolerance. Denies Excessive thirst. Denies Heat intolerance. Denies Weight loss. Denies Weight gain.  Musculoskeletal: Denies Painful joints. Denies Weakness.  Integumentary: Denies Rash. Denies Itching. Denies Dry skin.  Neurologic: Denies Dizziness. Denies Fainting. Denies Headache.  Psychiatric: Denies Depression. Denies Anxiety. Denies Suicidal/Homicidal ideations.    All Other ROS: Negative except as stated in HPI.       Objective:     Visit Vitals  /83 (BP Location: Right arm, Patient Position: Sitting, BP Method: Large (Automatic))   Pulse 85   Temp 98.7 °F (37.1 °C) (Oral)   Resp 18   Ht 5' 3" (1.6 m)   Wt 78.5 kg (173 lb)   BMI 30.65 kg/m²       Physical Exam    General: Alert and oriented, No acute distress.  Head: Normocephalic, Atraumatic.  Eye: Pupils are equal, round and reactive to light, Extraocular movements are intact, Sclera non-icteric.  Ears/Nose/Throat: Normal, Mucosa moist,Clear.  Neck/Thyroid: Supple, Non-tender, No carotid bruit, No " lymphadenopathy, No JVD, Full range of motion.  Respiratory: Clear to auscultation bilaterally; No wheezes, rales or rhonchi,Non-labored respirations, Symmetrical chest wall expansion.  Cardiovascular: Regular rate and rhythm, S1/S2 normal, No murmurs, rubs or gallops.  Gastrointestinal: Soft, Non-tender, Non-distended, Normal bowel sounds, No palpable organomegaly.  Musculoskeletal: Normal range of motion.  Integumentary: Warm, Dry, Intact, No suspicious lesions or rashes.  Extremities: No clubbing, cyanosis or edema  Neurologic: No focal deficits, Cranial Nerves II-XII are grossly intact, Motor strength normal upper and lower extremities, Sensory exam intact.  Psychiatric: Normal interaction, Coherent speech, Euthymic mood, Appropriate affect       Labs Reviewed:     Chemistry:  Lab Results   Component Value Date     10/17/2023    K 4.7 10/17/2023    CHLORIDE 103 10/17/2023    BUN 8.6 (L) 10/17/2023    CREATININE 0.86 10/17/2023    EGFRNORACEVR >60 10/17/2023    GLUCOSE 273 (H) 10/17/2023    CALCIUM 10.0 10/17/2023    ALKPHOS 89 10/17/2023    LABPROT 7.5 10/17/2023    ALBUMIN 4.3 10/17/2023    BILIDIR 0.1 06/16/2021    IBILI 0.10 06/16/2021    AST 19 10/17/2023    ALT 29 10/17/2023    RSOFQVWF61MR 34.0 04/05/2021        Lab Results   Component Value Date    HGBA1C 10.5 (H) 10/17/2023        Hematology:  Lab Results   Component Value Date    WBC 7.2 10/24/2022    HGB 13.7 10/24/2022    HCT 44.6 10/24/2022     10/24/2022       Lipid Panel:  Lab Results   Component Value Date    CHOL 98 12/13/2021    HDL 30 (L) 12/13/2021    LDL 44.00 (L) 12/13/2021    TRIG 121 12/13/2021    TOTALCHOLEST 3 12/13/2021        Urine:  Lab Results   Component Value Date    COLORUA Light-Yellow 02/09/2023    APPEARANCEUA Clear 02/09/2023    SGUA 1.019 02/09/2023    PHUA 6.0 02/09/2023    PROTEINUA Trace (A) 02/09/2023    GLUCOSEUA 3+ (A) 02/09/2023    KETONESUA Negative 02/09/2023    BLOODUA Negative 02/09/2023    NITRITESUA  Negative 02/09/2023    LEUKOCYTESUR 75 (A) 02/09/2023    RBCUA 0-5 02/09/2023    WBCUA 6-10 (A) 02/09/2023    BACTERIA Trace (A) 02/09/2023    SQEPUA Trace (A) 02/09/2023    HYALINECASTS None Seen 02/09/2023    CREATRANDUR 76.4 09/21/2023        Assessment:       ICD-10-CM ICD-9-CM   1. Carpal tunnel syndrome, unspecified laterality  G56.00 354.0   2. Multiple thyroid nodules  E04.2 241.1   3. Uncontrolled diabetes mellitus with hyperglycemia, with long-term current use of insulin  E11.65 250.02    Z79.4 V58.67   4. Well adult exam  Z00.00 V70.0   5. Uncontrolled type 2 diabetes mellitus with hyperglycemia  E11.65 250.02   6. Lung nodule  R91.1 793.11        Plan:     1. Carpal tunnel syndrome, unspecified laterality  [facility] for nerve conduction testing for CTS. Encouraged wrist splint at night.     2. Multiple thyroid nodules  US thyroid done 9-21-23 showing:  US Soft Tissue Head Neck Thyroid  Order: [id number]  Status: Final result     Visible to patient: Yes (seen)     Next appt: 11/01/2023 at 08:30 AM in Otolaryngology ([facility])     Dx: Multiple thyroid nodules     0 Result Notes  Details    Reading Physician Reading Date Result Priority   Wandy Macias MD  [phone] 9/22/2023 Routine     Narrative & Impression  EXAMINATION:  US SOFT TISSUE HEAD NECK THYROID     CLINICAL HISTORY:  .  Nontoxic multinodular goiter     TECHNIQUE:  Ultrasound of the thyroid and cervical lymph nodes was performed.     COMPARISON:  Thyroid sonogram 10/20/2022     FINDINGS:  SIZE:     Right lobe: 4.1 x 1.5 x 1.4 cm     Left lobe: 4.4 x 1.8 x 2 cm     Isthmus: 0.8 cm     PARENCHYMA:     Heterogeneous parenchyma.     NODULES:     Up to two most suspicious nodules in each lobe detailed.     Hypoechoic nodule at the mid right lobe measures 7 mm (TR 4).     Hypoechoic nodule at the medial right lobe/isthmus measures 0.4 cm (TR 4).     Hypoechoic nodule at the mid to lower left lobe  measures 0.8 cm (TR 4).     Isoechoic nodule which is taller than wide at the inferior left lobe measures 10 mm (TR 4).     LYMPH NODES:     No enlarged lymph nodes seen.     Impression:     Multinodular goiter.  No detrimental change from previous.  No nodule meeting imaging criteria for consideration of biopsy.     Reference: ACR Thyroid Imaging, Reporting and Data System (TI-RADS): White Paper of the ACR TI-RADS Committee.  2017.     TR4.  Moderately suspicious.  FNA if 1.5 cm or greater.  Follow up at 1, 2, 3, and 5 years if 1 cm or greater.        Electronically signed by: Wandy Macias  Date:                                            09/22/2023  Time:                                           14:37           Exam Ended: 09/21/23 09:36 Last Resulted: 09/22/23 14:37             3. Uncontrolled diabetes mellitus with hyperglycemia, with long-term current use of insulin  A1c 10.5. ADA diet and exercise.  Cont Glipizide, Metformin, Lispro as prescribed. Increase Levemir AM dose to 20 units and increase PM dose to 45 units.  Urine microalbumin done 9-21-23 147.1. encouraged low protein diet, drink plenty of water, avoid NSAIDs, maintain good BP and CBG controls. Cont Losartan 25 mg 1 tab po daily. DM foot exam done 9-22-23. DM eye exam done 1-30-23.     4. Well adult exam  Labs in 1 month.      5. Lung nodule  Pt had prior CT chest done 12-7-15 showing:  CT Chest With Contrast  Order: [id number]  Status: Final result     Visible to patient: Yes (not seen)     Next appt: 11/01/2023 at 08:30 AM in Otolaryngology ([facility])     0 Result Notes  Details    Reading Physician Reading Date Result Priority   IN REPORT, PROVIDER 12/7/2015      Narrative & Impression  CT of the chest with IV contrast. Comparison is to similar exam dated  June 5, 2015 as well as a similar exam dated 12/29/14.     INDICATION: Pulmonary nodule followup.     FINDINGS: 5 mm right upper lobe groundglass pulmonary nodule is stable  x 12  months. No suspicious pulmonary nodule is detected.  Hypoventilatory change is noted in the lung bases. The cardiac  chambers are not dilated. Visualized portions of the upper abdomen are  unremarkable with the exception of gallbladder stone. There is  thoracic spondylosis.     IMPRESSION:   1. 5 mm right upper lobe groundglass pulmonary nodule, stable x 12  months warrants no further workup /followup.  2. Cholelithiasis      Electronically Signed By: Chad Long MD  Date/Time Signed: 12/07/2015 10:02           Specimen Collected: 12/07/15 09:00 Last Resulted: 12/07/15 10:02           Will order CT chest with contrast in 1 month. BMP prior to CT. Informed pt to hold Metformin 1 day before and 3 days after contrast study and to drink plenty of water. Pt verbalized understanding. Pt denies personal hx of tobacco abuse but has been around people that smoke all her life.     Follow up in about 1 month (around 11/20/2023) for with labs 1 week prior to appt. . In addition to their scheduled follow up, the patient has also been instructed to follow up on as needed basis.     Future Appointments   Date Time Provider Department Center   11/1/2023  8:30 AM Narda Son FNP [facility] ENT [place] [facility]   1/10/2024  8:45 AM Ella Benoit MD [facility] NEURO [place] [facility]   1/30/2024  8:00 AM TABBY, MARILUZ MCGRAW OPHTH [place]    2/29/2024  9:30 AM Jennifer Cruz FNP [facility] GYN [place] [facility]        [facility] Baptiste

## 2023-11-01 ENCOUNTER — HOSPITAL ENCOUNTER (OUTPATIENT)
Dept: RADIOLOGY | Facility: HOSPITAL | Age: 58
Discharge: HOME | End: 2023-11-01
Attending: NURSE PRACTITIONER
Payer: COMMERCIAL

## 2023-11-01 ENCOUNTER — OFFICE VISIT (OUTPATIENT)
Dept: OTOLARYNGOLOGY | Facility: CLINIC | Age: 58
End: 2023-11-01
Payer: COMMERCIAL

## 2023-11-01 VITALS — SYSTOLIC BLOOD PRESSURE: 148 MMHG | TEMPERATURE: 98 F | DIASTOLIC BLOOD PRESSURE: 82 MMHG | HEART RATE: 83 BPM

## 2023-11-01 DIAGNOSIS — R04.0 EPISTAXIS: ICD-10-CM

## 2023-11-01 DIAGNOSIS — E04.2 MULTIPLE THYROID NODULES: Primary | ICD-10-CM

## 2023-11-01 DIAGNOSIS — R91.1 LUNG NODULE: ICD-10-CM

## 2023-11-01 PROCEDURE — 3066F NEPHROPATHY DOC TX: CPT | Mod: CPTII,,, | Performed by: NURSE PRACTITIONER

## 2023-11-01 PROCEDURE — 25500020 PHARM REV CODE 255: Performed by: NURSE PRACTITIONER

## 2023-11-01 PROCEDURE — 99213 OFFICE O/P EST LOW 20 MIN: CPT | Mod: S$PBB,,, | Performed by: NURSE PRACTITIONER

## 2023-11-01 PROCEDURE — 4010F ACE/ARB THERAPY RXD/TAKEN: CPT | Mod: CPTII,,, | Performed by: NURSE PRACTITIONER

## 2023-11-01 PROCEDURE — 71260 CT THORAX DX C+: CPT | Mod: TC

## 2023-11-01 PROCEDURE — 3066F PR DOCUMENTATION OF TREATMENT FOR NEPHROPATHY: ICD-10-PCS | Mod: CPTII,,, | Performed by: NURSE PRACTITIONER

## 2023-11-01 PROCEDURE — 3079F PR MOST RECENT DIASTOLIC BLOOD PRESSURE 80-89 MM HG: ICD-10-PCS | Mod: CPTII,,, | Performed by: NURSE PRACTITIONER

## 2023-11-01 PROCEDURE — 1159F MED LIST DOCD IN RCRD: CPT | Mod: CPTII,,, | Performed by: NURSE PRACTITIONER

## 2023-11-01 PROCEDURE — 3077F PR MOST RECENT SYSTOLIC BLOOD PRESSURE >= 140 MM HG: ICD-10-PCS | Mod: CPTII,,, | Performed by: NURSE PRACTITIONER

## 2023-11-01 PROCEDURE — 4010F PR ACE/ARB THEARPY RXD/TAKEN: ICD-10-PCS | Mod: CPTII,,, | Performed by: NURSE PRACTITIONER

## 2023-11-01 PROCEDURE — 3060F PR POS MICROALBUMINURIA RESULT DOCUMENTED/REVIEW: ICD-10-PCS | Mod: CPTII,,, | Performed by: NURSE PRACTITIONER

## 2023-11-01 PROCEDURE — 3079F DIAST BP 80-89 MM HG: CPT | Mod: CPTII,,, | Performed by: NURSE PRACTITIONER

## 2023-11-01 PROCEDURE — 3046F HEMOGLOBIN A1C LEVEL >9.0%: CPT | Mod: CPTII,,, | Performed by: NURSE PRACTITIONER

## 2023-11-01 PROCEDURE — 99213 PR OFFICE/OUTPT VISIT, EST, LEVL III, 20-29 MIN: ICD-10-PCS | Mod: S$PBB,,, | Performed by: NURSE PRACTITIONER

## 2023-11-01 PROCEDURE — 3077F SYST BP >= 140 MM HG: CPT | Mod: CPTII,,, | Performed by: NURSE PRACTITIONER

## 2023-11-01 PROCEDURE — 3046F PR MOST RECENT HEMOGLOBIN A1C LEVEL > 9.0%: ICD-10-PCS | Mod: CPTII,,, | Performed by: NURSE PRACTITIONER

## 2023-11-01 PROCEDURE — 3060F POS MICROALBUMINURIA REV: CPT | Mod: CPTII,,, | Performed by: NURSE PRACTITIONER

## 2023-11-01 PROCEDURE — 99214 OFFICE O/P EST MOD 30 MIN: CPT | Mod: PBBFAC,25 | Performed by: NURSE PRACTITIONER

## 2023-11-01 PROCEDURE — 1159F PR MEDICATION LIST DOCUMENTED IN MEDICAL RECORD: ICD-10-PCS | Mod: CPTII,,, | Performed by: NURSE PRACTITIONER

## 2023-11-01 RX ADMIN — IOHEXOL 100 ML: 350 INJECTION, SOLUTION INTRAVENOUS at 11:11

## 2023-11-01 NOTE — PROGRESS NOTES
[facility]  Otolaryngology Clinic Note    Foreign Rosas  YOB: 1965    Chief Complaint: f/u    HPI:  10/31/22: 57yoF referred for thyroid nodules. She denies any dysphonia, dysphagia, or compressive SOB. She requested referral to ENT due to family hx. Her sister had thyroid cancer with total thyroidectomy, dx at age 39. No other family hx of thyroid dz or thyroid cancer. Denies radiation exposure.      11/01/2023: Denies c/o. States she was having some left sided epistaxis recently and was seen in the ED last month. Reports she has not seen any bleeding in a few days.     ROS:   10-point review of systems negative except per HPI      Review of patient's allergies indicates:  No Known Allergies    Past Medical History:   Diagnosis Date    Anxiety disorder, unspecified     Glaucoma     HTN (hypertension)     Type 2 diabetes mellitus with unspecified diabetic retinopathy without macular edema        Past Surgical History:   Procedure Laterality Date    CHOLECYSTECTOMY      HYSTERECTOMY  2015    HYSTERECTOMY, VAGINAL, LAPAROSCOPY-ASSISTED, WITH SALPINGO-OOPHORECTOY Bilateral 08/22/2017    TUBAL LIGATION         Social History     Socioeconomic History    Marital status: Single   Tobacco Use    Smoking status: Never     Passive exposure: Past    Smokeless tobacco: Never   Substance and Sexual Activity    Alcohol use: Never     Comment: special occassion    Drug use: Never    Sexual activity: Not Currently     Partners: Male     Birth control/protection: Abstinence, Injection     Social Determinants of Health     Financial Resource Strain: Low Risk  (9/22/2023)    Overall Financial Resource Strain (CARDIA)     Difficulty of Paying Living Expenses: Not hard at all   Food Insecurity: No Food Insecurity (9/22/2023)    Hunger Vital Sign     Worried About Running Out of Food in the Last Year: Never true     Ran Out of Food in the Last Year: Never true   Transportation Needs: No  Transportation Needs (9/22/2023)    PRAPARE - Transportation     Lack of Transportation (Medical): No     Lack of Transportation (Non-Medical): No   Physical Activity: Inactive (9/22/2023)    Exercise Vital Sign     Days of Exercise per Week: 0 days     Minutes of Exercise per Session: 0 min   Stress: No Stress Concern Present (9/22/2023)    [employer] - Occupational Stress Questionnaire     Feeling of Stress : Not at all   Social Connections: Moderately Isolated (9/22/2023)    Social Connection and Isolation Panel [NHANES]     Frequency of Communication with Friends and Family: More than three times a week     Frequency of Social Gatherings with Friends and Family: Three times a week     Attends [employer] Services: More than 4 times per year     Active Member of Clubs or Organizations: No     Attends Club or Organization Meetings: Never     Marital Status:    Housing Stability: Low Risk  (9/22/2023)    Housing Stability Vital Sign     Unable to Pay for Housing in the Last Year: No     Number of Places Lived in the Last Year: 1     Unstable Housing in the Last Year: No       Family History   Problem Relation Age of Onset    Diabetes Mother     Hypertension Mother     Kidney failure Mother     Asthma Mother     Kidney disease Mother     Lung cancer Father     Diabetes Father     Hypertension Father     Mental illness Father     Thyroid disease Sister     Brain cancer Sister     Diabetes Sister     Hypertension Sister     Cancer Sister         Thyroid    Thyroid disease Sister     Migraines Sister     Miscarriages / Stillbirths Sister     Cancer Brother         Pancreatic    Hypertension Brother     Seizures Brother     Diabetes Sister     Hypertension Sister        Outpatient Encounter Medications as of 11/1/2023   Medication Sig Dispense Refill    arm brace Misc 1 each by Misc.(Non-Drug; Combo Route) route every evening. Left carpal tunnel wrist splint, nightly 1 each 4     aspirin (ECOTRIN) 81 MG EC tablet Take 81 mg by mouth once daily.      gabapentin (NEURONTIN) 300 MG capsule Take 300 mg by mouth every evening.      glipiZIDE (GLUCOTROL) 10 MG tablet Take 1 tablet (10 mg total) by mouth 2 (two) times daily before meals. 180 tablet 1    ibuprofen (ADVIL,MOTRIN) 800 MG tablet Take 1 tablet (800 mg total) by mouth 3 (three) times daily as needed for Pain. 90 tablet 2    insulin detemir U-100, Levemir, (LEVEMIR FLEXPEN) 100 unit/mL (3 mL) InPn pen Inject 20 units sq in AM and 45 units sq in PM. 60 mL 3    insulin lispro 100 unit/mL pen See Instructions, Inject 2-12 units sq per s/s after CBG AC & HS., # 15 mL, 6 Refill(s), Pharm[facility]: [facility], 162, cm, Height/Length Dosing, 06/21/21 7:49:00 CDT, 81, kg, Weight Dosing, 06/21/21 7:49:00 CDT Strength: 100 Units/mL 15 mL 6    losartan (COZAAR) 25 MG tablet Take 1 tablet (25 mg total) by mouth once daily. 90 tablet 1    metFORMIN (GLUCOPHAGE) 1000 MG tablet Take 1 tablet (1,000 mg total) by mouth 2 (two) times daily with meals. 180 tablet 1    oxybutynin (DITROPAN-XL) 5 MG TR24 Take 1 tablet (5 mg total) by mouth once daily. 90 tablet 3    PEN NEEDLE, DIABETIC MISC 1 each by Misc.(Non-Drug; Combo Route) route 2 (two) times a day. Before AC and HS.      pravastatin (PRAVACHOL) 20 MG tablet Take 20 mg by mouth once daily.      propranoloL (INDERAL) 20 MG tablet Take 1 tablet (20 mg total) by mouth 2 (two) times daily. 60 tablet 3    sertraline (ZOLOFT) 50 MG tablet TAKE  1 TABLET po at BEDTIME 30 tablet 6    zolpidem (AMBIEN) 5 MG Tab Take 1 tablet (5 mg total) by mouth nightly as needed. 30 tablet 5    amLODIPine (NORVASC) 5 MG tablet Take 1 tablet (5 mg total) by mouth once daily. 90 tablet 3    fluconazole (DIFLUCAN) 150 MG Tab Take 1 tablet (150 mg total) by mouth once daily. Take 1 tab po X 1 dose. Repeat 2nd dose in 3 days if needed. (Patient not taking: Reported on 11/1/2023) 2 tablet 0    [DISCONTINUED] insulin  detemir U-100 (LEVEMIR FLEXPEN) 100 unit/mL (3 mL) InPn pen Inject 15 units sq in AM and 42 units sq in PM. 60 mL 3     No facility-administered encounter medications on file as of 11/1/2023.       Physical Exam:  Vitals:    11/01/23 0824 11/01/23 0826   BP: (!) 153/86 (!) 148/82   BP Location: Right arm Right arm   Patient Position: Sitting Sitting   BP Method: Large (Automatic) Large (Manual)   Pulse: 83    Temp: 98.2 °F (36.8 °C)    TempSrc: Oral        General: NAD, voice normal  Neuro: AAO, CN II - XII grossly intact  Head/ Face: NCAT, symmetric, sensations intact bilaterally  Eyes: EOMI, PERRL  Ears: externally normal with grossly normal hearing  AD: EAC patent, TM intact, no middle ear effusion, no retractions  AS: EAC patent, TM intact, no middle ear effusion, no retractions  Nose: bilateral nares patent, midline septum, no rhinorrhea, no external deformity, no turbinate hypertrophy. No superficial vessels or sites of epistaxis noted.   OC/OP: MMM, no intraoral lesions, FOM/BOT soft, no trismus, dentition is poor (Edentulous upper), no uvular deviation, bilaterally symmetric soft palate elevation, palatoglossus and palatopharyngeal fold wnl; tonsils are symmetric and 1+  Indirect laryngoscopy: deferred due to patient intolerance  Neck: soft, supple, no LAD, normal ROM, no thyromegaly  Respiratory: nonlabored, no wheezing, bilateral chest rise  Cardiovascular: RRR  Gastrointestinal: S NT ND  Skin: warm, no lesions  Musculoskeletal: 5/5 strength  Psych: Appropriate affect/mood     Pertinent Data:  ? LABS:  ? AUDIO:           ? PATH:      Imaging:           Assessment/Plan:  58 y.o. female with subcentimeter thyroid nodules. Repeat u/s with multiple nodules still not meeting FNA critiera. TSH WNL 4/5/21. No sites of epistaxis noted on exam today.   - Thyroid u/s at 3 & 5 year intervals per PCP  - [place] hydration  - Saline gel or aquaphor to b/l nares prn. Do not sleep under a fan.   - RTC prn. She will call  for appt if epistaxis returns      Narda Son NP

## 2023-11-04 DIAGNOSIS — I10 HYPERTENSION: ICD-10-CM

## 2023-11-09 ENCOUNTER — PATIENT MESSAGE (OUTPATIENT)
Dept: ADMINISTRATIVE | Facility: HOSPITAL | Age: 58
End: 2023-11-09
Payer: COMMERCIAL

## 2023-11-09 ENCOUNTER — PATIENT OUTREACH (OUTPATIENT)
Dept: ADMINISTRATIVE | Facility: HOSPITAL | Age: 58
End: 2023-11-09
Payer: COMMERCIAL

## 2023-11-09 ENCOUNTER — TELEPHONE (OUTPATIENT)
Dept: ADMINISTRATIVE | Facility: HOSPITAL | Age: 58
End: 2023-11-09
Payer: COMMERCIAL

## 2023-11-09 VITALS — SYSTOLIC BLOOD PRESSURE: 158 MMHG | DIASTOLIC BLOOD PRESSURE: 84 MMHG

## 2023-11-09 NOTE — TELEPHONE ENCOUNTER
Population Health BP Outreach.   Pt awaiting refill of BP med. Will recheck BP after pt has taken BP medication at a later time.

## 2023-11-09 NOTE — PROGRESS NOTES
Population Health BP Outreach attempt to close gap for uncontrolled BP. Message sent to patient via [facility] patient portal for Population Health Outreach for uncontrolled hypertension.     Pt response to message with BP reading of 158/84. Reading sent to pcp. Pt states she has bee waiting on refill. Will retake Bp after pt has taken BP medication.     Population Health Chart Review & Patient Outreach Details      Further Action Needed If Patient Returns Outreach:            Updates Requested / Reviewed:     []  Care Everywhere    []     []  External Sources ([facility], [facility], [facility], etc.)    [] [facility]   [] [facility]   [] Other:    []  Care Team Updated   []  Removed  or Duplicate Orders   []  Immunization Reconciliation Completed / Queried    [] [place]   [] [place]   [] [place]   [] [place]      Health Maintenance Topics Addressed and Outreach Outcomes / Actions Taken:             Breast Cancer Screening []  Mammogram Order Placed    []  Mammogram Screening Scheduled    []  External Records Requested & Care Team Updated if Applicable    []  External Records Uploaded & Care Team Updated if Applicable    []  Pt Declined Scheduling Mammogram    []  Pt Will Schedule with External Provider / Order Routed & Care Team Updated if Applicable              Cervical Cancer Screening []  Pap Smear Scheduled in Primary Care or OBGYN    []  External Records Requested & Care Team Updated if Applicable       []  External Records Uploaded, Care Team Updated, & History Updated if Applicable    []  Patient Declined Scheduling Pap Smear    []  Patient Will Schedule with External Provider & Care Team Updated if Applicable                  Colorectal Cancer Screening []  Colonoscopy Case Request / Referral / Home Test Order Placed    []  External Records Requested & Care Team Updated if Applicable    []  External Records Uploaded, Care Team Updated, & History Updated if Applicable    []  Patient Declined Completing  Colon Cancer Screening    []  Patient Will Schedule with External Provider & Care Team Updated if Applicable    []  Fit Kit Mailed (add the SmartPhrase under additional notes)    []  Reminded Patient to Complete Home Test                Diabetic Eye Exam []  Eye Exam Screening Order Placed    []  Eye Camera Scheduled or Optometry/Ophthalmology Referral Placed    []  External Records Requested & Care Team Updated if Applicable    []  External Records Uploaded, Care Team Updated, & History Updated if Applicable    []  Patient Declined Scheduling Eye Exam    []  Patient Will Schedule with External Provider & Care Team Updated if Applicable             Blood Pressure Control []  Primary Care Follow Up Visit Scheduled     [x]  Remote Blood Pressure Reading Captured    []  Patient Declined Remote Reading or Scheduling Appt - Escalated to PCP    []  Patient Will Call Back or Send Portal Message with Reading                 HbA1c & Other Labs []  Overdue Lab(s) Ordered    []  Overdue Lab(s) Scheduled    []  External Records Uploaded & Care Team Updated if Applicable    []  Primary Care Follow Up Visit Scheduled     []  Reminded Patient to Complete A1c Home Test    []  Patient Declined Scheduling Labs or Will Call Back to Schedule    []  Patient Will Schedule with External Provider / Order Routed, & Care Team Updated if Applicable           Primary Care Appointment []  Primary Care Appt Scheduled    []  Patient Declined Scheduling or Will Call Back to Schedule    []  Pt Established with External Provider, Updated Care Team, & Informed Pt to Notify Payor if Applicable           Medication Adherence /    Statin Use []  Primary Care Appointment Scheduled    []  Patient Reminded to  Prescription    []  Patient Declined, Provider Notified if Needed    []  Sent Provider Message to Review to Evaluate Pt for Statin, Add Exclusion Dx Codes, Document   Exclusion in Problem List, Change Statin Intensity Level to Moderate or High  Intensity if Applicable                Osteoporosis Screening []  Dexa Order Placed    []  Dexa Appointment Scheduled    []  External Records Requested & Care Team Updated    []  External Records Uploaded, Care Team Updated, & History Updated if Applicable    []  Patient Declined Scheduling Dexa or Will Call Back to Schedule    []  Patient Will Schedule with External Provider / Order Routed & Care Team Updated if Applicable       Additional Notes:

## 2023-11-14 RX ORDER — AMLODIPINE BESYLATE 5 MG/1
5 TABLET ORAL DAILY
Qty: 90 TABLET | Refills: 1 | Status: SHIPPED | OUTPATIENT
Start: 2023-11-14 | End: 2024-11-13

## 2023-11-27 ENCOUNTER — TELEPHONE (OUTPATIENT)
Dept: INTERNAL MEDICINE | Facility: CLINIC | Age: 58
End: 2023-11-27
Payer: COMMERCIAL

## 2023-11-27 NOTE — TELEPHONE ENCOUNTER
Pt called in saying that she is suppose to have lab work done before her next appt however there or no current labs in the system for the pt. Please advise. TORY

## 2023-11-28 ENCOUNTER — LAB VISIT (OUTPATIENT)
Dept: LAB | Facility: HOSPITAL | Age: 58
End: 2023-11-28
Attending: NURSE PRACTITIONER
Payer: COMMERCIAL

## 2023-11-28 DIAGNOSIS — I10 PRIMARY HYPERTENSION: ICD-10-CM

## 2023-11-28 DIAGNOSIS — E11.65 UNCONTROLLED DIABETES MELLITUS WITH HYPERGLYCEMIA, WITH LONG-TERM CURRENT USE OF INSULIN: ICD-10-CM

## 2023-11-28 DIAGNOSIS — I10 PRIMARY HYPERTENSION: Primary | ICD-10-CM

## 2023-11-28 DIAGNOSIS — Z79.4 UNCONTROLLED DIABETES MELLITUS WITH HYPERGLYCEMIA, WITH LONG-TERM CURRENT USE OF INSULIN: ICD-10-CM

## 2023-11-28 LAB
ANION GAP SERPL CALC-SCNC: 7 MEQ/L
BASOPHILS # BLD AUTO: 0.02 X10(3)/MCL
BASOPHILS NFR BLD AUTO: 0.4 %
BUN SERPL-MCNC: 11.5 MG/DL (ref 9.8–20.1)
CALCIUM SERPL-MCNC: 9.8 MG/DL (ref 8.4–10.2)
CHLORIDE SERPL-SCNC: 102 MMOL/L (ref 98–107)
CHOLEST SERPL-MCNC: 122 MG/DL
CHOLEST/HDLC SERPL: 4 {RATIO} (ref 0–5)
CO2 SERPL-SCNC: 28 MMOL/L (ref 23–31)
CREAT SERPL-MCNC: 0.9 MG/DL (ref 0.55–1.02)
CREAT/UREA NIT SERPL: 13
EOSINOPHIL # BLD AUTO: 0.09 X10(3)/MCL (ref 0–0.9)
EOSINOPHIL NFR BLD AUTO: 1.6 %
ERYTHROCYTE [DISTWIDTH] IN BLOOD BY AUTOMATED COUNT: 13.5 % (ref 11.5–17)
EST. AVERAGE GLUCOSE BLD GHB EST-MCNC: 240.3 MG/DL
GFR SERPLBLD CREATININE-BSD FMLA CKD-EPI: >60 MLS/MIN/1.73/M2
GLUCOSE SERPL-MCNC: 326 MG/DL (ref 82–115)
HBA1C MFR BLD: 10 %
HCT VFR BLD AUTO: 43.8 % (ref 37–47)
HDLC SERPL-MCNC: 31 MG/DL (ref 35–60)
HGB BLD-MCNC: 12.9 G/DL (ref 12–16)
IMM GRANULOCYTES # BLD AUTO: 0 X10(3)/MCL (ref 0–0.04)
IMM GRANULOCYTES NFR BLD AUTO: 0 %
LDLC SERPL CALC-MCNC: 48 MG/DL (ref 50–140)
LYMPHOCYTES # BLD AUTO: 2.4 X10(3)/MCL (ref 0.6–4.6)
LYMPHOCYTES NFR BLD AUTO: 44 %
MCH RBC QN AUTO: 25 PG (ref 27–31)
MCHC RBC AUTO-ENTMCNC: 29.5 G/DL (ref 33–36)
MCV RBC AUTO: 85 FL (ref 80–94)
MONOCYTES # BLD AUTO: 0.46 X10(3)/MCL (ref 0.1–1.3)
MONOCYTES NFR BLD AUTO: 8.4 %
NEUTROPHILS # BLD AUTO: 2.49 X10(3)/MCL (ref 2.1–9.2)
NEUTROPHILS NFR BLD AUTO: 45.6 %
PLATELET # BLD AUTO: 245 X10(3)/MCL (ref 130–400)
PMV BLD AUTO: 11.4 FL (ref 7.4–10.4)
POTASSIUM SERPL-SCNC: 5 MMOL/L (ref 3.5–5.1)
RBC # BLD AUTO: 5.15 X10(6)/MCL (ref 4.2–5.4)
SODIUM SERPL-SCNC: 137 MMOL/L (ref 136–145)
TRIGL SERPL-MCNC: 215 MG/DL (ref 37–140)
TSH SERPL-ACNC: 0.93 UIU/ML (ref 0.35–4.94)
VLDLC SERPL CALC-MCNC: 43 MG/DL
WBC # SPEC AUTO: 5.46 X10(3)/MCL (ref 4.5–11.5)

## 2023-11-28 PROCEDURE — 80061 LIPID PANEL: CPT

## 2023-11-28 PROCEDURE — 85025 COMPLETE CBC W/AUTO DIFF WBC: CPT

## 2023-11-28 PROCEDURE — 83036 HEMOGLOBIN GLYCOSYLATED A1C: CPT

## 2023-11-28 PROCEDURE — 36415 COLL VENOUS BLD VENIPUNCTURE: CPT

## 2023-11-28 PROCEDURE — 80048 BASIC METABOLIC PNL TOTAL CA: CPT

## 2023-11-28 PROCEDURE — 84443 ASSAY THYROID STIM HORMONE: CPT

## 2023-11-29 ENCOUNTER — OFFICE VISIT (OUTPATIENT)
Dept: INTERNAL MEDICINE | Facility: CLINIC | Age: 58
End: 2023-11-29
Payer: COMMERCIAL

## 2023-11-29 VITALS
TEMPERATURE: 98 F | DIASTOLIC BLOOD PRESSURE: 83 MMHG | HEART RATE: 97 BPM | WEIGHT: 174 LBS | HEIGHT: 63 IN | SYSTOLIC BLOOD PRESSURE: 131 MMHG | BODY MASS INDEX: 30.83 KG/M2 | RESPIRATION RATE: 18 BRPM

## 2023-11-29 DIAGNOSIS — R91.8 MULTIPLE PULMONARY NODULES: ICD-10-CM

## 2023-11-29 DIAGNOSIS — Z00.00 WELL ADULT EXAM: ICD-10-CM

## 2023-11-29 DIAGNOSIS — E11.65 UNCONTROLLED TYPE 2 DIABETES MELLITUS WITH HYPERGLYCEMIA: ICD-10-CM

## 2023-11-29 DIAGNOSIS — E11.65 UNCONTROLLED DIABETES MELLITUS WITH HYPERGLYCEMIA, WITH LONG-TERM CURRENT USE OF INSULIN: ICD-10-CM

## 2023-11-29 DIAGNOSIS — E04.2 MULTIPLE THYROID NODULES: Primary | ICD-10-CM

## 2023-11-29 DIAGNOSIS — I51.7 CARDIOMEGALY: ICD-10-CM

## 2023-11-29 DIAGNOSIS — Z79.4 UNCONTROLLED DIABETES MELLITUS WITH HYPERGLYCEMIA, WITH LONG-TERM CURRENT USE OF INSULIN: ICD-10-CM

## 2023-11-29 DIAGNOSIS — G56.00 CARPAL TUNNEL SYNDROME, UNSPECIFIED LATERALITY: ICD-10-CM

## 2023-11-29 PROCEDURE — 3060F POS MICROALBUMINURIA REV: CPT | Mod: CPTII,,, | Performed by: NURSE PRACTITIONER

## 2023-11-29 PROCEDURE — 99214 PR OFFICE/OUTPT VISIT, EST, LEVL IV, 30-39 MIN: ICD-10-PCS | Mod: S$PBB,,, | Performed by: NURSE PRACTITIONER

## 2023-11-29 PROCEDURE — 1159F PR MEDICATION LIST DOCUMENTED IN MEDICAL RECORD: ICD-10-PCS | Mod: CPTII,,, | Performed by: NURSE PRACTITIONER

## 2023-11-29 PROCEDURE — 3079F DIAST BP 80-89 MM HG: CPT | Mod: CPTII,,, | Performed by: NURSE PRACTITIONER

## 2023-11-29 PROCEDURE — 3046F HEMOGLOBIN A1C LEVEL >9.0%: CPT | Mod: CPTII,,, | Performed by: NURSE PRACTITIONER

## 2023-11-29 PROCEDURE — 3066F PR DOCUMENTATION OF TREATMENT FOR NEPHROPATHY: ICD-10-PCS | Mod: CPTII,,, | Performed by: NURSE PRACTITIONER

## 2023-11-29 PROCEDURE — 3008F BODY MASS INDEX DOCD: CPT | Mod: CPTII,,, | Performed by: NURSE PRACTITIONER

## 2023-11-29 PROCEDURE — 3046F PR MOST RECENT HEMOGLOBIN A1C LEVEL > 9.0%: ICD-10-PCS | Mod: CPTII,,, | Performed by: NURSE PRACTITIONER

## 2023-11-29 PROCEDURE — 3060F PR POS MICROALBUMINURIA RESULT DOCUMENTED/REVIEW: ICD-10-PCS | Mod: CPTII,,, | Performed by: NURSE PRACTITIONER

## 2023-11-29 PROCEDURE — 3066F NEPHROPATHY DOC TX: CPT | Mod: CPTII,,, | Performed by: NURSE PRACTITIONER

## 2023-11-29 PROCEDURE — 3079F PR MOST RECENT DIASTOLIC BLOOD PRESSURE 80-89 MM HG: ICD-10-PCS | Mod: CPTII,,, | Performed by: NURSE PRACTITIONER

## 2023-11-29 PROCEDURE — 99215 OFFICE O/P EST HI 40 MIN: CPT | Mod: PBBFAC | Performed by: NURSE PRACTITIONER

## 2023-11-29 PROCEDURE — 3075F SYST BP GE 130 - 139MM HG: CPT | Mod: CPTII,,, | Performed by: NURSE PRACTITIONER

## 2023-11-29 PROCEDURE — 1159F MED LIST DOCD IN RCRD: CPT | Mod: CPTII,,, | Performed by: NURSE PRACTITIONER

## 2023-11-29 PROCEDURE — 4010F PR ACE/ARB THEARPY RXD/TAKEN: ICD-10-PCS | Mod: CPTII,,, | Performed by: NURSE PRACTITIONER

## 2023-11-29 PROCEDURE — 4010F ACE/ARB THERAPY RXD/TAKEN: CPT | Mod: CPTII,,, | Performed by: NURSE PRACTITIONER

## 2023-11-29 PROCEDURE — 3075F PR MOST RECENT SYSTOLIC BLOOD PRESS GE 130-139MM HG: ICD-10-PCS | Mod: CPTII,,, | Performed by: NURSE PRACTITIONER

## 2023-11-29 PROCEDURE — 3008F PR BODY MASS INDEX (BMI) DOCUMENTED: ICD-10-PCS | Mod: CPTII,,, | Performed by: NURSE PRACTITIONER

## 2023-11-29 PROCEDURE — 99214 OFFICE O/P EST MOD 30 MIN: CPT | Mod: S$PBB,,, | Performed by: NURSE PRACTITIONER

## 2023-11-29 RX ORDER — INSULIN LISPRO 100 [IU]/ML
INJECTION, SOLUTION INTRAVENOUS; SUBCUTANEOUS
Qty: 15 ML | Refills: 6 | Status: SHIPPED | OUTPATIENT
Start: 2023-11-29

## 2023-11-29 RX ORDER — ZOLPIDEM TARTRATE 5 MG/1
5 TABLET ORAL NIGHTLY PRN
Qty: 30 TABLET | Refills: 5 | Status: SHIPPED | OUTPATIENT
Start: 2023-11-29

## 2023-11-29 RX ORDER — IBUPROFEN 800 MG/1
800 TABLET ORAL 3 TIMES DAILY PRN
Qty: 90 TABLET | Refills: 2 | Status: SHIPPED | OUTPATIENT
Start: 2023-11-29

## 2023-11-29 RX ORDER — SERTRALINE HYDROCHLORIDE 50 MG/1
TABLET, FILM COATED ORAL
Qty: 30 TABLET | Refills: 6 | Status: SHIPPED | OUTPATIENT
Start: 2023-11-29

## 2023-11-29 RX ORDER — INSULIN DETEMIR 100 [IU]/ML
INJECTION, SOLUTION SUBCUTANEOUS
Qty: 75 ML | Refills: 3 | Status: SHIPPED | OUTPATIENT
Start: 2023-11-29 | End: 2023-12-18

## 2023-11-29 NOTE — PROGRESS NOTES
Patient ID: [id number]     Chief Complaint: lab results        HPI:     RICHMOND Rosas is a 58 y.o. female here today for a follow up.         Immunizations:   Immunization History   Administered Date(s) Administered    COVID-19, MRNA, LN-S, PF (Pfizer) (Purple Cap) 06/21/2021, 06/21/2021, 07/19/2021, 07/19/2021    DTP 1963, 1963, 1963, 10/22/1968, 07/24/1975, 05/03/1978    Influenza 10/26/2014    Influenza - Quadrivalent - PF *Preferred* (6 months and older) 09/29/2021    Influenza - Trivalent - PF (ADULT) 10/03/2016, 12/21/2017, 09/21/2018, 10/22/2019    MMR 01/15/2010    Measles 12/07/1967, 05/03/1978    OPV 1963, 1963, 01/23/1964, 10/14/1969, 07/24/1975    Pneumococcal Polysaccharide - 23 Valent 06/15/2016    Rubella 12/18/1969    Td (ADULT) 10/26/1999, 10/12/2005        ----------------------------  Anxiety disorder, unspecified  Glaucoma  HTN (hypertension)  Type 2 diabetes mellitus with unspecified diabetic retinopathy   without macular edema     Past Surgical History:   Procedure Laterality Date    CHOLECYSTECTOMY      HYSTERECTOMY  2015    HYSTERECTOMY, VAGINAL, LAPAROSCOPY-ASSISTED, WITH SALPINGO-OOPHORECTOY Bilateral 08/22/2017    TUBAL LIGATION         Review of patient's allergies indicates:  No Known Allergies    Current Outpatient Medications   Medication Instructions    amLODIPine (NORVASC) 5 mg, Oral, Daily    arm brace Misc 1 each, Misc.(Non-Drug; Combo Route), Nightly, Left carpal tunnel wrist splint, nightly    aspirin (ECOTRIN) 81 mg, Oral, Daily    fluconazole (DIFLUCAN) 150 mg, Oral, Daily, Take 1 tab po X 1 dose. Repeat 2nd dose in 3 days if needed.    gabapentin (NEURONTIN) 300 mg, Oral, Nightly    glipiZIDE (GLUCOTROL) 10 mg, Oral, 2 times daily before meals    ibuprofen (ADVIL,MOTRIN) 800 mg, Oral, 3 times daily PRN    insulin detemir U-100, Levemir, (LEVEMIR FLEXPEN) 100 unit/mL (3 mL) InPn pen Inject 20 units sq in AM and 45 units sq in PM.     insulin lispro 100 unit/mL pen See Instructions, Inject 2-12 units sq per s/s after CBG AC & HS., # 15 mL, 6 Refill(s), Pharmacy: [facility], 162, cm, Height/Length Dosing, 06/21/21 7:49:00 CDT, 81, kg, Weight Dosing, 06/21/21 7:49:00 CDT Strength: 100 Units/mL    losartan (COZAAR) 25 mg, Oral, Daily    metFORMIN (GLUCOPHAGE) 1,000 mg, Oral, 2 times daily with meals    oxybutynin (DITROPAN-XL) 5 mg, Oral, Daily    PEN NEEDLE, DIABETIC MISC 1 each, Misc.(Non-Drug; Combo Route), 2 times daily, Before AC and HS.    pravastatin (PRAVACHOL) 20 mg, Oral, Daily    propranoloL (INDERAL) 20 mg, Oral, 2 times daily    sertraline (ZOLOFT) 50 MG tablet TAKE  1 TABLET po at BEDTIME    zolpidem (AMBIEN) 5 mg, Oral, Nightly PRN       Social History     Socioeconomic History    Marital status: Single   Tobacco Use    Smoking status: Never     Passive exposure: Past    Smokeless tobacco: Never   Substance and Sexual Activity    Alcohol use: Never     Comment: special occassion    Drug use: Never    Sexual activity: Not Currently     Partners: Male     Birth control/protection: Abstinence, Injection     Social Determinants of Health     Financial Resource Strain: Low Risk  (9/22/2023)    Overall Financial Resource Strain (CARDIA)     Difficulty of Paying Living Expenses: Not hard at all   Food Insecurity: No Food Insecurity (9/22/2023)    Hunger Vital Sign     Worried About Running Out of Food in the Last Year: Never true     Ran Out of Food in the Last Year: Never true   Transportation Needs: No Transportation Needs (9/22/2023)    PRAPARE - Transportation     Lack of Transportation (Medical): No     Lack of Transportation (Non-Medical): No   Physical Activity: Inactive (9/22/2023)    Exercise Vital Sign     Days of Exercise per Week: 0 days     Minutes of Exercise per Session: 0 min   Stress: No Stress Concern Present (9/22/2023)    [place] [employer] - Occupational Stress Questionnaire      Feeling of Stress : Not at all   Social Connections: Moderately Isolated (9/22/2023)    Social Connection and Isolation Panel [NHANES]     Frequency of Communication with Friends and Family: More than three times a week     Frequency of Social Gatherings with Friends and Family: Three times a week     Attends [employer] Services: More than 4 times per year     Active Member of Clubs or Organizations: No     Attends Club or Organization Meetings: Never     Marital Status:    Housing Stability: Low Risk  (9/22/2023)    Housing Stability Vital Sign     Unable to Pay for Housing in the Last Year: No     Number of Places Lived in the Last Year: 1     Unstable Housing in the Last Year: No        Family History   Problem Relation Age of Onset    Diabetes Mother     Hypertension Mother     Kidney failure Mother     Asthma Mother     Kidney disease Mother     Lung cancer Father     Diabetes Father     Hypertension Father     Mental illness Father     Thyroid disease Sister     Brain cancer Sister     Diabetes Sister     Hypertension Sister     Cancer Sister         Thyroid    Thyroid disease Sister     Migraines Sister     Miscarriages / Stillbirths Sister     Cancer Brother         Pancreatic    Hypertension Brother     Seizures Brother     Diabetes Sister     Hypertension Sister         Patient Care Team:  Kelin Puga FNP as PCP - General (Family Medicine)  Kelin Puga FNP (Family Medicine)  Gilberto Adams MD as Diabetes Digital Medicine Responsible Provider (Cardiology)  Gilberto Adams MD as Hypertension Digital Medicine Responsible Provider (Cardiology)  Jade Carr as Digital Medicine Health   Eliane Shaw, PharmD as Hypertension Digital Medicine Clinician  Eliane Shaw, PharmD as Diabetes Digital Medicine Clinician  1, [facility] [employer] as Hypertension Digital Medicine Contract  1, [facility] [employer] as Diabetes Digital Medicine Contract     Subjective:  "    Review of Systems     See HPI for details    Constitutional: Denies Change in appetite. Denies Chills. Denies Fever. Denies Night sweats.  Eye: Denies Blurred vision. Denies Discharge. Denies Eye pain.  ENT: Denies Decreased hearing. Denies Sore throat. Denies Swollen glands.  Respiratory: Denies Cough. Denies Shortness of breath. Denies Shortness of breath with exertion. Denies Wheezing.  Cardiovascular: DeniesChest pain at rest. Denies Chest pain with exertion. Denies Irregular heartbeat. Denies Palpitations. Denies Edema.  Gastrointestinal: Denies Abdominal pain. DeniesDiarrhea. Denies Nausea. Denies Vomiting. Denies Hematemesis or Hematochezia.  Genitourinary: Denies Dysuria. Denies Urinary frequency. Denies Urinary urgency. Denies Blood in urine.  Endocrine: Denies Cold intolerance. Denies Excessive thirst. Denies Heat intolerance. Denies Weight loss. Denies Weight gain.  Musculoskeletal: Denies Painful joints. Denies Weakness.  Integumentary: Denies Rash. Denies Itching. Denies Dry skin.  Neurologic: Denies Dizziness. Denies Fainting. Denies Headache.  Psychiatric: Denies Depression. Denies Anxiety. Denies Suicidal/Homicidal ideations.    All Other ROS: Negative except as stated in HPI.       Objective:     Visit Vitals  /83 (BP Location: Right arm, Patient Position: Sitting, BP Method: Large (Automatic))   Pulse 97   Temp 98.2 °F (36.8 °C) (Oral)   Resp 18   Ht 5' 3" (1.6 m)   Wt 78.9 kg (174 lb)   BMI 30.82 kg/m²       Physical Exam    General: Alert and oriented, No acute distress.  Head: Normocephalic, Atraumatic.  Eye: Pupils are equal, round and reactive to light, Extraocular movements are intact, Sclera non-icteric.  Ears/Nose/Throat: Normal, Mucosa moist,Clear.  Neck/Thyroid: Supple, Non-tender, No carotid bruit, No lymphadenopathy, No JVD, Full range of motion.  Respiratory: Clear to auscultation bilaterally; No wheezes, rales or rhonchi,Non-labored respirations, Symmetrical chest wall " expansion.  Cardiovascular: Regular rate and rhythm, S1/S2 normal, No murmurs, rubs or gallops.  Gastrointestinal: Soft, Non-tender, Non-distended, Normal bowel sounds, No palpable organomegaly.  Musculoskeletal: Normal range of motion.  Integumentary: Warm, Dry, Intact, No suspicious lesions or rashes.  Extremities: No clubbing, cyanosis or edema  Neurologic: No focal deficits, Cranial Nerves II-XII are grossly intact, Motor strength normal upper and lower extremities, Sensory exam intact.  Psychiatric: Normal interaction, Coherent speech, Euthymic mood, Appropriate affect       Labs Reviewed:     Chemistry:  Lab Results   Component Value Date     11/28/2023    K 5.0 11/28/2023    CHLORIDE 102 11/28/2023    BUN 11.5 11/28/2023    CREATININE 0.90 11/28/2023    EGFRNORACEVR >60 11/28/2023    GLUCOSE 326 (H) 11/28/2023    CALCIUM 9.8 11/28/2023    ALKPHOS 89 10/17/2023    LABPROT 7.5 10/17/2023    ALBUMIN 4.3 10/17/2023    BILIDIR 0.1 06/16/2021    IBILI 0.10 06/16/2021    AST 19 10/17/2023    ALT 29 10/17/2023    ARNHAXOM58OO 34.0 04/05/2021        Lab Results   Component Value Date    HGBA1C 10.0 (H) 11/28/2023        Hematology:  Lab Results   Component Value Date    WBC 5.46 11/28/2023    HGB 12.9 11/28/2023    HCT 43.8 11/28/2023     11/28/2023       Lipid Panel:  Lab Results   Component Value Date    CHOL 122 11/28/2023    HDL 31 (L) 11/28/2023    LDL 48.00 (L) 11/28/2023    TRIG 215 (H) 11/28/2023    TOTALCHOLEST 4 11/28/2023        Urine:  Lab Results   Component Value Date    COLORUA Light-Yellow 02/09/2023    APPEARANCEUA Clear 02/09/2023    SGUA 1.019 02/09/2023    PHUA 6.0 02/09/2023    PROTEINUA Trace (A) 02/09/2023    GLUCOSEUA 3+ (A) 02/09/2023    KETONESUA Negative 02/09/2023    BLOODUA Negative 02/09/2023    NITRITESUA Negative 02/09/2023    LEUKOCYTESUR 75 (A) 02/09/2023    RBCUA 0-5 02/09/2023    WBCUA 6-10 (A) 02/09/2023    BACTERIA Trace (A) 02/09/2023    SQEPUA Trace (A) 02/09/2023     HYALINECASTS None Seen 02/09/2023    DAMIEN 76.4 09/21/2023        Assessment:       ICD-10-CM ICD-9-CM   1. Multiple thyroid nodules  E04.2 241.1   2. Carpal tunnel syndrome, unspecified laterality  G56.00 354.0   3. Uncontrolled diabetes mellitus with hyperglycemia, with long-term current use of insulin  E11.65 250.02    Z79.4 V58.67   4. Well adult exam  Z00.00 V70.0   5. Multiple pulmonary nodules  R91.8 793.19        Plan:     1. Multiple thyroid nodules  US thyroid done 9-21-23 showing:  US Soft Tissue Head Neck Thyroid  Order: [id number]  Status: Final result     Visible to patient: Yes (seen)     Next appt: 11/01/2023 at 08:30 AM in Otolaryngology (NORBERTO Ca)     Dx: Multiple thyroid nodules     0 Result Notes  Details     Reading Physician Reading Date Result Priority   Wandy Macias MD  [phone] 9/22/2023 Routine      Narrative & Impression  EXAMINATION:  US SOFT TISSUE HEAD NECK THYROID     CLINICAL HISTORY:  .  Nontoxic multinodular goiter     TECHNIQUE:  Ultrasound of the thyroid and cervical lymph nodes was performed.     COMPARISON:  Thyroid sonogram 10/20/2022     FINDINGS:  SIZE:     Right lobe: 4.1 x 1.5 x 1.4 cm     Left lobe: 4.4 x 1.8 x 2 cm     Isthmus: 0.8 cm     PARENCHYMA:     Heterogeneous parenchyma.     NODULES:     Up to two most suspicious nodules in each lobe detailed.     Hypoechoic nodule at the mid right lobe measures 7 mm (TR 4).     Hypoechoic nodule at the medial right lobe/isthmus measures 0.4 cm (TR 4).     Hypoechoic nodule at the mid to lower left lobe measures 0.8 cm (TR 4).     Isoechoic nodule which is taller than wide at the inferior left lobe measures 10 mm (TR 4).     LYMPH NODES:     No enlarged lymph nodes seen.     Impression:     Multinodular goiter.  No detrimental change from previous.  No nodule meeting imaging criteria for consideration of biopsy.     Reference: ACR Thyroid Imaging, Reporting and Data System (TI-RADS): White Paper of  the ACR TI-RADS Committee.  2017.     TR4.  Moderately suspicious.  FNA if 1.5 cm or greater.  Follow up at 1, 2, 3, and 5 years if 1 cm or greater.        Electronically signed by: Wandy Macias  Date:                                            09/22/2023  Time:                                           14:37             Exam Ended: 09/21/23 09:36 Last Resulted: 09/22/23 14:37             2. Carpal tunnel syndrome, unspecified laterality  [facility] for nerve conduction testing for CTS. Encouraged wrist splint at night.      3. Uncontrolled diabetes mellitus with hyperglycemia, with long-term current use of insulin  A1c 10.0 down from 10.5. ADA diet and exercise. Will add Sitaglipitin 50 mg 1 tab po daily. Increase Levemir to 25 units sq in AM and continue 45 units sq in PM. A1c, CMP in 1 month. Urine microalbumin 9-21-23. Dm foot done 9-22-23. DM eye done 1-30-23.     4. Well adult exam  Labs in 1 month. Keep GYN cl appt. UTD MMG. UTD cologuard done 4-10-23 neg results- next due 4/2026.     5. Multiple pulmonary nodules  Pt had CT chest with contrast 11-1-23 showing:  CT Chest With Contrast  Order: [id number]  Status: Final result       Visible to patient: Yes (seen)       Next appt: 01/10/2024 at 08:45 AM in Neurology (Ella Benoit MD)       Dx: Lung nodule    0 Result Notes  Details    Reading Physician Reading Date Result Priority   Dean Márquez MD  [phone] 11/5/2023 Routine     Narrative & Impression  EXAMINATION:  CT CHEST WITH CONTRAST     CLINICAL HISTORY:  Prior CT chest showing 5 mm lung nodule;  Solitary pulmonary nodule     TECHNIQUE:  Multiple cross-sectional images were obtained from the thoracic inlet to the upper abdomen after the intravenous administration 100 mL of Omnipaque 350.  Coronal and sagittal reformatted images were obtained.  An automated dose exposure technique was utilized this limits radiation does the patient.      COMPARISON:  06/03/2020     FINDINGS:  Heart size is enlarged.  Coronary calcifications identified.  The course and caliber of the thoracic aorta is normal.  A 4 vessel aortic arch is identified with a great vessels being widely patent.  The main pulmonary artery is normal caliber.  No evidence for hilar or mediastinal adenopathy.     Dependent atelectatic changes lungs are identified.  No evidence for consolidation or masslike lesion.  No pleural thickening or pleural effusion.  4 mm pulmonary nodules identified in the right upper lobe.  The trachea and airways are patent.     Changes of hepatic steatosis.  No suspicious osseous lesions with spondylotic changes.  Soft tissues are grossly normal.     Impression:     Single pulmonary nodule is described above.  Recommend yearly surveillance.  Other secondary findings as noted.        Electronically signed by: Dean Márquez MD  Date:                                            11/05/2023  Time:                                           11:26           Exam Ended: 11/01/23 11:19 Last Resulted: 11/05/23 11:26           Encouraged smoking cessation. Education provided. Will repeat in 1 year as recommended.          Follow up in about 1 month (around 12/29/2023) for with labs 1 week prior to appt. . In addition to their scheduled follow up, the patient has also been instructed to follow up on as needed basis.     Future Appointments   Date Time Provider Department Center   1/10/2024  8:45 AM Ella Benoit MD [facility] [place]   1/10/2024  3:00 PM Juan A Faustin MD [facility] [place]   1/30/2024  8:00 AM [facility] [place]    2/29/2024  9:30 AM Jennifer Cruz FNP [facility] [place]        [facility] Baptiste

## 2023-12-18 ENCOUNTER — CLINICAL SUPPORT (OUTPATIENT)
Dept: INTERNAL MEDICINE | Facility: CLINIC | Age: 58
End: 2023-12-18
Payer: COMMERCIAL

## 2023-12-18 DIAGNOSIS — Z79.4 UNCONTROLLED DIABETES MELLITUS WITH HYPERGLYCEMIA, WITH LONG-TERM CURRENT USE OF INSULIN: Primary | ICD-10-CM

## 2023-12-18 DIAGNOSIS — E11.65 UNCONTROLLED TYPE 2 DIABETES MELLITUS WITH HYPERGLYCEMIA: ICD-10-CM

## 2023-12-18 DIAGNOSIS — E11.65 UNCONTROLLED DIABETES MELLITUS WITH HYPERGLYCEMIA, WITH LONG-TERM CURRENT USE OF INSULIN: Primary | ICD-10-CM

## 2023-12-18 DIAGNOSIS — E11.65 UNCONTROLLED DIABETES MELLITUS WITH HYPERGLYCEMIA, WITH LONG-TERM CURRENT USE OF INSULIN: ICD-10-CM

## 2023-12-18 DIAGNOSIS — Z79.4 UNCONTROLLED DIABETES MELLITUS WITH HYPERGLYCEMIA, WITH LONG-TERM CURRENT USE OF INSULIN: ICD-10-CM

## 2023-12-18 LAB — HBA1C MFR BLD: 9.9 %

## 2023-12-18 PROCEDURE — 99211 OFF/OP EST MAY X REQ PHY/QHP: CPT | Mod: PBBFAC

## 2023-12-18 RX ORDER — INSULIN DETEMIR 100 [IU]/ML
INJECTION, SOLUTION SUBCUTANEOUS
Qty: 75 ML | Refills: 3 | Status: SHIPPED | OUTPATIENT
Start: 2023-12-18 | End: 2024-01-10

## 2023-12-18 NOTE — PROGRESS NOTES
POC A1c today 9.9 down from 10.0. Call pt and inform of results and recommendation. ADA diet and exercise encouraged. Inform pt to increase her Levemir to 30 units sq in AM and continue 45 units in PM. Keep f/u appt as scheduled 1-10-24 with labs as ordered. New RX sent to [facility] pharmacy.

## 2023-12-27 ENCOUNTER — HOSPITAL ENCOUNTER (OUTPATIENT)
Dept: CARDIOLOGY | Facility: HOSPITAL | Age: 58
Discharge: HOME | End: 2023-12-27
Attending: NURSE PRACTITIONER
Payer: COMMERCIAL

## 2023-12-27 VITALS
BODY MASS INDEX: 30.83 KG/M2 | SYSTOLIC BLOOD PRESSURE: 144 MMHG | WEIGHT: 174 LBS | HEIGHT: 63 IN | DIASTOLIC BLOOD PRESSURE: 79 MMHG

## 2023-12-27 DIAGNOSIS — I51.7 CARDIOMEGALY: ICD-10-CM

## 2023-12-27 LAB
AV INDEX (PROSTH): 0.85
AV MEAN GRADIENT: 2 MMHG
AV PEAK GRADIENT: 3 MMHG
AV VALVE AREA BY VELOCITY RATIO: 2.31 CM²
AV VALVE AREA: 2.82 CM²
AV VELOCITY RATIO: 0.69
BSA FOR ECHO PROCEDURE: 1.87 M2
CV ECHO LV RWT: 0.5 CM
DOP CALC AO PEAK VEL: 0.88 M/S
DOP CALC AO VTI: 16.4 CM
DOP CALC LVOT AREA: 3.3 CM2
DOP CALC LVOT DIAMETER: 2.06 CM
DOP CALC LVOT PEAK VEL: 0.61 M/S
DOP CALC LVOT STROKE VOLUME: 46.3 CM3
DOP CALC MV VTI: 22.4 CM
DOP CALCLVOT PEAK VEL VTI: 13.9 CM
E WAVE DECELERATION TIME: 119.68 MSEC
E/A RATIO: 0.7
E/E' RATIO: 6.88 M/S
ECHO LV POSTERIOR WALL: 0.99 CM (ref 0.6–1.1)
FRACTIONAL SHORTENING: 27 % (ref 28–44)
INTERVENTRICULAR SEPTUM: 0.94 CM (ref 0.6–1.1)
LEFT ATRIUM SIZE: 3.57 CM
LEFT INTERNAL DIMENSION IN SYSTOLE: 2.9 CM (ref 2.1–4)
LEFT VENTRICLE DIASTOLIC VOLUME INDEX: 37.59 ML/M2
LEFT VENTRICLE DIASTOLIC VOLUME: 68.41 ML
LEFT VENTRICLE MASS INDEX: 65 G/M2
LEFT VENTRICLE SYSTOLIC VOLUME INDEX: 17.6 ML/M2
LEFT VENTRICLE SYSTOLIC VOLUME: 32.08 ML
LEFT VENTRICULAR INTERNAL DIMENSION IN DIASTOLE: 3.96 CM (ref 3.5–6)
LEFT VENTRICULAR MASS: 118.94 G
LV LATERAL E/E' RATIO: 6.11 M/S
LV SEPTAL E/E' RATIO: 7.86 M/S
LVOT MG: 0.79 MMHG
LVOT MV: 0.4 CM/S
MV MEAN GRADIENT: 1 MMHG
MV PEAK A VEL: 0.79 M/S
MV PEAK E VEL: 0.55 M/S
MV PEAK GRADIENT: 2 MMHG
MV STENOSIS PRESSURE HALF TIME: 34.71 MS
MV VALVE AREA BY CONTINUITY EQUATION: 2.07 CM2
MV VALVE AREA P 1/2 METHOD: 6.34 CM2
OHS LV EJECTION FRACTION SIMPSONS BIPLANE MOD: 53 %
PISA TR MAX VEL: 1.37 M/S
RA PRESSURE ESTIMATED: 8 MMHG
RIGHT VENTRICULAR END-DIASTOLIC DIMENSION: 2.45 CM
RV TB RVSP: 9 MMHG
TDI LATERAL: 0.09 M/S
TDI SEPTAL: 0.07 M/S
TDI: 0.08 M/S
TR MAX PG: 8 MMHG
TV REST PULMONARY ARTERY PRESSURE: 16 MMHG
Z-SCORE OF LEFT VENTRICULAR DIMENSION IN END DIASTOLE: -2.41
Z-SCORE OF LEFT VENTRICULAR DIMENSION IN END SYSTOLE: -0.56

## 2023-12-27 PROCEDURE — 93306 TTE W/DOPPLER COMPLETE: CPT

## 2024-01-09 ENCOUNTER — LAB VISIT (OUTPATIENT)
Dept: LAB | Facility: HOSPITAL | Age: 59
End: 2024-01-09
Attending: NURSE PRACTITIONER
Payer: COMMERCIAL

## 2024-01-09 DIAGNOSIS — Z79.4 UNCONTROLLED DIABETES MELLITUS WITH HYPERGLYCEMIA, WITH LONG-TERM CURRENT USE OF INSULIN: ICD-10-CM

## 2024-01-09 DIAGNOSIS — Z00.00 WELL ADULT EXAM: ICD-10-CM

## 2024-01-09 DIAGNOSIS — E11.65 UNCONTROLLED DIABETES MELLITUS WITH HYPERGLYCEMIA, WITH LONG-TERM CURRENT USE OF INSULIN: ICD-10-CM

## 2024-01-09 LAB
ALBUMIN SERPL-MCNC: 4.2 G/DL (ref 3.4–4.8)
ALBUMIN/GLOB SERPL: 1.3 RATIO (ref 1.1–2)
ALP SERPL-CCNC: 78 UNIT/L (ref 40–150)
ALT SERPL-CCNC: 23 UNIT/L (ref 0–55)
APPEARANCE UR: CLEAR
AST SERPL-CCNC: 19 UNIT/L (ref 5–34)
BACTERIA #/AREA URNS AUTO: ABNORMAL /HPF
BILIRUB SERPL-MCNC: 0.3 MG/DL
BILIRUB UR QL STRIP.AUTO: NEGATIVE
BUN SERPL-MCNC: 7.4 MG/DL (ref 9.8–20.1)
CALCIUM SERPL-MCNC: 10 MG/DL (ref 8.4–10.2)
CHLORIDE SERPL-SCNC: 103 MMOL/L (ref 98–107)
CO2 SERPL-SCNC: 27 MMOL/L (ref 23–31)
COLOR UR AUTO: YELLOW
CREAT SERPL-MCNC: 0.82 MG/DL (ref 0.55–1.02)
EST. AVERAGE GLUCOSE BLD GHB EST-MCNC: 208.7 MG/DL
GFR SERPLBLD CREATININE-BSD FMLA CKD-EPI: >60 MLS/MIN/1.73/M2
GLOBULIN SER-MCNC: 3.3 GM/DL (ref 2.4–3.5)
GLUCOSE SERPL-MCNC: 232 MG/DL (ref 82–115)
GLUCOSE UR QL STRIP.AUTO: 100
HBA1C MFR BLD: 8.9 %
HIV 1+2 AB+HIV1 P24 AG SERPL QL IA: NONREACTIVE
KETONES UR QL STRIP.AUTO: NEGATIVE
LEUKOCYTE ESTERASE UR QL STRIP.AUTO: NEGATIVE
NITRITE UR QL STRIP.AUTO: NEGATIVE
PH UR STRIP.AUTO: 5.5 [PH]
POTASSIUM SERPL-SCNC: 4.8 MMOL/L (ref 3.5–5.1)
PROT SERPL-MCNC: 7.5 GM/DL (ref 5.8–7.6)
PROT UR QL STRIP.AUTO: 30
RBC #/AREA URNS AUTO: ABNORMAL /HPF
RBC UR QL AUTO: NEGATIVE
SODIUM SERPL-SCNC: 140 MMOL/L (ref 136–145)
SP GR UR STRIP.AUTO: 1.02 (ref 1–1.03)
SQUAMOUS #/AREA URNS AUTO: ABNORMAL /HPF
UROBILINOGEN UR STRIP-ACNC: 0.2
WBC #/AREA URNS AUTO: ABNORMAL /HPF

## 2024-01-09 PROCEDURE — 80053 COMPREHEN METABOLIC PANEL: CPT

## 2024-01-09 PROCEDURE — 83036 HEMOGLOBIN GLYCOSYLATED A1C: CPT

## 2024-01-09 PROCEDURE — 36415 COLL VENOUS BLD VENIPUNCTURE: CPT

## 2024-01-09 PROCEDURE — 81001 URINALYSIS AUTO W/SCOPE: CPT

## 2024-01-09 PROCEDURE — 87389 HIV-1 AG W/HIV-1&-2 AB AG IA: CPT

## 2024-01-10 ENCOUNTER — OFFICE VISIT (OUTPATIENT)
Dept: INTERNAL MEDICINE | Facility: CLINIC | Age: 59
End: 2024-01-10
Payer: COMMERCIAL

## 2024-01-10 ENCOUNTER — PROCEDURE VISIT (OUTPATIENT)
Dept: NEUROLOGY | Facility: CLINIC | Age: 59
End: 2024-01-10
Payer: COMMERCIAL

## 2024-01-10 VITALS
WEIGHT: 173 LBS | TEMPERATURE: 99 F | SYSTOLIC BLOOD PRESSURE: 123 MMHG | HEIGHT: 63 IN | RESPIRATION RATE: 18 BRPM | HEART RATE: 85 BPM | DIASTOLIC BLOOD PRESSURE: 82 MMHG | BODY MASS INDEX: 30.65 KG/M2

## 2024-01-10 DIAGNOSIS — G56.00 CARPAL TUNNEL SYNDROME, UNSPECIFIED LATERALITY: ICD-10-CM

## 2024-01-10 DIAGNOSIS — E11.65 UNCONTROLLED DIABETES MELLITUS WITH HYPERGLYCEMIA, WITH LONG-TERM CURRENT USE OF INSULIN: ICD-10-CM

## 2024-01-10 DIAGNOSIS — E11.65 UNCONTROLLED TYPE 2 DIABETES MELLITUS WITH HYPERGLYCEMIA: ICD-10-CM

## 2024-01-10 DIAGNOSIS — Z79.4 UNCONTROLLED DIABETES MELLITUS WITH HYPERGLYCEMIA, WITH LONG-TERM CURRENT USE OF INSULIN: ICD-10-CM

## 2024-01-10 DIAGNOSIS — G56.03 BILATERAL CARPAL TUNNEL SYNDROME: Primary | ICD-10-CM

## 2024-01-10 DIAGNOSIS — G56.00 CTS (CARPAL TUNNEL SYNDROME): ICD-10-CM

## 2024-01-10 DIAGNOSIS — Z00.00 WELL ADULT EXAM: ICD-10-CM

## 2024-01-10 DIAGNOSIS — E04.2 MULTIPLE THYROID NODULES: Primary | ICD-10-CM

## 2024-01-10 DIAGNOSIS — R80.9 MICROALBUMINURIA: ICD-10-CM

## 2024-01-10 DIAGNOSIS — R91.8 MULTIPLE PULMONARY NODULES: ICD-10-CM

## 2024-01-10 PROCEDURE — 3052F HG A1C>EQUAL 8.0%<EQUAL 9.0%: CPT | Mod: CPTII,,, | Performed by: NURSE PRACTITIONER

## 2024-01-10 PROCEDURE — 95910 NRV CNDJ TEST 7-8 STUDIES: CPT | Mod: S$GLB,,, | Performed by: SPECIALIST

## 2024-01-10 PROCEDURE — 99214 OFFICE O/P EST MOD 30 MIN: CPT | Mod: S$PBB,,, | Performed by: NURSE PRACTITIONER

## 2024-01-10 PROCEDURE — 99214 OFFICE O/P EST MOD 30 MIN: CPT | Mod: PBBFAC | Performed by: NURSE PRACTITIONER

## 2024-01-10 PROCEDURE — 3008F BODY MASS INDEX DOCD: CPT | Mod: CPTII,,, | Performed by: NURSE PRACTITIONER

## 2024-01-10 PROCEDURE — 1159F MED LIST DOCD IN RCRD: CPT | Mod: CPTII,,, | Performed by: NURSE PRACTITIONER

## 2024-01-10 PROCEDURE — 1160F RVW MEDS BY RX/DR IN RCRD: CPT | Mod: CPTII,,, | Performed by: NURSE PRACTITIONER

## 2024-01-10 PROCEDURE — 3074F SYST BP LT 130 MM HG: CPT | Mod: CPTII,,, | Performed by: NURSE PRACTITIONER

## 2024-01-10 PROCEDURE — 3079F DIAST BP 80-89 MM HG: CPT | Mod: CPTII,,, | Performed by: NURSE PRACTITIONER

## 2024-01-10 RX ORDER — INSULIN DETEMIR 100 [IU]/ML
INJECTION, SOLUTION SUBCUTANEOUS
Qty: 75 ML | Refills: 3 | Status: SHIPPED | OUTPATIENT
Start: 2024-01-10 | End: 2024-01-12

## 2024-01-10 RX ORDER — GLIPIZIDE 10 MG/1
10 TABLET ORAL
Qty: 180 TABLET | Refills: 1 | Status: SHIPPED | OUTPATIENT
Start: 2024-01-10

## 2024-01-10 RX ORDER — METFORMIN HYDROCHLORIDE 1000 MG/1
1000 TABLET ORAL 2 TIMES DAILY WITH MEALS
Qty: 180 TABLET | Refills: 1 | Status: SHIPPED | OUTPATIENT
Start: 2024-01-10

## 2024-01-10 RX ORDER — LOSARTAN POTASSIUM 25 MG/1
25 TABLET ORAL DAILY
Qty: 90 TABLET | Refills: 1 | Status: SHIPPED | OUTPATIENT
Start: 2024-01-10 | End: 2025-01-09

## 2024-01-10 NOTE — PROGRESS NOTES
Patient ID: [id number]     Chief Complaint: LAB RESULTS        HPI:     RICHMOND Rosas is a 58 y.o. female here today for a follow up. Pt has Cardio appt 1-24-24. Keep appt. Pt had Echo done 11-29-23- WN.         Immunizations:   Immunization History   Administered Date(s) Administered    COVID-19, MRNA, LN-S, PF (Pfizer) (Purple Cap) 06/21/2021, 06/21/2021, 07/19/2021, 07/19/2021    DTP 1963, 1963, 1963, 10/22/1968, 07/24/1975, 05/03/1978    Influenza 10/26/2014    Influenza - Quadrivalent - PF *Preferred* (6 months and older) 09/29/2021    Influenza - Trivalent - PF (ADULT) 10/03/2016, 12/21/2017, 09/21/2018, 10/22/2019    MMR 01/15/2010    Measles 12/07/1967, 05/03/1978    OPV 1963, 1963, 01/23/1964, 10/14/1969, 07/24/1975    Pneumococcal Polysaccharide - 23 Valent 06/15/2016    Rubella 12/18/1969    Td (ADULT) 10/26/1999, 10/12/2005        ----------------------------  Anxiety disorder, unspecified  Glaucoma  HTN (hypertension)  Type 2 diabetes mellitus with unspecified diabetic retinopathy   without macular edema     Past Surgical History:   Procedure Laterality Date    CHOLECYSTECTOMY      HYSTERECTOMY  2015    HYSTERECTOMY, VAGINAL, LAPAROSCOPY-ASSISTED, WITH SALPINGO-OOPHORECTOY Bilateral 08/22/2017    TUBAL LIGATION         Review of patient's allergies indicates:  No Known Allergies    Current Outpatient Medications   Medication Instructions    amLODIPine (NORVASC) 5 mg, Oral, Daily    arm brace Misc 1 each, Misc.(Non-Drug; Combo Route), Nightly, Left carpal tunnel wrist splint, nightly    aspirin (ECOTRIN) 81 mg, Oral, Daily    fluconazole (DIFLUCAN) 150 mg, Oral, Daily, Take 1 tab po X 1 dose. Repeat 2nd dose in 3 days if needed.    gabapentin (NEURONTIN) 300 mg, Oral, Nightly    glipiZIDE (GLUCOTROL) 10 mg, Oral, 2 times daily before meals    ibuprofen (ADVIL,MOTRIN) 800 mg, Oral, 3 times daily PRN    insulin detemir U-100, Levemir, (LEVEMIR FLEXPEN) 100  unit/mL (3 mL) InPn pen Inject 30 units sq in AM and 45 units sq in PM.    insulin lispro 100 unit/mL pen See Instructions, Inject 2-12 units sq per s/s after CBG AC & HS., # 15 mL, 6 Refill(s), Pharmacy: [facility], 162, cm, Height/Length Dosing, 06/21/21 7:49:00 CDT, 81, kg, Weight Dosing, 06/21/21 7:49:00 CDT Strength: 100 Units/mL    losartan (COZAAR) 25 mg, Oral, Daily    metFORMIN (GLUCOPHAGE) 1,000 mg, Oral, 2 times daily with meals    oxybutynin (DITROPAN-XL) 5 mg, Oral, Daily    PEN NEEDLE, DIABETIC MISC 1 each, Misc.(Non-Drug; Combo Route), 2 times daily, Before AC and HS.    pravastatin (PRAVACHOL) 20 mg, Oral, Daily    propranoloL (INDERAL) 20 mg, Oral, 2 times daily    sertraline (ZOLOFT) 50 MG tablet TAKE  1 TABLET po at BEDTIME    SITagliptin phosphate (JANUVIA) 50 mg, Oral, Daily    zolpidem (AMBIEN) 5 mg, Oral, Nightly PRN       Social History     Socioeconomic History    Marital status: Single   Tobacco Use    Smoking status: Never     Passive exposure: Past    Smokeless tobacco: Never   Substance and Sexual Activity    Alcohol use: Never     Comment: special occassion    Drug use: Never    Sexual activity: Not Currently     Partners: Male     Birth control/protection: Abstinence, Injection     Social Determinants of Health     Financial Resource Strain: Medium Risk (12/16/2023)    Overall Financial Resource Strain (CARDIA)     Difficulty of Paying Living Expenses: Somewhat hard   Food Insecurity: Food Insecurity Present (12/16/2023)    Hunger Vital Sign     Worried About Running Out of Food in the Last Year: Never true     Ran Out of Food in the Last Year: Sometimes true   Transportation Needs: No Transportation Needs (12/16/2023)    PRAPARE - Transportation     Lack of Transportation (Medical): No     Lack of Transportation (Non-Medical): No   Physical Activity: Inactive (12/16/2023)    Exercise Vital Sign     Days of Exercise per Week: 1 day     Minutes of Exercise per  Session: 0 min   Stress: Stress Concern Present (12/16/2023)    [facility] of Occupational Health - Occupational Stress Questionnaire     Feeling of Stress : Rather much   Social Connections: Moderately Isolated (12/16/2023)    Social Connection and Isolation Panel [NHANES]     Frequency of Communication with Friends and Family: Three times a week     Frequency of Social Gatherings with Friends and Family: Twice a week     Attends Jew Services: More than 4 times per year     Active Member of Clubs or Organizations: No     Attends Club or Organization Meetings: Never     Marital Status:    Housing Stability: High Risk (12/16/2023)    Housing Stability Vital Sign     Unable to Pay for Housing in the Last Year: Yes     Number of Places Lived in the Last Year: 2     Unstable Housing in the Last Year: No        Family History   Problem Relation Age of Onset    Diabetes Mother     Hypertension Mother     Kidney failure Mother     Asthma Mother     Kidney disease Mother     Lung cancer Father     Diabetes Father     Hypertension Father     Mental illness Father     Thyroid disease Sister     Brain cancer Sister     Diabetes Sister     Hypertension Sister     Cancer Sister         Thyroid    Thyroid disease Sister     Migraines Sister     Miscarriages / Stillbirths Sister     Cancer Brother         Pancreatic    Hypertension Brother     Seizures Brother     Diabetes Sister     Hypertension Sister         Patient Care Team:  Kelin Puga FNP as PCP - General (Family Medicine)  Kelin Puga FNP (Family Medicine)  Gilberto Adams MD as Diabetes Digital Medicine Responsible Provider (Cardiology)  Gilberto Adams MD as Hypertension Digital Medicine Responsible Provider (Cardiology)  Jade Carr as Digital Medicine Health   Eliane Shaw, PharmD as Hypertension Digital Medicine Clinician  Eliane Shaw, PharmD as Diabetes Digital Medicine Clinician  1, [facility] [employer] [facility] [place] as  "Hypertension Digital Medicine Contract  1, [facility] [employer] - [employer] as Diabetes Digital Medicine Contract     Subjective:     Review of Systems     See HPI for details    Constitutional: Denies Change in appetite. Denies Chills. Denies Fever. Denies Night sweats.  Eye: Denies Blurred vision. Denies Discharge. Denies Eye pain.  ENT: Denies Decreased hearing. Denies Sore throat. Denies Swollen glands.  Respiratory: Denies Cough. Denies Shortness of breath. Denies Shortness of breath with exertion. Denies Wheezing.  Cardiovascular: DeniesChest pain at rest. Denies Chest pain with exertion. Denies Irregular heartbeat. Denies Palpitations. Denies Edema.  Gastrointestinal: Denies Abdominal pain. DeniesDiarrhea. Denies Nausea. Denies Vomiting. Denies Hematemesis or Hematochezia.  Genitourinary: Denies Dysuria. Denies Urinary frequency. Denies Urinary urgency. Denies Blood in urine.  Endocrine: Denies Cold intolerance. Denies Excessive thirst. Denies Heat intolerance. Denies Weight loss. Denies Weight gain.  Musculoskeletal: Denies Painful joints. Denies Weakness.  Integumentary: Denies Rash. Denies Itching. Denies Dry skin.  Neurologic: Denies Dizziness. Denies Fainting. Denies Headache.  Psychiatric: Denies Depression. Denies Anxiety. Denies Suicidal/Homicidal ideations.    All Other ROS: Negative except as stated in HPI.       Objective:     Visit Vitals  /82 (BP Location: Left arm, Patient Position: Sitting, BP Method: Large (Automatic))   Pulse 85   Temp 98.5 °F (36.9 °C) (Oral)   Resp 18   Ht 5' 3" (1.6 m)   Wt 78.5 kg (173 lb)   BMI 30.65 kg/m²       Physical Exam    General: Alert and oriented, No acute distress.  Head: Normocephalic, Atraumatic.  Eye: Pupils are equal, round and reactive to light, Extraocular movements are intact, Sclera non-icteric.  Ears/Nose/Throat: Normal, Mucosa moist,Clear.  Neck/Thyroid: Supple, Non-tender, No carotid bruit, No lymphadenopathy, No JVD, Full range of " motion.  Respiratory: Clear to auscultation bilaterally; No wheezes, rales or rhonchi,Non-labored respirations, Symmetrical chest wall expansion.  Cardiovascular: Regular rate and rhythm, S1/S2 normal, No murmurs, rubs or gallops.  Gastrointestinal: Soft, Non-tender, Non-distended, Normal bowel sounds, No palpable organomegaly.  Musculoskeletal: Normal range of motion.  Integumentary: Warm, Dry, Intact, No suspicious lesions or rashes.  Extremities: No clubbing, cyanosis or edema  Neurologic: No focal deficits, Cranial Nerves II-XII are grossly intact, Motor strength normal upper and lower extremities, Sensory exam intact.  Psychiatric: Normal interaction, Coherent speech, Euthymic mood, Appropriate affect       Labs Reviewed:     Chemistry:  Lab Results   Component Value Date     01/09/2024    K 4.8 01/09/2024    CHLORIDE 103 01/09/2024    BUN 7.4 (L) 01/09/2024    CREATININE 0.82 01/09/2024    EGFRNORACEVR >60 01/09/2024    GLUCOSE 232 (H) 01/09/2024    CALCIUM 10.0 01/09/2024    ALKPHOS 78 01/09/2024    LABPROT 7.5 01/09/2024    ALBUMIN 4.2 01/09/2024    BILIDIR 0.1 06/16/2021    IBILI 0.10 06/16/2021    AST 19 01/09/2024    ALT 23 01/09/2024    OMIIGAAI96HT 34.0 04/05/2021        Lab Results   Component Value Date    HGBA1C 8.9 (H) 01/09/2024        Hematology:  Lab Results   Component Value Date    WBC 5.46 11/28/2023    HGB 12.9 11/28/2023    HCT 43.8 11/28/2023     11/28/2023       Lipid Panel:  Lab Results   Component Value Date    CHOL 122 11/28/2023    HDL 31 (L) 11/28/2023    LDL 48.00 (L) 11/28/2023    TRIG 215 (H) 11/28/2023    TOTALCHOLEST 4 11/28/2023        Urine:  Lab Results   Component Value Date    COLORUA Yellow 01/09/2024    APPEARANCEUA Clear 01/09/2024    SGUA 1.025 01/09/2024    PHUA 5.5 01/09/2024    PROTEINUA 30 (A) 01/09/2024    GLUCOSEUA 100 (A) 01/09/2024    KETONESUA Negative 01/09/2024    BLOODUA Negative 01/09/2024    NITRITESUA Negative 01/09/2024    LEUKOCYTESUR  Negative 01/09/2024    RBCUA None Seen 01/09/2024    WBCUA None Seen 01/09/2024    BACTERIA Moderate (A) 01/09/2024    SQEPUA Trace (A) 02/09/2023    HYALINECASTS None Seen 02/09/2023    CREATRANDUR 76.4 09/21/2023        Assessment:       ICD-10-CM ICD-9-CM   1. Multiple thyroid nodules  E04.2 241.1   2. Carpal tunnel syndrome, unspecified laterality  G56.00 354.0   3. Uncontrolled diabetes mellitus with hyperglycemia, with long-term current use of insulin  E11.65 250.02    Z79.4 V58.67   4. Well adult exam  Z00.00 V70.0   5. Multiple pulmonary nodules  R91.8 793.19        Plan:     1. Multiple thyroid nodules  US thyroid done 9-21-23 showing:  US Soft Tissue Head Neck Thyroid  Order: [id number]  Status: Final result     Visible to patient: Yes (seen)     Next appt: 11/01/2023 at 08:30 AM in Otolaryngology (NORBERTO Ca)     Dx: Multiple thyroid nodules     0 Result Notes  Details     Reading Physician Reading Date Result Priority   Wandy Macias MD  [phone] 9/22/2023 Routine      Narrative & Impression  EXAMINATION:  US SOFT TISSUE HEAD NECK THYROID     CLINICAL HISTORY:  .  Nontoxic multinodular goiter     TECHNIQUE:  Ultrasound of the thyroid and cervical lymph nodes was performed.     COMPARISON:  Thyroid sonogram 10/20/2022     FINDINGS:  SIZE:     Right lobe: 4.1 x 1.5 x 1.4 cm     Left lobe: 4.4 x 1.8 x 2 cm     Isthmus: 0.8 cm     PARENCHYMA:     Heterogeneous parenchyma.     NODULES:     Up to two most suspicious nodules in each lobe detailed.     Hypoechoic nodule at the mid right lobe measures 7 mm (TR 4).     Hypoechoic nodule at the medial right lobe/isthmus measures 0.4 cm (TR 4).     Hypoechoic nodule at the mid to lower left lobe measures 0.8 cm (TR 4).     Isoechoic nodule which is taller than wide at the inferior left lobe measures 10 mm (TR 4).     LYMPH NODES:     No enlarged lymph nodes seen.     Impression:     Multinodular goiter.  No detrimental change from previous.  No  nodule meeting imaging criteria for consideration of biopsy.     Reference: ACR Thyroid Imaging, Reporting and Data System (TI-RADS): White Paper of the ACR TI-RADS Committee.  2017.     TR4.  Moderately suspicious.  FNA if 1.5 cm or greater.  Follow up at 1, 2, 3, and 5 years if 1 cm or greater.        Electronically signed by: Wandy Macias  Date:                                            09/22/2023  Time:                                           14:37             Exam Ended: 09/21/23 09:36 Last Resulted: 09/22/23 14:37             2. Carpal tunnel syndrome, unspecified laterality  [facility] for nerve conduction testing for CTS. Encouraged wrist splint at night.       3. Uncontrolled diabetes mellitus with hyperglycemia, with long-term current use of insulin  A1c 8.6 down from 10.0. ADA diet and exercise. Cont Sitaglipitin 50 mg 1 tab po daily. Increase Levemir to 35 units sq in AM and continue 45 units sq in PM. A1c, CMP in 3 month. Urine microalbumin 9-21-23. Dm foot done 9-22-23. DM eye scheduled in Opthal cl 1-30-24- keep appt.        4. Well adult exam  Labs in 3 months.  Keep GYN cl appt. UTD MMG. UTD cologuard done 4-10-23 neg results- next due 4/2026.      5. Multiple pulmonary nodules  Pt had CT chest with contrast 11-1-23 showing:  CT Chest With Contrast  Order: [id number]  Status: Final result       Visible to patient: Yes (seen)       Next appt: 01/10/2024 at 08:45 AM in Neurology (Ella Benoit MD)       Dx: Lung nodule    0 Result Notes  Details     Reading Physician Reading Date Result Priority   Dean Márquez MD  [phone] 11/5/2023 Routine      Narrative & Impression  EXAMINATION:  CT CHEST WITH CONTRAST     CLINICAL HISTORY:  Prior CT chest showing 5 mm lung nodule;  Solitary pulmonary nodule     TECHNIQUE:  Multiple cross-sectional images were obtained from the thoracic inlet to the upper abdomen after the intravenous administration 100 mL of  Omnipaque 350.  Coronal and sagittal reformatted images were obtained.  An automated dose exposure technique was utilized this limits radiation does the patient.     COMPARISON:  06/03/2020     FINDINGS:  Heart size is enlarged.  Coronary calcifications identified.  The course and caliber of the thoracic aorta is normal.  A 4 vessel aortic arch is identified with a great vessels being widely patent.  The main pulmonary artery is normal caliber.  No evidence for hilar or mediastinal adenopathy.     Dependent atelectatic changes lungs are identified.  No evidence for consolidation or masslike lesion.  No pleural thickening or pleural effusion.  4 mm pulmonary nodules identified in the right upper lobe.  The trachea and airways are patent.     Changes of hepatic steatosis.  No suspicious osseous lesions with spondylotic changes.  Soft tissues are grossly normal.     Impression:     Single pulmonary nodule is described above.  Recommend yearly surveillance.  Other secondary findings as noted.        Electronically signed by: Dean Márquez MD  Date:                                            11/05/2023  Time:                                           11:26             Exam Ended: 11/01/23 11:19 Last Resulted: 11/05/23 11:26            Encouraged smoking cessation. Education provided. Will repeat in 1 year as recommended.             Follow up in about 3 months (around 4/10/2024) for with labs 1 week prior to appt. . In addition to their scheduled follow up, the patient has also been instructed to follow up on as needed basis.     Future Appointments   Date Time Provider Department Center   1/10/2024  3:00 PM Juan A Faustin MD [facility] [address] [place]   1/24/2024  8:00 AM Estephania Cedillo MD [facility] CARD [place]   1/30/2024  8:00 AM PROVID[facility] [place]   2/29/2024  9:30 AM Jennifer Cruz FNP [facility] GYN [place]   5/9/2024  8:45 AM Ella Benoit MD [facility] NEURO [place]        Kelin  JASEN Puga, LUPEP

## 2024-01-10 NOTE — PROCEDURES
Nerve conductions performed and report scanned in chart. (Media tab)   NCV show B median entrapments at the wrists and minimal features of ulnar neuropathy at the elbows.     Juan A Faustin MD

## 2024-01-12 DIAGNOSIS — E11.65 UNCONTROLLED DIABETES MELLITUS WITH HYPERGLYCEMIA, WITH LONG-TERM CURRENT USE OF INSULIN: Primary | ICD-10-CM

## 2024-01-12 DIAGNOSIS — Z79.4 UNCONTROLLED DIABETES MELLITUS WITH HYPERGLYCEMIA, WITH LONG-TERM CURRENT USE OF INSULIN: Primary | ICD-10-CM

## 2024-01-12 NOTE — PROGRESS NOTES
Received notification from pt's insurance that levemir was not on formulary and that either Semglee, Toujeo or Tresiba would be covered. Called pt and inform I will D/C Levemir and RX Toujeo inject 35 units sq Q am and 45 units sq Q pm. Pt verbalized understanding.

## 2024-01-15 NOTE — PROGRESS NOTES
A1c collected today- 9.9. pt informed of results and recommendation. ADA diet and exercise encouraged. Increased Levemir to 30 units sq in AM and continue 45 units in PM. Keep f/u appt as scheduled 1-10-24 with labs as ordered.

## 2024-01-19 ENCOUNTER — TELEPHONE (OUTPATIENT)
Dept: INTERNAL MEDICINE | Facility: CLINIC | Age: 59
End: 2024-01-19
Payer: COMMERCIAL

## 2024-01-19 NOTE — TELEPHONE ENCOUNTER
I called pt and she states she saw Dr. Faustin and he told her she would need to see a neurosurgeon and would send referral and wanted us to send it to Dr. Barragan. I advised pt to contact Dr. Faustin since they are the ones ordering the referral and let them know where she wants the referral sent so they can manage that as they know why they want her to see neurosurgery and have her results. Pt verbalized understanding and will call with any questions or concerns.

## 2024-01-19 NOTE — TELEPHONE ENCOUNTER
----- Message from Russ Hidalgo sent at 1/17/2024  1:33 PM CST -----  Type:  Needs Medical Advice    Who Called: pt     Would the patient rather a call back or a response via [facility]? Call back   Best Call Back Number: [phone]  Additional Information: Pt stated, Dr Faustin has her results and she would like to see Dr. Demetri Barragan   Please call pt to further discuss

## 2024-01-24 ENCOUNTER — OFFICE VISIT (OUTPATIENT)
Dept: CARDIOLOGY | Facility: CLINIC | Age: 59
End: 2024-01-24
Payer: COMMERCIAL

## 2024-01-24 VITALS
HEART RATE: 82 BPM | SYSTOLIC BLOOD PRESSURE: 136 MMHG | BODY MASS INDEX: 29.28 KG/M2 | OXYGEN SATURATION: 99 % | RESPIRATION RATE: 20 BRPM | DIASTOLIC BLOOD PRESSURE: 74 MMHG | HEIGHT: 64 IN | WEIGHT: 171.5 LBS | TEMPERATURE: 100 F

## 2024-01-24 DIAGNOSIS — R42 DIZZINESS: Primary | ICD-10-CM

## 2024-01-24 DIAGNOSIS — G47.33 OSA (OBSTRUCTIVE SLEEP APNEA): ICD-10-CM

## 2024-01-24 DIAGNOSIS — Z79.4 TYPE 2 DIABETES MELLITUS WITHOUT COMPLICATION, WITH LONG-TERM CURRENT USE OF INSULIN: ICD-10-CM

## 2024-01-24 DIAGNOSIS — R25.1 PHYSIOLOGICAL TREMOR: ICD-10-CM

## 2024-01-24 DIAGNOSIS — I10 PRIMARY HYPERTENSION: ICD-10-CM

## 2024-01-24 DIAGNOSIS — E11.9 TYPE 2 DIABETES MELLITUS WITHOUT COMPLICATION, WITH LONG-TERM CURRENT USE OF INSULIN: ICD-10-CM

## 2024-01-24 DIAGNOSIS — E78.5 HYPERLIPIDEMIA LDL GOAL <70: ICD-10-CM

## 2024-01-24 DIAGNOSIS — I51.7 CARDIOMEGALY: ICD-10-CM

## 2024-01-24 PROBLEM — I07.1 TRICUSPID VALVE INSUFFICIENCY: Status: RESOLVED | Noted: 2022-06-17 | Resolved: 2024-01-24

## 2024-01-24 PROCEDURE — 99215 OFFICE O/P EST HI 40 MIN: CPT | Mod: PBBFAC | Performed by: INTERNAL MEDICINE

## 2024-01-24 PROCEDURE — 93005 ELECTROCARDIOGRAM TRACING: CPT

## 2024-01-24 NOTE — PROGRESS NOTES
CHIEF COMPLAINT:   Chief Complaint   Patient presents with    Referral     cardiomegaly    Tricuspid valve insufficiency    Palpitations    Dizziness    Denies chestpains or SOB                             HPI:  Foreign Rosas 58 y.o. female with HTN, DM on insulin, pulmonary nodule (stable), GORDON not on CPAP who is referred to cardiology given finding of cardiomegaly on CT scan. She then underwent an echocardiogram that I have personally reviewed and showed normal size LV, no LVH, normal EF, no other abnormality.   Today, she reports feeling well overall but has been complaining of dizziness for the past few months.  She is not sure whether this started after starting propranolol for essential tremor.  Dizziness occurs randomly while sitting down.  Episodes can last a few minutes to a few hours and have been progressively worse. No syncope.  She reports she sometimes checks her blood pressure at work and initial read is elevated but then improves.       Pt has daytime fatigue and is not very active. No exertional chest pain, shortness of breath  Patient also denies orthopnea, PND, lower extremity edema, palpitations.      Social hx: Works as a  at [employer], never smoker, no alcohol or drugs    Family hx: both parents had DM and HTN, mother had CKD, father had lung cancer, brother had pancreatic cancer, sister had uterine cancer and other sister had uterine cancer                                                                                                                                                                                                                                                                                                                                                                                                                                                                            CARDIAC TESTING:  Results for orders placed during the hospital encounter  of 12/27/23    Echo Saline Bubble? No    Interpretation Summary    Left Ventricle: Normal wall motion. There is low normal systolic function. Biplane (2D) method of discs ejection fraction is 53%. Grade I diastolic dysfunction. Normal LV size, normal LV wall thickness, no LVH.    Right Ventricle: Normal right ventricular cavity size. Systolic function is normal.    Aortic Valve: The aortic valve is a trileaflet valve. There is normal leaflet mobility.    Mitral Valve: The mitral valve is structurally normal.    Pulmonary Artery: The estimated pulmonary artery systolic pressure is 16 mmHg.    IVC/SVC: Intermediate venous pressure at 8 mmHg.    Echo 4/2017:  EF 61%, normal RV size and function, trace TR       Patient Active Problem List   Diagnosis    Physiological tremor    Hypercalcemia    Triggering of digit    Tricuspid valve insufficiency    Severe acute respiratory syndrome coronavirus 2 (SARS-CoV-2) vaccination not indicated    Radial styloid tenosynovitis of left hand    Pain in pelvis    Postmenopausal bleeding    Nuclear senile cataract    Musculoskeletal pain    Multiple pulmonary nodules    Increased frequency of urination    Hypertension    CTS (carpal tunnel syndrome)    Insomnia    Chronic pain of both knees    Elevated PTHrP level    Uncontrolled diabetes mellitus with hyperglycemia, with long-term current use of insulin    Urinary incontinence    Overactive bladder    Well adult exam    Parathyroid adenoma    Multiple thyroid nodules    Tenosynovitis of left wrist     Past Surgical History:   Procedure Laterality Date    CHOLECYSTECTOMY      HYSTERECTOMY  2015    HYSTERECTOMY, VAGINAL, LAPAROSCOPY-ASSISTED, WITH SALPINGO-OOPHORECTOY Bilateral 08/22/2017    TUBAL LIGATION       Social History     Socioeconomic History    Marital status: Single   Tobacco Use    Smoking status: Never     Passive exposure: Past    Smokeless tobacco: Never   Substance and Sexual Activity    Alcohol use: Never     Comment:  special occassion    Drug use: Never    Sexual activity: Not Currently     Partners: Male     Birth control/protection: Abstinence, Injection     Social Determinants of Health     Financial Resource Strain: Medium Risk (12/16/2023)    Overall Financial Resource Strain (CARDIA)     Difficulty of Paying Living Expenses: Somewhat hard   Food Insecurity: Food Insecurity Present (12/16/2023)    Hunger Vital Sign     Worried About Running Out of Food in the Last Year: Never true     Ran Out of Food in the Last Year: Sometimes true   Transportation Needs: No Transportation Needs (12/16/2023)    PRAPARE - Transportation     Lack of Transportation (Medical): No     Lack of Transportation (Non-Medical): No   Physical Activity: Inactive (12/16/2023)    Exercise Vital Sign     Days of Exercise per Week: 1 day     Minutes of Exercise per Session: 0 min   Stress: Stress Concern Present (12/16/2023)    [place] [employer] - Occupational Stress Questionnaire     Feeling of Stress : Rather much   Social Connections: Moderately Isolated (12/16/2023)    Social Connection and Isolation Panel [NHANES]     Frequency of Communication with Friends and Family: Three times a week     Frequency of Social Gatherings with Friends and Family: Twice a week     Attends Jain Services: More than 4 times per year     Active Member of Clubs or Organizations: No     Attends Club or Organization Meetings: Never     Marital Status:    Housing Stability: High Risk (12/16/2023)    Housing Stability Vital Sign     Unable to Pay for Housing in the Last Year: Yes     Number of Places Lived in the Last Year: 2     Unstable Housing in the Last Year: No        Family History   Problem Relation Age of Onset    Diabetes Mother     Hypertension Mother     Kidney failure Mother     Asthma Mother     Kidney disease Mother     Lung cancer Father     Diabetes Father     Hypertension Father     Mental illness Father     Thyroid disease  "Sister     Brain cancer Sister     Diabetes Sister     Hypertension Sister     Cancer Sister         Thyroid    Thyroid disease Sister     Migraines Sister     Miscarriages / Stillbirths Sister     Cancer Brother         Pancreatic    Hypertension Brother     Seizures Brother     Diabetes Sister     Hypertension Sister      Review of patient's allergies indicates:  No Known Allergies      ROS:                                                                                                                                                                             Negative except as stated in the history of present illness. See HPI for details.    PHYSICAL EXAM:  Visit Vitals  BP (!) 146/75 (BP Location: Left arm, Patient Position: Sitting, BP Method: X-Large (Automatic))   Pulse 82   Temp 100 °F (37.8 °C) (Oral)   Resp 20   Ht 5' 4" (1.626 m)   Wt 77.8 kg (171 lb 8.3 oz)   SpO2 99%   BMI 29.44 kg/m²       General: alert and oriented/no acute distress  Eye: EOMI/normal conjunctiva/no xanthelasma  HENT: normocephalic/moist oral mucosa  Neck: supple/nontender/no carotid bruit  Respiratory: lungs CTA/nonlabored respirations/BS equal/symmetrical expansion/no  chest wall tenderness  Cardiovascular: normal rate/normal rhythm/no murmur/normal peripheral perfusion/no  edema/no JVD  Gastrointestinal: soft/nontender  Musculoskeletal: normal ROM  Integumentary: warm/dry/pink/intact  Neurologic: alert/oriented/normal sensory/no focal deficits  Psychiatric: cooperative/appropriate mood and affect/normal judgment    Current Outpatient Medications   Medication Instructions    amLODIPine (NORVASC) 5 mg, Oral, Daily    arm brace Misc 1 each, Misc.(Non-Drug; Combo Route), Nightly, Left carpal tunnel wrist splint, nightly    fluconazole (DIFLUCAN) 150 mg, Oral, Daily, Take 1 tab po X 1 dose. Repeat 2nd dose in 3 days if needed.    gabapentin (NEURONTIN) 300 mg, Oral, Nightly    glipiZIDE (GLUCOTROL) 10 mg, Oral, 2 times daily before " meals    ibuprofen (ADVIL,MOTRIN) 800 mg, Oral, 3 times daily PRN    insulin glargine U-300 conc (TOUJEO MAX SOLOSTAR) 300 unit/mL (3 mL) insulin pen Inject 35 units sq in AM and 45 units sq in PM.    insulin lispro 100 unit/mL pen See Instructions, Inject 2-12 units sq per s/s after CBG AC & HS., # 15 mL, 6 Refill(s), Pharm[facility]: [facility], 162, cm, Height/Length Dosing, 06/21/21 7:49:00 CDT, 81, kg, Weight Dosing, 06/21/21 7:49:00 CDT Strength: 100 Units/mL    losartan (COZAAR) 25 mg, Oral, Daily    metFORMIN (GLUCOPHAGE) 1,000 mg, Oral, 2 times daily with meals    oxybutynin (DITROPAN-XL) 5 mg, Oral, Daily    PEN NEEDLE, DIABETIC MISC 1 each, Misc.(Non-Drug; Combo Route), 2 times daily, Before AC and HS.    pravastatin (PRAVACHOL) 20 mg, Oral, Daily    propranoloL (INDERAL) 20 mg, Oral, 2 times daily    sertraline (ZOLOFT) 50 MG tablet TAKE  1 TABLET po at BEDTIME    SITagliptin phosphate (JANUVIA) 50 mg, Oral, Daily    zolpidem (AMBIEN) 5 mg, Oral, Nightly PRN        All medications, laboratory studies, cardiac diagnostic imaging reviewed.     Lab Results   Component Value Date    LDL 48.00 (L) 11/28/2023    LDL 44.00 (L) 12/13/2021    TRIG 215 (H) 11/28/2023    TRIG 121 12/13/2021    CREATININE 0.82 01/09/2024    K 4.8 01/09/2024        ASSESSMENT/PLAN:    Cardiomegaly  Cardiomegaly is noted on CT chest however echocardiogram shows normal sized LV without hypertrophy.   Echocardiogram is otherwise unremarkable.    Dizziness  Recently has been having dizziness even while sitting, potentially started when she started propranolol for tremors. Progressively worse. Episodes are intermittent and can last a few minutes to a few hours.  Will obtain a 7 day cardiac monitor to rule out bradycardia, pauses or arrhythmias    HTN  BP today 146/75 with repeat 132/72  Review of recent values shows some normal values and elevated values  She is on amlodipine 5mg, losartan 25mg, propranolol 20mg bid (for  tremor)  Will continue same regimen for now, will ask her to keep a BP log and will plan a NV.      HLP  Goal<70 given DM   On pravastatin 20mg, LDL at goal, 48 in 11/2023    GORDON  Explained to the patient the deleterious effects of untreated sleep apnea including but not limited to pulmonary HTN, systemic HTN and atrial arrhythmia and the importance of compliance with CPAP. She has not used the CPAP in a while and as noticed it wasn't fitting well anymore. She will go back to [facility].      DM  A1c 9.9% on 12/23/2023  On levemir and dosing has been increased recently, already on high dose levemir, also on metformin, glipizeide and januvia  Recommended she requests referral to endocrinology for more novel therapies      **no indication for aspirin and will stop it

## 2024-01-26 DIAGNOSIS — E11.65 UNCONTROLLED DIABETES MELLITUS WITH HYPERGLYCEMIA, WITH LONG-TERM CURRENT USE OF INSULIN: ICD-10-CM

## 2024-01-26 DIAGNOSIS — Z79.4 UNCONTROLLED DIABETES MELLITUS WITH HYPERGLYCEMIA, WITH LONG-TERM CURRENT USE OF INSULIN: ICD-10-CM

## 2024-01-26 RX ORDER — INSULIN GLARGINE 300 [IU]/ML
INJECTION, SOLUTION SUBCUTANEOUS
Qty: 75 ML | Refills: 3 | Status: SHIPPED | OUTPATIENT
Start: 2024-01-26 | End: 2024-05-09

## 2024-01-30 ENCOUNTER — OFFICE VISIT (OUTPATIENT)
Dept: OPHTHALMOLOGY | Facility: CLINIC | Age: 59
End: 2024-01-30
Payer: COMMERCIAL

## 2024-01-30 VITALS — HEIGHT: 64 IN | BODY MASS INDEX: 29.19 KG/M2 | WEIGHT: 171 LBS

## 2024-01-30 DIAGNOSIS — H40.023 OPEN ANGLE WITH BORDERLINE FINDINGS AND HIGH GLAUCOMA RISK IN BOTH EYES: Primary | ICD-10-CM

## 2024-01-30 DIAGNOSIS — E11.9 DIABETES MELLITUS TYPE 2 WITHOUT RETINOPATHY: ICD-10-CM

## 2024-01-30 DIAGNOSIS — H25.813 COMBINED FORM OF AGE-RELATED CATARACT, BOTH EYES: ICD-10-CM

## 2024-01-30 PROCEDURE — 92133 CPTRZD OPH DX IMG PST SGM ON: CPT | Mod: PBBFAC,PN | Performed by: OPHTHALMOLOGY

## 2024-01-30 PROCEDURE — 92250 FUNDUS PHOTOGRAPHY W/I&R: CPT | Mod: PBBFAC,PN | Performed by: OPHTHALMOLOGY

## 2024-01-30 PROCEDURE — 99213 OFFICE O/P EST LOW 20 MIN: CPT | Mod: PBBFAC,PN | Performed by: STUDENT IN AN ORGANIZED HEALTH CARE EDUCATION/TRAINING PROGRAM

## 2024-01-30 PROCEDURE — 92250 FUNDUS PHOTOGRAPHY W/I&R: CPT | Mod: PBBFAC,PN | Performed by: STUDENT IN AN ORGANIZED HEALTH CARE EDUCATION/TRAINING PROGRAM

## 2024-01-30 PROCEDURE — 92133 CPTRZD OPH DX IMG PST SGM ON: CPT | Mod: PBBFAC,PN | Performed by: STUDENT IN AN ORGANIZED HEALTH CARE EDUCATION/TRAINING PROGRAM

## 2024-01-30 NOTE — PROGRESS NOTES
HPI     Eye Exam     Additional comments: C/O eye strain due to computer use           Comments    Open angle with borderline findings and high glaucoma risk in both eyes          Last edited by Lara Mackenzie LPN on 1/30/2024  8:13 AM.            Assessment /Plan     For exam results, see Encounter Report.    Open angle with borderline findings and high glaucoma risk in both eyes    Diabetes mellitus type 2 without retinopathy    Combined form of age-related cataract, both eyes      OCTN   1/30/2024: 109//106 stable and full ou   2023 104//100 green or white ou    HVF 1/30/23 OD unreliable uninterpretable // OS borderline unreliable grossly full      1. Open angle glaucoma, borderline findings, high risk (3/5) OU   - Increased CDR  - Fam Hx: mother was blind from glaucoma; +AA  - CCT thin 12/15/21 490 // 496  - Gonio: 9/23/2020 - open to  OU  - HVF 12/15/21 unreliable OD; OS with few nasal changes but does not correlate with OCT  -1/30/23 both unreliable, OS borderline and grossly full  - CTM off drops  - now on yearly octn given unreliable VF   - 1/30/2024: OCTN remains full stable ou, dfe wnl cont yearly octn dfe     2. Combined cataract, ou  - NVS, monitor    3. Hypertensive choroidopathy ([place] Spots) OU  - monitor  - encouraged to follow with PCP     4. Presbyopia  - Can use OTC readers    5. ID-DM type 2 without retinopathy  - encouraged good PCP follow up  - last fundus photos 1/30/2024  - no retinopathy on exam 1/30/2024, cont yearly dfe     RTC 1 year DFE, OCT RNFL

## 2024-01-31 ENCOUNTER — HOSPITAL ENCOUNTER (OUTPATIENT)
Dept: CARDIOLOGY | Facility: HOSPITAL | Age: 59
Discharge: HOME | End: 2024-01-31
Attending: INTERNAL MEDICINE
Payer: COMMERCIAL

## 2024-01-31 DIAGNOSIS — R42 DIZZINESS: ICD-10-CM

## 2024-02-05 DIAGNOSIS — E83.52 HYPERCALCEMIA: ICD-10-CM

## 2024-02-05 DIAGNOSIS — M65.9 TENOSYNOVITIS OF LEFT WRIST: ICD-10-CM

## 2024-02-05 DIAGNOSIS — R25.1 PHYSIOLOGICAL TREMOR: ICD-10-CM

## 2024-02-05 DIAGNOSIS — G56.02 CARPAL TUNNEL SYNDROME OF LEFT WRIST: ICD-10-CM

## 2024-02-06 RX ORDER — PROPRANOLOL HYDROCHLORIDE 20 MG/1
20 TABLET ORAL 2 TIMES DAILY
Qty: 60 TABLET | Refills: 3 | Status: SHIPPED | OUTPATIENT
Start: 2024-02-06 | End: 2024-05-09

## 2024-02-29 ENCOUNTER — OFFICE VISIT (OUTPATIENT)
Dept: GYNECOLOGY | Facility: CLINIC | Age: 59
End: 2024-02-29
Payer: COMMERCIAL

## 2024-02-29 VITALS
DIASTOLIC BLOOD PRESSURE: 76 MMHG | SYSTOLIC BLOOD PRESSURE: 136 MMHG | HEART RATE: 55 BPM | OXYGEN SATURATION: 100 % | RESPIRATION RATE: 18 BRPM | HEIGHT: 64 IN | WEIGHT: 174.19 LBS | TEMPERATURE: 98 F | BODY MASS INDEX: 29.74 KG/M2

## 2024-02-29 DIAGNOSIS — Z12.31 SCREENING MAMMOGRAM FOR BREAST CANCER: ICD-10-CM

## 2024-02-29 DIAGNOSIS — Z01.419 WOMEN'S ANNUAL ROUTINE GYNECOLOGICAL EXAMINATION: Primary | ICD-10-CM

## 2024-02-29 PROCEDURE — 3075F SYST BP GE 130 - 139MM HG: CPT | Mod: CPTII,,, | Performed by: NURSE PRACTITIONER

## 2024-02-29 PROCEDURE — 99215 OFFICE O/P EST HI 40 MIN: CPT | Mod: PBBFAC | Performed by: NURSE PRACTITIONER

## 2024-02-29 PROCEDURE — 3078F DIAST BP <80 MM HG: CPT | Mod: CPTII,,, | Performed by: NURSE PRACTITIONER

## 2024-02-29 PROCEDURE — 3008F BODY MASS INDEX DOCD: CPT | Mod: CPTII,,, | Performed by: NURSE PRACTITIONER

## 2024-02-29 PROCEDURE — 3052F HG A1C>EQUAL 8.0%<EQUAL 9.0%: CPT | Mod: CPTII,,, | Performed by: NURSE PRACTITIONER

## 2024-02-29 PROCEDURE — 4010F ACE/ARB THERAPY RXD/TAKEN: CPT | Mod: CPTII,,, | Performed by: NURSE PRACTITIONER

## 2024-02-29 PROCEDURE — 1159F MED LIST DOCD IN RCRD: CPT | Mod: CPTII,,, | Performed by: NURSE PRACTITIONER

## 2024-02-29 PROCEDURE — 99396 PREV VISIT EST AGE 40-64: CPT | Mod: S$PBB,,, | Performed by: NURSE PRACTITIONER

## 2024-02-29 PROCEDURE — 1160F RVW MEDS BY RX/DR IN RCRD: CPT | Mod: CPTII,,, | Performed by: NURSE PRACTITIONER

## 2024-02-29 NOTE — PROGRESS NOTES
"Patient ID: Foreign Rosas is a 58 y.o. female.    Chief Complaint: Annual Exam      Review of patient's allergies indicates:  No Known Allergies          HPI:  Pt is  here for annual gyn exam. Pt had LAVH with BSO in 2017 secondary to AUB-L. Pt denies vaginal bleeding or discharge. Denies vaginal dryness/dyspareunia. Pt states is followed by urology for OAB/urge incontinence.. Denies dysuria.. Denies any breast complaints.  Followed in med clinic for primary care. Pt works at [employer].  Denies fly hx of colon cancer.      Review of Systems:   Negative except for findings in HPI     Objective:   /76   Pulse (!) 55   Temp 98.2 °F (36.8 °C) (Oral)   Resp 18   Ht 5' 4" (1.626 m)   Wt 79 kg (174 lb 3.2 oz)   SpO2 100%   BMI 29.90 kg/m²    Physical Exam:  GENERAL: Pt is aware and alert and  in no acute distress.  BREASTS: Bilateral-No masses, nipple discharge, skin changes, or tenderness.  ABDOMEN: Soft, non tender.  VULVA:  No lesions or skin changes.  URETHRA: No lesions  BLADDER: No tenderness.  VAGINA: Mucosa pale,no discharge; no lesions.  CERVIX:  absent  BIMANUAL EXAM:  The uterus is absent. Ceasar adnexa reveal no evidence of masses; no fullness   SKIN: Warm and Dry  PSYCHIATRIC: Patient is awake and alert. Mood and affect are normal.    Assessment:   Women's annual routine gynecological examination    Screening mammogram for breast cancer  -     Mammo Digital Screening Bilat w/ Eriberto; Future; Expected date: 2024            1. Women's annual routine gynecological examination    2. Screening mammogram for breast cancer             -pelvic; pap not indicated secondary to hyst for benign conditions  -declined sti screening  Plan:       Follow up in about 1 year (around 2025).    "

## 2024-03-04 PROBLEM — Z00.00 WELL ADULT EXAM: Status: RESOLVED | Noted: 2023-03-09 | Resolved: 2024-03-04

## 2024-03-05 ENCOUNTER — TELEPHONE (OUTPATIENT)
Dept: NEUROLOGY | Facility: CLINIC | Age: 59
End: 2024-03-05
Payer: COMMERCIAL

## 2024-03-05 DIAGNOSIS — G56.03 BILATERAL CARPAL TUNNEL SYNDROME: Primary | ICD-10-CM

## 2024-03-05 NOTE — TELEPHONE ENCOUNTER
Pt contacted office stating she had EMG done 1/10 and there was a referral to neurosurgery that was supposed to be generated. I do not see any dialogue on report regarding. Please advise.    Call back #: [phone]

## 2024-04-17 ENCOUNTER — LAB VISIT (OUTPATIENT)
Dept: LAB | Facility: HOSPITAL | Age: 59
End: 2024-04-17
Attending: NURSE PRACTITIONER
Payer: COMMERCIAL

## 2024-04-17 ENCOUNTER — OFFICE VISIT (OUTPATIENT)
Dept: INTERNAL MEDICINE | Facility: CLINIC | Age: 59
End: 2024-04-17
Payer: COMMERCIAL

## 2024-04-17 VITALS
HEART RATE: 89 BPM | HEIGHT: 64 IN | BODY MASS INDEX: 30.05 KG/M2 | DIASTOLIC BLOOD PRESSURE: 81 MMHG | TEMPERATURE: 99 F | SYSTOLIC BLOOD PRESSURE: 130 MMHG | WEIGHT: 176 LBS | RESPIRATION RATE: 16 BRPM

## 2024-04-17 DIAGNOSIS — F51.01 PRIMARY INSOMNIA: ICD-10-CM

## 2024-04-17 DIAGNOSIS — E11.65 UNCONTROLLED DIABETES MELLITUS WITH HYPERGLYCEMIA, WITH LONG-TERM CURRENT USE OF INSULIN: ICD-10-CM

## 2024-04-17 DIAGNOSIS — Z00.00 WELL ADULT EXAM: ICD-10-CM

## 2024-04-17 DIAGNOSIS — I10 PRIMARY HYPERTENSION: ICD-10-CM

## 2024-04-17 DIAGNOSIS — Z79.4 UNCONTROLLED DIABETES MELLITUS WITH HYPERGLYCEMIA, WITH LONG-TERM CURRENT USE OF INSULIN: ICD-10-CM

## 2024-04-17 DIAGNOSIS — G56.00 CARPAL TUNNEL SYNDROME, UNSPECIFIED LATERALITY: ICD-10-CM

## 2024-04-17 DIAGNOSIS — Z12.31 BREAST CANCER SCREENING BY MAMMOGRAM: ICD-10-CM

## 2024-04-17 DIAGNOSIS — R91.8 MULTIPLE PULMONARY NODULES: ICD-10-CM

## 2024-04-17 DIAGNOSIS — E04.2 MULTIPLE THYROID NODULES: Primary | ICD-10-CM

## 2024-04-17 DIAGNOSIS — R80.9 MICROALBUMINURIA: ICD-10-CM

## 2024-04-17 LAB
CHOLEST SERPL-MCNC: 126 MG/DL
CHOLEST/HDLC SERPL: 4 {RATIO} (ref 0–5)
HBA1C MFR BLD: 10.3 %
HDLC SERPL-MCNC: 36 MG/DL (ref 35–60)
LDLC SERPL CALC-MCNC: 51 MG/DL (ref 50–140)
TRIGL SERPL-MCNC: 193 MG/DL (ref 37–140)
VLDLC SERPL CALC-MCNC: 39 MG/DL

## 2024-04-17 PROCEDURE — 1159F MED LIST DOCD IN RCRD: CPT | Mod: CPTII,,, | Performed by: NURSE PRACTITIONER

## 2024-04-17 PROCEDURE — 3079F DIAST BP 80-89 MM HG: CPT | Mod: CPTII,,, | Performed by: NURSE PRACTITIONER

## 2024-04-17 PROCEDURE — 3046F HEMOGLOBIN A1C LEVEL >9.0%: CPT | Mod: CPTII,,, | Performed by: NURSE PRACTITIONER

## 2024-04-17 PROCEDURE — 99215 OFFICE O/P EST HI 40 MIN: CPT | Mod: PBBFAC | Performed by: NURSE PRACTITIONER

## 2024-04-17 PROCEDURE — 3075F SYST BP GE 130 - 139MM HG: CPT | Mod: CPTII,,, | Performed by: NURSE PRACTITIONER

## 2024-04-17 PROCEDURE — 80061 LIPID PANEL: CPT

## 2024-04-17 PROCEDURE — 3008F BODY MASS INDEX DOCD: CPT | Mod: CPTII,,, | Performed by: NURSE PRACTITIONER

## 2024-04-17 PROCEDURE — 36415 COLL VENOUS BLD VENIPUNCTURE: CPT

## 2024-04-17 PROCEDURE — 99214 OFFICE O/P EST MOD 30 MIN: CPT | Mod: S$PBB,,, | Performed by: NURSE PRACTITIONER

## 2024-04-17 PROCEDURE — 83036 HEMOGLOBIN GLYCOSYLATED A1C: CPT | Mod: PBBFAC | Performed by: NURSE PRACTITIONER

## 2024-04-17 PROCEDURE — 4010F ACE/ARB THERAPY RXD/TAKEN: CPT | Mod: CPTII,,, | Performed by: NURSE PRACTITIONER

## 2024-04-17 RX ORDER — INSULIN GLARGINE 300 [IU]/ML
INJECTION, SOLUTION SUBCUTANEOUS
Qty: 75 ML | Refills: 3 | Status: SHIPPED | OUTPATIENT
Start: 2024-04-17 | End: 2024-05-09

## 2024-04-17 RX ORDER — ZOLPIDEM TARTRATE 5 MG/1
5 TABLET ORAL NIGHTLY PRN
Qty: 30 TABLET | Refills: 5 | Status: SHIPPED | OUTPATIENT
Start: 2024-04-17

## 2024-04-17 RX ORDER — SERTRALINE HYDROCHLORIDE 50 MG/1
TABLET, FILM COATED ORAL
Qty: 90 TABLET | Refills: 1 | Status: SHIPPED | OUTPATIENT
Start: 2024-04-17 | End: 2024-05-09 | Stop reason: SDUPTHER

## 2024-04-17 RX ORDER — AMLODIPINE BESYLATE 5 MG/1
5 TABLET ORAL DAILY
Qty: 90 TABLET | Refills: 1 | Status: SHIPPED | OUTPATIENT
Start: 2024-04-17 | End: 2024-05-09

## 2024-04-17 RX ORDER — IBUPROFEN 800 MG/1
800 TABLET ORAL 3 TIMES DAILY PRN
Qty: 90 TABLET | Refills: 2 | Status: SHIPPED | OUTPATIENT
Start: 2024-04-17

## 2024-04-17 RX ORDER — METFORMIN HYDROCHLORIDE 1000 MG/1
1000 TABLET ORAL 2 TIMES DAILY WITH MEALS
Qty: 180 TABLET | Refills: 1 | Status: SHIPPED | OUTPATIENT
Start: 2024-04-17

## 2024-04-17 RX ORDER — LOSARTAN POTASSIUM 25 MG/1
25 TABLET ORAL DAILY
Qty: 90 TABLET | Refills: 1 | Status: SHIPPED | OUTPATIENT
Start: 2024-04-17 | End: 2025-04-17

## 2024-04-17 RX ORDER — GLIPIZIDE 10 MG/1
10 TABLET ORAL
Qty: 180 TABLET | Refills: 1 | Status: SHIPPED | OUTPATIENT
Start: 2024-04-17

## 2024-04-17 NOTE — PROGRESS NOTES
Patient ID: [id number]     Chief Complaint: LAB RESULTS        HPI:     RICHMOND Rosas is a 58 y.o. female here today for a follow up. Pt recently returned from a 11 day cruise and states she had forgot her meds at home and is cause for her elevated A1c.         Immunizations:   Immunization History   Administered Date(s) Administered    COVID-19, MRNA, LN-S, PF (Pfizer) (Purple Cap) 06/21/2021, 06/21/2021, 07/19/2021, 07/19/2021    DTP 1963, 1963, 1963, 10/22/1968, 07/24/1975, 05/03/1978    Influenza 10/26/2014    Influenza - Quadrivalent - PF *Preferred* (6 months and older) 09/29/2021    Influenza - Trivalent - PF (ADULT) 10/03/2016, 12/21/2017, 09/21/2018, 10/22/2019    MMR 01/15/2010    Measles 12/07/1967, 05/03/1978    OPV 1963, 1963, 01/23/1964, 10/14/1969, 07/24/1975    Pneumococcal Polysaccharide - 23 Valent 06/15/2016    Rubella 12/18/1969    Td (ADULT) 10/26/1999, 10/12/2005        -------------------------------------    Anxiety disorder, unspecified    Glaucoma    HTN (hypertension)    OAB (overactive bladder)    GORDON (obstructive sleep apnea)    Type 2 diabetes mellitus with unspecified diabetic retinopathy without macular edema        Past Surgical History:   Procedure Laterality Date    CHOLECYSTECTOMY      HYSTERECTOMY  2015    HYSTERECTOMY, VAGINAL, LAPAROSCOPY-ASSISTED, WITH SALPINGO-OOPHORECTOY Bilateral 08/22/2017    TUBAL LIGATION         Review of patient's allergies indicates:  No Known Allergies    Current Outpatient Medications   Medication Instructions    amLODIPine (NORVASC) 5 mg, Oral, Daily    arm brace Misc 1 each, Misc.(Non-Drug; Combo Route), Nightly, Left carpal tunnel wrist splint, nightly    gabapentin (NEURONTIN) 300 mg, Oral, Nightly    glipiZIDE (GLUCOTROL) 10 mg, Oral, 2 times daily before meals    ibuprofen (ADVIL,MOTRIN) 800 mg, Oral, 3 times daily PRN    insulin detemir U-100, Levemir, 100 unit/mL (3 mL) SubQ InPn pen Inject 20  "Units into the skin every morning and 45 units into the skin every evening.    insulin glargine U-300 conc (TOUJEO MAX SOLOSTAR) 300 unit/mL (3 mL) insulin pen Inject 35 units under the skin in morning and 45 units under the skin in evening.    insulin lispro 100 unit/mL pen See Instructions, Inject 2-12 units sq per s/s after CBG AC & HS., # 15 mL, 6 Refill(s), Pharmacy: [facility], 162, cm, Height/Length Dosing, 06/21/21 7:49:00 CDT, 81, kg, Weight Dosing, 06/21/21 7:49:00 CDT Strength: 100 Units/mL    insulin lispro 100 unit/mL pen Inject 2 to 12 units under the skin per sliding scale after checking blood glucose before meals and at bedtime    losartan (COZAAR) 25 mg, Oral, Daily    metFORMIN (GLUCOPHAGE) 1,000 mg, Oral, 2 times daily with meals    oxybutynin (DITROPAN-XL) 5 mg, Oral, Daily    pen needle, diabetic 32 gauge x 5/32" Ndle Use 1 needle five times daily    PEN NEEDLE, DIABETIC MISC 1 each, Misc.(Non-Drug; Combo Route), 2 times daily, Before AC and HS.    pravastatin (PRAVACHOL) 20 mg, Oral, Daily    propranoloL (INDERAL) 20 mg, Oral, 2 times daily    sertraline (ZOLOFT) 50 MG tablet TAKE  1 TABLET po at BEDTIME    SITagliptin phosphate (JANUVIA) 50 mg, Oral, Daily    zolpidem (AMBIEN) 5 mg, Oral, Nightly PRN       Social History     Socioeconomic History    Marital status: Single   Tobacco Use    Smoking status: Never     Passive exposure: Past    Smokeless tobacco: Never   Substance and Sexual Activity    Alcohol use: Not Currently     Comment: special occassion    Drug use: Never    Sexual activity: Not Currently     Partners: Male     Birth control/protection: Abstinence, Injection     Social Determinants of Health     Financial Resource Strain: Medium Risk (12/16/2023)    Overall Financial Resource Strain (CARDIA)     Difficulty of Paying Living Expenses: Somewhat hard   Food Insecurity: Food Insecurity Present (12/16/2023)    Hunger Vital Sign     Worried About Running Out of " Food in the Last Year: Never true     Ran Out of Food in the Last Year: Sometimes true   Transportation Needs: No Transportation Needs (12/16/2023)    PRAPARE - Transportation     Lack of Transportation (Medical): No     Lack of Transportation (Non-Medical): No   Physical Activity: Inactive (12/16/2023)    Exercise Vital Sign     Days of Exercise per Week: 1 day     Minutes of Exercise per Session: 0 min   Stress: Stress Concern Present (12/16/2023)    [employer] - Occupational Stress Questionnaire     Feeling of Stress : Rather much   Social Connections: Moderately Isolated (12/16/2023)    Social Connection and Isolation Panel [NHANES]     Frequency of Communication with Friends and Family: Three times a week     Frequency of Social Gatherings with Friends and Family: Twice a week     Attends Holiness Services: More than 4 times per year     Active Member of Clubs or Organizations: No     Attends Club or Organization Meetings: Never     Marital Status:    Housing Stability: High Risk (12/16/2023)    Housing Stability Vital Sign     Unable to Pay for Housing in the Last Year: Yes     Number of Places Lived in the Last Year: 2     Unstable Housing in the Last Year: No        Family History   Problem Relation Name Age of Onset    Diabetes Mother Mckenzie     Hypertension Mother Mckenzie     Kidney failure Mother Mckenzie     Asthma Mother Mckenzie     Kidney disease Mother Mckenzie     Lung cancer Father Rei     Diabetes Father Rei     Hypertension Father Rei     Mental illness Father Rei     Thyroid disease Sister Astrid     Brain cancer Sister Astrid     Diabetes Sister Astrid     Hypertension Sister Astrid     Cancer Sister Danelle         Thyroid    Thyroid disease Sister Danelle     Migraines Sister Danelle     Miscarriages / Stillbirths Sister Danelle     Cancer Brother Jack         Pancreatic    Hypertension Brother Jack     Seizures Brother Jack      Diabetes Sister [place]     Hypertension [facility]         Patient Care Team:  Kelin Puga FNP as PCP - General (Family Medicine)  Kelin Puga FNP (Family Medicine)  Gilberto Adams MD as Diabetes Digital Medicine Responsible Provider (Cardiology)  Gilberto Adams MD as Hypertension Digital Medicine Responsible Provider (Cardiology)  Jade Carr as Digital Medicine Health   Eliane Shaw PharmD as Hypertension Digital Medicine Clinician  Eliane Shaw PharmD as Diabetes Digital Medicine Clinician  1, [employer] as Hypertension Digital Medicine Contract  1, [employer] as Diabetes Digital Medicine Contract     Subjective:     Review of Systems     See HPI for details    Constitutional: Denies Change in appetite. Denies Chills. Denies Fever. Denies Night sweats.  Eye: Denies Blurred vision. Denies Discharge. Denies Eye pain.  ENT: Denies Decreased hearing. Denies Sore throat. Denies Swollen glands.  Respiratory: Denies Cough. Denies Shortness of breath. Denies Shortness of breath with exertion. Denies Wheezing.  Cardiovascular: DeniesChest pain at rest. Denies Chest pain with exertion. Denies Irregular heartbeat. Denies Palpitations. Denies Edema.  Gastrointestinal: Denies Abdominal pain. DeniesDiarrhea. Denies Nausea. Denies Vomiting. Denies Hematemesis or Hematochezia.  Genitourinary: Denies Dysuria. Denies Urinary frequency. Denies Urinary urgency. Denies Blood in urine.  Endocrine: Denies Cold intolerance. Denies Excessive thirst. Denies Heat intolerance. Denies Weight loss. Denies Weight gain.  Musculoskeletal: Denies Painful joints. Denies Weakness.  Integumentary: Denies Rash. Denies Itching. Denies Dry skin.  Neurologic: Denies Dizziness. Denies Fainting. Denies Headache.  Psychiatric: Denies Depression. Denies Anxiety. Denies Suicidal/Homicidal ideations.    All Other ROS: Negative except as stated in HPI.       Objective:     Visit  "Vitals  /81 (BP Location: Right arm, Patient Position: Sitting, BP Method: Large (Automatic))   Pulse 89   Temp 98.7 °F (37.1 °C) (Oral)   Resp 16   Ht 5' 4" (1.626 m)   Wt 79.8 kg (176 lb)   BMI 30.21 kg/m²       Physical Exam    General: Alert and oriented, No acute distress.  Head: Normocephalic, Atraumatic.  Eye: Pupils are equal, round and reactive to light, Extraocular movements are intact, Sclera non-icteric.  Ears/Nose/Throat: Normal, Mucosa moist,Clear.  Neck/Thyroid: Supple, Non-tender, No carotid bruit, No lymphadenopathy, No JVD, Full range of motion.  Respiratory: Clear to auscultation bilaterally; No wheezes, rales or rhonchi,Non-labored respirations, Symmetrical chest wall expansion.  Cardiovascular: Regular rate and rhythm, S1/S2 normal, No murmurs, rubs or gallops.  Gastrointestinal: Soft, Non-tender, Non-distended, Normal bowel sounds, No palpable organomegaly.  Musculoskeletal: Normal range of motion.  Integumentary: Warm, Dry, Intact, No suspicious lesions or rashes.  Extremities: No clubbing, cyanosis or edema  Neurologic: No focal deficits, Cranial Nerves II-XII are grossly intact, Motor strength normal upper and lower extremities, Sensory exam intact.  Psychiatric: Normal interaction, Coherent speech, Euthymic mood, Appropriate affect       Labs Reviewed:     Chemistry:  Lab Results   Component Value Date     01/09/2024    K 4.8 01/09/2024    CHLORIDE 103 01/09/2024    BUN 7.4 (L) 01/09/2024    CREATININE 0.82 01/09/2024    EGFRNORACEVR >60 01/09/2024    GLUCOSE 232 (H) 01/09/2024    CALCIUM 10.0 01/09/2024    ALKPHOS 78 01/09/2024    LABPROT 7.5 01/09/2024    ALBUMIN 4.2 01/09/2024    BILIDIR 0.1 06/16/2021    IBILI 0.10 06/16/2021    AST 19 01/09/2024    ALT 23 01/09/2024    DMVMIYLK52YX 34.0 04/05/2021        Lab Results   Component Value Date    HGBA1C 8.9 (H) 01/09/2024        Hematology:  Lab Results   Component Value Date    WBC 5.46 11/28/2023    HGB 12.9 11/28/2023    HCT " 43.8 11/28/2023     11/28/2023       Lipid Panel:  Lab Results   Component Value Date    CHOL 126 04/17/2024    HDL 36 04/17/2024    LDL 51.00 04/17/2024    TRIG 193 (H) 04/17/2024    TOTALCHOLEST 4 04/17/2024        Urine:  Lab Results   Component Value Date    COLORUA Yellow 01/09/2024    APPEARANCEUA Clear 01/09/2024    SGUA 1.025 01/09/2024    PHUA 5.5 01/09/2024    PROTEINUA 30 (A) 01/09/2024    GLUCOSEUA 100 (A) 01/09/2024    KETONESUA Negative 01/09/2024    BLOODUA Negative 01/09/2024    NITRITESUA Negative 01/09/2024    LEUKOCYTESUR Negative 01/09/2024    RBCUA None Seen 01/09/2024    WBCUA None Seen 01/09/2024    BACTERIA Moderate (A) 01/09/2024    SQEPUA Trace (A) 02/09/2023    HYALINECASTS None Seen 02/09/2023    CREATRANDUR 76.4 09/21/2023        Assessment:       ICD-10-CM ICD-9-CM   1. Multiple thyroid nodules  E04.2 241.1   2. Carpal tunnel syndrome, unspecified laterality  G56.00 354.0   3. Uncontrolled diabetes mellitus with hyperglycemia, with long-term current use of insulin  E11.65 250.02    Z79.4 V58.67   4. Well adult exam  Z00.00 V70.0   5. Multiple pulmonary nodules  R91.8 793.19   6. Breast cancer screening by mammogram  Z12.31 V76.12        Plan:     1. Multiple thyroid nodules  US thyroid in 1 month. TSH WNL.     2. Carpal tunnel syndrome, unspecified laterality  Pt had EMG done with Dr Velez on 1-10-24 showing:  Sensory NCVs: medians absent ulnars normal for age   Motor NCVs: medians markedly distally prolonged and on the right low amplitudes   Ulnars with only trivial slowing but no conduction block at the elbows   Needle EMG: given her complaints and the above findings needle emg not pursued   Conclusions: Median entrapments at the wrists;   minimal features of ulnar neuropathy at the elbows.     3. Uncontrolled diabetes mellitus with hyperglycemia, with long-term current use of insulin  POC A1c- 10.3.  ADA diet and exercise. Pt states she recently returned from a 11 day cruise and  states she had forgotten her meds at home and is cause for her elevated A1c. Cont Sitaglipitin 50 mg 1 tab po daily. Cont Levemir to 35 units sq in AM and continue 45 units sq in PM. Urine microalbumin 9-21-23. Dm foot done 9-22-23. DM eye done  in [place] 1-30-24.    - Basic Metabolic Panel; Future  - POCT HEMOGLOBIN A1C    4. Well adult exam  Labs in 3 months. Keep GYN cl appt. MMG in 3 months. UTD cologuard 7-12-23 negative results- next due 4/2026.     5. Multiple pulmonary nodules  Pt had CT chest with contrast 11-1-23 showing:  CT Chest With Contrast  Order: [id number]  Status: Final result       Visible to patient: Yes (seen)       Next appt: 01/10/2024 at 08:45 AM in Neurology (Ella Benoit MD)       Dx: Lung nodule    0 Result Notes  Details     Reading Physician Reading Date Result Priority   Dean Márquez MD  [phone] 11/5/2023 Routine      Narrative & Impression  EXAMINATION:  CT CHEST WITH CONTRAST     CLINICAL HISTORY:  Prior CT chest showing 5 mm lung nodule;  Solitary pulmonary nodule     TECHNIQUE:  Multiple cross-sectional images were obtained from the thoracic inlet to the upper abdomen after the intravenous administration 100 mL of Omnipaque 350.  Coronal and sagittal reformatted images were obtained.  An automated dose exposure technique was utilized this limits radiation does the patient.     COMPARISON:  06/03/2020     FINDINGS:  Heart size is enlarged.  Coronary calcifications identified.  The course and caliber of the thoracic aorta is normal.  A 4 vessel aortic arch is identified with a great vessels being widely patent.  The main pulmonary artery is normal caliber.  No evidence for hilar or mediastinal adenopathy.     Dependent atelectatic changes lungs are identified.  No evidence for consolidation or masslike lesion.  No pleural thickening or pleural effusion.  4 mm pulmonary nodules identified in the right upper lobe.  The trachea and airways are patent.     Changes of hepatic  steatosis.  No suspicious osseous lesions with spondylotic changes.  Soft tissues are grossly normal.     Impression:     Single pulmonary nodule is described above.  Recommend yearly surveillance.  Other secondary findings as noted.        Electronically signed by: Dean Márquez MD  Date:                                            11/05/2023  Time:                                           11:26             Exam Ended: 11/01/23 11:19 Last Resulted: 11/05/23 11:26            Encouraged smoking cessation. Education provided. Will repeat in 1 year as recommended.     6. Breast cancer screening by mammogram  MMG in 3 months.          Follow up in about 1 month (around 5/17/2024) for telemed for thyroid US results.. In addition to their scheduled follow up, the patient has also been instructed to follow up on as needed basis.     Future Appointments   Date Time Provider Department Center   5/6/2024  8:30 AM Andres Lowe MD [facility] MOBORT [place]   5/9/2024  8:45 AM Ella Benoit MD [facility] NEURO [place]   5/27/2024  8:30 AM Estephania Cedillo MD [facility] CARD [place]   1/30/2025  8:30 AM [facility] [place]   3/6/2025 10:30 AM Jennifer Cruz FNP [facility] GYN [place]        NORBERTO Baptiste

## 2024-04-26 ENCOUNTER — HOSPITAL ENCOUNTER (OUTPATIENT)
Dept: RADIOLOGY | Facility: HOSPITAL | Age: 59
Discharge: HOME | End: 2024-04-26
Attending: NURSE PRACTITIONER
Payer: COMMERCIAL

## 2024-04-26 DIAGNOSIS — E04.2 MULTIPLE THYROID NODULES: ICD-10-CM

## 2024-04-26 PROCEDURE — 76536 US EXAM OF HEAD AND NECK: CPT | Mod: TC

## 2024-05-06 ENCOUNTER — OFFICE VISIT (OUTPATIENT)
Dept: ORTHOPEDICS | Facility: CLINIC | Age: 59
End: 2024-05-06
Payer: COMMERCIAL

## 2024-05-06 ENCOUNTER — HOSPITAL ENCOUNTER (OUTPATIENT)
Dept: RADIOLOGY | Facility: CLINIC | Age: 59
Discharge: HOME | End: 2024-05-06
Attending: STUDENT IN AN ORGANIZED HEALTH CARE EDUCATION/TRAINING PROGRAM
Payer: COMMERCIAL

## 2024-05-06 VITALS
BODY MASS INDEX: 30.39 KG/M2 | DIASTOLIC BLOOD PRESSURE: 79 MMHG | WEIGHT: 178 LBS | HEIGHT: 64 IN | HEART RATE: 64 BPM | SYSTOLIC BLOOD PRESSURE: 151 MMHG

## 2024-05-06 DIAGNOSIS — G56.03 BILATERAL CARPAL TUNNEL SYNDROME: Primary | ICD-10-CM

## 2024-05-06 DIAGNOSIS — G56.03 BILATERAL CARPAL TUNNEL SYNDROME: ICD-10-CM

## 2024-05-06 PROCEDURE — 99204 OFFICE O/P NEW MOD 45 MIN: CPT | Mod: ,,, | Performed by: STUDENT IN AN ORGANIZED HEALTH CARE EDUCATION/TRAINING PROGRAM

## 2024-05-06 PROCEDURE — 73110 X-RAY EXAM OF WRIST: CPT | Mod: LT,,, | Performed by: STUDENT IN AN ORGANIZED HEALTH CARE EDUCATION/TRAINING PROGRAM

## 2024-05-06 PROCEDURE — 3008F BODY MASS INDEX DOCD: CPT | Mod: CPTII,,, | Performed by: STUDENT IN AN ORGANIZED HEALTH CARE EDUCATION/TRAINING PROGRAM

## 2024-05-06 PROCEDURE — 4010F ACE/ARB THERAPY RXD/TAKEN: CPT | Mod: CPTII,,, | Performed by: STUDENT IN AN ORGANIZED HEALTH CARE EDUCATION/TRAINING PROGRAM

## 2024-05-06 PROCEDURE — 3077F SYST BP >= 140 MM HG: CPT | Mod: CPTII,,, | Performed by: STUDENT IN AN ORGANIZED HEALTH CARE EDUCATION/TRAINING PROGRAM

## 2024-05-06 PROCEDURE — 1159F MED LIST DOCD IN RCRD: CPT | Mod: CPTII,,, | Performed by: STUDENT IN AN ORGANIZED HEALTH CARE EDUCATION/TRAINING PROGRAM

## 2024-05-06 PROCEDURE — 3078F DIAST BP <80 MM HG: CPT | Mod: CPTII,,, | Performed by: STUDENT IN AN ORGANIZED HEALTH CARE EDUCATION/TRAINING PROGRAM

## 2024-05-06 PROCEDURE — 3046F HEMOGLOBIN A1C LEVEL >9.0%: CPT | Mod: CPTII,,, | Performed by: STUDENT IN AN ORGANIZED HEALTH CARE EDUCATION/TRAINING PROGRAM

## 2024-05-06 PROCEDURE — 73110 X-RAY EXAM OF WRIST: CPT | Mod: RT,,, | Performed by: STUDENT IN AN ORGANIZED HEALTH CARE EDUCATION/TRAINING PROGRAM

## 2024-05-06 RX ORDER — SODIUM CHLORIDE 9 MG/ML
INJECTION, SOLUTION INTRAVENOUS CONTINUOUS
Status: CANCELLED | OUTPATIENT
Start: 2024-05-06

## 2024-05-06 NOTE — PROGRESS NOTES
Chief Complaint:  Bilateral hand numbness    Consulting Physician: Juan A Faustin MD    History of present illness:    Patient is a 58-year-old female right-hand dominant who presents today for initial evaluation of her bilateral hand numbness and tingling.  The right side is worse than the left.  This has been going on for several years.  On the right side she has numbness in all 5 fingers.  On the left side she has numbness predominantly in the thumb index and middle fingers.  She feels like her hands go to sleep.  This is worse at night.  This often wakes her up at nighttime.  She has worn nighttime splints with some relief initially but her symptoms have become worse.  She works as a  at [employer].  She states that her fingers also go numb while she was at work.  She feels like her fingers are also clumsy.  She denies any neck pain difficulty with balance or gait abnormalities.  On the right side she also has stiffness pain catching locking of the right middle finger.  She has not had an injection for this.    Past Medical History:   Diagnosis Date    Anxiety disorder, unspecified     Glaucoma     HTN (hypertension)     OAB (overactive bladder)     GORDON (obstructive sleep apnea) 01/24/2024    Type 2 diabetes mellitus with unspecified diabetic retinopathy without macular edema        Past Surgical History:   Procedure Laterality Date    CHOLECYSTECTOMY      HYSTERECTOMY  2015    HYSTERECTOMY, VAGINAL, LAPAROSCOPY-ASSISTED, WITH SALPINGO-OOPHORECTOY Bilateral 08/22/2017    TUBAL LIGATION         Current Outpatient Medications   Medication Sig Dispense Refill    amLODIPine (NORVASC) 5 MG tablet Take 1 tablet (5 mg total) by mouth once daily. 90 tablet 1    arm brace Misc 1 each by Misc.(Non-Drug; Combo Route) route every evening. Left carpal tunnel wrist splint, nightly 1 each 4    gabapentin (NEURONTIN) 300 MG capsule Take 300 mg by mouth every evening.      glipiZIDE (GLUCOTROL) 10 MG  tablet Take 1 tablet (10 mg total) by mouth 2 (two) times daily before meals. 180 tablet 1    glipiZIDE (GLUCOTROL) 10 MG tablet Take 1 tablet by [facility] twice daily before meal(s) 180 tablet 1    ibuprofen (ADVIL,MOTRIN) 800 MG tablet Take 1 tablet (800 mg total) by mouth 3 (three) times daily as needed for Pain. 90 tablet 2    ibuprofen (ADVIL,MOTRIN) 800 MG tablet Take 1 tablet by mouth three times daily as needed for pain 90 tablet 2    insulin detemir U-100, Levemir, 100 unit/mL (3 mL) SubQ InPn pen Inject 35 units subcutaneously in the morning and 45 units in the evening. 75 mL 3    insulin glargine U-300 conc (TOUJEO MAX SOLOSTAR) 300 unit/mL (3 mL) insulin pen Inject 35 units under the skin in morning and 45 units under the skin in evening. 75 mL 3    insulin glargine U-300 conc (TOUJEO MAX SOLOSTAR) 300 unit/mL (3 mL) insulin pen Inject 35 units under the skin in morning and 45 units under the skin in evening. 75 mL 3    insulin lispro 100 unit/mL pen See Instructions, Inject 2-12 units sq per s/s after CBG AC & HS., # 15 mL, 6 Refill(s), Pharmacy: [facility], 162, cm, Height/Length Dosing, 06/21/21 7:49:00 CDT, 81, kg, Weight Dosing, 06/21/21 7:49:00 CDT Strength: 100 Units/mL 15 mL 6    insulin lispro 100 unit/mL pen Inject 2 to 12 units under the skin per sliding scale after checking blood glucose before meals and at bedtime 15 mL 6    insulin lispro 100 unit/mL pen Inject 2-12 units per sliding scale after cbg before meals and at bedtime as directed. 15 mL 6    losartan (COZAAR) 25 MG tablet Take 1 tablet (25 mg total) by mouth once daily. 90 tablet 1    losartan (COZAAR) 25 MG tablet Take 1 tablet by mouth once daily 90 tablet 1    metFORMIN (GLUCOPHAGE) 1000 MG tablet Take 1 tablet (1,000 mg total) by mouth 2 (two) times daily with meals. 180 tablet 1    metFORMIN (GLUCOPHAGE) 1000 MG tablet Take 1 tablet by mouth twice daily with meals 180 tablet 1    pen needle, diabetic 32 gauge  "x 5/32" Ndle Use 1 needle five times daily 100 each 3    PEN NEEDLE, DIABETIC MISC 1 each by Misc.(Non-Drug; Combo Route) route 2 (two) times a day. Before AC and HS.      pravastatin (PRAVACHOL) 20 MG tablet Take 20 mg by mouth once daily.      propranoloL (INDERAL) 20 MG tablet Take 1 tablet (20 mg total) by mouth 2 (two) times daily. 60 tablet 3    sertraline (ZOLOFT) 50 MG tablet TAKE  1 TABLET po at BEDTIME 90 tablet 1    sertraline (ZOLOFT) 50 MG tablet Take 1 tablet by mouth at bedtime 30 tablet 6    SITagliptin phosphate (JANUVIA) 50 MG Tab Take 1 tablet (50 mg total) by mouth once daily. 90 tablet 1    SITagliptin phosphate (JANUVIA) 50 MG Tab Take 1 tablet (50 mg total) by mouth once daily. 90 tablet 1    zolpidem (AMBIEN) 5 MG Tab Take 1 tablet (5 mg total) by mouth nightly as needed (insomnia). 30 tablet 5    zolpidem (AMBIEN) 5 MG Tab Take 1 tablet (5 mg total) by mouth nightly as needed (insomnia). 30 tablet 5    oxybutynin (DITROPAN-XL) 5 MG TR24 Take 1 tablet (5 mg total) by mouth once daily. 90 tablet 3     No current facility-administered medications for this visit.       Review of patient's allergies indicates:  No Known Allergies    Family History   Problem Relation Name Age of Onset    Diabetes Mother Mckenzie     Hypertension Mother Mckenzie     Kidney failure Mother Mckenzie     Asthma Mother Mckenzie     Kidney disease Mother Mckenzie     Lung cancer Father Rei     Diabetes Father Rei     Hypertension Father Rei     Mental illness Father Rei     Thyroid disease Sister Astrid     Brain cancer Sister Astrid     Diabetes Sister Astrid     Hypertension Sister Astrid     Cancer Sister Danelle         Thyroid    Thyroid disease Sister Danelle     Migraines Sister Danelle     Miscarriages / Stillbirths Sister Danelle     Cancer Brother Jack         Pancreatic    Hypertension Brother Jack     Seizures Brother Jack     Diabetes Sister Roxy     Hypertension Sister Roxy  "       Social History     Socioeconomic History    Marital status: Single   Tobacco Use    Smoking status: Never     Passive exposure: Past    Smokeless tobacco: Never   Substance and Sexual Activity    Alcohol use: Not Currently     Comment: special occassion    Drug use: Never    Sexual activity: Not Currently     Partners: Male     Birth control/protection: Abstinence, Injection     Social Determinants of Health     Financial Resource Strain: Medium Risk (12/16/2023)    Overall Financial Resource Strain (CARDIA)     Difficulty of Paying Living Expenses: Somewhat hard   Food Insecurity: Food Insecurity Present (12/16/2023)    Hunger Vital Sign     Worried About Running Out of Food in the Last Year: Never true     Ran Out of Food in the Last Year: Sometimes true   Transportation Needs: No Transportation Needs (12/16/2023)    PRAPARE - Transportation     Lack of Transportation (Medical): No     Lack of Transportation (Non-Medical): No   Physical Activity: Inactive (12/16/2023)    Exercise Vital Sign     Days of Exercise per Week: 1 day     Minutes of Exercise per Session: 0 min   Stress: Stress Concern Present (12/16/2023)    [employer] [place] [employer] - Occupational Stress Questionnaire     Feeling of Stress : Rather much   Housing Stability: High Risk (12/16/2023)    Housing Stability Vital Sign     Unable to Pay for Housing in the Last Year: Yes     Number of Places Lived in the Last Year: 2     Unstable Housing in the Last Year: No       Review of Systems:    Constitution:   Denies chills, fever, and sweats.  HENT:   Denies headaches or blurry vision.  Cardiovascular:  Denies chest pain or irregular heart beat.  Respiratory:   Denies cough or shortness of breath.  Gastrointestinal:  Denies abdominal pain, nausea, or vomiting.  Musculoskeletal:   Denies muscle cramps.  Neurological:   Denies dizziness or focal weakness.  Psychiatric/Behavior: Normal mental status.  Hematology/Lymph:  Denies bleeding  "problem or easy bruising/bleeding.  Skin:    Denies rash or suspicious lesions.    Examination:    Vital Signs:    Vitals:    05/06/24 0856 05/06/24 0857   BP: (!) 145/81 (!) 151/79   Pulse: 96 64   Weight: 80.7 kg (178 lb)    Height: 5' 4" (1.626 m)        Body mass index is 30.55 kg/m².    Constitution:   Well-developed, well nourished patient in no acute distress.  Neurological:   Alert and oriented x 3 and cooperative to examination.     Psychiatric/Behavior: Normal mental status.  Respiratory:   No shortness of breath.  Eyes:    Extraoccular muscles intact  Skin:    No scars, rash or suspicious lesions.    MSK:   Right hand:  No open wounds or rashes.  Full range of motion of the wrist digits.  She is tenderness to palpation over the A1 pulley of the middle finger.  She can flex the middle finger to 1 cm away from the distal palmar crease and extend it fully.  Sensation is diminished in the median nerve distribution.  Two-point discrimination is 10 mm in all 5 digits.  There is subtle thenar atrophy.  Apb strength is 4+ out of 5.  Tinel's positive over the median nerve at the wrist.  Tinel's positive over the ulnar nerve at the elbow.  Flexion and compression at the cubital tunnel is positive.  Phalen's test is also positive.  Radial pulses 2+ hand is warm well perfused    Left hand: No open wounds or rashes.  Full range of motion of the wrist and digits.  She is able to make a fist to the distal palmar crease and extend her digits fully.  Two-point discrimination is 10 in the thumb index and middle fingers.  It is 5 in the ring and small fingers.  Tinel's positive over the median nerve at the wrist.  Phalen's test is positive.  Cubital tunnel provocative maneuvers are negative.  Radial pulse 2 +hand is warm well perfused    Imaging:   X-ray of the right wrist three views shows degenerative changes but no fractures or dislocations    X-ray of the left wrist three views shows degenerative changes but no " fractures or dislocations    EMG shows bilateral carpal tunnel syndrome with minimal cubital tunnel syndrome     Assessment:  Right carpal tunnel syndrome, right cubital tunnel syndrome, right middle finger trigger finger, left carpal tunnel syndrome    Plan:  I will give her an injection into the right middle finger flexor tendon sheath today to treat the right trigger finger conservatively.  I think she will benefit from a right carpal tunnel and cubital tunnel release.  I will book her for surgery for right carpal tunnel release and endoscopic cubital tunnel release on 06/07/2024    I explained that surgery and the nature of their condition are not without risks. These include, but are not limited to, bleeding, infection, neurovascular compromise, wound complications, scarring, cosmetic defects, need for later and/or repeated surgeries, pain, loss of ROM, loss of function, deformity, functional abnormalities, stiffness, thromboembolic complications, compartment syndrome, loss of limb, loss of life, anesthetic complications, and other imponderables. I explained that these can occur despite the adequacy of treatments rendered, and that their risks are heightened given the nature of their condition. They verbalized understanding. They would like to continue with surgery at this time. If appropriate family was involved with surgical discussion.      Follow Up: after surgery  Xray at next visit: none

## 2024-05-07 NOTE — PROGRESS NOTES
[facility] Neurology Follow Up Office Visit Note    Initial Visit Date: 3/29/2023  Last Visit Date: 9/26/2023  Current Visit Date:  05/09/2024    Chief Complaint:     Chief Complaint   Patient presents with    Tremors     States medication has not really helped with tremor       History of Present Illness:      This is a 58 y.o. right hand dominant female with history of  IDDM, Cataract, borderline glaucoma, hypercalcemia, overactive bladder, depression, insomnia who is referred for exaggerated physiological tremor and left carpal tunnel due to left wrist tenosynovitis.  During last visit, propanolol 20 mg twice a day was started.  Left wrist splint was prescribed. Not taking propranolol during the day due to nausea.      Age of Onset: 57 years old      Description of movements: right hand with use, intermittent.     Exacerbation factors: denied      Relieving factors: denied      Associated Symptoms:  Changes in smell or taste: Not Applicable   Dysphagia: No   Voice/phonation change: No    Changes in gait/falls:  No    Orthostatic hypotension:  No   Sleep disorder: Yes trouble sleeping at night    Visual Hallucinations/Delusions: Not Applicable   Mood disturbance: Yes depression     Cognitive decline: Not Applicable    Handwriting changes: Yes erratic     Trouble using a fork: No   Trouble using a spoon: Yes occasional    Trouble drinking out of a cup: No       Risk Factors:   Family history of movement disorder: Yes father and sister with tremor disorder.    Cardiovascular risk factors: Yes as above    Antipsychotics/Mood stabilizer/Antidepressant/Traditional antiemetic exposure: Yes Zoloft    Stimulant use: No    Tobacco use: No   Alcohol use: Yes occasional      Recreational drug use: No    Herbicide exposure: Not Applicable   Agent Orange exposure: Not Applicable         Medications:     Current:   Gabapentin 300 mg at bedtime   Propranolol 20 mg twice a day: nausea     Prior:   Denied    Devices/Interventions:      DBS:   Gamma knife/Radiofrequency ablation:   PT/OT:     Labs:     Results for orders placed or performed in visit on 04/17/24   POCT HEMOGLOBIN A1C   Result Value Ref Range    Hemoglobin A1C, POC 10.3 %       Studies:      Imaging:   MRI Brain:     Review of Systems:     Review of Systems   All other systems reviewed and are negative.      Physical Exams:     Vitals:    05/09/24 0857   BP: 127/85   Pulse: 84   Resp: 12   Temp: 97.9 °F (36.6 °C)         Physical Exam  Vitals and nursing note reviewed.   Constitutional:       Appearance: Normal appearance.   HENT:      Head: Normocephalic and atraumatic.      Nose: Nose normal.      Mouth/Throat:      Mouth: Mucous membranes are moist.      Pharynx: Oropharynx is clear.   Eyes:      Conjunctiva/sclera: Conjunctivae normal.   Cardiovascular:      Rate and Rhythm: Normal rate and regular rhythm.      Pulses: Normal pulses.   Pulmonary:      Effort: Pulmonary effort is normal.      Breath sounds: Normal breath sounds.   Abdominal:      General: Abdomen is flat.   Musculoskeletal:         General: Tenderness present. Normal range of motion.      Cervical back: Normal range of motion.      Comments: Left wrist tenderness to pressure, edematous, with delayed Phalen's Sign   Skin:     General: Skin is warm.   Neurological:      Mental Status: She is alert.         Comprehensive Neurological Exam:  Mental Status: Alert Oriented to Self, Date, and Place. Comprehension wnl. No dysarthria.   CN II - XII: LANDRY, No APD, VFFC, No ptosis OU, EOMI without nystagmus, LT/Temp symmetric in CN V1-3 distribution, Hearing grossly intact, Face Symmetric, Tongue and Uvula midline, Trapezius symmetric bilateral.   Motor: tone and bulk wnl throughout, no tremor today,  5/5 to confrontation, Fine finger movements wnl b/l, No pronator drift.   Sensory: LT, Proprioception, Vibration, PP, Temp symmetric. No sensory simultagnosia.   Reflexes: 1+ throughout, plantar reflexes downward bilateral.    Cerebellar: FNF wnl b/l, RAHM wnl b/l  Gait: normal. Heel Gait, Toe Gait, Tandem Gait wnl.     Assessment:     This is a 58 y.o. right hand dominant female with history of IDDM, Cataract, borderline glaucoma, hypercalcemia, overactive bladder, depression, insomnia who is referred for exaggerated physiological tremor and left carpal tunnel due to left wrist tenosynovitis.     Problem List Items Addressed This Visit          Neuro    Physiological tremor - Primary    Relevant Medications    propranoloL (INDERAL) 10 MG tablet    CTS (carpal tunnel syndrome)    Relevant Medications    propranoloL (INDERAL) 10 MG tablet       Renal/    Hypercalcemia    Relevant Medications    propranoloL (INDERAL) 10 MG tablet       Orthopedic    Tenosynovitis of left wrist    Relevant Medications    propranoloL (INDERAL) 10 MG tablet         Plan:     [] decrease Propranolol 10 mg twice a day as needed for tremor  [] wrist splint at night: requesting more rest splint.  [] Will monitor clinically    RTC 3 months    Visit today is associated with current or anticipated ongoing medical care related to this patient's single serious condition/complex condition as documented above.       I have explained the treatment plan, diagnosis, and prognosis to patient. All questions are answered to the best of my knowledge.     Face to face time 30 minutes, including documentation, chart review, counseling, education, review of test results, relevant medical records, and coordination of care.       Ella Benoit MD   General Neurology  05/09/2024

## 2024-05-09 ENCOUNTER — OFFICE VISIT (OUTPATIENT)
Dept: NEUROLOGY | Facility: CLINIC | Age: 59
End: 2024-05-09
Payer: COMMERCIAL

## 2024-05-09 VITALS
OXYGEN SATURATION: 97 % | DIASTOLIC BLOOD PRESSURE: 85 MMHG | WEIGHT: 177.5 LBS | HEIGHT: 64 IN | SYSTOLIC BLOOD PRESSURE: 127 MMHG | BODY MASS INDEX: 30.3 KG/M2 | HEART RATE: 84 BPM | TEMPERATURE: 98 F | RESPIRATION RATE: 12 BRPM

## 2024-05-09 DIAGNOSIS — M65.9 TENOSYNOVITIS OF LEFT WRIST: ICD-10-CM

## 2024-05-09 DIAGNOSIS — R25.1 PHYSIOLOGICAL TREMOR: Primary | ICD-10-CM

## 2024-05-09 DIAGNOSIS — G56.02 CARPAL TUNNEL SYNDROME OF LEFT WRIST: ICD-10-CM

## 2024-05-09 DIAGNOSIS — E83.52 HYPERCALCEMIA: ICD-10-CM

## 2024-05-09 PROCEDURE — 1160F RVW MEDS BY RX/DR IN RCRD: CPT | Mod: CPTII,,, | Performed by: PSYCHIATRY & NEUROLOGY

## 2024-05-09 PROCEDURE — 3008F BODY MASS INDEX DOCD: CPT | Mod: CPTII,,, | Performed by: PSYCHIATRY & NEUROLOGY

## 2024-05-09 PROCEDURE — 1159F MED LIST DOCD IN RCRD: CPT | Mod: CPTII,,, | Performed by: PSYCHIATRY & NEUROLOGY

## 2024-05-09 PROCEDURE — 3074F SYST BP LT 130 MM HG: CPT | Mod: CPTII,,, | Performed by: PSYCHIATRY & NEUROLOGY

## 2024-05-09 PROCEDURE — G2211 COMPLEX E/M VISIT ADD ON: HCPCS | Mod: S$PBB,,, | Performed by: PSYCHIATRY & NEUROLOGY

## 2024-05-09 PROCEDURE — 3046F HEMOGLOBIN A1C LEVEL >9.0%: CPT | Mod: CPTII,,, | Performed by: PSYCHIATRY & NEUROLOGY

## 2024-05-09 PROCEDURE — 4010F ACE/ARB THERAPY RXD/TAKEN: CPT | Mod: CPTII,,, | Performed by: PSYCHIATRY & NEUROLOGY

## 2024-05-09 PROCEDURE — 99214 OFFICE O/P EST MOD 30 MIN: CPT | Mod: PBBFAC | Performed by: PSYCHIATRY & NEUROLOGY

## 2024-05-09 PROCEDURE — 3079F DIAST BP 80-89 MM HG: CPT | Mod: CPTII,,, | Performed by: PSYCHIATRY & NEUROLOGY

## 2024-05-09 PROCEDURE — 99214 OFFICE O/P EST MOD 30 MIN: CPT | Mod: S$PBB,,, | Performed by: PSYCHIATRY & NEUROLOGY

## 2024-05-09 RX ORDER — PROPRANOLOL HYDROCHLORIDE 10 MG/1
10 TABLET ORAL 2 TIMES DAILY
Qty: 60 TABLET | Refills: 3 | Status: SHIPPED | OUTPATIENT
Start: 2024-05-09 | End: 2024-09-06

## 2024-05-09 NOTE — LETTER
May 9, 2024      [facility]  [address] [address]  Phone: [phone]       Patient: Foreign Rosas   YOB: 1965  Date of Visit: 05/09/2024    To Whom It May Concern:    Samuel Rosas  was at [facility] on 05/09/2024. The patient may return to work/school on 05/09/2024 with no restrictions. If you have any questions or concerns, or if I can be of further assistance, please do not hesitate to contact me.    Sincerely,        Ella Benoit MD

## 2024-05-20 ENCOUNTER — OFFICE VISIT (OUTPATIENT)
Dept: INTERNAL MEDICINE | Facility: CLINIC | Age: 59
End: 2024-05-20
Payer: COMMERCIAL

## 2024-05-20 ENCOUNTER — LAB VISIT (OUTPATIENT)
Dept: LAB | Facility: HOSPITAL | Age: 59
End: 2024-05-20
Attending: NURSE PRACTITIONER
Payer: COMMERCIAL

## 2024-05-20 VITALS
RESPIRATION RATE: 18 BRPM | TEMPERATURE: 99 F | HEART RATE: 97 BPM | WEIGHT: 174.19 LBS | HEIGHT: 64 IN | BODY MASS INDEX: 29.74 KG/M2 | SYSTOLIC BLOOD PRESSURE: 120 MMHG | DIASTOLIC BLOOD PRESSURE: 76 MMHG

## 2024-05-20 DIAGNOSIS — R91.8 MULTIPLE PULMONARY NODULES: ICD-10-CM

## 2024-05-20 DIAGNOSIS — Z79.4 TYPE 2 DIABETES MELLITUS WITHOUT COMPLICATION, WITH LONG-TERM CURRENT USE OF INSULIN: ICD-10-CM

## 2024-05-20 DIAGNOSIS — Z79.4 UNCONTROLLED DIABETES MELLITUS WITH HYPERGLYCEMIA, WITH LONG-TERM CURRENT USE OF INSULIN: ICD-10-CM

## 2024-05-20 DIAGNOSIS — G56.00 CARPAL TUNNEL SYNDROME, UNSPECIFIED LATERALITY: ICD-10-CM

## 2024-05-20 DIAGNOSIS — E11.9 TYPE 2 DIABETES MELLITUS WITHOUT COMPLICATION, WITH LONG-TERM CURRENT USE OF INSULIN: ICD-10-CM

## 2024-05-20 DIAGNOSIS — G47.33 OSA (OBSTRUCTIVE SLEEP APNEA): ICD-10-CM

## 2024-05-20 DIAGNOSIS — Z00.00 WELL ADULT EXAM: ICD-10-CM

## 2024-05-20 DIAGNOSIS — E11.65 UNCONTROLLED DIABETES MELLITUS WITH HYPERGLYCEMIA, WITH LONG-TERM CURRENT USE OF INSULIN: ICD-10-CM

## 2024-05-20 DIAGNOSIS — G56.03 BILATERAL CARPAL TUNNEL SYNDROME: ICD-10-CM

## 2024-05-20 DIAGNOSIS — Z12.31 ENCOUNTER FOR SCREENING MAMMOGRAM FOR MALIGNANT NEOPLASM OF BREAST: ICD-10-CM

## 2024-05-20 DIAGNOSIS — E04.2 MULTIPLE THYROID NODULES: Primary | ICD-10-CM

## 2024-05-20 PROBLEM — Z12.39 SCREENING FOR BREAST CANCER: Status: ACTIVE | Noted: 2024-05-20

## 2024-05-20 LAB
ALBUMIN SERPL-MCNC: 4.1 G/DL (ref 3.4–4.8)
ALBUMIN/GLOB SERPL: 1.1 RATIO (ref 1.1–2)
ALP SERPL-CCNC: 76 UNIT/L (ref 40–150)
ALT SERPL-CCNC: 22 UNIT/L (ref 0–55)
ANION GAP SERPL CALC-SCNC: 8 MEQ/L
AST SERPL-CCNC: 17 UNIT/L (ref 5–34)
BASOPHILS # BLD AUTO: 0.04 X10(3)/MCL
BASOPHILS NFR BLD AUTO: 0.5 %
BILIRUB SERPL-MCNC: 0.3 MG/DL
BUN SERPL-MCNC: 12.4 MG/DL (ref 9.8–20.1)
CALCIUM SERPL-MCNC: 10.2 MG/DL (ref 8.4–10.2)
CHLORIDE SERPL-SCNC: 106 MMOL/L (ref 98–107)
CO2 SERPL-SCNC: 25 MMOL/L (ref 23–31)
CREAT SERPL-MCNC: 0.89 MG/DL (ref 0.55–1.02)
CREAT/UREA NIT SERPL: 14
EOSINOPHIL # BLD AUTO: 0.15 X10(3)/MCL (ref 0–0.9)
EOSINOPHIL NFR BLD AUTO: 1.8 %
ERYTHROCYTE [DISTWIDTH] IN BLOOD BY AUTOMATED COUNT: 14.2 % (ref 11.5–17)
EST. AVERAGE GLUCOSE BLD GHB EST-MCNC: 214.5 MG/DL
GFR SERPLBLD CREATININE-BSD FMLA CKD-EPI: >60 ML/MIN/1.73/M2
GLOBULIN SER-MCNC: 3.6 GM/DL (ref 2.4–3.5)
GLUCOSE SERPL-MCNC: 149 MG/DL (ref 82–115)
HBA1C MFR BLD: 9.1 %
HCT VFR BLD AUTO: 39.6 % (ref 37–47)
HGB BLD-MCNC: 12.5 G/DL (ref 12–16)
IMM GRANULOCYTES # BLD AUTO: 0.02 X10(3)/MCL (ref 0–0.04)
IMM GRANULOCYTES NFR BLD AUTO: 0.2 %
LYMPHOCYTES # BLD AUTO: 3.32 X10(3)/MCL (ref 0.6–4.6)
LYMPHOCYTES NFR BLD AUTO: 40 %
MCH RBC QN AUTO: 26 PG (ref 27–31)
MCHC RBC AUTO-ENTMCNC: 31.6 G/DL (ref 33–36)
MCV RBC AUTO: 82.5 FL (ref 80–94)
MONOCYTES # BLD AUTO: 0.66 X10(3)/MCL (ref 0.1–1.3)
MONOCYTES NFR BLD AUTO: 8 %
NEUTROPHILS # BLD AUTO: 4.11 X10(3)/MCL (ref 2.1–9.2)
NEUTROPHILS NFR BLD AUTO: 49.5 %
NRBC BLD AUTO-RTO: 0 %
PLATELET # BLD AUTO: 289 X10(3)/MCL (ref 130–400)
PMV BLD AUTO: 11 FL (ref 7.4–10.4)
POTASSIUM SERPL-SCNC: 4 MMOL/L (ref 3.5–5.1)
PROT SERPL-MCNC: 7.7 GM/DL (ref 5.8–7.6)
RBC # BLD AUTO: 4.8 X10(6)/MCL (ref 4.2–5.4)
SODIUM SERPL-SCNC: 139 MMOL/L (ref 136–145)
WBC # SPEC AUTO: 8.3 X10(3)/MCL (ref 4.5–11.5)

## 2024-05-20 PROCEDURE — 85025 COMPLETE CBC W/AUTO DIFF WBC: CPT

## 2024-05-20 PROCEDURE — 4010F ACE/ARB THERAPY RXD/TAKEN: CPT | Mod: CPTII,,, | Performed by: NURSE PRACTITIONER

## 2024-05-20 PROCEDURE — 99214 OFFICE O/P EST MOD 30 MIN: CPT | Mod: S$PBB,,, | Performed by: NURSE PRACTITIONER

## 2024-05-20 PROCEDURE — 83036 HEMOGLOBIN GLYCOSYLATED A1C: CPT

## 2024-05-20 PROCEDURE — 36415 COLL VENOUS BLD VENIPUNCTURE: CPT

## 2024-05-20 PROCEDURE — 3074F SYST BP LT 130 MM HG: CPT | Mod: CPTII,,, | Performed by: NURSE PRACTITIONER

## 2024-05-20 PROCEDURE — 1159F MED LIST DOCD IN RCRD: CPT | Mod: CPTII,,, | Performed by: NURSE PRACTITIONER

## 2024-05-20 PROCEDURE — 3046F HEMOGLOBIN A1C LEVEL >9.0%: CPT | Mod: CPTII,,, | Performed by: NURSE PRACTITIONER

## 2024-05-20 PROCEDURE — 3008F BODY MASS INDEX DOCD: CPT | Mod: CPTII,,, | Performed by: NURSE PRACTITIONER

## 2024-05-20 PROCEDURE — 3078F DIAST BP <80 MM HG: CPT | Mod: CPTII,,, | Performed by: NURSE PRACTITIONER

## 2024-05-20 PROCEDURE — 80053 COMPREHEN METABOLIC PANEL: CPT

## 2024-05-20 PROCEDURE — 99215 OFFICE O/P EST HI 40 MIN: CPT | Mod: PBBFAC | Performed by: NURSE PRACTITIONER

## 2024-05-20 NOTE — PROGRESS NOTES
Patient ID: [id number]     Chief Complaint: LAB RESULTS        HPI:     RICHMOND Rosas is a 58 y.o. female here today for a follow up.         Immunizations:   Immunization History   Administered Date(s) Administered    COVID-19, MRNA, LN-S, PF (Pfizer) (Purple Cap) 06/21/2021, 06/21/2021, 07/19/2021, 07/19/2021    DTP 1963, 1963, 1963, 10/22/1968, 07/24/1975, 05/03/1978    Influenza 10/26/2014    Influenza - Quadrivalent - PF *Preferred* (6 months and older) 09/29/2021    Influenza - Trivalent - PF (ADULT) 10/03/2016, 12/21/2017, 09/21/2018, 10/22/2019    MMR 01/15/2010    Measles 12/07/1967, 05/03/1978    OPV 1963, 1963, 01/23/1964, 10/14/1969, 07/24/1975    Pneumococcal Polysaccharide - 23 Valent 06/15/2016    Rubella 12/18/1969    Td (ADULT) 10/26/1999, 10/12/2005        -------------------------------------    Anxiety disorder, unspecified    Glaucoma    HTN (hypertension)    OAB (overactive bladder)    GORDON (obstructive sleep apnea)    Type 2 diabetes mellitus with unspecified diabetic retinopathy without macular edema        Past Surgical History:   Procedure Laterality Date    CHOLECYSTECTOMY      HYSTERECTOMY, VAGINAL, LAPAROSCOPY-ASSISTED, WITH SALPINGO-OOPHORECTOY Bilateral 08/22/2017    TUBAL LIGATION         Review of patient's allergies indicates:  No Known Allergies    Current Outpatient Medications   Medication Instructions    arm brace Misc 1 each, Misc.(Non-Drug; Combo Route), Nightly, Left carpal tunnel wrist splint, nightly    gabapentin (NEURONTIN) 300 mg, Oral, Nightly, Patient states she takes once a month    glipiZIDE (GLUCOTROL) 10 mg, Oral, 2 times daily before meals    ibuprofen (ADVIL,MOTRIN) 800 mg, Oral, 3 times daily PRN    insulin detemir U-100, Levemir, 100 unit/mL (3 mL) SubQ InPn pen Inject 35 units sq in AM and 48 units sq in PM.    insulin lispro 100 unit/mL pen Inject 2 to 12 units under the skin per sliding scale after checking blood  "glucose before meals and at bedtime    losartan (COZAAR) 25 mg, Oral, Daily    metFORMIN (GLUCOPHAGE) 1,000 mg, Oral, 2 times daily with meals    oxybutynin (DITROPAN-XL) 5 mg, Oral, Daily    pen needle, diabetic 32 gauge x 5/32" Ndle Use 1 needle five times daily    pravastatin (PRAVACHOL) 20 mg, Oral, Daily    propranoloL (INDERAL) 10 mg, Oral, 2 times daily    sertraline (ZOLOFT) 50 MG tablet Take 1 tablet by mouth at bedtime    SITagliptin phosphate (JANUVIA) 50 mg, Oral, Daily    zolpidem (AMBIEN) 5 mg, Oral, Nightly PRN       Social History     Socioeconomic History    Marital status: Single   Tobacco Use    Smoking status: Never     Passive exposure: Past    Smokeless tobacco: Never   Substance and Sexual Activity    Alcohol use: Not Currently     Comment: special occassion    Drug use: Never    Sexual activity: Not Currently     Partners: Male     Birth control/protection: Abstinence, See Surgical Hx     Social Determinants of Health     Financial Resource Strain: Medium Risk (12/16/2023)    Overall Financial Resource Strain (CARDIA)     Difficulty of Paying Living Expenses: Somewhat hard   Food Insecurity: Food Insecurity Present (12/16/2023)    Hunger Vital Sign     Worried About Running Out of Food in the Last Year: Never true     Ran Out of Food in the Last Year: Sometimes true   Transportation Needs: No Transportation Needs (12/16/2023)    PRAPARE - Transportation     Lack of Transportation (Medical): No     Lack of Transportation (Non-Medical): No   Physical Activity: Inactive (12/16/2023)    Exercise Vital Sign     Days of Exercise per Week: 1 day     Minutes of Exercise per Session: 0 min   Stress: Stress Concern Present (12/16/2023)    [employer] [place] [employer] - Occupational Stress Questionnaire     Feeling of Stress : Rather much   Housing Stability: High Risk (12/16/2023)    Housing Stability Vital Sign     Unable to Pay for Housing in the Last Year: Yes     Number of Places Lived " in the Last Year: 2     Unstable Housing in the Last Year: No        Family History   Problem Relation Name Age of Onset    Diabetes Mother Mckenzie     Hypertension Mother Mckenzie     Kidney failure Mother Mckenzie     Asthma Mother Mckenzie     Kidney disease Mother Mckenzie     Lung cancer Father Rei     Diabetes Father Rei     Hypertension Father Rei     Mental illness Father Rei     Thyroid disease Sister Astrid     Brain cancer Sister Astrid     Diabetes Sister Astrid     Hypertension Sister Astrid     Cancer Sister Danelle         Thyroid    Thyroid disease Sister Danelle     Migraines Sister Danelle     Miscarriages / Stillbirths Sister Danelle     Cancer Brother Jack         Pancreatic    Hypertension Brother Jack     Seizures Brother Jack     Diabetes Sister Roxy     Hypertension Sister Roxy         Patient Care Team:  Kelin Puga FNP as PCP - General (Family Medicine)  Kelin Puga FNP (Family Medicine)  Gilberto Adams MD as Hypertension Digital Medicine Responsible Provider (Cardiology)  Eliane Shaw, PharmD as Hypertension Digital Medicine Clinician  1, [facility] [employer] [facility] as Hypertension Digital Medicine Contract     Subjective:     Review of Systems     See HPI for details    Constitutional: Denies Change in appetite. Denies Chills. Denies Fever. Denies Night sweats.  Eye: Denies Blurred vision. Denies Discharge. Denies Eye pain.  ENT: Denies Decreased hearing. Denies Sore throat. Denies Swollen glands.  Respiratory: Denies Cough. Denies Shortness of breath. Denies Shortness of breath with exertion. Denies Wheezing.  Cardiovascular: DeniesChest pain at rest. Denies Chest pain with exertion. Denies Irregular heartbeat. Denies Palpitations. Denies Edema.  Gastrointestinal: Denies Abdominal pain. DeniesDiarrhea. Denies Nausea. Denies Vomiting. Denies Hematemesis or Hematochezia.  Genitourinary: Denies Dysuria. Denies Urinary frequency. Denies  "Urinary urgency. Denies Blood in urine.  Endocrine: Denies Cold intolerance. Denies Excessive thirst. Denies Heat intolerance. Denies Weight loss. Denies Weight gain.  Musculoskeletal: Denies Painful joints. Denies Weakness.  Integumentary: Denies Rash. Denies Itching. Denies Dry skin.  Neurologic: Denies Dizziness. Denies Fainting. Denies Headache.  Psychiatric: Denies Depression. Denies Anxiety. Denies Suicidal/Homicidal ideations.    All Other ROS: Negative except as stated in HPI.       Objective:     Visit Vitals  /76 (BP Location: Right arm, Patient Position: Sitting, BP Method: Large (Automatic))   Pulse 97   Temp 98.6 °F (37 °C) (Oral)   Resp 18   Ht 5' 4" (1.626 m)   Wt 79 kg (174 lb 2.6 oz)   BMI 29.90 kg/m²       Physical Exam    General: Alert and oriented, No acute distress.  Head: Normocephalic, Atraumatic.  Eye: Pupils are equal, round and reactive to light, Extraocular movements are intact, Sclera non-icteric.  Ears/Nose/Throat: Normal, Mucosa moist,Clear.  Neck/Thyroid: Supple, Non-tender, No carotid bruit, No lymphadenopathy, No JVD, Full range of motion.  Respiratory: Clear to auscultation bilaterally; No wheezes, rales or rhonchi,Non-labored respirations, Symmetrical chest wall expansion.  Cardiovascular: Regular rate and rhythm, S1/S2 normal, No murmurs, rubs or gallops.  Gastrointestinal: Soft, Non-tender, Non-distended, Normal bowel sounds, No palpable organomegaly.  Musculoskeletal: Normal range of motion.  Integumentary: Warm, Dry, Intact, No suspicious lesions or rashes.  Extremities: No clubbing, cyanosis or edema  Neurologic: No focal deficits, Cranial Nerves II-XII are grossly intact, Motor strength normal upper and lower extremities, Sensory exam intact.  Psychiatric: Normal interaction, Coherent speech, Euthymic mood, Appropriate affect       Labs Reviewed:     Chemistry:  Lab Results   Component Value Date     05/20/2024    K 4.0 05/20/2024    CHLORIDE 106 05/20/2024    " BUN 12.4 05/20/2024    CREATININE 0.89 05/20/2024    EGFRNORACEVR >60 05/20/2024    GLUCOSE 149 (H) 05/20/2024    CALCIUM 10.2 05/20/2024    ALKPHOS 76 05/20/2024    LABPROT 7.7 (H) 05/20/2024    ALBUMIN 4.1 05/20/2024    BILIDIR 0.1 06/16/2021    IBILI 0.10 06/16/2021    AST 17 05/20/2024    ALT 22 05/20/2024    [id number] 34.0 04/05/2021        Lab Results   Component Value Date    HGBA1C 9.1 (H) 05/20/2024        Hematology:  Lab Results   Component Value Date    WBC 8.30 05/20/2024    HGB 12.5 05/20/2024    HCT 39.6 05/20/2024     05/20/2024       Lipid Panel:  Lab Results   Component Value Date    CHOL 126 04/17/2024    HDL 36 04/17/2024    LDL 51.00 04/17/2024    TRIG 193 (H) 04/17/2024    TOTALCHOLEST 4 04/17/2024        Urine:  Lab Results   Component Value Date    COLORUA Yellow 01/09/2024    APPEARANCEUA Clear 01/09/2024    SGUA 1.025 01/09/2024    PHUA 5.5 01/09/2024    PROTEINUA 30 (A) 01/09/2024    GLUCOSEUA 100 (A) 01/09/2024    KETONESUA Negative 01/09/2024    BLOODUA Negative 01/09/2024    NITRITESUA Negative 01/09/2024    LEUKOCYTESUR Negative 01/09/2024    RBCUA None Seen 01/09/2024    WBCUA None Seen 01/09/2024    BACTERIA Moderate (A) 01/09/2024    SQEPUA Trace (A) 02/09/2023    HYALINECASTS None Seen 02/09/2023    CREATRANDUR 76.4 09/21/2023        Assessment:       ICD-10-CM ICD-9-CM   1. Multiple thyroid nodules  E04.2 241.1   2. Carpal tunnel syndrome, unspecified laterality  G56.00 354.0   3. Well adult exam  Z00.00 V70.0   4. Multiple pulmonary nodules  R91.8 793.19   5. Encounter for screening mammogram for malignant neoplasm of breast  Z12.31 V76.12   6. Type 2 diabetes mellitus without complication, with long-term current use of insulin  E11.9 250.00    Z79.4 V58.67   7. GODRON (obstructive sleep apnea)  G47.33 327.23        Plan:     1. Multiple thyroid nodules  Pt had Thyroid US done 4-26-24 showing:  US Thyroid  Order: [id number]  Status: Final result       Visible to patient:  Yes (seen)       Next appt: 05/27/2024 at 08:30 AM in Cardiology (Estephania Cedillo MD)       Dx: Multiple thyroid nodules    0 Result Notes  Details    Reading Physician Reading Date Result Priority   River West MD  [phone] 4/29/2024 Routine     Narrative & Impression  EXAMINATION:  US THYROID     CLINICAL HISTORY:  multiple thyroid nodules;, Nontoxic multinodular goiter.     TECHNIQUE:  Multiple transverse and sagittal grayscale sonographic images were acquired through the thyroid gland.     FINDINGS:  The right emilie thyroid measures 3.5 x 1.3 x 1.4 cm, left emilie thyroid measures 3.6 x 1.7 x 1.5 cm isthmus measures 5 mm in thickness.     Within the right emilie thyroid multiple subcentimeter nodules are identified measuring 3 mm x 3 mm x 2 mm, 4 mm x 3 mm x 2 mm and 7 mm x 6 mm x 7 mm none of these nodules meets criteria for biopsy and or surveillance.     On the left subcentimeter nodules are identified measuring 6 mm x 6 mm x 5 mm a larger hypoechoic solid nodule measures 1 cm x 9 mm x 7 mm these nodule corresponds to a TI-RADS type 4 nodule recommendations are follow-up if greater than 1 cm at 1 year, 2 years, 3 years and 5 years.     A 2nd 9 mm x 7 mm by 5 mm nodule is identified also corresponding to a TI-RADS type 4 nodule recommendations are for follow-up if greater than 1 cm at 1 year, 2 years, 3 years and 5 years.     No other significant abnormalities     Impression:     Nodularities as above with recommendations as above        Electronically signed by:River West  Date:                                            04/29/2024  Time:                                           08:42           Exam Ended: 04/26/24 12:18 CDT Last Resulted: 04/29/24 08:42 CDT         Pt has 2 thyroid nodules showing Tirads 4- refer to ENT for eval and mgmt.     2. Carpal tunnel syndrome, unspecified laterality  Pt had EMG done with Dr Velez on 1-10-24 showing:  Sensory NCVs: medians absent ulnars normal for age    Motor NCVs: medians markedly distally prolonged and on the right low amplitudes   Ulnars with only trivial slowing but no conduction block at the elbows   Needle EMG: given her complaints and the above findings needle emg not pursued   Conclusions: Median entrapments at the wrists;   minimal features of ulnar neuropathy at the elbows.     3. Well adult exam  Labs in 1 month. Keep GYN cl appt. MMG in 3 months. UTD cologuard 7-12-23 negative results- next due 4/2026.     4. Multiple pulmonary nodules  Pt had CT chest with contrast 11-1-23 showing:  CT Chest With Contrast  Order: [id number]  Status: Final result       Visible to patient: Yes (seen)       Next appt: 01/10/2024 at 08:45 AM in Neurology (Ella Benoit MD)       Dx: Lung nodule    0 Result Notes  Details     Reading Physician Reading Date Result Priority   Dean Márquez MD  [phone] 11/5/2023 Routine      Narrative & Impression  EXAMINATION:  CT CHEST WITH CONTRAST     CLINICAL HISTORY:  Prior CT chest showing 5 mm lung nodule;  Solitary pulmonary nodule     TECHNIQUE:  Multiple cross-sectional images were obtained from the thoracic inlet to the upper abdomen after the intravenous administration 100 mL of Omnipaque 350.  Coronal and sagittal reformatted images were obtained.  An automated dose exposure technique was utilized this limits radiation does the patient.     COMPARISON:  06/03/2020     FINDINGS:  Heart size is enlarged.  Coronary calcifications identified.  The course and caliber of the thoracic aorta is normal.  A 4 vessel aortic arch is identified with a great vessels being widely patent.  The main pulmonary artery is normal caliber.  No evidence for hilar or mediastinal adenopathy.     Dependent atelectatic changes lungs are identified.  No evidence for consolidation or masslike lesion.  No pleural thickening or pleural effusion.  4 mm pulmonary nodules identified in the right upper lobe.  The trachea and airways are patent.      Changes of hepatic steatosis.  No suspicious osseous lesions with spondylotic changes.  Soft tissues are grossly normal.     Impression:     Single pulmonary nodule is described above.  Recommend yearly surveillance.  Other secondary findings as noted.        Electronically signed by: Dean Márquez MD  Date:                                            11/05/2023  Time:                                           11:26             Exam Ended: 11/01/23 11:19 Last Resulted: 11/05/23 11:26            Encouraged smoking cessation. Education provided. Will repeat in 1 year as recommended.     5. Encounter for screening mammogram for malignant neoplasm of breast  MMG ordered 4-17-24- keep appt when scheduled.     6. GORDON  Patient is compliant with PAP use. Patient is benefiting from PAP use. Patient encouraged to continue PAP use as prescribed.     Follow up in about 1 month (around 6/20/2024) for with labs 1 week prior to appt. . In addition to their scheduled follow up, the patient has also been instructed to follow up on as needed basis.     Future Appointments   Date Time Provider Department Center   5/27/2024  8:30 AM Estephania Cedillo MD [facility] CARD [place]   6/26/2024  8:15 AM Paulina Pappas PA-C [facility] MOBORT [place]   7/1/2024  8:20 AM Kelin Puga FNP [facility] INTMED [place]   9/4/2024  9:45 AM Ella Benoit MD [facility] NEURO [place]   1/30/2025  8:30 AM PROVIDERS, [facility] [place]    3/6/2025 10:30 AM Jennifer Cruz FNP [facility] GYN [place]        NORBERTO Baptiste

## 2024-05-21 ENCOUNTER — HOSPITAL ENCOUNTER (OUTPATIENT)
Dept: RADIOLOGY | Facility: HOSPITAL | Age: 59
Discharge: HOME | End: 2024-05-21
Attending: STUDENT IN AN ORGANIZED HEALTH CARE EDUCATION/TRAINING PROGRAM
Payer: COMMERCIAL

## 2024-05-21 DIAGNOSIS — G56.03 BILATERAL CARPAL TUNNEL SYNDROME: ICD-10-CM

## 2024-05-21 PROCEDURE — 71046 X-RAY EXAM CHEST 2 VIEWS: CPT | Mod: TC

## 2024-05-27 ENCOUNTER — OFFICE VISIT (OUTPATIENT)
Dept: CARDIOLOGY | Facility: CLINIC | Age: 59
End: 2024-05-27
Payer: COMMERCIAL

## 2024-05-27 VITALS
RESPIRATION RATE: 18 BRPM | WEIGHT: 176.38 LBS | TEMPERATURE: 99 F | HEIGHT: 64 IN | BODY MASS INDEX: 30.11 KG/M2 | HEART RATE: 86 BPM | SYSTOLIC BLOOD PRESSURE: 136 MMHG | DIASTOLIC BLOOD PRESSURE: 86 MMHG | OXYGEN SATURATION: 100 %

## 2024-05-27 DIAGNOSIS — R42 DIZZINESS: ICD-10-CM

## 2024-05-27 DIAGNOSIS — E78.5 HYPERLIPIDEMIA LDL GOAL <70: ICD-10-CM

## 2024-05-27 DIAGNOSIS — E11.65 UNCONTROLLED DIABETES MELLITUS WITH HYPERGLYCEMIA, WITH LONG-TERM CURRENT USE OF INSULIN: ICD-10-CM

## 2024-05-27 DIAGNOSIS — G47.33 OSA (OBSTRUCTIVE SLEEP APNEA): ICD-10-CM

## 2024-05-27 DIAGNOSIS — Z79.4 UNCONTROLLED DIABETES MELLITUS WITH HYPERGLYCEMIA, WITH LONG-TERM CURRENT USE OF INSULIN: ICD-10-CM

## 2024-05-27 DIAGNOSIS — I10 PRIMARY HYPERTENSION: Primary | ICD-10-CM

## 2024-05-27 PROCEDURE — 99215 OFFICE O/P EST HI 40 MIN: CPT | Mod: PBBFAC | Performed by: INTERNAL MEDICINE

## 2024-05-27 RX ORDER — AMLODIPINE BESYLATE 5 MG/1
5 TABLET ORAL DAILY
Start: 2024-05-27 | End: 2025-05-27

## 2024-05-27 NOTE — PROGRESS NOTES
CHIEF COMPLAINT:   Chief Complaint   Patient presents with    Follow-up     4 mos f/u w/7 day holter denies cardiac target at this time. Pt c/o headaches in the mornings                             HPI:  Foreign Rosas 58 y.o. female with HTN, DM on insulin, pulmonary nodule (stable), GORDON not on CPAP who was initially referred to cardiology given finding of cardiomegaly on CT scan. She then underwent an echocardiogram that I have personally reviewed and showed normal size LV, no LVH, normal EF, no other abnormality.    During last visit, pt complained of dizziness that she thought could be related to starting propranolol for essential tremor.  Dizziness occurred randomly while sitting down.  Episodes could last a few minutes to a few hours. No syncope. 7 day monitor unrevealing.  She continues to have dizziness but reports it is a little better since taking propranolol PRN instead of daily as instructed by her neurologist.  Today, she reports morning headaches. She is in the process of getting new equipment for her CPAP.  Otherwise pt has daytime fatigue and is not very active. No exertional chest pain, shortness of breath  Patient also denies orthopnea, PND, lower extremity edema, palpitations.  She needs to undergo carpal tunnel surgery on June 7th.    Social hx: Works as a  at [employer], never smoker, no alcohol or drugs    Family hx: both parents had DM and HTN, mother had CKD, father had lung cancer, brother had pancreatic cancer, sister had uterine cancer and other sister had uterine cancer                                                                                                                                                                                                                                                                                                                                                                                                                                                                             CARDIAC TESTIN day monitor 2024  -Sinus rhythm, minimum 79 bpm and maximum 113 bpm.  - No profound pauses.  - No PACs. No SVT episodes.  - No PVCs. No VT episodes.  - No atrial fibrillation/atrial flutter.  - patient activated episodes during sinus tachycardia    Results for orders placed during the hospital encounter of 23    Echo Saline Bubble? No    Interpretation Summary    Left Ventricle: Normal wall motion. There is low normal systolic function. Biplane (2D) method of discs ejection fraction is 53%. Grade I diastolic dysfunction. Normal LV size, normal LV wall thickness, no LVH.    Right Ventricle: Normal right ventricular cavity size. Systolic function is normal.    Aortic Valve: The aortic valve is a trileaflet valve. There is normal leaflet mobility.    Mitral Valve: The mitral valve is structurally normal.    Pulmonary Artery: The estimated pulmonary artery systolic pressure is 16 mmHg.    IVC/SVC: Intermediate venous pressure at 8 mmHg.    Echo 2017:  EF 61%, normal RV size and function, trace TR       Patient Active Problem List   Diagnosis    Physiological tremor    Hypercalcemia    Triggering of digit    Severe acute respiratory syndrome coronavirus 2 (SARS-CoV-2) vaccination not indicated    Radial styloid tenosynovitis of left hand    Pain in pelvis    Postmenopausal bleeding    Nuclear senile cataract    Musculoskeletal pain    Multiple pulmonary nodules    Increased frequency of urination    Hypertension    CTS (carpal tunnel syndrome)    Insomnia    Chronic pain of both knees    Elevated PTHrP level    Uncontrolled diabetes mellitus with hyperglycemia, with long-term current use of insulin    Urinary incontinence    Overactive bladder    Well adult exam    Parathyroid adenoma    Multiple thyroid nodules    Tenosynovitis of left wrist    Hyperlipidemia LDL goal <70    Dizziness    GORDON (obstructive sleep apnea)    Type 2  diabetes mellitus without complication, with long-term current use of insulin    Screening for breast cancer     Past Surgical History:   Procedure Laterality Date    CHOLECYSTECTOMY      HYSTERECTOMY, VAGINAL, LAPAROSCOPY-ASSISTED, WITH SALPINGO-OOPHORECTOY Bilateral 08/22/2017    TUBAL LIGATION       Social History     Socioeconomic History    Marital status: Single   Tobacco Use    Smoking status: Never     Passive exposure: Past    Smokeless tobacco: Never   Substance and Sexual Activity    Alcohol use: Not Currently     Comment: special occassion    Drug use: Never    Sexual activity: Not Currently     Partners: Male     Birth control/protection: Abstinence, See Surgical Hx     Social Determinants of Health     Financial Resource Strain: Medium Risk (12/16/2023)    Overall Financial Resource Strain (CARDIA)     Difficulty of Paying Living Expenses: Somewhat hard   Food Insecurity: Food Insecurity Present (12/16/2023)    Hunger Vital Sign     Worried About Running Out of Food in the Last Year: Never true     Ran Out of Food in the Last Year: Sometimes true   Transportation Needs: No Transportation Needs (12/16/2023)    PRAPARE - Transportation     Lack of Transportation (Medical): No     Lack of Transportation (Non-Medical): No   Physical Activity: Inactive (12/16/2023)    Exercise Vital Sign     Days of Exercise per Week: 1 day     Minutes of Exercise per Session: 0 min   Stress: Stress Concern Present (12/16/2023)    Citizen of the [place] of Occupational Health - Occupational Stress Questionnaire     Feeling of Stress : Rather much   Housing Stability: High Risk (12/16/2023)    Housing Stability Vital Sign     Unable to Pay for Housing in the Last Year: Yes     Number of Places Lived in the Last Year: 2     Unstable Housing in the Last Year: No        Family History   Problem Relation Name Age of Onset    Diabetes Mother Mckenzie     Hypertension Mother Mckenzie     Kidney failure Mother Mckenzie     Asthma Mother  "Mckenzie     Kidney disease Mother Mckenzie     Lung cancer Father Rei     Diabetes Father Rei     Hypertension Father Rei     Mental illness Father Rei     Thyroid disease Sister Astrid     Brain cancer Sister Astrid     Diabetes Sister Astrid     Hypertension Sister Astrid     Cancer Sister Danelle         Thyroid    Thyroid disease Sister Danelle     Migraines Sister Danelle     Miscarriages / Stillbirths Sister Danelle     Cancer Brother Jack         Pancreatic    Hypertension Brother Jack     Seizures Brother Jack     Diabetes Sister Roxy     Hypertension Sister Roxy      Review of patient's allergies indicates:  No Known Allergies      ROS:                                                                                                                                                                             Negative except as stated in the history of present illness. See HPI for details.    PHYSICAL EXAM:  Visit Vitals  BP (!) 136/98 (BP Location: Right arm, Patient Position: Sitting, BP Method: Medium (Automatic))   Pulse 86   Temp 98.6 °F (37 °C) (Oral)   Resp 18   Ht 5' 4" (1.626 m)   Wt 80 kg (176 lb 6.4 oz)   SpO2 100%   BMI 30.28 kg/m²       General: alert and oriented/no acute distress  Eye: EOMI/normal conjunctiva/no xanthelasma  HENT: normocephalic/moist oral mucosa  Neck: supple/nontender/no carotid bruit  Respiratory: lungs CTA/nonlabored respirations/BS equal/symmetrical expansion/no  chest wall tenderness  Cardiovascular: normal rate/normal rhythm/no murmur/normal peripheral perfusion/no  edema/no JVD  Gastrointestinal: soft/nontender  Musculoskeletal: normal ROM  Integumentary: warm/dry/pink/intact  Neurologic: alert/oriented/normal sensory/no focal deficits  Psychiatric: cooperative/appropriate mood and affect/normal judgment    Current Outpatient Medications   Medication Instructions    arm brace Misc 1 each, Misc.(Non-Drug; Combo Route), Nightly, Left carpal " "tunnel wrist splint, nightly    gabapentin (NEURONTIN) 300 mg, Oral, Nightly, Patient states she takes once a month    glipiZIDE (GLUCOTROL) 10 mg, Oral, 2 times daily before meals    ibuprofen (ADVIL,MOTRIN) 800 mg, Oral, 3 times daily PRN    insulin detemir U-100, Levemir, 100 unit/mL (3 mL) SubQ InPn pen Inject 35 units sq in AM and 48 units sq in PM.    insulin lispro 100 unit/mL pen Inject 2 to 12 units under the skin per sliding scale after checking blood glucose before meals and at bedtime    losartan (COZAAR) 25 mg, Oral, Daily    metFORMIN (GLUCOPHAGE) 1,000 mg, Oral, 2 times daily with meals    oxybutynin (DITROPAN-XL) 5 mg, Oral, Daily    pen needle, diabetic 32 gauge x 5/32" Ndle Use 1 needle five times daily    pravastatin (PRAVACHOL) 20 mg, Oral, Daily    propranoloL (INDERAL) 10 mg, Oral, 2 times daily    sertraline (ZOLOFT) 50 MG tablet Take 1 tablet by mouth at bedtime    SITagliptin phosphate (JANUVIA) 50 mg, Oral, Daily    zolpidem (AMBIEN) 5 mg, Oral, Nightly PRN        All medications, laboratory studies, cardiac diagnostic imaging reviewed.     Lab Results   Component Value Date    LDL 51.00 04/17/2024    LDL 48.00 (L) 11/28/2023    TRIG 193 (H) 04/17/2024    TRIG 215 (H) 11/28/2023    CREATININE 0.89 05/20/2024    K 4.0 05/20/2024        ASSESSMENT/PLAN:    Cardiomegaly  Cardiomegaly is noted on CT chest however echocardiogram shows normal sized LV without hypertrophy.   Echocardiogram is otherwise unremarkable.    Dizziness  Recently has been having dizziness even while sitting, potentially started when she started propranolol for tremors. Progressively worse. Episodes are intermittent and can last a few minutes to a few hours.  7 day monitor shows normal rhythm and rates  Unclear etiology of dizziness but unlikely cardiac related.    HTN  BP today 136/98 with repeat 136/86  Review of recent values shows some normal values and elevated values  She is on amlodipine 5mg, losartan 25mg, " propranolol 20mg bid (for tremor)  Currently not getting treated for sleep apnea but suspect BP will improve once sleep apnea is treated.  She also takes ibuprofen 800 mg p.r.n. about twice weekly.  Instructed her to try to take 400 mg instead and to avoid it altogether as much as possible.    HLP  Goal<70 given DM   On pravastatin 20mg, LDL at goal, 51 in 4/2024    GORDON  Pt needs new equipment and she will follow up on the matter  Has morning headaches    DM  A1c 9.1% on 5/20/2024  On levemir and dosing has been increased recently, already on high dose levemir, also on metformin, glipizide and januvia  A1c improving albeit slowly

## 2024-06-04 ENCOUNTER — TELEPHONE (OUTPATIENT)
Dept: INTERNAL MEDICINE | Facility: CLINIC | Age: 59
End: 2024-06-04

## 2024-06-04 NOTE — TELEPHONE ENCOUNTER
Received a call from ENT department stating the pt will need to have a biopsy done before completing the referral and based on the results of the biopsy ENT said they'll proceed from there but they won't close out the referral just yet.. Please advise.

## 2024-06-06 ENCOUNTER — TELEPHONE (OUTPATIENT)
Dept: ORTHOPEDICS | Facility: CLINIC | Age: 59
End: 2024-06-06
Payer: COMMERCIAL

## 2024-06-06 ENCOUNTER — TELEPHONE (OUTPATIENT)
Dept: INTERNAL MEDICINE | Facility: CLINIC | Age: 59
End: 2024-06-06
Payer: COMMERCIAL

## 2024-06-06 DIAGNOSIS — E04.2 MULTIPLE THYROID NODULES: Primary | ICD-10-CM

## 2024-06-06 NOTE — TELEPHONE ENCOUNTER
I called the patient to inform her of what is stated below by Dr. Lowe. Patient states that she went on a trip in April for 2 weeks and forgot all medications at home. Patients thinks that is why they were high at the time labs were recently drawn.     I called the patients PCP (Kelin Puga, FLORA @ [facility]) and spoke to a nurse who informed me that her A1C has not been below 7 in several months - year. I informed the nurse that we are canceling the patients surgery and once they have her A1c below 7 they/she can call our office to reschedule surgery.     I explained that Dr. Lowe is moving at the end of July so once her A1c is controlled she will have to see one of his partners.     Patient voiced a clear understanding.       I called surgery scheduling and spoke to Sun to inform her to cancel surgery scheduled for 6/7/24 (tomorrow).

## 2024-06-06 NOTE — TELEPHONE ENCOUNTER
Patient is scheduled for right carpal and cubital tunnel releases tomorrow (6/7/24).     A1C is high on recent labs.     Wanted to make sure you are aware.

## 2024-06-06 NOTE — TELEPHONE ENCOUNTER
Dr. Lowe's office called and the nurse states because patients A1C was 9.1 on 5/20/24, he is not comfortable doing surgery. She said it would have to be under 7 in order for them to proceed with surgery. Nurse states she will call patient and let her know they have to cancel surgery and just wanted to let us know once her A1C is down to 7 or below, we can refer her back to ortho for surgery. She did say Dr. Lowe is leaving the office soon but Dr. Sebastian Enciso will be there and we can refer to him once pt is at goal with A1C.

## 2024-06-06 NOTE — PROGRESS NOTES
Received notification from ENT cl that pt needs bx of the left 2 thyroid nodules showing Tirads 4 on US done 4-26-24. Called pt and informed of recommendation from ENT cl and need for referral to IR for thyroid biopsy before being seen in ENT cl. Pt verbalized understanding.

## 2024-06-12 ENCOUNTER — PATIENT OUTREACH (OUTPATIENT)
Facility: CLINIC | Age: 59
End: 2024-06-12
Payer: COMMERCIAL

## 2024-06-12 NOTE — PROGRESS NOTES
Health Maintenance Topic(s) Outreach Outcomes & Actions Taken:    Blood Pressure - Outreach Outcomes & Actions Taken  : Primary Care Follow Up Visit Scheduled  and 7/1/2024    Breast Cancer Screening - Outreach Outcomes & Actions Taken  : Mammogram Screening Scheduled and 7/16/2024       Additional Notes:  MG due 7/12/2024. Scheduled 7/12/2024.    Uncontrolled BP & DM. PCP f/u 7/1/2024.

## 2024-06-13 ENCOUNTER — HOSPITAL ENCOUNTER (OUTPATIENT)
Dept: INTERVENTIONAL RADIOLOGY/VASCULAR | Facility: HOSPITAL | Age: 59
Discharge: HOME | End: 2024-06-13
Attending: NURSE PRACTITIONER
Payer: COMMERCIAL

## 2024-06-13 DIAGNOSIS — E04.1 THYROID NODULE: ICD-10-CM

## 2024-06-13 PROCEDURE — 10005 FNA BX W/US GDN 1ST LES: CPT

## 2024-06-26 ENCOUNTER — PATIENT MESSAGE (OUTPATIENT)
Dept: ADMINISTRATIVE | Facility: OTHER | Age: 59
End: 2024-06-26
Payer: COMMERCIAL

## 2024-06-28 DIAGNOSIS — E11.65 UNCONTROLLED DIABETES MELLITUS WITH HYPERGLYCEMIA, WITH LONG-TERM CURRENT USE OF INSULIN: Primary | ICD-10-CM

## 2024-06-28 DIAGNOSIS — Z79.4 UNCONTROLLED DIABETES MELLITUS WITH HYPERGLYCEMIA, WITH LONG-TERM CURRENT USE OF INSULIN: Primary | ICD-10-CM

## 2024-06-29 ENCOUNTER — LAB VISIT (OUTPATIENT)
Dept: LAB | Facility: HOSPITAL | Age: 59
End: 2024-06-29
Attending: NURSE PRACTITIONER
Payer: COMMERCIAL

## 2024-06-29 DIAGNOSIS — Z79.4 UNCONTROLLED DIABETES MELLITUS WITH HYPERGLYCEMIA, WITH LONG-TERM CURRENT USE OF INSULIN: ICD-10-CM

## 2024-06-29 DIAGNOSIS — E11.65 UNCONTROLLED DIABETES MELLITUS WITH HYPERGLYCEMIA, WITH LONG-TERM CURRENT USE OF INSULIN: ICD-10-CM

## 2024-06-29 LAB
ANION GAP SERPL CALC-SCNC: 3 MEQ/L
BUN SERPL-MCNC: 11.2 MG/DL (ref 9.8–20.1)
CALCIUM SERPL-MCNC: 10.2 MG/DL (ref 8.4–10.2)
CHLORIDE SERPL-SCNC: 108 MMOL/L (ref 98–107)
CO2 SERPL-SCNC: 30 MMOL/L (ref 23–31)
CREAT SERPL-MCNC: 1.04 MG/DL (ref 0.55–1.02)
CREAT/UREA NIT SERPL: 11
EST. AVERAGE GLUCOSE BLD GHB EST-MCNC: 194.4 MG/DL
GFR SERPLBLD CREATININE-BSD FMLA CKD-EPI: >60 ML/MIN/1.73/M2
GLUCOSE SERPL-MCNC: 201 MG/DL (ref 82–115)
HBA1C MFR BLD: 8.4 %
POTASSIUM SERPL-SCNC: 5.2 MMOL/L (ref 3.5–5.1)
SODIUM SERPL-SCNC: 141 MMOL/L (ref 136–145)

## 2024-06-29 PROCEDURE — 80048 BASIC METABOLIC PNL TOTAL CA: CPT

## 2024-06-29 PROCEDURE — 83036 HEMOGLOBIN GLYCOSYLATED A1C: CPT

## 2024-06-29 PROCEDURE — 36415 COLL VENOUS BLD VENIPUNCTURE: CPT

## 2024-07-01 ENCOUNTER — LAB VISIT (OUTPATIENT)
Dept: LAB | Facility: HOSPITAL | Age: 59
End: 2024-07-01
Attending: NURSE PRACTITIONER
Payer: COMMERCIAL

## 2024-07-01 ENCOUNTER — OFFICE VISIT (OUTPATIENT)
Dept: INTERNAL MEDICINE | Facility: CLINIC | Age: 59
End: 2024-07-01
Payer: COMMERCIAL

## 2024-07-01 VITALS
RESPIRATION RATE: 18 BRPM | WEIGHT: 176 LBS | DIASTOLIC BLOOD PRESSURE: 83 MMHG | HEART RATE: 94 BPM | BODY MASS INDEX: 30.05 KG/M2 | TEMPERATURE: 98 F | SYSTOLIC BLOOD PRESSURE: 137 MMHG | HEIGHT: 64 IN

## 2024-07-01 DIAGNOSIS — E87.5 HYPERKALEMIA: ICD-10-CM

## 2024-07-01 DIAGNOSIS — E11.9 TYPE 2 DIABETES MELLITUS WITHOUT COMPLICATION, WITH LONG-TERM CURRENT USE OF INSULIN: ICD-10-CM

## 2024-07-01 DIAGNOSIS — F32.A ANXIETY AND DEPRESSION: ICD-10-CM

## 2024-07-01 DIAGNOSIS — E04.2 MULTIPLE THYROID NODULES: ICD-10-CM

## 2024-07-01 DIAGNOSIS — R89.9 ABNORMAL THYROID BIOPSY: ICD-10-CM

## 2024-07-01 DIAGNOSIS — R91.8 MULTIPLE PULMONARY NODULES: Primary | ICD-10-CM

## 2024-07-01 DIAGNOSIS — F41.9 ANXIETY AND DEPRESSION: ICD-10-CM

## 2024-07-01 DIAGNOSIS — Z79.4 TYPE 2 DIABETES MELLITUS WITHOUT COMPLICATION, WITH LONG-TERM CURRENT USE OF INSULIN: ICD-10-CM

## 2024-07-01 LAB
ANION GAP SERPL CALC-SCNC: 5 MEQ/L
BUN SERPL-MCNC: 12.4 MG/DL (ref 9.8–20.1)
CALCIUM SERPL-MCNC: 10.4 MG/DL (ref 8.4–10.2)
CHLORIDE SERPL-SCNC: 106 MMOL/L (ref 98–107)
CO2 SERPL-SCNC: 30 MMOL/L (ref 23–31)
CREAT SERPL-MCNC: 0.89 MG/DL (ref 0.55–1.02)
CREAT/UREA NIT SERPL: 14
GFR SERPLBLD CREATININE-BSD FMLA CKD-EPI: >60 ML/MIN/1.73/M2
GLUCOSE SERPL-MCNC: 231 MG/DL (ref 82–115)
POTASSIUM SERPL-SCNC: 4.2 MMOL/L (ref 3.5–5.1)
SODIUM SERPL-SCNC: 141 MMOL/L (ref 136–145)

## 2024-07-01 PROCEDURE — 4010F ACE/ARB THERAPY RXD/TAKEN: CPT | Mod: CPTII,,, | Performed by: NURSE PRACTITIONER

## 2024-07-01 PROCEDURE — 1160F RVW MEDS BY RX/DR IN RCRD: CPT | Mod: CPTII,,, | Performed by: NURSE PRACTITIONER

## 2024-07-01 PROCEDURE — 1159F MED LIST DOCD IN RCRD: CPT | Mod: CPTII,,, | Performed by: NURSE PRACTITIONER

## 2024-07-01 PROCEDURE — 80048 BASIC METABOLIC PNL TOTAL CA: CPT

## 2024-07-01 PROCEDURE — 3052F HG A1C>EQUAL 8.0%<EQUAL 9.0%: CPT | Mod: CPTII,,, | Performed by: NURSE PRACTITIONER

## 2024-07-01 PROCEDURE — 99215 OFFICE O/P EST HI 40 MIN: CPT | Mod: PBBFAC | Performed by: NURSE PRACTITIONER

## 2024-07-01 PROCEDURE — 3075F SYST BP GE 130 - 139MM HG: CPT | Mod: CPTII,,, | Performed by: NURSE PRACTITIONER

## 2024-07-01 PROCEDURE — 3079F DIAST BP 80-89 MM HG: CPT | Mod: CPTII,,, | Performed by: NURSE PRACTITIONER

## 2024-07-01 PROCEDURE — 99214 OFFICE O/P EST MOD 30 MIN: CPT | Mod: S$PBB,,, | Performed by: NURSE PRACTITIONER

## 2024-07-01 PROCEDURE — 36415 COLL VENOUS BLD VENIPUNCTURE: CPT

## 2024-07-01 PROCEDURE — 3008F BODY MASS INDEX DOCD: CPT | Mod: CPTII,,, | Performed by: NURSE PRACTITIONER

## 2024-07-01 RX ORDER — SERTRALINE HYDROCHLORIDE 50 MG/1
TABLET, FILM COATED ORAL
Qty: 30 TABLET | Refills: 6 | Status: SHIPPED | OUTPATIENT
Start: 2024-07-01

## 2024-07-01 RX ORDER — PEN NEEDLE, DIABETIC 30 GX3/16"
1 NEEDLE, DISPOSABLE MISCELLANEOUS
Qty: 100 EACH | Refills: 6 | Status: SHIPPED | OUTPATIENT
Start: 2024-07-01

## 2024-07-01 NOTE — PROGRESS NOTES
Patient ID: [id number]     Chief Complaint: Results        HPI:     RICHMOND Rosas is a 59 y.o. female here today for a follow up.         Immunizations:   Immunization History   Administered Date(s) Administered    COVID-19, MRNA, LN-S, PF (Pfizer) (Purple Cap) 06/21/2021, 06/21/2021, 07/19/2021, 07/19/2021    DTP 1963, 1963, 1963, 10/22/1968, 07/24/1975, 05/03/1978    Influenza 10/26/2014, 09/21/2022    Influenza - Quadrivalent - PF *Preferred* (6 months and older) 09/29/2021, 10/04/2022, 10/12/2023    Influenza - Trivalent - PF (ADULT) 10/03/2016, 12/21/2017, 09/21/2018, 10/22/2019    MMR 01/15/2010    Measles 12/07/1967, 05/03/1978    OPV 1963, 1963, 01/23/1964, 10/14/1969, 07/24/1975    Pneumococcal Polysaccharide - 23 Valent 06/15/2016    Rubella 12/18/1969    Td (ADULT) 10/26/1999, 10/12/2005        -------------------------------------    Anxiety disorder, unspecified    Glaucoma    HTN (hypertension)    OAB (overactive bladder)    GORDON (obstructive sleep apnea)    Type 2 diabetes mellitus with unspecified diabetic retinopathy without macular edema        Past Surgical History:   Procedure Laterality Date    CHOLECYSTECTOMY      HYSTERECTOMY, VAGINAL, LAPAROSCOPY-ASSISTED, WITH SALPINGO-OOPHORECTOY Bilateral 08/22/2017    TUBAL LIGATION         Review of patient's allergies indicates:  No Known Allergies    Current Outpatient Medications   Medication Instructions    amLODIPine (NORVASC) 5 mg, Oral, Daily    arm brace Misc 1 each, Misc.(Non-Drug; Combo Route), Nightly, Left carpal tunnel wrist splint, nightly    gabapentin (NEURONTIN) 300 mg, Oral, Nightly, Patient states she takes once a month    glipiZIDE (GLUCOTROL) 10 mg, Oral, 2 times daily before meals    ibuprofen (ADVIL,MOTRIN) 800 mg, Oral, 3 times daily PRN    insulin detemir U-100, Levemir, 100 unit/mL (3 mL) SubQ InPn pen Inject 40 units sq in AM and 48 units sq in PM.    insulin lispro 100 unit/mL pen  CLINICAL PHARMACY NOTE: MEDS TO [address] Select Patient?: No  Total # of Prescriptions Filled: 1   The following medications were delivered to the patient:  · Ibuprofen 800 mg    Total # of Interventions Completed: 2  Time Spent (min): 30    Additional Documentation: "Inject 2 to 12 units under the skin per sliding scale after checking blood glucose before meals and at bedtime    losartan (COZAAR) 25 mg, Oral, Daily    metFORMIN (GLUCOPHAGE) 1,000 mg, Oral, 2 times daily with meals    oxybutynin (DITROPAN-XL) 5 mg, Oral, Daily    pen needle, diabetic 32 gauge x 5/32" Ndle Use 1 needle five times daily    pravastatin (PRAVACHOL) 20 mg, Oral, Daily    propranoloL (INDERAL) 10 mg, Oral, 2 times daily    sertraline (ZOLOFT) 50 MG tablet Take 1 tablet by mouth at bedtime    SITagliptin phosphate (JANUVIA) 50 mg, Oral, Daily    zolpidem (AMBIEN) 5 mg, Oral, Nightly PRN       Social History     Socioeconomic History    Marital status: Single   Tobacco Use    Smoking status: Never     Passive exposure: Past    Smokeless tobacco: Never   Substance and Sexual Activity    Alcohol use: Not Currently     Comment: special occassion    Drug use: Never    Sexual activity: Not Currently     Partners: Male     Birth control/protection: Abstinence, See Surgical Hx     Social Determinants of Health     Financial Resource Strain: Medium Risk (12/16/2023)    Overall Financial Resource Strain (CARDIA)     Difficulty of Paying Living Expenses: Somewhat hard   Food Insecurity: Food Insecurity Present (12/16/2023)    Hunger Vital Sign     Worried About Running Out of Food in the Last Year: Never true     Ran Out of Food in the Last Year: Sometimes true   Transportation Needs: No Transportation Needs (12/16/2023)    PRAPARE - Transportation     Lack of Transportation (Medical): No     Lack of Transportation (Non-Medical): No   Physical Activity: Inactive (12/16/2023)    Exercise Vital Sign     Days of Exercise per Week: 1 day     Minutes of Exercise per Session: 0 min   Stress: Stress Concern Present (12/16/2023)    [employer] - Occupational Stress Questionnaire     Feeling of Stress : Rather much   Housing Stability: High Risk (12/16/2023)    Housing Stability Vital Sign     " Unable to Pay for Housing in the Last Year: Yes     Number of Places Lived in the Last Year: 2     Unstable Housing in the Last Year: No        Family History   Problem Relation Name Age of Onset    Diabetes Mother Mckenzie     Hypertension Mother Mckenzie     Kidney failure Mother Mckenzie     Asthma Mother Mckenzie     Kidney disease Mother Mckenzie     Lung cancer Father Rei     Diabetes Father Rei     Hypertension Father Rei     Mental illness Father Rei     Thyroid disease Sister Astrid     Brain cancer Sister Astrid     Diabetes Sister Astrid     Hypertension Sister Astrid     Cancer Sister Danelle         Thyroid    Thyroid disease Sister Danelle     Migraines Sister Danelle     Miscarriages / Stillbirths Sister Danelle     Cancer Brother Jack         Pancreatic    Hypertension Brother Jack     Seizures Brother Jack     Diabetes Sister Roxy     Hypertension Sister Roxy         Patient Care Team:  Kelin Puga FNP as PCP - General (Family Medicine)  Kelin Puga FNP (Family Medicine)  Gilberto Adams MD as Hypertension Digital Medicine Responsible Provider (Cardiology)  Eliane Shaw, Fernando as Hypertension Digital Medicine Clinician  1, [facility] [employer] [facility] as Hypertension Digital Medicine Contract  Sunni Fernando LPN as Care Coordinator     Subjective:     Review of Systems     See HPI for details    Constitutional: Denies Change in appetite. Denies Chills. Denies Fever. Denies Night sweats.  Eye: Denies Blurred vision. Denies Discharge. Denies Eye pain.  ENT: Denies Decreased hearing. Denies Sore throat. Denies Swollen glands.  Respiratory: Denies Cough. Denies Shortness of breath. Denies Shortness of breath with exertion. Denies Wheezing.  Cardiovascular: DeniesChest pain at rest. Denies Chest pain with exertion. Denies Irregular heartbeat. Denies Palpitations. Denies Edema.  Gastrointestinal: Denies Abdominal pain. DeniesDiarrhea. Denies  "Nausea. Denies Vomiting. Denies Hematemesis or Hematochezia.  Genitourinary: Denies Dysuria. Denies Urinary frequency. Denies Urinary urgency. Denies Blood in urine.  Endocrine: Denies Cold intolerance. Denies Excessive thirst. Denies Heat intolerance. Denies Weight loss. Denies Weight gain.  Musculoskeletal: Denies Painful joints. Denies Weakness.  Integumentary: Denies Rash. Denies Itching. Denies Dry skin.  Neurologic: Denies Dizziness. Denies Fainting. Denies Headache.  Psychiatric: Denies Depression. Denies Anxiety. Denies Suicidal/Homicidal ideations.    All Other ROS: Negative except as stated in HPI.       Objective:     Visit Vitals  /83 (BP Location: Right arm, Patient Position: Sitting, BP Method: Large (Automatic))   Pulse 94   Temp 98.3 °F (36.8 °C) (Oral)   Resp 18   Ht 5' 4" (1.626 m)   Wt 79.8 kg (176 lb)   BMI 30.21 kg/m²       Physical Exam    General: Alert and oriented, No acute distress.  Head: Normocephalic, Atraumatic.  Eye: Pupils are equal, round and reactive to light, Extraocular movements are intact, Sclera non-icteric.  Ears/Nose/Throat: Normal, Mucosa moist,Clear.  Neck/Thyroid: Supple, Non-tender, No carotid bruit, No lymphadenopathy, No JVD, Full range of motion.  Respiratory: Clear to auscultation bilaterally; No wheezes, rales or rhonchi,Non-labored respirations, Symmetrical chest wall expansion.  Cardiovascular: Regular rate and rhythm, S1/S2 normal, No murmurs, rubs or gallops.  Gastrointestinal: Soft, Non-tender, Non-distended, Normal bowel sounds, No palpable organomegaly.  Musculoskeletal: Normal range of motion.  Integumentary: Warm, Dry, Intact, No suspicious lesions or rashes.  Extremities: No clubbing, cyanosis or edema  Neurologic: No focal deficits, Cranial Nerves II-XII are grossly intact, Motor strength normal upper and lower extremities, Sensory exam intact.  Psychiatric: Normal interaction, Coherent speech, Euthymic mood, Appropriate affect       Labs Reviewed: "     Chemistry:  Lab Results   Component Value Date     06/29/2024    K 5.2 (H) 06/29/2024    BUN 11.2 06/29/2024    CREATININE 1.04 (H) 06/29/2024    EGFRNORACEVR >60 06/29/2024    GLUCOSE 201 (H) 06/29/2024    CALCIUM 10.2 06/29/2024    ALKPHOS 76 05/20/2024    LABPROT 7.7 (H) 05/20/2024    ALBUMIN 4.1 05/20/2024    BILIDIR 0.1 06/16/2021    IBILI 0.10 06/16/2021    AST 17 05/20/2024    ALT 22 05/20/2024    MPZBWRBO87QF 34.0 04/05/2021        Lab Results   Component Value Date    HGBA1C 8.4 (H) 06/29/2024        Hematology:  Lab Results   Component Value Date    WBC 8.30 05/20/2024    HGB 12.5 05/20/2024    HCT 39.6 05/20/2024     05/20/2024       Lipid Panel:  Lab Results   Component Value Date    CHOL 126 04/17/2024    HDL 36 04/17/2024    LDL 51.00 04/17/2024    TRIG 193 (H) 04/17/2024    TOTALCHOLEST 4 04/17/2024        Urine:  Lab Results   Component Value Date    APPEARANCEUA Clear 01/09/2024    SGUA 1.025 01/09/2024    PROTEINUA 30 (A) 01/09/2024    KETONESUA Negative 01/09/2024    LEUKOCYTESUR Negative 01/09/2024    RBCUA None Seen 01/09/2024    WBCUA None Seen 01/09/2024    BACTERIA Moderate (A) 01/09/2024    SQEPUA Trace (A) 02/09/2023    HYALINECASTS None Seen 02/09/2023    CREATRANDUR 76.4 09/21/2023        Assessment:       ICD-10-CM ICD-9-CM   1. Multiple pulmonary nodules  R91.8 793.19   2. Abnormal thyroid biopsy  R89.9 796.9   3. Multiple thyroid nodules  E04.2 241.1   4. Hyperkalemia  E87.5 276.7   5. Type 2 diabetes mellitus without complication, with long-term current use of insulin  E11.9 250.00    Z79.4 V58.67   6. Anxiety and depression  F41.9 300.00    F32.A 311        Plan:     1. Multiple pulmonary nodules  Pt had CT chest with contrast 11-1-23 showing:  CT Chest With Contrast  Order: [id number]  Status: Final result       Visible to patient: Yes (seen)       Next appt: 01/10/2024 at 08:45 AM in Neurology (Ella Benoit MD)       Dx: Lung nodule    0 Result Notes  Details      Reading Physician Reading Date Result Priority   Dean Márquez MD  [phone] 11/5/2023 Routine      Narrative & Impression  EXAMINATION:  CT CHEST WITH CONTRAST     CLINICAL HISTORY:  Prior CT chest showing 5 mm lung nodule;  Solitary pulmonary nodule     TECHNIQUE:  Multiple cross-sectional images were obtained from the thoracic inlet to the upper abdomen after the intravenous administration 100 mL of Omnipaque 350.  Coronal and sagittal reformatted images were obtained.  An automated dose exposure technique was utilized this limits radiation does the patient.     COMPARISON:  06/03/2020     FINDINGS:  Heart size is enlarged.  Coronary calcifications identified.  The course and caliber of the thoracic aorta is normal.  A 4 vessel aortic arch is identified with a great vessels being widely patent.  The main pulmonary artery is normal caliber.  No evidence for hilar or mediastinal adenopathy.     Dependent atelectatic changes lungs are identified.  No evidence for consolidation or masslike lesion.  No pleural thickening or pleural effusion.  4 mm pulmonary nodules identified in the right upper lobe.  The trachea and airways are patent.     Changes of hepatic steatosis.  No suspicious osseous lesions with spondylotic changes.  Soft tissues are grossly normal.     Impression:     Single pulmonary nodule is described above.  Recommend yearly surveillance.  Other secondary findings as noted.        Electronically signed by: Dean Márquez MD  Date:                                            11/05/2023  Time:                                           11:26             Exam Ended: 11/01/23 11:19 Last Resulted: 11/05/23 11:26            Encouraged smoking cessation. Education provided. Will repeat in 1 year as recommended.     2. Abnormal thyroid biopsy  Pt had FNA of Left thyroid nodule done 6-13-24 showing:  Cytology- FNA Radiology Guided, Bronch/EBUS, EUS/GI: [id number]  Order: [id number]  Status: Final result        Visible to patient: Yes (seen)       Next appt: 07/16/2024 at 08:00 AM in Radiology ([facility] M[facility]2)    0 Result Notes       Component Ref Range & Units    Case Report   [place] [facility]                                    Case: [id number]                                 Authorizing Provider:  Kelin Puga FNP    Collected:           06/13/2024 09:38 AM           Ordering Location:     [facility] -       Received:            06/13/2024 10:15 AM                                  Interventional Radiology                                                     Pathologist:           Sophia Pagan MD                                                         Specimen:    Thyroid, Left Thyroid                                                                      Clinical Information     Left   Final Diagnosis      Fine-needle aspiration of left thyroid nodule:     - Highly suspicious Papillary Thyroid Carcinoma.   Electronically signed by Sophia Pagan MD on 6/20/2024 at 1611   Gross Description     1. Thyroid: Left Thyroid  Received in 95% alcohol are 3 slides for pap stain, 4 air-dried slides for Diff-Quick stain, and cytology fixative specimen submitted for cytospin and cell block preparation.    Specimen 1 Interpretation   [facility] Thyroid Cytology Category V: Suspicious for Malignancy - Suspect Papillary thyroid carcinoma Abnormal    Electronically signed by Sophia Pagan MD on 6/20/2024 at 1611   Resulting Agency  [facility]              Specimen Collected: 06/13/24 09:38 CDT Last Resulted: 06/20/24 16:11 CDT           Called ENT clinic and informed of needing URGENT referral for eval and mgmt. Appt scheduled 7-23-24 at 10 am. Keep appt.     3. Multiple thyroid nodules  Pt had FNA of Left thyroid nodule done 6-13-24 showing:  Cytology- FNA Radiology Guided, Bronch/EBUS, EUS/GI: [id number]  Order: [id number]  Status: Final result       Visible to patient: Yes (seen)       Next appt:  07/16/2024 at 08:00 AM in Radiology ([facility])    0 Result Notes       Component Ref Range & Units    Case Report   [place] [facility]                                    Case: [id number]                                 Authorizing Provider:  Kelin Puga FNP    Collected:           06/13/2024 09:38 AM           Ordering Location:     [facility] -       Received:            06/13/2024 10:15 AM                                  Interventional Radiology                                                     Pathologist:           Sophia Pagan MD                                                         Specimen:    Thyroid, Left Thyroid                                                                      Clinical Information     Left   Final Diagnosis      Fine-needle aspiration of left thyroid nodule:     - Highly suspicious Papillary Thyroid Carcinoma.   Electronically signed by Sophia Pagan MD on 6/20/2024 at 1611   Gross Description     1. Thyroid: Left Thyroid  Received in 95% alcohol are 3 slides for pap stain, 4 air-dried slides for Diff-Quick stain, and cytology fixative specimen submitted for cytospin and cell block preparation.    Specimen 1 Interpretation   [facility] Thyroid Cytology Category V: Suspicious for Malignancy - Suspect Papillary thyroid carcinoma Abnormal    Electronically signed by Sophia Pagan MD on 6/20/2024 at 1611   Resulting Agency  [facility]              Specimen Collected: 06/13/24 09:38 CDT Last Resulted: 06/20/24 16:11 CDT           Called ENT clinic and informed of needing URGENT referral for eval and mgmt. Appt scheduled 7-23-24 at 10 am. Keep appt.     4. Hyperkalemia  Potassium level 5.2. Encouraged to minimize potassium rich foods in diet. STAT BMP today.     5. Type 2 diabetes mellitus without complication, with long-term current use of insulin  A1c 8.4. ADA diet and exercise. Cont Sitaglipitin 50 mg 1 tab po daily. Cont Levemir to 35 units sq in AM  and continue 45 units sq in PM. Urine microalbumin 9-21-23. Dm foot done 9-22-23. DM eye done  in Op[facility] 1-30-24.           Follow up in about 4 months (around 11/1/2024) for with labs 1 week prior to appt. . In addition to their scheduled follow up, the patient has also been instructed to follow up on as needed basis.     Future Appointments   Date Time Provider Department Center   7/16/2024  8:00 AM [facility]2 [facility] [place]    7/23/2024 10:00 AM RESIDENT 1, [facility] OTORHINOLARYNGOLOGY [facility] ENT [place] [facility]   9/4/2024  9:45 AM Ella Benoit MD [facility] NEURO [place] [facility]   1/30/2025  8:30 AM PROVIDERS, [facility] O[facility]   3/6/2025 10:30 AM Jennifer Cruz FNP [facility] GYN [place] [facility]   5/27/2025  8:30 AM Estephania Cedillo MD [facility] CARD [place] [facility]        NORBERTO Baptiste

## 2024-07-21 ENCOUNTER — TELEPHONE (OUTPATIENT)
Dept: INTERNAL MEDICINE | Facility: CLINIC | Age: 59
End: 2024-07-21
Payer: COMMERCIAL

## 2024-07-22 PROBLEM — Z00.00 WELL ADULT EXAM: Status: RESOLVED | Noted: 2023-03-09 | Resolved: 2024-07-22

## 2024-07-23 ENCOUNTER — OFFICE VISIT (OUTPATIENT)
Dept: OTOLARYNGOLOGY | Facility: CLINIC | Age: 59
End: 2024-07-23
Payer: COMMERCIAL

## 2024-07-23 ENCOUNTER — HOSPITAL ENCOUNTER (OUTPATIENT)
Dept: RADIOLOGY | Facility: HOSPITAL | Age: 59
Discharge: HOME | End: 2024-07-23
Attending: NURSE PRACTITIONER
Payer: COMMERCIAL

## 2024-07-23 VITALS
RESPIRATION RATE: 20 BRPM | DIASTOLIC BLOOD PRESSURE: 78 MMHG | BODY MASS INDEX: 29.71 KG/M2 | WEIGHT: 174 LBS | HEART RATE: 99 BPM | HEIGHT: 64 IN | OXYGEN SATURATION: 99 % | TEMPERATURE: 99 F | SYSTOLIC BLOOD PRESSURE: 136 MMHG

## 2024-07-23 DIAGNOSIS — C73 PAPILLARY THYROID CARCINOMA: Primary | ICD-10-CM

## 2024-07-23 DIAGNOSIS — Z12.31 BREAST CANCER SCREENING BY MAMMOGRAM: ICD-10-CM

## 2024-07-23 DIAGNOSIS — E04.2 MULTIPLE THYROID NODULES: ICD-10-CM

## 2024-07-23 DIAGNOSIS — D35.1 PARATHYROID ADENOMA: ICD-10-CM

## 2024-07-23 PROCEDURE — 77067 SCR MAMMO BI INCL CAD: CPT | Mod: TC

## 2024-07-23 PROCEDURE — 77063 BREAST TOMOSYNTHESIS BI: CPT | Mod: TC

## 2024-07-23 PROCEDURE — 77063 BREAST TOMOSYNTHESIS BI: CPT | Mod: 26,,, | Performed by: RADIOLOGY

## 2024-07-23 PROCEDURE — 99215 OFFICE O/P EST HI 40 MIN: CPT | Mod: PBBFAC | Performed by: STUDENT IN AN ORGANIZED HEALTH CARE EDUCATION/TRAINING PROGRAM

## 2024-07-23 PROCEDURE — 31575 DIAGNOSTIC LARYNGOSCOPY: CPT | Mod: PBBFAC | Performed by: STUDENT IN AN ORGANIZED HEALTH CARE EDUCATION/TRAINING PROGRAM

## 2024-07-23 PROCEDURE — 77067 SCR MAMMO BI INCL CAD: CPT | Mod: 26,,, | Performed by: RADIOLOGY

## 2024-07-23 RX ORDER — LIDOCAINE HYDROCHLORIDE 40 MG/ML
SOLUTION TOPICAL
Status: DISCONTINUED
Start: 2024-07-23 | End: 2024-07-23 | Stop reason: HOSPADM

## 2024-07-23 RX ORDER — SODIUM CHLORIDE 9 MG/ML
INJECTION, SOLUTION INTRAVENOUS CONTINUOUS
OUTPATIENT
Start: 2024-07-23

## 2024-07-23 RX ORDER — CEFAZOLIN SODIUM 2 G/50ML
2 SOLUTION INTRAVENOUS
OUTPATIENT
Start: 2024-07-23

## 2024-07-23 NOTE — PROGRESS NOTES
[facility]  Otolaryngology Clinic Note    Foreign Rosas  Encounter Date: 7/23/2024  YOB: 1965  Physician: MD Alin    Chief Complaint:  Papillary thyroid carcinoma    HPI: Foreign Rosas is a 59 y.o. female past medical history of hypercalcemia and hyperparathyroidism who was referred for left thyroid nodule that returned as papillary thyroid carcinoma.  Patient reports she has been having coughing episodes for the past month and some gas or air.  She denies odynophagia or dysphagia.  She reports some hoarseness however that comes and goes.  Denies any lumps or bumps on her neck.  They found thyroid nodule previously on a CT scan year or 2 ago.  She does have a sister who had thyroid cancer recently.  She denies any exposure to radiation.  Denies any previous neck surgeries.  Does not smoke tobacco.  Last year patient had a sestamibi scan in a 4D CT which localize a right inferior parathyroid adenoma.  At that time her PTH was elevated to the 90s.  She had hypercalcemia to 10.8.  She is asymptomatic.     ROS:   General: Negative except per HPI  Skin: Denies rash, ulcer, or lesion.  Eyes: Denies vision changes or diplopia.  Ears: Negative except per HPI  Nose: Negative except per HPI  Throat/mouth: Negative except per HPI  Cardiovascular: Negative except per HPI  Respiratory: Negative except per HPI  Neck: Negative except per HPI  Endocrine: Negative except per HPI  Neurologic: Negative except per HPI    Other 10-point review of systems negative except per HPI      Review of patient's allergies indicates:  No Known Allergies    Past Medical History:   Diagnosis Date    Anxiety disorder, unspecified     Glaucoma     HTN (hypertension)     OAB (overactive bladder)     GORDON (obstructive sleep apnea) 01/24/2024    Type 2 diabetes mellitus with unspecified diabetic retinopathy without macular edema        Past Surgical History:   Procedure Laterality Date     CHOLECYSTECTOMY      HYSTERECTOMY, VAGINAL, LAPAROSCOPY-ASSISTED, WITH SALPINGO-OOPHORECTOY Bilateral 08/22/2017    TUBAL LIGATION         Social History     Socioeconomic History    Marital status: Single   Tobacco Use    Smoking status: Never     Passive exposure: Past    Smokeless tobacco: Never   Substance and Sexual Activity    Alcohol use: Not Currently     Comment: special occassion    Drug use: Never    Sexual activity: Not Currently     Partners: Male     Birth control/protection: Abstinence, See Surgical Hx     Social Determinants of Health     Financial Resource Strain: Medium Risk (12/16/2023)    Overall Financial Resource Strain (CARDIA)     Difficulty of Paying Living Expenses: Somewhat hard   Food Insecurity: Food Insecurity Present (12/16/2023)    Hunger Vital Sign     Worried About Running Out of Food in the Last Year: Never true     Ran Out of Food in the Last Year: Sometimes true   Transportation Needs: No Transportation Needs (12/16/2023)    PRAPARE - Transportation     Lack of Transportation (Medical): No     Lack of Transportation (Non-Medical): No   Physical Activity: Inactive (12/16/2023)    Exercise Vital Sign     Days of Exercise per Week: 1 day     Minutes of Exercise per Session: 0 min   Stress: Stress Concern Present (12/16/2023)    [employer] [place] [place] [employer] - Occupational Stress Questionnaire     Feeling of Stress : Rather much   Housing Stability: High Risk (12/16/2023)    Housing Stability Vital Sign     Unable to Pay for Housing in the Last Year: Yes     Number of Places Lived in the Last Year: 2     Unstable Housing in the Last Year: No       Family History   Problem Relation Name Age of Onset    Diabetes Mother Mckenzie     Hypertension Mother Mckenzie     Kidney failure Mother Mckenzie     Asthma Mother Mckenzie     Kidney disease Mother Mckenzie     Lung cancer Father Rei     Diabetes Father Rei     Hypertension Father Rei     Mental illness Father  "Rei     Thyroid disease Sister Astrid     Brain cancer Sister Astrid     Diabetes Sister Astrid     Hypertension Sister Astrid     Cancer Sister Danelle         Thyroid    Thyroid disease Sister Danelle     Migraines Sister Danelle     Miscarriages / Stillbirths Sister Danelle     Cancer Brother Jack         Pancreatic    Hypertension Brother Jack     Seizures Brother Jack     Diabetes Sister Roxy     Hypertension Sister Roxy        Outpatient Encounter Medications as of 7/23/2024   Medication Sig Dispense Refill    amLODIPine (NORVASC) 5 MG tablet Take 1 tablet (5 mg total) by mouth once daily.      arm brace Misc 1 each by Misc.(Non-Drug; Combo Route) route every evening. Left carpal tunnel wrist splint, nightly 6 each 0    gabapentin (NEURONTIN) 300 MG capsule Take 300 mg by mouth every evening. Patient states she takes once a month      glipiZIDE (GLUCOTROL) 10 MG tablet Take 1 tablet (10 mg total) by mouth 2 (two) times daily before meals. 180 tablet 1    ibuprofen (ADVIL,MOTRIN) 800 MG tablet Take 1 tablet (800 mg total) by mouth 3 (three) times daily as needed for Pain. 90 tablet 2    insulin detemir U-100, Levemir, 100 unit/mL (3 mL) SubQ InPn pen Inject 40 units sq in AM and 48 units sq in PM. 90 mL 3    insulin lispro 100 unit/mL pen Inject 2 to 12 units under the skin per sliding scale after checking blood glucose before meals and at bedtime 15 mL 6    losartan (COZAAR) 25 MG tablet Take 1 tablet (25 mg total) by mouth once daily. 90 tablet 1    metFORMIN (GLUCOPHAGE) 1000 MG tablet Take 1 tablet (1,000 mg total) by mouth 2 (two) times daily with meals. 180 tablet 1    oxybutynin (DITROPAN-XL) 5 MG TR24 Take 1 tablet (5 mg total) by mouth once daily. 90 tablet 3    pen needle, diabetic 32 gauge x 5/32" Ndle Use 1 needle five times daily 100 each 6    pravastatin (PRAVACHOL) 20 MG tablet Take 20 mg by mouth once daily.      propranoloL (INDERAL) 10 MG tablet Take 1 tablet (10 mg total) " "by mouth 2 (two) times daily. (Patient taking differently: Take 10 mg by mouth once daily.) 60 tablet 3    sertraline (ZOLOFT) 50 MG tablet Take 1 tablet by mouth at bedtime 30 tablet 6    SITagliptin phosphate (JANUVIA) 50 MG Tab Take 1 tablet (50 mg total) by mouth once daily. 90 tablet 1    zolpidem (AMBIEN) 5 MG Tab Take 1 tablet (5 mg total) by mouth nightly as needed (insomnia). 30 tablet 5     Facility-Administered Encounter Medications as of 7/23/2024   Medication Dose Route Frequency Provider Last Rate Last Admin    LIDOcaine HCL 4% (XYLOCAINE) 4 % (40 mg/mL) external solution             phenylephrine HCL 1% (HADLEY-SYNEPHRINE) 1 % nasal spray                Physical Exam:  Vitals:    07/23/24 1026 07/23/24 1028   BP: (!) 158/89 136/78   BP Location: Right arm Left arm   Patient Position: Sitting Sitting   BP Method: Medium (Automatic) Medium (Automatic)   Pulse: 102 99   Resp: 20    Temp: 98.5 °F (36.9 °C)    TempSrc: Oral    SpO2: 99%    Weight: 78.9 kg (174 lb)    Height: 5' 4" (1.626 m)        Constitutional  General Appearance: well nourished, well-developed, AAO x3, NAD  HEENT  Eyes: PEERLA, EOMI, normal conjunctivae  Ears: Hearing well at conversation level; cristobal - no lateralization, Rinne AC > BC   AD: auricle normal, EAC normal, TM intact, no BRONWYN   AS: auricle normal, EAC normal, TM intact, no BRONWYN   Vestibular: ambulates without gait disturbance  Nose: septum midline, no inferior turbinate hypertrophy, no polyps, moist mucosa without erythema or blue hue  OC/OP:  Upper dentures present no oral lesions, tongue/FOM/BOT- soft, no leukoplakia/ulcerations/ tenderness  Nasopharynx, Hypopharynx, and Larynx:    Indirect: attempted, limited view due to patient intolerance  Neck: soft, non-tender, no palpable lymph nodes   Thyroid region- no nodules or goiter  Coughs with palpation along thyrohyoid  Neuro: CN II - XII intact bilaterally  Cardiovascular: peripheral pulses palpable  Respiratory: non-labored " respirations  Psychiatric: oriented to time, place and person, no depression, anxiety or agitation    Procedures:Flexible Fiberoptic Laryngoscopy/Nasopharyngoscopy via left nare    Procedure in Detail: Informed consent was obtained from the patient after explanation of procedure, indications, risks and benefits. Flexible endoscopy was performed through the nasal passages. The nasal cavity, nasopharynx, oropharynx, hypopharynx and larynx were adequately visualized. The true vocal cords and arytenoids were examined during phonation and repose.    Anesthesia: Topical Neosynephrine / Tetracaine  Adverse Events: None  Resident Surgeon: Alin   Blood loss: none  Condition: good    Findings:  NP/OP: no masses/lesions of NC, eustachian tube, fossa of Rosenmuller, no adenoid hypertrophy  BOT/vallecula: no lingual hypertrophy, no masses/lesions, no secretions obscuring visualization  Piriform sinuses/post-cricoid: no masses/lesions, no pooling of secretions  Epiglottis: lingual and laryngeal surfaces within normal limits  Arytenoids/FVFs: no masses/lesions, no edema, bilateral mobility  TVCs: bilateral cord mobility, no masses or lesions      Pertinent Data:  ? LABS:  ? AUDIO:  ? CT Scan:    Imaging:   I personally reviewed the following images:    Summary of Outside Records:      Assessment/Plan:  Foreign Rosas is a 59 y.o. female with papillary thyroid carcinoma of the left thyroid, and previously seen right parathyroid adenoma.  Patient with a history of thyroid cancer.  Flexible scope exam does not reveal any abnormalities, bilateral vocal cord movement present.     - we will obtain CBC CMP PTH vitamin-D chest x-ray EKG today  - stat CT scan to evaluate for cervical lymphadenopathy from papillary thyroid cancer  - OR on August 2nd.  We discussed that she will require an inpatient stay.  We discussed drain placement, injury to vocal cord, hypocalcemia, permanent thyroid medication supplementation, seroma, hematoma,  pain, infection, scarring, need for reoperation  - follow up in 1 week after surgery    EMILY Ogden MD  [facility] Department of Otolaryngology  HO-IV        Answers submitted by the patient for this visit:  Review of Symptoms Questionnaire  (Submitted on 7/23/2024)  Fatigue (Tiredness)?: Yes  facial swelling: Yes  Tooth/Dental Problems?: Yes  trouble swallowing: Yes  None of these : Yes  cough: Yes  None of these : Yes  None of these: Yes  None of these: Yes  Muscle aches / pain?: Yes  None of these : Yes  None of these: Yes  None of these : Yes  tremors: Yes  None of these: Yes  None of these: Yes

## 2024-07-23 NOTE — PROGRESS NOTES
The scope used for the exam was:  Flexible scope ENF-P4  Serial Number:  1)    [id number]    []   2)    [id number]    []   3)    [id number]    [x]   4)    [id number]    []   5)    [id number]    []   6)    [id number]    []       The scope used for the exam was:  Rigid scope   Serial Number:  1)   [id number]    []   2)   [id number]    []   3)   7330    []   4)    3384   []   5)    0824   []   6)    5554   []     7)   7425    []   8)   2240    []   9)   1109    []       Answers submitted by the patient for this visit:  Review of Symptoms Questionnaire  (Submitted on 7/23/2024)  Fatigue (Tiredness)?: Yes  facial swelling: Yes  Tooth/Dental Problems?: Yes  trouble swallowing: Yes  None of these : Yes  cough: Yes  None of these : Yes  None of these: Yes  None of these: Yes  Muscle aches / pain?: Yes  None of these : Yes  None of these: Yes  None of these : Yes  tremors: Yes  None of these: Yes  None of these: Yes

## 2024-07-24 ENCOUNTER — HOSPITAL ENCOUNTER (OUTPATIENT)
Dept: RADIOLOGY | Facility: HOSPITAL | Age: 59
Discharge: HOME | End: 2024-07-24
Attending: STUDENT IN AN ORGANIZED HEALTH CARE EDUCATION/TRAINING PROGRAM
Payer: COMMERCIAL

## 2024-07-24 ENCOUNTER — ANESTHESIA EVENT (OUTPATIENT)
Dept: SURGERY | Facility: HOSPITAL | Age: 59
End: 2024-07-24
Payer: COMMERCIAL

## 2024-07-24 DIAGNOSIS — C73 PAPILLARY THYROID CARCINOMA: ICD-10-CM

## 2024-07-24 DIAGNOSIS — D35.1 PARATHYROID ADENOMA: ICD-10-CM

## 2024-07-24 PROCEDURE — 71046 X-RAY EXAM CHEST 2 VIEWS: CPT | Mod: TC

## 2024-07-29 ENCOUNTER — HOSPITAL ENCOUNTER (OUTPATIENT)
Dept: RADIOLOGY | Facility: HOSPITAL | Age: 59
Discharge: HOME | End: 2024-07-29
Attending: STUDENT IN AN ORGANIZED HEALTH CARE EDUCATION/TRAINING PROGRAM
Payer: COMMERCIAL

## 2024-07-29 DIAGNOSIS — E04.2 MULTIPLE THYROID NODULES: ICD-10-CM

## 2024-07-29 DIAGNOSIS — C73 PAPILLARY THYROID CARCINOMA: ICD-10-CM

## 2024-07-29 DIAGNOSIS — D35.1 PARATHYROID ADENOMA: ICD-10-CM

## 2024-07-29 PROCEDURE — 70491 CT SOFT TISSUE NECK W/DYE: CPT | Mod: TC

## 2024-07-29 PROCEDURE — 25500020 PHARM REV CODE 255

## 2024-07-29 RX ADMIN — IOHEXOL 80 ML: 350 INJECTION, SOLUTION INTRAVENOUS at 10:07

## 2024-07-31 RX ORDER — ACETAMINOPHEN 325 MG/1
650 TABLET ORAL
OUTPATIENT
Start: 2024-07-31 | End: 2024-07-31

## 2024-07-31 RX ORDER — MIDAZOLAM HYDROCHLORIDE 2 MG/2ML
2 INJECTION, SOLUTION INTRAMUSCULAR; INTRAVENOUS
OUTPATIENT
Start: 2024-08-02 | End: 2024-08-02

## 2024-08-02 ENCOUNTER — ANESTHESIA (OUTPATIENT)
Dept: SURGERY | Facility: HOSPITAL | Age: 59
End: 2024-08-02
Payer: COMMERCIAL

## 2024-08-02 ENCOUNTER — HOSPITAL ENCOUNTER (INPATIENT)
Facility: HOSPITAL | Age: 59
LOS: 1 days | Discharge: HOME | DRG: 627 | End: 2024-08-03
Attending: OTOLARYNGOLOGY | Admitting: OTOLARYNGOLOGY
Payer: COMMERCIAL

## 2024-08-02 DIAGNOSIS — E04.2 MULTIPLE THYROID NODULES: ICD-10-CM

## 2024-08-02 DIAGNOSIS — C73 PAPILLARY THYROID CARCINOMA: ICD-10-CM

## 2024-08-02 DIAGNOSIS — D35.1 PARATHYROID ADENOMA: ICD-10-CM

## 2024-08-02 DIAGNOSIS — E83.52 HYPERCALCEMIA: Primary | ICD-10-CM

## 2024-08-02 LAB
POCT GLUCOSE: 151 MG/DL (ref 70–110)
POCT GLUCOSE: 153 MG/DL (ref 70–110)
POCT GLUCOSE: 199 MG/DL (ref 70–110)
POCT GLUCOSE: 244 MG/DL (ref 70–110)
POCT GLUCOSE: 259 MG/DL (ref 70–110)
PTH-INTACT SERPL-MCNC: 115 PG/ML (ref 8.7–77)
PTH-INTACT SERPL-MCNC: 19.5 PG/ML (ref 8.7–77)

## 2024-08-02 PROCEDURE — 82962 GLUCOSE BLOOD TEST: CPT | Performed by: OTOLARYNGOLOGY

## 2024-08-02 PROCEDURE — 37000008 HC ANESTHESIA 1ST 15 MINUTES: Performed by: SURGERY

## 2024-08-02 PROCEDURE — 25000003 PHARM REV CODE 250: Performed by: ANESTHESIOLOGY

## 2024-08-02 PROCEDURE — 21400001 HC TELEMETRY ROOM

## 2024-08-02 PROCEDURE — 63600175 PHARM REV CODE 636 W HCPCS: Performed by: NURSE ANESTHETIST, CERTIFIED REGISTERED

## 2024-08-02 PROCEDURE — 63600175 PHARM REV CODE 636 W HCPCS: Performed by: NURSE PRACTITIONER

## 2024-08-02 PROCEDURE — 63600175 PHARM REV CODE 636 W HCPCS: Performed by: STUDENT IN AN ORGANIZED HEALTH CARE EDUCATION/TRAINING PROGRAM

## 2024-08-02 PROCEDURE — 25000003 PHARM REV CODE 250: Performed by: NURSE ANESTHETIST, CERTIFIED REGISTERED

## 2024-08-02 PROCEDURE — 63600175 PHARM REV CODE 636 W HCPCS: Performed by: ANESTHESIOLOGY

## 2024-08-02 PROCEDURE — 25000242 PHARM REV CODE 250 ALT 637 W/ HCPCS: Performed by: ANESTHESIOLOGY

## 2024-08-02 PROCEDURE — 27201423 OPTIME MED/SURG SUP & DEVICES STERILE SUPPLY: Performed by: SURGERY

## 2024-08-02 PROCEDURE — 25000003 PHARM REV CODE 250: Performed by: OTOLARYNGOLOGY

## 2024-08-02 PROCEDURE — 0GTK0ZZ RESECTION OF THYROID GLAND, OPEN APPROACH: ICD-10-PCS | Performed by: SURGERY

## 2024-08-02 PROCEDURE — 37000009 HC ANESTHESIA EA ADD 15 MINS: Performed by: SURGERY

## 2024-08-02 PROCEDURE — 83970 ASSAY OF PARATHORMONE: CPT | Performed by: STUDENT IN AN ORGANIZED HEALTH CARE EDUCATION/TRAINING PROGRAM

## 2024-08-02 PROCEDURE — 88331 PATH CONSLTJ SURG 1 BLK 1SPC: CPT | Mod: TC | Performed by: OTOLARYNGOLOGY

## 2024-08-02 PROCEDURE — 0GBL0ZZ EXCISION OF RIGHT SUPERIOR PARATHYROID GLAND, OPEN APPROACH: ICD-10-PCS | Performed by: SURGERY

## 2024-08-02 PROCEDURE — 25000003 PHARM REV CODE 250: Performed by: SPECIALIST

## 2024-08-02 PROCEDURE — 63600175 PHARM REV CODE 636 W HCPCS: Performed by: OTOLARYNGOLOGY

## 2024-08-02 PROCEDURE — 63600175 PHARM REV CODE 636 W HCPCS

## 2024-08-02 PROCEDURE — 25000003 PHARM REV CODE 250: Performed by: STUDENT IN AN ORGANIZED HEALTH CARE EDUCATION/TRAINING PROGRAM

## 2024-08-02 PROCEDURE — 36000706: Performed by: SURGERY

## 2024-08-02 PROCEDURE — 71000033 HC RECOVERY, INTIAL HOUR: Performed by: SURGERY

## 2024-08-02 PROCEDURE — 83970 ASSAY OF PARATHORMONE: CPT | Performed by: OTOLARYNGOLOGY

## 2024-08-02 PROCEDURE — 25000242 PHARM REV CODE 250 ALT 637 W/ HCPCS: Performed by: NURSE ANESTHETIST, CERTIFIED REGISTERED

## 2024-08-02 PROCEDURE — 36000707: Performed by: SURGERY

## 2024-08-02 RX ORDER — ONDANSETRON HYDROCHLORIDE 2 MG/ML
INJECTION, SOLUTION INTRAVENOUS
Status: DISCONTINUED | OUTPATIENT
Start: 2024-08-02 | End: 2024-08-02

## 2024-08-02 RX ORDER — SODIUM CHLORIDE 9 MG/ML
INJECTION, SOLUTION INTRAVENOUS
Status: DISCONTINUED | OUTPATIENT
Start: 2024-08-02 | End: 2024-08-03

## 2024-08-02 RX ORDER — PROPRANOLOL HYDROCHLORIDE 10 MG/1
10 TABLET ORAL DAILY
Status: DISCONTINUED | OUTPATIENT
Start: 2024-08-03 | End: 2024-08-03

## 2024-08-02 RX ORDER — ACETAMINOPHEN 325 MG/1
650 TABLET ORAL ONCE
Status: COMPLETED | OUTPATIENT
Start: 2024-08-02 | End: 2024-08-02

## 2024-08-02 RX ORDER — SODIUM CHLORIDE 9 MG/ML
INJECTION, SOLUTION INTRAVENOUS CONTINUOUS
Status: DISCONTINUED | OUTPATIENT
Start: 2024-08-02 | End: 2024-08-02

## 2024-08-02 RX ORDER — HYDROMORPHONE HYDROCHLORIDE 1 MG/ML
0.2 INJECTION, SOLUTION INTRAMUSCULAR; INTRAVENOUS; SUBCUTANEOUS EVERY 6 HOURS PRN
Status: DISCONTINUED | OUTPATIENT
Start: 2024-08-02 | End: 2024-08-03

## 2024-08-02 RX ORDER — OXYBUTYNIN CHLORIDE 5 MG/1
5 TABLET, EXTENDED RELEASE ORAL DAILY
Status: DISCONTINUED | OUTPATIENT
Start: 2024-08-03 | End: 2024-08-03

## 2024-08-02 RX ORDER — DEXMEDETOMIDINE HYDROCHLORIDE 100 UG/ML
INJECTION, SOLUTION INTRAVENOUS
Status: DISCONTINUED | OUTPATIENT
Start: 2024-08-02 | End: 2024-08-02

## 2024-08-02 RX ORDER — MEPERIDINE HYDROCHLORIDE 25 MG/ML
12.5 INJECTION INTRAMUSCULAR; INTRAVENOUS; SUBCUTANEOUS EVERY 10 MIN PRN
Status: DISCONTINUED | OUTPATIENT
Start: 2024-08-02 | End: 2024-08-02 | Stop reason: HOSPADM

## 2024-08-02 RX ORDER — VASOPRESSIN 20 [USP'U]/ML
INJECTION, SOLUTION INTRAMUSCULAR; SUBCUTANEOUS
Status: DISCONTINUED | OUTPATIENT
Start: 2024-08-02 | End: 2024-08-02

## 2024-08-02 RX ORDER — OXYCODONE HYDROCHLORIDE 5 MG/1
5 TABLET ORAL EVERY 6 HOURS PRN
Status: DISCONTINUED | OUTPATIENT
Start: 2024-08-02 | End: 2024-08-03

## 2024-08-02 RX ORDER — SODIUM CHLORIDE, SODIUM LACTATE, POTASSIUM CHLORIDE, CALCIUM CHLORIDE 600; 310; 30; 20 MG/100ML; MG/100ML; MG/100ML; MG/100ML
INJECTION, SOLUTION INTRAVENOUS CONTINUOUS
Status: DISCONTINUED | OUTPATIENT
Start: 2024-08-02 | End: 2024-08-02

## 2024-08-02 RX ORDER — PROPOFOL 10 MG/ML
VIAL (ML) INTRAVENOUS CONTINUOUS PRN
Status: DISCONTINUED | OUTPATIENT
Start: 2024-08-02 | End: 2024-08-02

## 2024-08-02 RX ORDER — IPRATROPIUM BROMIDE AND ALBUTEROL SULFATE 2.5; .5 MG/3ML; MG/3ML
3 SOLUTION RESPIRATORY (INHALATION) ONCE
Status: COMPLETED | OUTPATIENT
Start: 2024-08-02 | End: 2024-08-02

## 2024-08-02 RX ORDER — GLUCAGON 1 MG
1 KIT INJECTION
Status: DISCONTINUED | OUTPATIENT
Start: 2024-08-02 | End: 2024-08-03

## 2024-08-02 RX ORDER — IBUPROFEN 200 MG
24 TABLET ORAL
Status: DISCONTINUED | OUTPATIENT
Start: 2024-08-02 | End: 2024-08-03

## 2024-08-02 RX ORDER — IBUPROFEN 400 MG/1
800 TABLET ORAL EVERY 6 HOURS PRN
Status: DISCONTINUED | OUTPATIENT
Start: 2024-08-02 | End: 2024-08-03

## 2024-08-02 RX ORDER — OXYCODONE HYDROCHLORIDE 5 MG/1
10 TABLET ORAL EVERY 6 HOURS PRN
Status: DISCONTINUED | OUTPATIENT
Start: 2024-08-02 | End: 2024-08-03

## 2024-08-02 RX ORDER — FAMOTIDINE 20 MG/1
20 TABLET, FILM COATED ORAL ONCE
Status: COMPLETED | OUTPATIENT
Start: 2024-08-02 | End: 2024-08-02

## 2024-08-02 RX ORDER — SUCCINYLCHOLINE CHLORIDE 20 MG/ML
INJECTION INTRAMUSCULAR; INTRAVENOUS
Status: DISCONTINUED | OUTPATIENT
Start: 2024-08-02 | End: 2024-08-02

## 2024-08-02 RX ORDER — INSULIN ASPART 100 [IU]/ML
0-10 INJECTION, SOLUTION INTRAVENOUS; SUBCUTANEOUS
Status: DISCONTINUED | OUTPATIENT
Start: 2024-08-02 | End: 2024-08-03

## 2024-08-02 RX ORDER — PHENYLEPHRINE HYDROCHLORIDE 10 MG/ML
INJECTION INTRAVENOUS
Status: DISCONTINUED | OUTPATIENT
Start: 2024-08-02 | End: 2024-08-02

## 2024-08-02 RX ORDER — AMLODIPINE BESYLATE 5 MG/1
5 TABLET ORAL DAILY
Status: DISCONTINUED | OUTPATIENT
Start: 2024-08-03 | End: 2024-08-03

## 2024-08-02 RX ORDER — DEXAMETHASONE SODIUM PHOSPHATE 4 MG/ML
INJECTION, SOLUTION INTRA-ARTICULAR; INTRALESIONAL; INTRAMUSCULAR; INTRAVENOUS; SOFT TISSUE
Status: DISCONTINUED | OUTPATIENT
Start: 2024-08-02 | End: 2024-08-02

## 2024-08-02 RX ORDER — ROCURONIUM BROMIDE 10 MG/ML
INJECTION, SOLUTION INTRAVENOUS
Status: DISCONTINUED | OUTPATIENT
Start: 2024-08-02 | End: 2024-08-02

## 2024-08-02 RX ORDER — CEFAZOLIN SODIUM 1 G/3ML
2 INJECTION, POWDER, FOR SOLUTION INTRAMUSCULAR; INTRAVENOUS
Status: COMPLETED | OUTPATIENT
Start: 2024-08-02 | End: 2024-08-02

## 2024-08-02 RX ORDER — GLUCAGON 1 MG
1 KIT INJECTION
Status: DISCONTINUED | OUTPATIENT
Start: 2024-08-02 | End: 2024-08-02 | Stop reason: HOSPADM

## 2024-08-02 RX ORDER — LIDOCAINE HYDROCHLORIDE 20 MG/ML
INJECTION INTRAVENOUS
Status: DISCONTINUED | OUTPATIENT
Start: 2024-08-02 | End: 2024-08-02

## 2024-08-02 RX ORDER — NALOXONE HCL 0.4 MG/ML
0.02 VIAL (ML) INJECTION
Status: DISCONTINUED | OUTPATIENT
Start: 2024-08-02 | End: 2024-08-03

## 2024-08-02 RX ORDER — SODIUM CHLORIDE, SODIUM LACTATE, POTASSIUM CHLORIDE, CALCIUM CHLORIDE 600; 310; 30; 20 MG/100ML; MG/100ML; MG/100ML; MG/100ML
INJECTION, SOLUTION INTRAVENOUS CONTINUOUS
Status: ACTIVE | OUTPATIENT
Start: 2024-08-02 | End: 2024-08-02

## 2024-08-02 RX ORDER — REMIFENTANIL HYDROCHLORIDE 1 MG/ML
INJECTION, POWDER, LYOPHILIZED, FOR SOLUTION INTRAVENOUS CONTINUOUS PRN
Status: DISCONTINUED | OUTPATIENT
Start: 2024-08-02 | End: 2024-08-02

## 2024-08-02 RX ORDER — MORPHINE SULFATE 2 MG/ML
2 INJECTION, SOLUTION INTRAMUSCULAR; INTRAVENOUS EVERY 6 HOURS PRN
Status: DISCONTINUED | OUTPATIENT
Start: 2024-08-02 | End: 2024-08-02

## 2024-08-02 RX ORDER — CEFAZOLIN SODIUM 1 G/3ML
INJECTION, POWDER, FOR SOLUTION INTRAMUSCULAR; INTRAVENOUS
Status: DISCONTINUED | OUTPATIENT
Start: 2024-08-02 | End: 2024-08-02

## 2024-08-02 RX ORDER — ACETAMINOPHEN 325 MG/1
650 TABLET ORAL EVERY 6 HOURS
Status: DISCONTINUED | OUTPATIENT
Start: 2024-08-03 | End: 2024-08-03

## 2024-08-02 RX ORDER — HYDROMORPHONE HYDROCHLORIDE 1 MG/ML
0.5 INJECTION, SOLUTION INTRAMUSCULAR; INTRAVENOUS; SUBCUTANEOUS EVERY 10 MIN PRN
Status: DISCONTINUED | OUTPATIENT
Start: 2024-08-02 | End: 2024-08-02 | Stop reason: HOSPADM

## 2024-08-02 RX ORDER — PROPOFOL 10 MG/ML
VIAL (ML) INTRAVENOUS
Status: DISCONTINUED | OUTPATIENT
Start: 2024-08-02 | End: 2024-08-02

## 2024-08-02 RX ORDER — LOSARTAN POTASSIUM 25 MG/1
25 TABLET ORAL DAILY
Status: DISCONTINUED | OUTPATIENT
Start: 2024-08-03 | End: 2024-08-03

## 2024-08-02 RX ORDER — FENTANYL CITRATE 50 UG/ML
INJECTION, SOLUTION INTRAMUSCULAR; INTRAVENOUS
Status: DISCONTINUED | OUTPATIENT
Start: 2024-08-02 | End: 2024-08-02

## 2024-08-02 RX ORDER — ONDANSETRON HYDROCHLORIDE 2 MG/ML
4 INJECTION, SOLUTION INTRAVENOUS ONCE
Status: COMPLETED | OUTPATIENT
Start: 2024-08-02 | End: 2024-08-02

## 2024-08-02 RX ORDER — CEFAZOLIN SODIUM 1 G/3ML
2 INJECTION, POWDER, FOR SOLUTION INTRAMUSCULAR; INTRAVENOUS
Status: DISCONTINUED | OUTPATIENT
Start: 2024-08-02 | End: 2024-08-02

## 2024-08-02 RX ORDER — LEVOTHYROXINE SODIUM 112 UG/1
112 TABLET ORAL
Status: DISCONTINUED | OUTPATIENT
Start: 2024-08-03 | End: 2024-08-03

## 2024-08-02 RX ORDER — ALBUTEROL SULFATE 90 UG/1
AEROSOL, METERED RESPIRATORY (INHALATION)
Status: DISCONTINUED | OUTPATIENT
Start: 2024-08-02 | End: 2024-08-02

## 2024-08-02 RX ORDER — SODIUM CHLORIDE 0.9 % (FLUSH) 0.9 %
10 SYRINGE (ML) INJECTION EVERY 12 HOURS PRN
Status: DISCONTINUED | OUTPATIENT
Start: 2024-08-02 | End: 2024-08-03

## 2024-08-02 RX ORDER — PROPRANOLOL HYDROCHLORIDE 10 MG/1
10 TABLET ORAL ONCE
Status: COMPLETED | OUTPATIENT
Start: 2024-08-02 | End: 2024-08-02

## 2024-08-02 RX ORDER — IBUPROFEN 200 MG
16 TABLET ORAL
Status: DISCONTINUED | OUTPATIENT
Start: 2024-08-02 | End: 2024-08-03

## 2024-08-02 RX ORDER — EPHEDRINE SULFATE 50 MG/ML
INJECTION, SOLUTION INTRAVENOUS
Status: DISCONTINUED | OUTPATIENT
Start: 2024-08-02 | End: 2024-08-02

## 2024-08-02 RX ORDER — ONDANSETRON HYDROCHLORIDE 2 MG/ML
4 INJECTION, SOLUTION INTRAVENOUS ONCE AS NEEDED
Status: COMPLETED | OUTPATIENT
Start: 2024-08-02 | End: 2024-08-02

## 2024-08-02 RX ORDER — INSULIN ASPART 100 [IU]/ML
0-5 INJECTION, SOLUTION INTRAVENOUS; SUBCUTANEOUS ONCE AS NEEDED
Status: COMPLETED | OUTPATIENT
Start: 2024-08-02 | End: 2024-08-02

## 2024-08-02 RX ADMIN — SUCCINYLCHOLINE CHLORIDE 100 MG: 20 INJECTION, SOLUTION INTRAMUSCULAR; INTRAVENOUS; PARENTERAL at 07:08

## 2024-08-02 RX ADMIN — EPHEDRINE SULFATE 25 MG: 50 INJECTION INTRAVENOUS at 08:08

## 2024-08-02 RX ADMIN — CEFAZOLIN 2 G: 330 INJECTION, POWDER, FOR SOLUTION INTRAMUSCULAR; INTRAVENOUS at 12:08

## 2024-08-02 RX ADMIN — SODIUM CHLORIDE, POTASSIUM CHLORIDE, SODIUM LACTATE AND CALCIUM CHLORIDE: 600; 310; 30; 20 INJECTION, SOLUTION INTRAVENOUS at 07:08

## 2024-08-02 RX ADMIN — HYDROMORPHONE HYDROCHLORIDE 0.5 MG: 1 INJECTION, SOLUTION INTRAMUSCULAR; INTRAVENOUS; SUBCUTANEOUS at 11:08

## 2024-08-02 RX ADMIN — PHENYLEPHRINE HYDROCHLORIDE 200 MCG: 10 INJECTION INTRAVENOUS at 07:08

## 2024-08-02 RX ADMIN — PHENYLEPHRINE HYDROCHLORIDE 5 MCG/KG/MIN: 10 INJECTION INTRAVENOUS at 08:08

## 2024-08-02 RX ADMIN — VASOPRESSIN 1 UNITS: 20 INJECTION INTRAVENOUS at 08:08

## 2024-08-02 RX ADMIN — ONDANSETRON 4 MG: 2 INJECTION INTRAMUSCULAR; INTRAVENOUS at 11:08

## 2024-08-02 RX ADMIN — CEFAZOLIN 2 G: 2 INJECTION, POWDER, FOR SOLUTION INTRAMUSCULAR; INTRAVENOUS at 09:08

## 2024-08-02 RX ADMIN — ONDANSETRON 4 MG: 2 INJECTION INTRAMUSCULAR; INTRAVENOUS at 09:08

## 2024-08-02 RX ADMIN — ALBUTEROL SULFATE 2 PUFF: 90 AEROSOL, METERED RESPIRATORY (INHALATION) at 10:08

## 2024-08-02 RX ADMIN — PROPOFOL 120 MCG/KG/MIN: 10 INJECTION, EMULSION INTRAVENOUS at 08:08

## 2024-08-02 RX ADMIN — REMIFENTANIL HYDROCHLORIDE 0.04 MCG/KG/MIN: 1 INJECTION, POWDER, LYOPHILIZED, FOR SOLUTION INTRAVENOUS at 07:08

## 2024-08-02 RX ADMIN — PROPOFOL 50 MG: 10 INJECTION, EMULSION INTRAVENOUS at 07:08

## 2024-08-02 RX ADMIN — ROCURONIUM BROMIDE 10 MG: 10 INJECTION INTRAVENOUS at 07:08

## 2024-08-02 RX ADMIN — PROPOFOL 60 MG: 10 INJECTION, EMULSION INTRAVENOUS at 07:08

## 2024-08-02 RX ADMIN — INSULIN ASPART 3 UNITS: 100 INJECTION, SOLUTION INTRAVENOUS; SUBCUTANEOUS at 08:08

## 2024-08-02 RX ADMIN — PROPOFOL 140 MG: 10 INJECTION, EMULSION INTRAVENOUS at 07:08

## 2024-08-02 RX ADMIN — HYDROMORPHONE HYDROCHLORIDE 0.2 MG: 1 INJECTION, SOLUTION INTRAMUSCULAR; INTRAVENOUS; SUBCUTANEOUS at 07:08

## 2024-08-02 RX ADMIN — INSULIN ASPART 4 UNITS: 100 INJECTION, SOLUTION INTRAVENOUS; SUBCUTANEOUS at 12:08

## 2024-08-02 RX ADMIN — OXYCODONE HYDROCHLORIDE 10 MG: 5 TABLET ORAL at 08:08

## 2024-08-02 RX ADMIN — ONDANSETRON 4 MG: 2 INJECTION INTRAMUSCULAR; INTRAVENOUS at 05:08

## 2024-08-02 RX ADMIN — PROPRANOLOL HYDROCHLORIDE 10 MG: 10 TABLET ORAL at 05:08

## 2024-08-02 RX ADMIN — ACETAMINOPHEN 650 MG: 325 TABLET, FILM COATED ORAL at 05:08

## 2024-08-02 RX ADMIN — ALBUTEROL SULFATE 4 PUFF: 90 AEROSOL, METERED RESPIRATORY (INHALATION) at 07:08

## 2024-08-02 RX ADMIN — SODIUM CHLORIDE, POTASSIUM CHLORIDE, SODIUM LACTATE AND CALCIUM CHLORIDE: 600; 310; 30; 20 INJECTION, SOLUTION INTRAVENOUS at 06:08

## 2024-08-02 RX ADMIN — IPRATROPIUM BROMIDE AND ALBUTEROL SULFATE 3 ML: .5; 3 SOLUTION RESPIRATORY (INHALATION) at 11:08

## 2024-08-02 RX ADMIN — PHENYLEPHRINE HYDROCHLORIDE 200 MCG: 10 INJECTION INTRAVENOUS at 08:08

## 2024-08-02 RX ADMIN — CEFAZOLIN 2 G: 330 INJECTION, POWDER, FOR SOLUTION INTRAMUSCULAR; INTRAVENOUS at 07:08

## 2024-08-02 RX ADMIN — ACETAMINOPHEN 650 MG: 325 TABLET, FILM COATED ORAL at 11:08

## 2024-08-02 RX ADMIN — DEXAMETHASONE SODIUM PHOSPHATE 4 MG: 4 INJECTION, SOLUTION INTRA-ARTICULAR; INTRALESIONAL; INTRAMUSCULAR; INTRAVENOUS; SOFT TISSUE at 07:08

## 2024-08-02 RX ADMIN — OXYCODONE HYDROCHLORIDE 10 MG: 5 TABLET ORAL at 02:08

## 2024-08-02 RX ADMIN — LIDOCAINE HYDROCHLORIDE 80 MG: 20 INJECTION INTRAVENOUS at 07:08

## 2024-08-02 RX ADMIN — DEXMEDETOMIDINE 10 MCG: 200 INJECTION, SOLUTION INTRAVENOUS at 07:08

## 2024-08-02 RX ADMIN — FENTANYL CITRATE 100 MCG: 50 INJECTION INTRAMUSCULAR; INTRAVENOUS at 07:08

## 2024-08-02 RX ADMIN — ALBUTEROL SULFATE 6 PUFF: 90 AEROSOL, METERED RESPIRATORY (INHALATION) at 07:08

## 2024-08-02 RX ADMIN — FAMOTIDINE 20 MG: 20 TABLET, FILM COATED ORAL at 05:08

## 2024-08-02 NOTE — ANESTHESIA POSTPROCEDURE EVALUATION
Anesthesia Post Evaluation    Patient: Foreign Rosas    Procedure(s) Performed: Procedure(s) (LRB):  THYROIDECTOMY total (Bilateral)  PARATHYROIDECTOMY (Right)    Final Anesthesia Type: general      Patient location during evaluation: PACU  Patient participation: Yes- Able to Participate  Level of consciousness: awake and alert, awake and oriented  Post-procedure vital signs: reviewed and stable  Pain management: adequate  Airway patency: patent    PONV status at discharge: No PONV  Anesthetic complications: no      Cardiovascular status: blood pressure returned to baseline, hemodynamically stable and stable  Respiratory status: unassisted, spontaneous ventilation and nasal cannula  Hydration status: euvolemic  Follow-up not needed.              Vitals Value Taken Time   /77 08/02/24 1145   Temp 36.3 °C (97.3 °F) 08/02/24 1145   Pulse 86 08/02/24 1210   Resp 18 08/02/24 1145   SpO2 94 % 08/02/24 1210   Vitals shown include unfiled device data.      Event Time   Out of Recovery 11:58:00         Pain/Charmaine Score: Pain Rating Prior to Med Admin: 8 (8/2/2024 11:51 AM)  Charmaine Score: 8 (8/2/2024 11:26 AM)

## 2024-08-02 NOTE — ANESTHESIA PROCEDURE NOTES
Intubation    Date/Time: 8/2/2024 7:08 AM    Performed by: Arnoldo Schmidt CRNA  Authorized by: Zoraida Escobar MD    Intubation:     Induction:  Intravenous    Intubated:  Postinduction    Mask Ventilation:  Easy mask    Attempts:  1    Attempted By:  CRNA    Method of Intubation:  Direct    Blade:  Lei 4    Laryngeal View Grade: Grade I - full view of cords      Difficult Airway Encountered?: No      Complications:  None    Airway Device:  Oral endotracheal tube and EMG ETT (NIMS)    Airway Device Size:  7.5    Style/Cuff Inflation:  Cuffed (inflated to minimal occlusive pressure)    Secured at:  The lips    Placement Verified By:  Capnometry    Complicating Factors:  None    Findings Post-Intubation:  BS equal bilateral and atraumatic/condition of teeth unchanged

## 2024-08-02 NOTE — TRANSFER OF CARE
Anesthesia Transfer of Care Note    Patient: Foreign Rosas    Procedure(s) Performed: Procedure(s) (LRB):  THYROIDECTOMY total (Bilateral)  PARATHYROIDECTOMY (Right)    Patient location: PACU    Anesthesia Type: general    Transport from OR: Transported from OR on room air with adequate spontaneous ventilation    Post pain: adequate analgesia    Post assessment: no apparent anesthetic complications    Post vital signs: stable    Level of consciousness: sedated    Nausea/Vomiting: no nausea/vomiting    Complications: none    Transfer of care protocol was followed      Last vitals: Visit Vitals  /80   Pulse 89   Temp 36.5 °C (97.7 °F) (Temporal)   Resp 16   Wt 77.1 kg (170 lb)   SpO2 96%   Breastfeeding No   BMI 29.18 kg/m²

## 2024-08-03 VITALS
BODY MASS INDEX: 29.02 KG/M2 | RESPIRATION RATE: 18 BRPM | TEMPERATURE: 99 F | HEIGHT: 64 IN | OXYGEN SATURATION: 97 % | WEIGHT: 170 LBS | SYSTOLIC BLOOD PRESSURE: 144 MMHG | DIASTOLIC BLOOD PRESSURE: 78 MMHG | HEART RATE: 89 BPM

## 2024-08-03 PROBLEM — C73 PAPILLARY THYROID CARCINOMA: Status: ACTIVE | Noted: 2024-08-03

## 2024-08-03 PROBLEM — E21.3 HYPERPARATHYROIDISM: Status: ACTIVE | Noted: 2024-08-03

## 2024-08-03 LAB
ALBUMIN SERPL-MCNC: 3.6 G/DL (ref 3.4–4.8)
ALBUMIN/GLOB SERPL: 1.1 RATIO (ref 1.1–2)
ALP SERPL-CCNC: 64 UNIT/L (ref 40–150)
ALT SERPL-CCNC: 62 UNIT/L (ref 0–55)
ANION GAP SERPL CALC-SCNC: 8 MEQ/L
AST SERPL-CCNC: 43 UNIT/L (ref 5–34)
BASOPHILS # BLD AUTO: 0.04 X10(3)/MCL
BASOPHILS NFR BLD AUTO: 0.4 %
BILIRUB SERPL-MCNC: 0.3 MG/DL
BUN SERPL-MCNC: 16.8 MG/DL (ref 9.8–20.1)
CALCIUM SERPL-MCNC: 8.8 MG/DL (ref 8.4–10.2)
CHLORIDE SERPL-SCNC: 107 MMOL/L (ref 98–107)
CO2 SERPL-SCNC: 25 MMOL/L (ref 23–31)
CREAT SERPL-MCNC: 1.22 MG/DL (ref 0.55–1.02)
CREAT/UREA NIT SERPL: 14
EOSINOPHIL # BLD AUTO: 0.19 X10(3)/MCL (ref 0–0.9)
EOSINOPHIL NFR BLD AUTO: 1.7 %
ERYTHROCYTE [DISTWIDTH] IN BLOOD BY AUTOMATED COUNT: 14.2 % (ref 11.5–17)
GFR SERPLBLD CREATININE-BSD FMLA CKD-EPI: 51 ML/MIN/1.73/M2
GLOBULIN SER-MCNC: 3.3 GM/DL (ref 2.4–3.5)
GLUCOSE SERPL-MCNC: 148 MG/DL (ref 82–115)
HCT VFR BLD AUTO: 36 % (ref 37–47)
HGB BLD-MCNC: 11.1 G/DL (ref 12–16)
HOLD SPECIMEN: NORMAL
IMM GRANULOCYTES # BLD AUTO: 0.02 X10(3)/MCL (ref 0–0.04)
IMM GRANULOCYTES NFR BLD AUTO: 0.2 %
LYMPHOCYTES # BLD AUTO: 1.8 X10(3)/MCL (ref 0.6–4.6)
LYMPHOCYTES NFR BLD AUTO: 16.3 %
MCH RBC QN AUTO: 26.1 PG (ref 27–31)
MCHC RBC AUTO-ENTMCNC: 30.8 G/DL (ref 33–36)
MCV RBC AUTO: 84.5 FL (ref 80–94)
MONOCYTES # BLD AUTO: 0.94 X10(3)/MCL (ref 0.1–1.3)
MONOCYTES NFR BLD AUTO: 8.5 %
NEUTROPHILS # BLD AUTO: 8.05 X10(3)/MCL (ref 2.1–9.2)
NEUTROPHILS NFR BLD AUTO: 72.9 %
NRBC BLD AUTO-RTO: 0 %
PLATELET # BLD AUTO: 236 X10(3)/MCL (ref 130–400)
PMV BLD AUTO: 11.3 FL (ref 7.4–10.4)
POCT GLUCOSE: 144 MG/DL (ref 70–110)
POCT GLUCOSE: 146 MG/DL (ref 70–110)
POTASSIUM SERPL-SCNC: 4.5 MMOL/L (ref 3.5–5.1)
PROT SERPL-MCNC: 6.9 GM/DL (ref 5.8–7.6)
PTH-INTACT SERPL-MCNC: 24.2 PG/ML (ref 8.7–77)
RBC # BLD AUTO: 4.26 X10(6)/MCL (ref 4.2–5.4)
SODIUM SERPL-SCNC: 140 MMOL/L (ref 136–145)
WBC # BLD AUTO: 11.04 X10(3)/MCL (ref 4.5–11.5)

## 2024-08-03 PROCEDURE — 94761 N-INVAS EAR/PLS OXIMETRY MLT: CPT

## 2024-08-03 PROCEDURE — 63600175 PHARM REV CODE 636 W HCPCS: Performed by: OTOLARYNGOLOGY

## 2024-08-03 PROCEDURE — 25000242 PHARM REV CODE 250 ALT 637 W/ HCPCS

## 2024-08-03 PROCEDURE — 25000003 PHARM REV CODE 250: Performed by: STUDENT IN AN ORGANIZED HEALTH CARE EDUCATION/TRAINING PROGRAM

## 2024-08-03 PROCEDURE — 11000001 HC ACUTE MED/SURG PRIVATE ROOM

## 2024-08-03 PROCEDURE — 25000003 PHARM REV CODE 250: Performed by: OTOLARYNGOLOGY

## 2024-08-03 PROCEDURE — 63600175 PHARM REV CODE 636 W HCPCS

## 2024-08-03 PROCEDURE — 83970 ASSAY OF PARATHORMONE: CPT | Performed by: STUDENT IN AN ORGANIZED HEALTH CARE EDUCATION/TRAINING PROGRAM

## 2024-08-03 PROCEDURE — 85025 COMPLETE CBC W/AUTO DIFF WBC: CPT | Performed by: STUDENT IN AN ORGANIZED HEALTH CARE EDUCATION/TRAINING PROGRAM

## 2024-08-03 PROCEDURE — 94640 AIRWAY INHALATION TREATMENT: CPT

## 2024-08-03 PROCEDURE — 36415 COLL VENOUS BLD VENIPUNCTURE: CPT | Performed by: OTOLARYNGOLOGY

## 2024-08-03 PROCEDURE — 80053 COMPREHEN METABOLIC PANEL: CPT | Performed by: STUDENT IN AN ORGANIZED HEALTH CARE EDUCATION/TRAINING PROGRAM

## 2024-08-03 PROCEDURE — 36415 COLL VENOUS BLD VENIPUNCTURE: CPT | Performed by: STUDENT IN AN ORGANIZED HEALTH CARE EDUCATION/TRAINING PROGRAM

## 2024-08-03 PROCEDURE — 25000003 PHARM REV CODE 250

## 2024-08-03 RX ORDER — ALBUTEROL SULFATE 0.83 MG/ML
2.5 SOLUTION RESPIRATORY (INHALATION) EVERY 4 HOURS PRN
Status: DISCONTINUED | OUTPATIENT
Start: 2024-08-03 | End: 2024-08-03

## 2024-08-03 RX ORDER — HYDROCODONE BITARTRATE AND ACETAMINOPHEN 10; 325 MG/1; MG/1
1 TABLET ORAL EVERY 6 HOURS PRN
Qty: 20 TABLET | Refills: 0 | Status: SHIPPED | OUTPATIENT
Start: 2024-08-03 | End: 2024-08-08

## 2024-08-03 RX ORDER — CEPHALEXIN 500 MG/1
500 CAPSULE ORAL EVERY 6 HOURS
Qty: 28 CAPSULE | Refills: 0 | Status: SHIPPED | OUTPATIENT
Start: 2024-08-03 | End: 2024-08-03

## 2024-08-03 RX ORDER — LEVOTHYROXINE SODIUM 112 UG/1
112 TABLET ORAL
Qty: 30 TABLET | Refills: 11 | Status: SHIPPED | OUTPATIENT
Start: 2024-08-04 | End: 2024-08-03

## 2024-08-03 RX ORDER — ALBUTEROL SULFATE 0.83 MG/ML
2.5 SOLUTION RESPIRATORY (INHALATION) EVERY 6 HOURS PRN
Status: DISCONTINUED | OUTPATIENT
Start: 2024-08-03 | End: 2024-08-03

## 2024-08-03 RX ORDER — LEVOTHYROXINE SODIUM 112 UG/1
112 TABLET ORAL
Qty: 30 TABLET | Refills: 11 | Status: SHIPPED | OUTPATIENT
Start: 2024-08-04 | End: 2025-08-04

## 2024-08-03 RX ORDER — HYDROCODONE BITARTRATE AND ACETAMINOPHEN 10; 325 MG/1; MG/1
1 TABLET ORAL EVERY 6 HOURS PRN
Qty: 20 TABLET | Refills: 0 | Status: SHIPPED | OUTPATIENT
Start: 2024-08-03 | End: 2024-08-03

## 2024-08-03 RX ORDER — CEPHALEXIN 500 MG/1
500 CAPSULE ORAL EVERY 6 HOURS
Qty: 28 CAPSULE | Refills: 0 | Status: SHIPPED | OUTPATIENT
Start: 2024-08-03 | End: 2024-08-10

## 2024-08-03 RX ADMIN — OXYCODONE HYDROCHLORIDE 10 MG: 5 TABLET ORAL at 10:08

## 2024-08-03 RX ADMIN — ALBUTEROL SULFATE 2.5 MG: 2.5 SOLUTION RESPIRATORY (INHALATION) at 07:08

## 2024-08-03 RX ADMIN — AMLODIPINE BESYLATE 5 MG: 5 TABLET ORAL at 08:08

## 2024-08-03 RX ADMIN — ALBUTEROL SULFATE 2.5 MG: 2.5 SOLUTION RESPIRATORY (INHALATION) at 01:08

## 2024-08-03 RX ADMIN — OXYCODONE HYDROCHLORIDE 10 MG: 5 TABLET ORAL at 03:08

## 2024-08-03 RX ADMIN — ACETAMINOPHEN 650 MG: 325 TABLET, FILM COATED ORAL at 11:08

## 2024-08-03 RX ADMIN — HYDROMORPHONE HYDROCHLORIDE 0.2 MG: 1 INJECTION, SOLUTION INTRAMUSCULAR; INTRAVENOUS; SUBCUTANEOUS at 01:08

## 2024-08-03 RX ADMIN — PROPRANOLOL HYDROCHLORIDE 10 MG: 10 TABLET ORAL at 08:08

## 2024-08-03 RX ADMIN — ACETAMINOPHEN 650 MG: 325 TABLET, FILM COATED ORAL at 05:08

## 2024-08-03 RX ADMIN — HYDROMORPHONE HYDROCHLORIDE 0.2 MG: 1 INJECTION, SOLUTION INTRAMUSCULAR; INTRAVENOUS; SUBCUTANEOUS at 07:08

## 2024-08-03 RX ADMIN — LOSARTAN POTASSIUM 25 MG: 25 TABLET, FILM COATED ORAL at 08:08

## 2024-08-03 RX ADMIN — OXYBUTYNIN CHLORIDE 5 MG: 5 TABLET, EXTENDED RELEASE ORAL at 08:08

## 2024-08-03 RX ADMIN — LEVOTHYROXINE SODIUM 112 MCG: 112 TABLET ORAL at 05:08

## 2024-08-03 RX ADMIN — CEFAZOLIN 2 G: 2 INJECTION, POWDER, FOR SOLUTION INTRAMUSCULAR; INTRAVENOUS at 05:08

## 2024-08-04 PROCEDURE — 11000001 HC ACUTE MED/SURG PRIVATE ROOM

## 2024-08-05 ENCOUNTER — PATIENT OUTREACH (OUTPATIENT)
Dept: ADMINISTRATIVE | Facility: CLINIC | Age: 59
End: 2024-08-05
Payer: COMMERCIAL

## 2024-08-05 LAB
POCT GLUCOSE: 192 MG/DL (ref 70–110)
POCT GLUCOSE: 210 MG/DL (ref 70–110)
POCT GLUCOSE: 227 MG/DL (ref 70–110)

## 2024-08-06 LAB
ESTROGEN SERPL-MCNC: NORMAL PG/ML
INSULIN SERPL-ACNC: NORMAL U[IU]/ML
LAB AP CLINICAL INFORMATION: NORMAL
LAB AP GROSS DESCRIPTION: NORMAL
LAB AP INTRA OP: NORMAL
LAB AP REPORT FOOTNOTES: NORMAL
LAB AP SYNOPTIC CHECKLIST: NORMAL
T3RU NFR SERPL: NORMAL %

## 2024-08-07 LAB
OHS QRS DURATION: 84 MS
OHS QTC CALCULATION: 478 MS

## 2024-08-08 ENCOUNTER — OFFICE VISIT (OUTPATIENT)
Dept: OTOLARYNGOLOGY | Facility: CLINIC | Age: 59
End: 2024-08-08
Payer: COMMERCIAL

## 2024-08-08 VITALS
RESPIRATION RATE: 20 BRPM | WEIGHT: 171 LBS | OXYGEN SATURATION: 99 % | HEIGHT: 64 IN | BODY MASS INDEX: 29.19 KG/M2 | TEMPERATURE: 99 F | HEART RATE: 92 BPM | DIASTOLIC BLOOD PRESSURE: 85 MMHG | SYSTOLIC BLOOD PRESSURE: 138 MMHG

## 2024-08-08 DIAGNOSIS — E21.3 HYPERPARATHYROIDISM: ICD-10-CM

## 2024-08-08 DIAGNOSIS — D35.1 PARATHYROID ADENOMA: ICD-10-CM

## 2024-08-08 DIAGNOSIS — C73 PAPILLARY THYROID CARCINOMA: Primary | ICD-10-CM

## 2024-08-08 PROCEDURE — 99215 OFFICE O/P EST HI 40 MIN: CPT | Mod: PBBFAC | Performed by: STUDENT IN AN ORGANIZED HEALTH CARE EDUCATION/TRAINING PROGRAM

## 2024-08-08 RX ORDER — LIDOCAINE HYDROCHLORIDE 40 MG/ML
SOLUTION TOPICAL
Status: DISCONTINUED
Start: 2024-08-08 | End: 2024-08-08 | Stop reason: HOSPADM

## 2024-08-08 RX ORDER — FAMOTIDINE 40 MG/1
40 TABLET, FILM COATED ORAL DAILY
Qty: 30 TABLET | Refills: 11 | Status: SHIPPED | OUTPATIENT
Start: 2024-08-08 | End: 2025-08-08

## 2024-08-16 NOTE — PROGRESS NOTES
Subjective:       Patient ID: Foreign Rosas is a 56 y.o. female.    Chief Complaint: Follow-up    55 y/o female for f/u Uncontrolled DM/ insulin titration.  Pt has hx HTN, DM2 uncontrolled, Abnl CBC, Mild mitral valve regurg, hx 5 mm right upper lung lobe nodule stable since 2014. Pt states she has been having occassional tremors to hands and head X 3 months and states is getting more frequent. Pt request to see a specialist for eval due to father having hx of parkinson's and sister having history of brain CA.     Review of Systems   Constitutional: Negative.    HENT: Negative.    Eyes: Negative.    Respiratory: Negative.    Cardiovascular: Negative.    Gastrointestinal: Negative.    Endocrine: Negative.    Genitourinary: Negative.    Musculoskeletal: Negative.    Integumentary:  Negative.   Allergic/Immunologic: Negative.    Neurological: Positive for tremors.   Hematological: Negative.    Psychiatric/Behavioral: Negative.          Objective:      Physical Exam  Vitals reviewed.   Constitutional:       Appearance: Normal appearance. She is normal weight.   HENT:      Head: Normocephalic.   Eyes:      Extraocular Movements: Extraocular movements intact.      Pupils: Pupils are equal, round, and reactive to light.   Cardiovascular:      Rate and Rhythm: Normal rate and regular rhythm.      Pulses: Normal pulses.      Heart sounds: Normal heart sounds.   Pulmonary:      Effort: Pulmonary effort is normal.      Breath sounds: Normal breath sounds.   Abdominal:      General: Abdomen is flat.      Palpations: Abdomen is soft.   Musculoskeletal:         General: Normal range of motion.      Cervical back: Normal range of motion.   Skin:     General: Skin is warm and dry.   Neurological:      General: No focal deficit present.      Mental Status: She is alert and oriented to person, place, and time.   Psychiatric:         Mood and Affect: Mood normal.         Assessment:       Problem List Items Addressed This Visit         Neuro    Occasional tremors - Primary     Pt states she has been noticing her hands and head shaking at times. Denies related to blood sugar level. Pt states father had beginning of parkinson's and sister had brain tumor and she is concerned and request to be seen by specialist. Refer to Neuro for further eval and mgmt.           Relevant Orders    Ambulatory referral/consult to Neurology    CT Head Without Contrast       Endocrine    DM (diabetes mellitus), type 2, uncontrolled     ADA diet and exercise. A1c 10.3. Informed to increase  Lantus to 58 units sq Q hs. Informed pt to set an alarm on her phone to remind her to take her insulin as uncontrolled DM put her at risk fir complications such as MI, CVA, kidney failure, blindness, amputations, and death. Will repeat BMP and A1c in 1 month. Pt verbalized understanding. Cont Humalog as prescribed per SS. DM eye exam 9-23-20- will get in clinic today. DM foot exam done today. Pt UTD with pneumovax.               Relevant Medications    metFORMIN (GLUCOPHAGE) 1000 MG tablet    glipiZIDE (GLUCOTROL) 10 MG tablet    insulin lispro 100 unit/mL injection    insulin glargine (LANTUS) 100 unit/mL injection    Other Relevant Orders    Diabetic Eye Screening Photo    Basic Metabolic Panel    Hemoglobin A1C          Plan:       RTC in 1 month with labs 1 week prior to appt  Diabetic foot exam:   Left: Reflexes 4+    Vibratory sensation normal   Proprioception normal   Sharp/dull discrimination normal   Filament test present  Right: Reflexes 4+    Vibratory sensation normal   Proprioception normal   Sharp/dull discrimination normal   Filament test present        no pain, swelling or deformity of joints

## 2024-08-21 ENCOUNTER — OFFICE VISIT (OUTPATIENT)
Dept: OTOLARYNGOLOGY | Facility: CLINIC | Age: 59
End: 2024-08-21
Payer: COMMERCIAL

## 2024-08-21 VITALS
BODY MASS INDEX: 29.2 KG/M2 | DIASTOLIC BLOOD PRESSURE: 85 MMHG | OXYGEN SATURATION: 99 % | RESPIRATION RATE: 18 BRPM | TEMPERATURE: 98 F | HEIGHT: 64 IN | HEART RATE: 87 BPM | WEIGHT: 171.06 LBS | SYSTOLIC BLOOD PRESSURE: 138 MMHG

## 2024-08-21 DIAGNOSIS — R49.0 DYSPHONIA: ICD-10-CM

## 2024-08-21 DIAGNOSIS — C73 PAPILLARY THYROID CARCINOMA: Primary | ICD-10-CM

## 2024-08-21 PROCEDURE — 99215 OFFICE O/P EST HI 40 MIN: CPT | Mod: PBBFAC

## 2024-08-21 NOTE — PROGRESS NOTES
[facility]  Otolaryngology Clinic Note    Foreign Rosas  Encounter Date: 8/21/2024  YOB: 1965    Chief Complaint:  Papillary thyroid carcinoma    HPI: Foreign Rsoas is a 59 y.o. female past medical history of hypercalcemia and hyperparathyroidism who was referred for left thyroid nodule that returned as papillary thyroid carcinoma.  Patient reports she has been having coughing episodes for the past month and some gas or air.  She denies odynophagia or dysphagia.  She reports some hoarseness however that comes and goes.  Denies any lumps or bumps on her neck.  They found thyroid nodule previously on a CT scan year or 2 ago.  She does have a sister who had thyroid cancer recently.  She denies any exposure to radiation.  Denies any previous neck surgeries.  Does not smoke tobacco.  Last year patient had a sestamibi scan in a 4D CT which localize a right inferior parathyroid adenoma.  At that time her PTH was elevated to the 90s.  She had hypercalcemia to 10.8.  She is asymptomatic.     08/08/2024: Patient here today postop 1 follow up.  She underwent total thyroidectomy and parathyroidectomy on 08/02/2024.  Patient has been doing well postoperatively.  She denies any signs or symptoms of infection and no perioral or hand paresthesias.  Patient has had a dysphonic voice following surgery.  No issues with p.o. intake.    8/21/2024: Patient is here for her 2nd post-op follow up. S/p total thyroidectomy and parathyroidectomy on 08/02/2024. Continue to report dysphonia and dry cough however improving. She denies dysphagia, odynophagia, paraesthesia, myalgias, and confusion.     ROS:   General: Negative except per HPI  Skin: Denies rash, ulcer, or lesion.  Eyes: Denies vision changes or diplopia.  Ears: Negative except per HPI  Nose: Negative except per HPI  Throat/mouth: Negative except per HPI  Cardiovascular: Negative except per HPI  Respiratory: Negative except per  HPI  Neck: Negative except per HPI  Endocrine: Negative except per HPI  Neurologic: Negative except per HPI    Other 10-point review of systems negative except per HPI      Review of patient's allergies indicates:  No Known Allergies    Past Medical History:   Diagnosis Date    Anxiety disorder, unspecified     Glaucoma     HTN (hypertension)     Hyperparathyroidism 8/3/2024    OAB (overactive bladder)     GORDON (obstructive sleep apnea) 01/24/2024    Type 2 diabetes mellitus with unspecified diabetic retinopathy without macular edema        Past Surgical History:   Procedure Laterality Date    CHOLECYSTECTOMY      HYSTERECTOMY, VAGINAL, LAPAROSCOPY-ASSISTED, WITH SALPINGO-OOPHORECTOY Bilateral 08/22/2017    PARATHYROIDECTOMY Right 8/2/2024    Procedure: PARATHYROIDECTOMY;  Surgeon: Cindy Byrne MD;  Location: [facility];  Service: ENT;  Laterality: Right;    THYROIDECTOMY Bilateral 8/2/2024    Procedure: THYROIDECTOMY total;  Surgeon: Cindy Byrne MD;  Location: [facility];  Service: ENT;  Laterality: Bilateral;    TUBAL LIGATION         Social History     Socioeconomic History    Marital status: Single   Tobacco Use    Smoking status: Never     Passive exposure: Past    Smokeless tobacco: Never   Substance and Sexual Activity    Alcohol use: Not Currently     Comment: special occassion    Drug use: Never    Sexual activity: Not Currently     Partners: Male     Birth control/protection: Abstinence, See Surgical Hx     Social Determinants of Health     Financial Resource Strain: Medium Risk (12/16/2023)    Overall Financial Resource Strain (CARDIA)     Difficulty of Paying Living Expenses: Somewhat hard   Food Insecurity: Food Insecurity Present (12/16/2023)    Hunger Vital Sign     Worried About Running Out of Food in the Last Year: Never true     Ran Out of Food in the Last Year: Sometimes true   Transportation Needs: No Transportation Needs (12/16/2023)    PRAPARE - Transportation     Lack of Transportation  (Medical): No     Lack of Transportation (Non-Medical): No   Physical Activity: Inactive (2023)    Exercise Vital Sign     Days of Exercise per Week: 1 day     Minutes of Exercise per Session: 0 min   Stress: Stress Concern Present (2023)    [employer] [place] [employer] - Occupational Stress Questionnaire     Feeling of Stress : Rather much   Housing Stability: High Risk (2023)    Housing Stability Vital Sign     Unable to Pay for Housing in the Last Year: Yes     Number of Places Lived in the Last Year: 2     Unstable Housing in the Last Year: No       Family History   Problem Relation Name Age of Onset    Diabetes Mother Mckenzie     Hypertension Mother Mckenzie     Kidney failure Mother Mckenzie     Asthma Mother Mckenzie     Kidney disease Mother Mckenzie     Lung cancer Father Rei     Diabetes Father Rei     Hypertension Father Rei     Mental illness Father Rei     Thyroid disease Sister Astrid     Brain cancer Sister Astrid     Diabetes Sister Astrid     Hypertension Sister Astrid     Cancer Sister Danelle         Thyroid    Thyroid disease Sister Danelle     Migraines Sister Danelle     Miscarriages / Stillbirths Sister Danelle     Cancer Brother Jack         Pancreatic    Hypertension Brother Jack     Seizures Brother Jack     Diabetes Sister Roxy     Hypertension Sister Roxy        Outpatient Encounter Medications as of 2024   Medication Sig Dispense Refill    amLODIPine (NORVASC) 5 MG tablet Take 1 tablet (5 mg total) by mouth once daily.      arm brace Misc 1 each by Misc.(Non-Drug; Combo Route) route every evening. Left carpal tunnel wrist splint, nightly 6 each 0    [] cephALEXin (KEFLEX) 500 MG capsule Take 1 capsule (500 mg total) by mouth every 6 (six) hours. for 7 days 28 capsule 0    famotidine (PEPCID) 40 MG tablet Take 1 tablet (40 mg total) by mouth once daily. 30 tablet 11    gabapentin (NEURONTIN) 300 MG capsule Take 300  "mg by mouth every evening. Patient states she takes once a month      glipiZIDE (GLUCOTROL) 10 MG tablet Take 1 tablet (10 mg total) by mouth 2 (two) times daily before meals. 180 tablet 1    [] HYDROcodone-acetaminophen (NORCO)  mg per tablet Take 1 tablet by mouth every 6 (six) hours as needed for Pain. 20 tablet 0    ibuprofen (ADVIL,MOTRIN) 800 MG tablet Take 1 tablet (800 mg total) by mouth 3 (three) times daily as needed for Pain. 90 tablet 2    insulin detemir U-100, Levemir, 100 unit/mL (3 mL) SubQ InPn pen Inject 40 units sq in AM and 48 units sq in PM. 90 mL 3    insulin lispro 100 unit/mL pen Inject 2 to 12 units under the skin per sliding scale after checking blood glucose before meals and at bedtime 15 mL 6    levothyroxine (SYNTHROID) 112 MCG tablet Take 1 tablet (112 mcg total) by mouth before breakfast. 30 tablet 11    losartan (COZAAR) 25 MG tablet Take 1 tablet (25 mg total) by mouth once daily. 90 tablet 1    metFORMIN (GLUCOPHAGE) 1000 MG tablet Take 1 tablet (1,000 mg total) by mouth 2 (two) times daily with meals. 180 tablet 1    oxybutynin (DITROPAN-XL) 5 MG TR24 Take 1 tablet (5 mg total) by mouth once daily. (Patient not taking: Reported on 2024) 90 tablet 3    pen needle, diabetic 32 gauge x 5/32" Ndle Use 1 needle five times daily 100 each 6    pravastatin (PRAVACHOL) 20 MG tablet Take 20 mg by mouth once daily.      propranoloL (INDERAL) 10 MG tablet Take 1 tablet (10 mg total) by mouth 2 (two) times daily. (Patient taking differently: Take 10 mg by mouth once daily.) 60 tablet 3    sertraline (ZOLOFT) 50 MG tablet Take 1 tablet by mouth at bedtime 30 tablet 6    SITagliptin phosphate (JANUVIA) 50 MG Tab Take 1 tablet (50 mg total) by mouth once daily. 90 tablet 1    zolpidem (AMBIEN) 5 MG Tab Take 1 tablet (5 mg total) by mouth nightly as needed (insomnia). 30 tablet 5     No facility-administered encounter medications on file as of 2024.       Physical " "Exam:  Vitals:    08/21/24 0844 08/21/24 0846   BP: (!) 148/85 138/85   BP Location: Left arm Left arm   Patient Position: Sitting Sitting   BP Method: Medium (Automatic) Medium (Manual)   Pulse: 92 87   Resp: 18    Temp: 98.1 °F (36.7 °C)    TempSrc: Oral    SpO2: 99%    Weight: 77.6 kg (171 lb 1.2 oz)    Height: 5' 4" (1.626 m)        Constitutional  General Appearance: well nourished, well-developed, AAO x3, NAD  HEENT  Eyes: PEERLA, EOMI, normal conjunctivae  Neck: incision-appropriately TTP ( s/p surgery), CDI, healing appropriately   Ears: Hearing well at conversation level  Nose: septum midline, no inferior turbinate hypertrophy, no polyps, moist mucosa without erythema or blue hue  OC/OP:  Upper dentures present no oral lesions, tongue/FOM/BOT- soft, no leukoplakia/ulcerations/ tenderness  Nasopharynx, Hypopharynx, and Larynx:    Indirect: attempted, limited view due to patient intolerance  Neuro: CN II - XII intact bilaterally  Cardiovascular: peripheral pulses palpable  Respiratory: non-labored respirations, dysphonic voice with breaks    Procedures:Flexible Fiberoptic Laryngoscopy/Nasopharyngoscopy 8/8/2024    Procedure in Detail: Informed consent was obtained from the patient after explanation of procedure, indications, risks and benefits. Flexible endoscopy was performed through the nasal passages. The nasal cavity, nasopharynx, oropharynx, hypopharynx and larynx were adequately visualized. The true vocal cords and arytenoids were examined during phonation and repose.    Anesthesia: Topical Neosynephrine / Tetracaine  Adverse Events: None  Resident Surgeon: Harris   Blood loss: none  Condition: good    Findings:  NP/OP: no masses/lesions of NC, eustachian tube, fossa of Rosenmuller, no adenoid hypertrophy  BOT/vallecula: no lingual hypertrophy, no masses/lesions, no secretions obscuring visualization  Piriform sinuses/post-cricoid: no masses/lesions, no pooling of secretions  Epiglottis: lingual and " laryngeal surfaces within normal limits  Arytenoids/FVFs: no masses/lesions, no edema, bilateral mobility  Some irritation of the interarytenoid space  TVCs: bilateral cord mobility, no masses or lesions      Pertinent Data:  TUMOR   Tumor Focality  Multifocal   Tumor Characteristics     Tumor Site  Right lobe     Left lobe   Tumor Size  Greatest Dimension (Centimeters): 1.2 cm   Histologic Tumor Types and Subtypes   Papillary Carcinoma, tall cell subtype   Tumor Proliferative Activity     Mitotic Rate  Less than 3 mitoses per 2mm2   Tumor Necrosis  Not identified   Angioinvasion (vascular invasion)  Not identified   Lymphatic Invasion  Present   Perineural Invasion  Not identified   Extrathyroidal Extension  Not identified   Margin Status  All margins negative for carcinoma   Distance from Invasive Carcinoma to Closest Margin  Less than 1 mm   REGIONAL LYMPH NODES   Regional Lymph Node Status  All regional lymph nodes negative for tumor   Number of Lymph Nodes Examined  4   Sally Level(s) Examined  Level VI       Summary of Outside Records:    Assessment/Plan:  Foreign Rosas is a 59 y.o. female s/p total thyroidectomy and parathyroidectomy on 8/2/24.  Final pathology pT1b pN0 multifocal Papillary thyroid carcinoma greatest diameter 1.2 cm with lymphatic invasion.  Central compartment lymph nodes negative for disease.  Parathyroid adenoma with return to function of PTH and calcium.    Patient has bilateral vocal cord mobility on flexible endoscopy on scope 8/8/24. Cough and dysphonia are improving, currently on pepcid daily. Will refer to SLP for further evaluation of dysphonia, likely multifactorial     -Continue Pepcid daily for LPR.   - Awaiting for appointment with endocrinology for PHILLIPS evaluation (referral authorized)  -Referral placed to SLP for evaluation of dysphonia    RTC in 1 month for final post-op follow up.    Edil Jordan MD  [facility]

## 2024-08-22 NOTE — PROGRESS NOTES
I have reviewed and agree with the resident's findings, including all diagnostic interpretations and plans as written.     Kennedy Poon M.D.

## 2024-09-03 NOTE — PROGRESS NOTES
[facility], formerly [facility] Neurology Follow Up Office Visit Note    Initial Visit Date: 3/29/2023  Last Visit Date: 5/9/2024  Current Visit Date:  09/04/2024    Chief Complaint:     Chief Complaint   Patient presents with    Physiological tremor       History of Present Illness:      This is a 59 y.o. right hand dominant female with history of  IDDM, Cataract, borderline glaucoma, hypercalcemia, overactive bladder, depression, insomnia, papillary thyroid cancer s/p total thyroidectomy in 8/2024 who is referred for exaggerated physiological tremor and left carpal tunnel due to left wrist tenosynovitis.  During last visit, propanolol was decreased to 10 mg twice a day as needed. Tremor is the same.      Age of Onset: 57 years old      Description of movements: right hand with use, intermittent.     Exacerbation factors: denied      Relieving factors: denied      Associated Symptoms:  Changes in smell or taste: Not Applicable   Dysphagia: No   Voice/phonation change: No    Changes in gait/falls:  No    Orthostatic hypotension:  No   Sleep disorder: Yes trouble sleeping at night    Visual Hallucinations/Delusions: Not Applicable   Mood disturbance: Yes depression     Cognitive decline: Not Applicable    Handwriting changes: Yes erratic     Trouble using a fork: No   Trouble using a spoon: Yes occasional    Trouble drinking out of a cup: No       Risk Factors:   Family history of movement disorder: Yes father and sister with tremor disorder.    Cardiovascular risk factors: Yes as above    Antipsychotics/Mood stabilizer/Antidepressant/Traditional antiemetic exposure: Yes Zoloft    Stimulant use: No    Tobacco use: No   Alcohol use: Yes occasional      Recreational drug use: No    Herbicide exposure: Not Applicable   Agent Orange exposure: Not Applicable         Medications:     Current:   Gabapentin 300 mg at bedtime   Propranolol 10 mg twice a day as needed: taking it as twice a day      Prior:   Propranolol 20 mg twice a day:  nausea    Devices/Interventions:     DBS:   Gamma knife/Radiofrequency ablation:   PT/OT:     Labs:     Results for orders placed or performed during the hospital encounter of 08/02/24   PTH, Intact   Result Value Ref Range    PARATHYROID HORMONE INTACT 115.0 (H) 8.7 - 77.0 pg/mL   PTH, Intact   Result Value Ref Range    PARATHYROID HORMONE INTACT 19.5 8.7 - 77.0 pg/mL   Comprehensive Metabolic Panel   Result Value Ref Range    Sodium 140 136 - 145 mmol/L    Potassium 4.5 3.5 - 5.1 mmol/L    Chloride 107 98 - 107 mmol/L    CO2 25 23 - 31 mmol/L    Glucose 148 (H) 82 - 115 mg/dL    Blood Urea Nitrogen 16.8 9.8 - 20.1 mg/dL    Creatinine 1.22 (H) 0.55 - 1.02 mg/dL    Calcium 8.8 8.4 - 10.2 mg/dL    Protein Total 6.9 5.8 - 7.6 gm/dL    Albumin 3.6 3.4 - 4.8 g/dL    Globulin 3.3 2.4 - 3.5 gm/dL    Albumin/Globulin Ratio 1.1 1.1 - 2.0 ratio    Bilirubin Total 0.3 <=1.5 mg/dL    ALP 64 40 - 150 unit/L    ALT 62 (H) 0 - 55 unit/L    AST 43 (H) 5 - 34 unit/L    eGFR 51 mL/min/1.73/m2    Anion Gap 8.0 mEq/L    BUN/Creatinine Ratio 14    PTH, Intact   Result Value Ref Range    PARATHYROID HORMONE INTACT 24.2 8.7 - 77.0 pg/mL   CBC with Differential   Result Value Ref Range    WBC 11.04 4.50 - 11.50 x10(3)/mcL    RBC 4.26 4.20 - 5.40 x10(6)/mcL    Hgb 11.1 (L) 12.0 - 16.0 g/dL    Hct 36.0 (L) 37.0 - 47.0 %    MCV 84.5 80.0 - 94.0 fL    MCH 26.1 (L) 27.0 - 31.0 pg    MCHC 30.8 (L) 33.0 - 36.0 g/dL    RDW 14.2 11.5 - 17.0 %    Platelet 236 130 - 400 x10(3)/mcL    MPV 11.3 (H) 7.4 - 10.4 fL    Neut % 72.9 %    Lymph % 16.3 %    Mono % 8.5 %    Eos % 1.7 %    Basophil % 0.4 %    Lymph # 1.80 0.6 - 4.6 x10(3)/mcL    Neut # 8.05 2.1 - 9.2 x10(3)/mcL    Mono # 0.94 0.1 - 1.3 x10(3)/mcL    Eos # 0.19 0 - 0.9 x10(3)/mcL    Baso # 0.04 <=0.2 x10(3)/mcL    IG# 0.02 0 - 0.04 x10(3)/mcL    IG% 0.2 %    NRBC% 0.0 %   Pink Top Hold   Result Value Ref Range    Extra Tube Hold for add-ons.    Specimen to Pathology   Result Value Ref Range     Case Report       [facility] Surgical Pathology                            Case: [id number]                                 Authorizing Provider:  Kennedy Poon MD      Collected:           08/02/2024 09:37 AM          Ordering Location:     [facility] -       Received:            08/02/2024 10:14 AM                                 [facility]                                                              Pathologist:           Jennifer Givens MD                                                        Specimens:   1) - Thyroid, thyroid, stitch marks left superior                                                   2) - Neck, Anterior, neck dissection level 6                                                        3) - Parathyroid, right paratracheal tissue                                                         4) - Parathyroid, right superior parathyroid gland rule out adenoma                        Clinical Information       Procedure:  THYROIDECTOMY total - Bilateral  PARATHYROIDECTOMY - Right  Pre-op Diagnosis:  D35.1 - Parathyroid adenoma [ICD-10-CM]  C73 - Papillary thyroid carcinoma [ICD-10-CM]  Post-op Diagnosis:  D35.1 - Parathyroid adenoma [ICD-10-CM]  C73 - Papillary thyroid carcinoma [ICD-10-CM]      Final Diagnosis         1. Thyroid, stitch marks left superior, Thyroidectomy:   - Papillary thyroid carcinoma, multifocal.  - See synoptic checklist for CAP cancer protocol.      2. Neck, Anterior, neck dissection level 6:   - Four (4) lymph nodes negative for carcinoma (0/4).   - Benign thymic tissue present.      3. Right paratracheal tissue, Biopsy:  - Benign parathyroid tissue with no significant histopathologic abnormality.      4. Right superior parathyroid gland rule out adenoma, Parathyroidectomy:  - Hypercellular parathyroid gland, consistent with a parathyroid adenoma.          Synoptic Checklist       THYROID GLAND   THYROID GLAND - 1   8th Edition - Protocol posted: 3/22/2023          SPECIMEN      Procedure:    Total thyroidectomy       TUMOR      Tumor Focality:    Multifocal       Tumor Characteristics:            Tumor Site:    Right lobe         Tumor Site:    Left lobe         Tumor Size:    Greatest Dimension (Centimeters): 1.2 cm        Histologic Tumor Types and Subtypes:     Papillary Carcinoma, tall cell subtype         Tumor Proliferative Activity:              Mitotic Rate:    Less than 3 mitoses per 2mm2         Tumor Necrosis:    Not identified         Angioinvasion (vascular invasion):    Not identified         Lymphatic Invasion:    Present         Perineural Invasion:    Not identified         Extrathyroidal Extension:    Not identified         Margin Status:    All margins negative for carcinoma           Distance from Invasive Carcinoma to Closest Margin:    Less than 1 mm       REGIONAL LYMPH NODES      Regional Lymph Node Status:            :    All regional lymph nodes negative for tumor         Number of Lymph Nodes Examined:    4           Sally Level(s) Examined:    Level VI       pTNM CLASSIFICATION (AJCC 8th Edition)      Reporting of pT, pN, and (when applicable) pM categories is based on information available to the pathologist at the time the report is issued. As per the AJCC (Chapter 1, 8th Ed.) it is the managing physicians responsibility to establish the final pathologic stage based upon all pertinent information, including but potentially not limited to this pathology report.      pT Category:    pT1b     pN Category:    pN0a       ADDITIONAL FINDINGS    Additional Findings:    Follicular nodular disease (Thyroid follicular nodular disease)       Microscopic Description       A microscopic examination was performed and the diagnosis reflects the findings.          Intraoperative Consult       3. Parathyroid: right paratracheal tissue    Parathyroid tissue.    Results are reported by phone to Dr. Iglesias at 1045 by Dr. Givens.    4. Parathyroid: right superior  "parathyroid gland rule out adenoma    Hypercellular parathyroid gland.    Results are reported by phone to Dr. Iglesias at 1045 by Dr. Givens.      Gross Description       1. Thyroid: thyroid, stitch marks left superior  Received in 10% neutral buffered formalin is a thyroid weighing 15 g.  The right lobe measures 4 x 2.5 x 1.3 cm and the left lobe measures 4 x 2 x 1.8 cm.  The capsule is smooth and intact.  Cut section reveals a homogeneous dark red parenchyma in the right lobe with a small white are tan nodule near 1 edge of the superior aspect.  The isthmus contains a small gelatinous nodule.  There is a solid well-defined tan mass involving the inferior pole of the left lobe near the isthmus, measuring 1.2 cm in greatest dimension.  Representative sections submitted as follows 1A through 1 C right lobe 1D isthmus from right side 1E through 1H left lobe, 1I and 1J the tumor in the left lobe entirely submitted.    2. Neck, Anterior: neck dissection level 6  Received in formalin is a single irregular fragment of yellow fibrofatty tissue measuring 20 x 15 x 10 mm.  A single small lymph node is identified.  Entirely submitted in a single cassette.    3. Parathyroid: right paratracheal tissue  Received fresh for frozen section is a single fragment red and yellow fatty tissue measuring 15 mm.  Entirely submitted as frozen section control.  4. Parathyroid: right superior parathyroid gland rule out adenoma  Received fresh for frozen section is a single ovoid nodular fragment of red-tan soft tissue measuring 17 x 10 x 6 mm.  Entirely submitted as frozen section control.      Report Footnotes       Unless the case is a "gross only" or additional testing only, the final diagnosis for each specimen is based on a microscopic examination of appropriate tissue sections.     POCT glucose   Result Value Ref Range    POCT Glucose 153 (H) 70 - 110 mg/dL   POCT glucose   Result Value Ref Range    POCT Glucose 151 (H) 70 - 110 mg/dL "   POCT glucose   Result Value Ref Range    POCT Glucose 244 (H) 70 - 110 mg/dL   POCT glucose   Result Value Ref Range    POCT Glucose 199 (H) 70 - 110 mg/dL   POCT glucose   Result Value Ref Range    POCT Glucose 259 (H) 70 - 110 mg/dL   POCT glucose   Result Value Ref Range    POCT Glucose 144 (H) 70 - 110 mg/dL   POCT glucose   Result Value Ref Range    POCT Glucose 146 (H) 70 - 110 mg/dL   POCT glucose   Result Value Ref Range    POCT Glucose 192 (H) 70 - 110 mg/dL   POCT glucose   Result Value Ref Range    POCT Glucose 210 (H) 70 - 110 mg/dL   POCT glucose   Result Value Ref Range    POCT Glucose 227 (H) 70 - 110 mg/dL       Studies:      Imaging:   MRI Brain:     Review of Systems:     Review of Systems   All other systems reviewed and are negative.      Physical Exams:     Vitals:    09/04/24 1015   BP: 108/68   Pulse: 83   Resp: 16   Temp: 98.3 °F (36.8 °C)           Physical Exam  Vitals and nursing note reviewed.   Constitutional:       Appearance: Normal appearance.   HENT:      Head: Normocephalic and atraumatic.      Nose: Nose normal.      Mouth/Throat:      Mouth: Mucous membranes are moist.      Pharynx: Oropharynx is clear.   Eyes:      Conjunctiva/sclera: Conjunctivae normal.   Cardiovascular:      Rate and Rhythm: Normal rate and regular rhythm.      Pulses: Normal pulses.   Pulmonary:      Effort: Pulmonary effort is normal.      Breath sounds: Normal breath sounds.   Abdominal:      General: Abdomen is flat.   Musculoskeletal:         General: Tenderness present. Normal range of motion.      Cervical back: Normal range of motion.      Comments: Left wrist tenderness to pressure, edematous, with delayed Phalen's Sign   Skin:     General: Skin is warm.   Neurological:      Mental Status: She is alert.         Comprehensive Neurological Exam:  Mental Status: Alert Oriented to Self, Date, and Place. Comprehension wnl. No dysarthria. Dysphonia with hoarseness noted.   CN II - XII: LANDRY, No APD, VFFC,  No ptosis OU, EOMI without nystagmus, LT/Temp symmetric in CN V1-3 distribution, Hearing grossly intact, Face Symmetric, Tongue and Uvula midline, Trapezius symmetric bilateral.   Motor: tone and bulk wnl throughout, no tremor today,  5/5 to confrontation, Fine finger movements wnl b/l, No pronator drift.   Sensory: LT, Proprioception, Vibration, PP, Temp symmetric. No sensory simultagnosia.   Reflexes: 1+ throughout, plantar reflexes downward bilateral.   Cerebellar: FNF wnl b/l, RAHM wnl b/l  Gait: normal. Heel Gait, Toe Gait, Tandem Gait wnl.     Assessment:     This is a 59 y.o. right hand dominant female with history of IDDM, Cataract, borderline glaucoma, hypercalcemia, overactive bladder, depression, insomnia, papillary thyroid cancer s/p total thyroidectomy in 8/2024 who is referred for exaggerated physiological tremor and left carpal tunnel due to left wrist tenosynovitis.     Problem List Items Addressed This Visit          Neuro    Physiological tremor    Relevant Medications    propranoloL (INDERAL) 20 MG tablet    CTS (carpal tunnel syndrome)    Relevant Medications    propranoloL (INDERAL) 20 MG tablet       Renal/    Hypercalcemia    Relevant Medications    propranoloL (INDERAL) 20 MG tablet       Orthopedic    Tenosynovitis of left wrist    Relevant Medications    propranoloL (INDERAL) 20 MG tablet           Plan:     [] increase Propranolol to 20 mg twice a day as needed  [] Will monitor clinically    RTC 4 months    Visit today is associated with current or anticipated ongoing medical care related to this patient's single serious condition/complex condition as documented above.       I have explained the treatment plan, diagnosis, and prognosis to patient. All questions are answered to the best of my knowledge.     Face to face time 30 minutes, including documentation, chart review, counseling, education, review of test results, relevant medical records, and coordination of care.       Ella  MD Cy   General Neurology  09/04/2024

## 2024-09-04 ENCOUNTER — OFFICE VISIT (OUTPATIENT)
Dept: NEUROLOGY | Facility: CLINIC | Age: 59
End: 2024-09-04
Payer: COMMERCIAL

## 2024-09-04 ENCOUNTER — CLINICAL SUPPORT (OUTPATIENT)
Dept: REHABILITATION | Facility: HOSPITAL | Age: 59
End: 2024-09-04
Payer: COMMERCIAL

## 2024-09-04 VITALS
DIASTOLIC BLOOD PRESSURE: 68 MMHG | RESPIRATION RATE: 16 BRPM | BODY MASS INDEX: 29.16 KG/M2 | HEART RATE: 83 BPM | HEIGHT: 64 IN | SYSTOLIC BLOOD PRESSURE: 108 MMHG | WEIGHT: 170.81 LBS | OXYGEN SATURATION: 99 % | TEMPERATURE: 98 F

## 2024-09-04 DIAGNOSIS — R25.1 PHYSIOLOGICAL TREMOR: ICD-10-CM

## 2024-09-04 DIAGNOSIS — E83.52 HYPERCALCEMIA: ICD-10-CM

## 2024-09-04 DIAGNOSIS — M65.9 TENOSYNOVITIS OF LEFT WRIST: ICD-10-CM

## 2024-09-04 DIAGNOSIS — R49.0 DYSPHONIA: ICD-10-CM

## 2024-09-04 DIAGNOSIS — G56.02 CARPAL TUNNEL SYNDROME OF LEFT WRIST: ICD-10-CM

## 2024-09-04 PROCEDURE — 92524 BEHAVRAL QUALIT ANALYS VOICE: CPT

## 2024-09-04 PROCEDURE — 99214 OFFICE O/P EST MOD 30 MIN: CPT | Mod: S$PBB,,, | Performed by: PSYCHIATRY & NEUROLOGY

## 2024-09-04 PROCEDURE — 3078F DIAST BP <80 MM HG: CPT | Mod: CPTII,,, | Performed by: PSYCHIATRY & NEUROLOGY

## 2024-09-04 PROCEDURE — G2211 COMPLEX E/M VISIT ADD ON: HCPCS | Mod: S$PBB,,, | Performed by: PSYCHIATRY & NEUROLOGY

## 2024-09-04 PROCEDURE — 3008F BODY MASS INDEX DOCD: CPT | Mod: CPTII,,, | Performed by: PSYCHIATRY & NEUROLOGY

## 2024-09-04 PROCEDURE — 3074F SYST BP LT 130 MM HG: CPT | Mod: CPTII,,, | Performed by: PSYCHIATRY & NEUROLOGY

## 2024-09-04 PROCEDURE — 1159F MED LIST DOCD IN RCRD: CPT | Mod: CPTII,,, | Performed by: PSYCHIATRY & NEUROLOGY

## 2024-09-04 PROCEDURE — 3052F HG A1C>EQUAL 8.0%<EQUAL 9.0%: CPT | Mod: CPTII,,, | Performed by: PSYCHIATRY & NEUROLOGY

## 2024-09-04 PROCEDURE — 4010F ACE/ARB THERAPY RXD/TAKEN: CPT | Mod: CPTII,,, | Performed by: PSYCHIATRY & NEUROLOGY

## 2024-09-04 PROCEDURE — 99214 OFFICE O/P EST MOD 30 MIN: CPT | Mod: PBBFAC | Performed by: PSYCHIATRY & NEUROLOGY

## 2024-09-04 RX ORDER — PROPRANOLOL HYDROCHLORIDE 20 MG/1
20 TABLET ORAL 2 TIMES DAILY
Qty: 180 TABLET | Refills: 1 | Status: SHIPPED | OUTPATIENT
Start: 2024-09-04 | End: 2025-03-03

## 2024-09-04 NOTE — PLAN OF CARE
[facility]  Speech-Language Pathology  Voice Evaluation  Date: 09/04/2024       Name: Foreign Rosas   MRN: [id number]    Therapy Diagnosis:   Encounter Diagnosis   Name Primary?    Dysphonia       Physician: Edil Chairez*    Time In:  1300   Time Out:  1400     Procedure Min.   Speech Language Evaluation   60   Total Untimed Units: 60  Charges Billed/# of units: 60/1    Precautions: Standard  Subjective    Date of Onset: after thyroidectomy on 08/02/2024  Current Medical History: Foreign Rosas is a 59 y.o. female referred for voice evaluation (CPT 91985) by Dr. Sohan Jordan.  She presents with complaints of hoarseness which began post thyroidectomy on 08/02/2024.  The patient also reported the following complaints: voice worse in afternoon/evening, fatigue with use, tightness, and reduced breath support .     ENT note 8/21/2024: Patient is here for her 2nd post-op follow up. S/p total thyroidectomy and parathyroidectomy on 08/02/2024. Continue to report dysphonia and dry cough however improving. She denies dysphagia, odynophagia, paraesthesia, myalgias, and confusion.     Procedures:Flexible Fiberoptic Laryngoscopy/Nasopharyngoscopy 8/8/2024     Procedure in Detail: Informed consent was obtained from the patient after explanation of procedure, indications, risks and benefits. Flexible endoscopy was performed through the nasal passages. The nasal cavity, nasopharynx, oropharynx, hypopharynx and larynx were adequately visualized. The true vocal cords and arytenoids were examined during phonation and repose.     Anesthesia: Topical Neosynephrine / Tetracaine  Adverse Events: None  Resident Surgeon: Harris   Blood loss: none  Condition: good     Findings:  NP/OP: no masses/lesions of NC, eustachian tube, fossa of Rosenmuller, no adenoid hypertrophy  BOT/vallecula: no lingual hypertrophy, no masses/lesions, no secretions obscuring visualization  Piriform  sinuses/post-cricoid: no masses/lesions, no pooling of secretions  Epiglottis: lingual and laryngeal surfaces within normal limits  Arytenoids/FVFs: no masses/lesions, no edema, bilateral mobility  Some irritation of the interarytenoid space  TVCs: bilateral cord mobility, no masses or lesions    Summary of Outside Records:     Assessment/Plan:  Foreign Rosas is a 59 y.o. female s/p total thyroidectomy and parathyroidectomy on 8/2/24.  Final pathology pT1b pN0 multifocal Papillary thyroid carcinoma greatest diameter 1.2 cm with lymphatic invasion.  Central compartment lymph nodes negative for disease.  Parathyroid adenoma with return to function of PTH and calcium.     Patient has bilateral vocal cord mobility on flexible endoscopy on scope 8/8/24. Cough and dysphonia are improving, currently on pepcid daily. Will refer to SLP for further evaluation of dysphonia, likely multifactorial      -Continue Pepcid daily for LPR.   - Awaiting for appointment with endocrinology for PHILLIPS evaluation (referral authorized)  -Referral placed to SLP for evaluation of dysphonia     RTC in 1 month for final post-op follow up.     Edil Jordan MD  [facility]    Past Medical History:   Past Medical History:   Diagnosis Date    Anxiety disorder, unspecified     Glaucoma     HTN (hypertension)     Hyperparathyroidism 08/03/2024    OAB (overactive bladder)     GORDON (obstructive sleep apnea) 01/24/2024    Thyroid cancer 2024    Type 2 diabetes mellitus with unspecified diabetic retinopathy without macular edema     Foreign Rosas  has a past surgical history that includes Tubal ligation; Cholecystectomy; hysterectomy, vaginal, laparoscopy-assisted, with salpingo-oophorectoy (Bilateral, 08/22/2017); Thyroidectomy (Bilateral, 8/2/2024); and Parathyroidectomy (Right, 8/2/2024).   Active Ambulatory Problems     Diagnosis Date Noted    Physiological tremor 05/04/2022    Hypercalcemia 06/17/2022    Triggering of digit  06/17/2022    Severe acute respiratory syndrome coronavirus 2 (SARS-CoV-2) vaccination not indicated 06/17/2022    Radial styloid tenosynovitis of left hand 06/17/2022    Pain in pelvis 06/17/2022    Postmenopausal bleeding 06/17/2022    Nuclear senile cataract 06/17/2022    Musculoskeletal pain 06/17/2022    Multiple pulmonary nodules 06/17/2022    Increased frequency of urination 06/17/2022    Hypertension 06/17/2022    CTS (carpal tunnel syndrome) 06/17/2022    Insomnia 06/17/2022    Chronic pain of both knees 06/17/2022    Elevated PTHrP level 09/22/2022    Uncontrolled diabetes mellitus with hyperglycemia, with long-term current use of insulin 10/21/2022    Urinary incontinence 02/09/2023    Overactive bladder 02/17/2023    Parathyroid adenoma 04/06/2023    Multiple thyroid nodules 09/22/2023    Tenosynovitis of left wrist 09/26/2023    Hyperlipidemia LDL goal <70 01/24/2024    Dizziness 01/24/2024    GORDON (obstructive sleep apnea) 01/24/2024    Type 2 diabetes mellitus without complication, with long-term current use of insulin 01/24/2024    Screening for breast cancer 05/20/2024    Abnormal thyroid biopsy 07/01/2024    Hyperkalemia 07/01/2024    Papillary thyroid carcinoma 08/03/2024    Hyperparathyroidism 08/03/2024    Dysphonia 09/04/2024     Resolved Ambulatory Problems     Diagnosis Date Noted    Tricuspid valve insufficiency 06/17/2022    Well adult exam 03/09/2023     Past Medical History:   Diagnosis Date    Anxiety disorder, unspecified     Glaucoma     HTN (hypertension)     OAB (overactive bladder)     Thyroid cancer 2024    Type 2 diabetes mellitus with unspecified diabetic retinopathy without macular edema       Medical Hx and Allergies: Foreign has a current medication list which includes the following prescription(s): amlodipine, arm brace, famotidine, gabapentin, glipizide, ibuprofen, insulin detemir u-100 (levemir), insulin lispro, levothyroxine, losartan, metformin, oxybutynin, pen needle,  diabetic, pravastatin, propranolol, sertraline, sitagliptin phosphate, and zolpidem. Review of patient's allergies indicates:  No Known Allergies  Prior Level of Function: on leave from work post surgery   Current Level of Function:  independent  Prior Therapy:  none  Pain:   0/10  Pain Location / Description: NA  Nutrition:  oral diet of regular consistency with thin liquids  Social History:  works full time prior to surgery  Patient Therapy Goals:  return to work    Swallowing: reduced appetite, intermittent difficulty with liquids/solids, slow rate/bolus volume reduced complaints, no complaints of globus sensation   Breathing:  shallow breaths     Smoking History: non-smoker  EtOH: occasional/rarely  Caffeine: 2 cups/day   Reflux: yes, daily medications  Water Intake: 60 oz/day   Current Medication: antidepressants, anxiety  Allergies: NA  Occupation:  12 hour shifts  Hours a day spent talkin+  Work Environment: open air [employer] / ?????.??  Hobbies/Extra Curricular Activity: social outside of work       VOCAL ABUSE  Frequent throat clearing: sometimes  Frequent coughing:Yes  Excessive talking:Yes  Talking in noisy situations:Yes  Imitate noises:No  Talk loudly:Yes  Scream/yell frequently: sometimes  Talk in groups:Yes  Whisper:Yes    VOICE MISUSE  Talk when stressed: Yes  Talk when tired: Yes  Alter pitch: Yes    VOICE COMPLAINTS  roughness in the voice, increased vocal strain, increased vocal fatigue, decreased vocal endurance, inability to project the voice, inability to reach high pitches, and vocal instability  hoarse, breathy, weak    Objective      Perceptual assessment:    -Quality: rough, strained, breathy, and breathy phonatory breaks  -Volume: decreased projection and variable loudness   -Pitch: appropriate for age and gender  -Flexibility: instability    Patient's Rating of Voice (1 Normal/None -10 Worst/Most)  Today: 7  Effort to Use Voice: 7  Impact of Voice Disorder: 10    Patient's Perceptual  Rating of Voice (Absent/Mild/Moderate/Much)  Grade of Abnormality: Much  Roughness: Much  Breathiness: Much  Aesthenic/Lack of Power: Much  Strained/Hyperfunctional: Much    Does voice change during the day: Yes  Best: Morning  Worst: Evening  Habitual respiratory pattern: chest/clavicular.  MPT (ah > 18s WFL): 7.27 seconds  S/Z ratio (ratio of 1.4+ may indicate a degree of vocal fold dysfunction): 1.22    Vocal Handicap Index: 33    Individuals with a VHI-10 above 11 should be considered as having an abnormal VHI-10 score.   Ora Kaufman, Quiana Ko, Karen Luna, and Lakhwinder Espinosa, Journal of Voice, 2012, Volume 26, Issue 4, Pages 462-399, Copyright © 2012 [employer]    RSI (completed to assess the possible presence and/or severity of LPR symptoms and any relationship between this condition and the pt's dysphagia; score of ~15 may indicate LPR): 28    GRBAS   G-2    R- 2   B- 2   A-  2   S- 2  GRBAS TOTAL= 10/15  G=Grade, R=Rough, B=Breathy, A=Asthenic, S=Strain    Ratin=Normal, 1=Slight, 2=Moderate, 3=Severe   Adopted from: Clinical Examination of VoiceKAREN, 1981    Muscle Tension Assessment  Tension Observed: Yes neck and shoulder  Tenderness with Palpation: Yes inferior to scar    Education     Education: Plan of Care, role of SLP in care, vocal hygeine, scheduling/ cancellation policy, insurance limitations / visit limit , diaphragmatic breathing exercises, straw phonation, resonant phonation, head and neck stretches, and vocal hygiene were discussed with pt. Discussed importance of vocal hygiene including: hydration, conservation, and reducing throat clearing, coughing, or other phonotraumatic behaviors.  Patient was stimulable for improved voice using straw and resonant phonation exercises.  Patient expressed understanding. Handout provided for carryover    Barriers to Learning:  NA    Teaching Method: Verbal, Written, Demonstration, Audio/Visual    Home  Program: diaphragmatic breathing exercises, straw phonation, resonant phonation, and head and neck stretches  Assessment     Foreign Rosas is exhibiting moderate dysphonia at the conversational level as diagnosed by Dr. Sohan Jordan. Dysphonia is characterized by moderate roughness, moderate asthenia, moderate strain, moderate breathiness, and alternating pitch. The patient exhibited reduced breath support when completing single-syllable phonation tasks. Reduced vocal function noted as a result of dysphonia. She was able to produced /a/ for 6.18 seconds (15-25 seconds is the norm for her age), /s/ for 7.27 seconds (15 seconds is the norm for her age) and /z/ for 5.94 seconds. SLP intervention is warranted to decrease dysphonia to enhance functional communication. SLP intervention is warranted 1 time a week for 3 months for 30-60 minutes.  Ms. Calderon demonstrates impairments including limitations as described in the problem list. Foreign Rosas is motivated to reduce her dysphonia. Negative prognostic factors include anxiety. No barriers to therapy identified.    Rehab Potential: good  Pt's spiritual, cultural and educational needs considered and pt agreeable to plan of care and goals.    Goals     Long Term Goals    Patient will restore and maintain functional voice for communication in all environments without vocal strain and misuse.   Initial     Short Term Goals    Patient will complete diaphragmatic breathing exercises at an independent level. Initial   Patient will complete neck relaxation exercises at an independent level.  Initial   Patient will complete resonant phonation exercises at an independent level. Initial   Patient will complete easy onset phonation exercises at an independent level. Initial   Patient will complete straw phonation exercises at an independent level.  Initial   Patient will identify vocal hygiene strategies at an independent level.  Initial       Plan   Recommended  Treatment Plan:  Ms. Calderon will participate outpatient SLP intervention 1 time a week for 3 months to optimize glottal postures for improved vocal function, vocal efficiency, ease of phonation, educate patient and their family, and to participate in a home exercise program.    Other Recommendations:   -Continue exercises as discussed in session  -Contact clinician with any further questions  -Phonatory and respiratory systems  Vocal hygiene  Voice exercise regimen  Vocal rest  Ongoing follow-up with ENT  Ongoing follow-up with SLP as outpatient  patient demonstrated understanding  patient demonstrated ability to verbally teachback education principles reviewed  Ongoing education and support warranted      Binta Lantigua MS, CCC-SLP  9/4/2024

## 2024-09-13 ENCOUNTER — CLINICAL SUPPORT (OUTPATIENT)
Dept: REHABILITATION | Facility: HOSPITAL | Age: 59
End: 2024-09-13
Payer: COMMERCIAL

## 2024-09-13 DIAGNOSIS — R49.0 DYSPHONIA: Primary | ICD-10-CM

## 2024-09-13 PROCEDURE — 92507 TX SP LANG VOICE COMM INDIV: CPT

## 2024-09-13 NOTE — PROGRESS NOTES
[facility]  Speech Therapy Treatment Note  Voice Intervention    Name: Foreign Rosas   MRN: [id number]   Therapy Diagnosis:   Encounter Diagnosis   Name Primary?    Dysphonia Yes     Physician: Edil Chairez*    Date: 09/13/2024  Time In:  1000  Time Out:  1040  Total Billable Time: 40     Precautions: Standard  Subjective:   Patient reports: Getting my voice back   She was compliant to home exercise program.   Other Symptoms/Functional Status: Voice lasting most of the day  Response to previous treatment: Initial therapy session   Pain Scale:  0/10 on a Visual Analog Scale currently.   Pain Location: NA  Objective:        UNTIMED  Procedure Min.   Speech- Language- Voice Therapy  40     Total Untimed Units: 40  Charges Billed/Number of units: 40/1    Long Term Goals     Patient will restore and maintain functional voice for communication in all environments without vocal strain and misuse.   Initial      Short Term Goals     Patient will complete diaphragmatic breathing exercises at an independent level. Initial   Patient will complete neck relaxation exercises at an independent level.  Initial   Patient will complete resonant phonation exercises at an independent level. Initial   Patient will complete easy onset phonation exercises at an independent level. Initial   Patient will complete straw phonation exercises at an independent level.  Initial   Patient will identify vocal hygiene strategies at an independent level.  Initial       Patient Education/Response:   SLP reviewed HEP including diaphragmatic breathing, oral resonant, nasal resonant, head and neck stretches. Patient acknowledged and was able to complete at an independent level. SLP demonstrated all exercises and provided handouts for carryover and accuracy.     Home program established: Yes, Patient instructed to continue prior program  Exercises were reviewed and Foreign was able to demonstrate them prior to  the end of the session.  Foreign demonstrated good  understanding of the education provided.     See Electronic Medical Record under Patient Instructions for exercises provided throughout therapy.  Assessment:   Foreign is progressing well towards her goals. Current goals remain appropriate. Goals to be updated as necessary.       Patient prognosis is Excellent. Patient will continue to benefit from skilled outpatient speech and language therapy to address the deficits listed in the problem list on initial evaluation, provide patient/family education and to maximize patient's level of independence in the home and community environment.     Medical necessity is demonstrated by the following IMPAIRMENTS:  Dysphonia  Barriers to Therapy: NA  Patient's spiritual, cultural and educational needs considered and patient agreeable to plan of care and goals. Yes  Plan:     Continue Plan of Care with focus on diaphragmatic breathing, oral resonant, nasal resonant, head and neck stretches.     Visit Summary:     Ms. Rosas entered session with good voicing this date. She reported significantly improved voice since last session. She reported daily compliance with HEP. Improved voice maintenance until the afternoon and pt able to verbally communication via phone without complications or aphonia. Session initiated with diaphragmatic breathing this date. Blowing exercises via kazoo to increased oral airflow completed at an independent level after SLP demonstration. Oral resonant phonation exercises with minimal to no vocal tension. Resonant phonation drills introduces this date, pt able to complete without vocal tension or aphonia. Reduced neck tension visualized overall. SLP demonstrated head and neck stretches this date, handout provided for carryover. SLP to follow up on 09/18/2024.       Laryngeal Performance   /s/  9.26 seconds   /z/  9.46 seconds   /a/  9.34 seconds   /e/  12.67 seconds      Voice Rating   Voice Today 4    Effort Today 3   Impact of Voice 2      Binta Lantigua MS, CCC-SLP  9/13/2024

## 2024-09-18 ENCOUNTER — CLINICAL SUPPORT (OUTPATIENT)
Dept: REHABILITATION | Facility: HOSPITAL | Age: 59
End: 2024-09-18
Payer: COMMERCIAL

## 2024-09-18 DIAGNOSIS — R49.0 DYSPHONIA: Primary | ICD-10-CM

## 2024-09-18 PROCEDURE — 92507 TX SP LANG VOICE COMM INDIV: CPT

## 2024-09-18 NOTE — PROGRESS NOTES
"[facility]  Speech Therapy Treatment Note  Voice Intervention    Name: Foreign Rosas   MRN: [id number]   Therapy Diagnosis:   Encounter Diagnosis   Name Primary?    Dysphonia Yes     Physician: Edil Chairez*    Date: 09/18/2024  Time In:  1000  Time Out:  1040  Total Billable Time: 40     Precautions: Standard  Subjective:   Patient reports: Voice getting "cracky" after excess usage  She was compliant to home exercise program.   Other Symptoms/Functional Status: Voice lasting all day  Response to previous treatment: Initial therapy session   Pain Scale:  0/10 on a Visual Analog Scale currently.   Pain Location: NA  Objective:        UNTIMED  Procedure Min.   Speech- Language- Voice Therapy  40     Total Untimed Units: 40  Charges Billed/Number of units: 40/1    Long Term Goals     Patient will restore and maintain functional voice for communication in all environments without vocal strain and misuse.   Initial      Short Term Goals     Patient will complete diaphragmatic breathing exercises at an independent level. Initial   Patient will complete neck relaxation exercises at an independent level.  Initial   Patient will complete resonant phonation exercises at an independent level. Initial   Patient will complete easy onset phonation exercises at an independent level. Initial   Patient will complete straw phonation exercises at an independent level.  Initial   Patient will identify vocal hygiene strategies at an independent level.  Initial       Patient Education/Response:   SLP reviewed HEP including diaphragmatic breathing, oral resonant, nasal resonant, head and neck stretches. Patient acknowledged and was able to complete at an independent level. SLP demonstrated all exercises and provided handouts for carryover and accuracy.     Home program established: Yes, Patient instructed to continue prior program  Exercises were reviewed and Foreign was able to demonstrate " them prior to the end of the session.  Foreign demonstrated good  understanding of the education provided.     See Electronic Medical Record under Patient Instructions for exercises provided throughout therapy.  Assessment:   Foreign is progressing well towards her goals. Current goals remain appropriate. Goals to be updated as necessary.       Patient prognosis is Excellent. Patient will continue to benefit from skilled outpatient speech and language therapy to address the deficits listed in the problem list on initial evaluation, provide patient/family education and to maximize patient's level of independence in the home and community environment.     Medical necessity is demonstrated by the following IMPAIRMENTS:  Dysphonia  Barriers to Therapy: NA  Patient's spiritual, cultural and educational needs considered and patient agreeable to plan of care and goals. Yes  Plan:     Continue Plan of Care with focus on diaphragmatic breathing, oral resonant, nasal resonant, head and neck stretches.     Visit Summary:     Ms. Rosas entered session with good voicing this date. She reported continued improved voice since last session. She reported daily compliance with HEP. Improved voice maintenance until the afternoon and pt able to verbally communication via phone without complications or aphonia. Session initiated with diaphragmatic breathing this date. Oral resonant phonation exercises with minimal to no vocal tension. Resonant phonation drills continued this date, pt able to complete without vocal tension or aphonia. Reduced neck tension visualized overall. SLP demonstrated head and neck stretches this date, handout provided for carryover. Functional voice at conversation level without tension, pitch breaks, aphonic episodes. Adequate breath support noted throughout conversational tasks. SLP to follow up on 09/23/2024 due to ENT scheduled on 9/24/2024.      Laryngeal Performance   /s/  15.14 seconds   /z/  11.31  seconds   /a/  16.40 seconds   /e/  12.56 seconds      Voice Rating   Voice Today 3   Effort Today 2   Impact of Voice 1      MILA Mccollum, SSLP  Binta Lantigua MS, CCC-SLP  9/18/2024

## 2024-09-20 ENCOUNTER — HOSPITAL ENCOUNTER (OUTPATIENT)
Dept: RADIOLOGY | Facility: HOSPITAL | Age: 59
Discharge: HOME | End: 2024-09-20
Attending: NURSE PRACTITIONER
Payer: COMMERCIAL

## 2024-09-20 DIAGNOSIS — E04.2 MULTIPLE THYROID NODULES: ICD-10-CM

## 2024-09-20 PROCEDURE — 76536 US EXAM OF HEAD AND NECK: CPT | Mod: TC

## 2024-09-23 ENCOUNTER — CLINICAL SUPPORT (OUTPATIENT)
Dept: REHABILITATION | Facility: HOSPITAL | Age: 59
End: 2024-09-23
Payer: COMMERCIAL

## 2024-09-23 DIAGNOSIS — R49.0 DYSPHONIA: Primary | ICD-10-CM

## 2024-09-23 PROCEDURE — 92507 TX SP LANG VOICE COMM INDIV: CPT

## 2024-09-23 NOTE — PROGRESS NOTES
[facility]  Speech Therapy Treatment Note  Voice Intervention    Name: Foreign Rosas   MRN: [id number]   Therapy Diagnosis:   Encounter Diagnosis   Name Primary?    Dysphonia Yes     Physician: Edil Chairez*    Date: 09/23/2024  Time In:  1010  Time Out:  1100  Total Billable Time: 50     Precautions: Standard  Subjective:   Patient reports: Losing voice following excess usage.  She was compliant to home exercise program.   Other Symptoms/Functional Status: Voice lasting all day  Response to previous treatment: Initial therapy session   Pain Scale:  0/10 on a Visual Analog Scale currently.   Pain Location: NA  Objective:        UNTIMED  Procedure Min.   Speech- Language- Voice Therapy  50     Total Untimed Units: 40  Charges Billed/Number of units: 40/1    Long Term Goals     Patient will restore and maintain functional voice for communication in all environments without vocal strain and misuse.   Initial      Short Term Goals     Patient will complete diaphragmatic breathing exercises at an independent level. Initial   Patient will complete neck relaxation exercises at an independent level.  Initial   Patient will complete resonant phonation exercises at an independent level. Initial   Patient will complete easy onset phonation exercises at an independent level. Initial   Patient will complete straw phonation exercises at an independent level.  Initial   Patient will identify vocal hygiene strategies at an independent level.  Initial       Patient Education/Response:   SLP reviewed HEP including diaphragmatic breathing, oral resonant, nasal resonant, head and neck stretches. Patient acknowledged and was able to complete at an independent level. SLP demonstrated all exercises and provided handouts for carryover and accuracy.     Home program established: Yes, Patient instructed to continue prior program  Exercises were reviewed and Foreign was able to demonstrate them  prior to the end of the session.  Foreign demonstrated good  understanding of the education provided.     See Electronic Medical Record under Patient Instructions for exercises provided throughout therapy.  Assessment:   Foreign is progressing well towards her goals. Current goals remain appropriate. Goals to be updated as necessary.       Patient prognosis is Excellent. Patient will continue to benefit from skilled outpatient speech and language therapy to address the deficits listed in the problem list on initial evaluation, provide patient/family education and to maximize patient's level of independence in the home and community environment.     Medical necessity is demonstrated by the following IMPAIRMENTS:  Dysphonia  Barriers to Therapy: NA  Patient's spiritual, cultural and educational needs considered and patient agreeable to plan of care and goals. Yes  Plan:     Continue Plan of Care with focus on diaphragmatic breathing, oral resonant, nasal resonant, head and neck stretches.     Visit Summary:     Ms. Rosas entered session with good voicing this date, however pt reported aphonia following excess voice usage over weekend.  She reported continued improved voice since last session overall. She reported daily compliance with HEP. Improved voice maintenance until the evening and pt able to verbally communication via phone without complications or aphonia. Session initiated with oral resonant phonation exercises with minimal to no vocal tension. Pt able to complete without vocal tension or aphonia. Reduced neck tension visualized overall. Functional voice at conversation level without tension, pitch breaks, aphonic episodes. Adequate breath support noted throughout conversational tasks. Pt will follow up with SLP following ENT appointment on 9/24/2024 as she may be returning to work. SLP to possibly change plan of care pending return to work.       Laryngeal Performance   /s/  19.10 seconds   /z/  15.14  seconds   /a/  17.51 seconds   /e/  17.98 seconds      Voice Rating   Voice Today 2   Effort Today 2   Impact of Voice 3      MILA Mccollum, SSLP  Binta Lantigua MS, CCC-SLP  9/23/2024

## 2024-09-24 ENCOUNTER — TELEPHONE (OUTPATIENT)
Dept: ENDOCRINOLOGY | Facility: CLINIC | Age: 59
End: 2024-09-24
Payer: COMMERCIAL

## 2024-09-24 ENCOUNTER — OFFICE VISIT (OUTPATIENT)
Dept: OTOLARYNGOLOGY | Facility: CLINIC | Age: 59
End: 2024-09-24
Payer: COMMERCIAL

## 2024-09-24 VITALS
SYSTOLIC BLOOD PRESSURE: 137 MMHG | OXYGEN SATURATION: 99 % | RESPIRATION RATE: 20 BRPM | DIASTOLIC BLOOD PRESSURE: 84 MMHG | WEIGHT: 172 LBS | HEART RATE: 96 BPM | BODY MASS INDEX: 29.37 KG/M2 | TEMPERATURE: 98 F | HEIGHT: 64 IN

## 2024-09-24 DIAGNOSIS — C73 PAPILLARY THYROID CARCINOMA: Primary | ICD-10-CM

## 2024-09-24 DIAGNOSIS — E21.3 HYPERPARATHYROIDISM: ICD-10-CM

## 2024-09-24 DIAGNOSIS — E04.2 MULTIPLE THYROID NODULES: ICD-10-CM

## 2024-09-24 DIAGNOSIS — R49.0 DYSPHONIA: ICD-10-CM

## 2024-09-24 PROCEDURE — 99215 OFFICE O/P EST HI 40 MIN: CPT | Mod: PBBFAC

## 2024-09-24 NOTE — LETTER
September 24, 2024      [facility], Entr[address]  [address] [facility] [address]  Phone: [phone]       Patient: Foreign Rosas   YOB: 1965  Date of Visit: 09/24/2024    To Whom It May Concern:    Samuel Rosas  was at [facility] on 09/24/2024. The patient may return to work on 09/30/2024 with no restrictions. If you have any questions or concerns, or if I can be of further assistance, please do not hesitate to contact me.    Sincerely,    Sheron Rushing MA

## 2024-09-24 NOTE — PROGRESS NOTES
[facility]  Otolaryngology Clinic Note    Foreign Rosas  Encounter Date: 9/24/2024  YOB: 1965    Chief Complaint:  Papillary thyroid carcinoma    HPI: Foreign Rosas is a 59 y.o. female past medical history of hypercalcemia and hyperparathyroidism who was referred for left thyroid nodule that returned as papillary thyroid carcinoma.  Patient reports she has been having coughing episodes for the past month and some gas or air.  She denies odynophagia or dysphagia.  She reports some hoarseness however that comes and goes.  Denies any lumps or bumps on her neck.  They found thyroid nodule previously on a CT scan year or 2 ago.  She does have a sister who had thyroid cancer recently.  She denies any exposure to radiation.  Denies any previous neck surgeries.  Does not smoke tobacco.  Last year patient had a sestamibi scan in a 4D CT which localize a right inferior parathyroid adenoma.  At that time her PTH was elevated to the 90s.  She had hypercalcemia to 10.8.  She is asymptomatic.     08/08/2024: Patient here today postop 1 follow up.  She underwent total thyroidectomy and parathyroidectomy on 08/02/2024.  Patient has been doing well postoperatively.  She denies any signs or symptoms of infection and no perioral or hand paresthesias.  Patient has had a dysphonic voice following surgery.  No issues with p.o. intake.    8/21/2024: Patient is here for her 2nd post-op follow up. S/p total thyroidectomy and parathyroidectomy on 08/02/2024. Continue to report dysphonia and dry cough however improving. She denies dysphagia, odynophagia, paraesthesia, myalgias, and confusion.     9/24/24:  Patient returns for 3rd postoperative visit.  She has been doing well since her surgery.  She has been seen speech therapy and feels that her voice has improved noticeably since her last visit.  She has been tolerating a regular diet without problems.    ROS:   General: Negative except per  HPI  Skin: Denies rash, ulcer, or lesion.  Eyes: Denies vision changes or diplopia.  Ears: Negative except per HPI  Nose: Negative except per HPI  Throat/mouth: Negative except per HPI  Cardiovascular: Negative except per HPI  Respiratory: Negative except per HPI  Neck: Negative except per HPI  Endocrine: Negative except per HPI  Neurologic: Negative except per HPI    Other 10-point review of systems negative except per HPI      Review of patient's allergies indicates:  No Known Allergies    Past Medical History:   Diagnosis Date    Anxiety disorder, unspecified     Glaucoma     HTN (hypertension)     Hyperparathyroidism 08/03/2024    OAB (overactive bladder)     GORDON (obstructive sleep apnea) 01/24/2024    Thyroid cancer 2024    Type 2 diabetes mellitus with unspecified diabetic retinopathy without macular edema        Past Surgical History:   Procedure Laterality Date    CHOLECYSTECTOMY      HYSTERECTOMY, VAGINAL, LAPAROSCOPY-ASSISTED, WITH SALPINGO-OOPHORECTOY Bilateral 08/22/2017    PARATHYROIDECTOMY Right 8/2/2024    Procedure: PARATHYROIDECTOMY;  Surgeon: Cindy Byrne MD;  Location: [facility];  Service: ENT;  Laterality: Right;    THYROIDECTOMY Bilateral 8/2/2024    Procedure: THYROIDECTOMY total;  Surgeon: Cindy Byrne MD;  Location: [facility];  Service: ENT;  Laterality: Bilateral;    TUBAL LIGATION         Social History     Socioeconomic History    Marital status: Single   Tobacco Use    Smoking status: Never     Passive exposure: Past    Smokeless tobacco: Never   Substance and Sexual Activity    Alcohol use: Not Currently     Comment: special occassion    Drug use: Never    Sexual activity: Not Currently     Partners: Male     Birth control/protection: Abstinence, See Surgical Hx     Social Determinants of Health     Financial Resource Strain: Medium Risk (12/16/2023)    Overall Financial Resource Strain (CARDIA)     Difficulty of Paying Living Expenses: Somewhat hard   Food Insecurity: Food  Insecurity Present (12/16/2023)    Hunger Vital Sign     Worried About Running Out of Food in the Last Year: Never true     Ran Out of Food in the Last Year: Sometimes true   Transportation Needs: No Transportation Needs (12/16/2023)    PRAPARE - Transportation     Lack of Transportation (Medical): No     Lack of Transportation (Non-Medical): No   Physical Activity: Unknown (12/16/2023)    Exercise Vital Sign     Days of Exercise per Week: 1 day   Recent Concern: Physical Activity - Inactive (12/16/2023)    Exercise Vital Sign     Days of Exercise per Week: 1 day     Minutes of Exercise per Session: 0 min   Stress: Stress Concern Present (12/16/2023)    [employer] [place] [employer] - Occupational Stress Questionnaire     Feeling of Stress : Rather much   Housing Stability: High Risk (12/16/2023)    Housing Stability Vital Sign     Unable to Pay for Housing in the Last Year: Yes     Number of Places Lived in the Last Year: 2     Unstable Housing in the Last Year: No       Family History   Problem Relation Name Age of Onset    Diabetes Mother Mckenzie     Hypertension Mother Mckenzie     Kidney failure Mother Mckenzie     Asthma Mother Mckenzie     Kidney disease Mother Mckenzie     Lung cancer Father Rei     Diabetes Father Rei     Hypertension Father Rei     Mental illness Father Rei     Thyroid disease Sister Astrid     Brain cancer Sister Astrid     Diabetes Sister Astrid     Hypertension Sister Astrid     Cancer Sister Danelle         Thyroid    Thyroid disease Sister Danelle     Migraines Sister Danelle     Miscarriages / Stillbirths Sister Danelle     Cancer Brother Jack         Pancreatic    Hypertension Brother Jack     Seizures Brother Jack     Diabetes Sister Roxy     Hypertension Sister Roxy        Outpatient Encounter Medications as of 9/24/2024   Medication Sig Dispense Refill    amLODIPine (NORVASC) 5 MG tablet Take 1 tablet (5 mg total) by mouth once daily.    "   arm brace Misc 1 each by Misc.(Non-Drug; Combo Route) route every evening. Left carpal tunnel wrist splint, nightly 6 each 0    famotidine (PEPCID) 40 MG tablet Take 1 tablet (40 mg total) by mouth once daily. 30 tablet 11    gabapentin (NEURONTIN) 300 MG capsule Take 300 mg by mouth every evening. Patient states she takes once a month      glipiZIDE (GLUCOTROL) 10 MG tablet Take 1 tablet (10 mg total) by mouth 2 (two) times daily before meals. 180 tablet 1    ibuprofen (ADVIL,MOTRIN) 800 MG tablet Take 1 tablet (800 mg total) by mouth 3 (three) times daily as needed for Pain. 90 tablet 2    insulin detemir U-100, Levemir, 100 unit/mL (3 mL) SubQ InPn pen Inject 40 units sq in AM and 48 units sq in PM. 90 mL 3    insulin lispro 100 unit/mL pen Inject 2 to 12 units under the skin per sliding scale after checking blood glucose before meals and at bedtime 15 mL 6    levothyroxine (SYNTHROID) 112 MCG tablet Take 1 tablet (112 mcg total) by mouth before breakfast. 30 tablet 11    losartan (COZAAR) 25 MG tablet Take 1 tablet (25 mg total) by mouth once daily. 90 tablet 1    metFORMIN (GLUCOPHAGE) 1000 MG tablet Take 1 tablet (1,000 mg total) by mouth 2 (two) times daily with meals. 180 tablet 1    pen needle, diabetic 32 gauge x 5/32" Ndle Use 1 needle five times daily 100 each 6    pravastatin (PRAVACHOL) 20 MG tablet Take 20 mg by mouth once daily.      propranoloL (INDERAL) 20 MG tablet Take 1 tablet (20 mg total) by mouth 2 (two) times daily. 180 tablet 1    sertraline (ZOLOFT) 50 MG tablet Take 1 tablet by mouth at bedtime 30 tablet 6    SITagliptin phosphate (JANUVIA) 50 MG Tab Take 1 tablet (50 mg total) by mouth once daily. 90 tablet 1    zolpidem (AMBIEN) 5 MG Tab Take 1 tablet (5 mg total) by mouth nightly as needed (insomnia). 30 tablet 5    oxybutynin (DITROPAN-XL) 5 MG TR24 Take 1 tablet (5 mg total) by mouth once daily. (Patient not taking: Reported on 8/5/2024) 90 tablet 3    [DISCONTINUED] propranoloL " "(INDERAL) 10 MG tablet Take 1 tablet (10 mg total) by mouth 2 (two) times daily. (Patient taking differently: Take 10 mg by mouth once daily.) 60 tablet 3     No facility-administered encounter medications on file as of 9/24/2024.       Physical Exam:  Vitals:    09/24/24 0828   BP: 137/84   BP Location: Left arm   Patient Position: Sitting   BP Method: Medium (Automatic)   Pulse: 96   Resp: 20   Temp: 98.4 °F (36.9 °C)   TempSrc: Oral   SpO2: 99%   Weight: 78 kg (172 lb)   Height: 5' 4" (1.626 m)       Constitutional  General Appearance: well nourished, well-developed, AAO x3, NAD  HEENT  Eyes: PEERLA, EOMI, normal conjunctivae  Neck: incision-appropriately TTP ( s/p surgery), CDI, healing appropriately   Ears: Hearing well at conversation level  Nose: septum midline, no inferior turbinate hypertrophy, no polyps, moist mucosa without erythema or blue hue  OC/OP:  Upper dentures present no oral lesions, tongue/FOM/BOT- soft, no leukoplakia/ulcerations/ tenderness  Nasopharynx, Hypopharynx, and Larynx:    Indirect: attempted, limited view due to patient intolerance  Neuro: CN II - XII intact bilaterally  Cardiovascular: peripheral pulses palpable  Respiratory: non-labored respirations, voice with somewhat low projection      Pertinent Data:  TUMOR   Tumor Focality  Multifocal   Tumor Characteristics     Tumor Site  Right lobe     Left lobe   Tumor Size  Greatest Dimension (Centimeters): 1.2 cm   Histologic Tumor Types and Subtypes   Papillary Carcinoma, tall cell subtype   Tumor Proliferative Activity     Mitotic Rate  Less than 3 mitoses per 2mm2   Tumor Necrosis  Not identified   Angioinvasion (vascular invasion)  Not identified   Lymphatic Invasion  Present   Perineural Invasion  Not identified   Extrathyroidal Extension  Not identified   Margin Status  All margins negative for carcinoma   Distance from Invasive Carcinoma to Closest Margin  Less than 1 mm   REGIONAL LYMPH NODES   Regional Lymph Node Status  All " regional lymph nodes negative for tumor   Number of Lymph Nodes Examined  4   Sally Level(s) Examined  Level VI       Summary of Outside Records:    Assessment/Plan:  Foreign Rosas is a 59 y.o. female s/p total thyroidectomy and parathyroidectomy on 8/2/24.  Final pathology pT1b pN0 multifocal Papillary thyroid carcinoma greatest diameter 1.2 cm with lymphatic invasion.  Central compartment lymph nodes negative for disease.  Parathyroid adenoma with return to function of PTH and calcium.  Postoperative FL with true vocal fold motion intact.  Has been following with SLP for dysphonia following surgery which she feels is improving.      -Continue Pepcid daily for LPR.   - continue SLP for dysphonia  - Referral made to Dr. Neal Johnson to evaluate for need of post-operative PHILLIPS & mangement of hypothyroidism.  - Thyroid ultrasound, TSH, T4, and thyroglobulin in 6 months    RTC in 6 months    ANNY Son MD  [facility]

## 2024-10-10 ENCOUNTER — DOCUMENTATION ONLY (OUTPATIENT)
Dept: REHABILITATION | Facility: HOSPITAL | Age: 59
End: 2024-10-10
Payer: COMMERCIAL

## 2024-10-10 ENCOUNTER — PATIENT OUTREACH (OUTPATIENT)
Facility: CLINIC | Age: 59
End: 2024-10-10
Payer: COMMERCIAL

## 2024-10-10 DIAGNOSIS — Z79.4 UNCONTROLLED DIABETES MELLITUS WITH HYPERGLYCEMIA, WITH LONG-TERM CURRENT USE OF INSULIN: Primary | ICD-10-CM

## 2024-10-10 DIAGNOSIS — Z09 NEED FOR CASE MANAGEMENT FOLLOW-UP: ICD-10-CM

## 2024-10-10 DIAGNOSIS — E11.65 UNCONTROLLED DIABETES MELLITUS WITH HYPERGLYCEMIA, WITH LONG-TERM CURRENT USE OF INSULIN: Primary | ICD-10-CM

## 2024-10-10 NOTE — PROGRESS NOTES
[facility]  Speech Therapy Discharge Note          Date: 10/10/2024     Name: Foreign Rosas   MRN: [id number]   Medical Diagnosis: Dysphonia  Referring Physician: Edil Chairez*        Date of Initial Evaluation: 09/04/2024  Date of Re-Evaluation: NA     Subjective   Mrs. Rosas contacted office on 10/10/2024 to request discharge from OP SLP services. She reported that she was back at work and things are going well. Clinic  informed her to contact office if dysphonia returns.     Goals     Long Term Goals     Patient will restore and maintain functional voice for communication in all environments without vocal strain and misuse.   Goal met      Short Term Goals     Patient will complete diaphragmatic breathing exercises at an independent level. Goal met   Patient will complete neck relaxation exercises at an independent level.  Goal met   Patient will complete resonant phonation exercises at an independent level. Goal met   Patient will complete easy onset phonation exercises at an independent level. Goal met   Patient will complete straw phonation exercises at an independent level.  Goal met   Patient will identify vocal hygiene strategies at an independent level.  Goal met     Treatment   Education: Therapist discussed discharge with the patient after the session. Patient verbalized understanding of all discussed, and agrees that all goals have been met.     Assessment   Foreign Rosas, a 59 year old female, was referred to speech and language therapy with a diagnosis of dysphonia. She attended 4 OP SLP treatments since her evaluation on 09/04/2024. She was compliant with her home exercise program with addressed diaphragmatic breathing exercises, straw phonation, resonant phonation, head and neck stretches, and vocal hygiene.     Given that Foreign has met all of her goals and demonstrates appropriate skills, Foreign is being discharged from speech therapy at  this time. She agreed with the recommended plan.      Barriers to Therapy: No barriers to learning evident. Spiritual/cultural beliefs not needed to be incorporated into treatment sessions. Patient agreeable to plan of care and goals.      Plan   Recommendations: Foreign  has met all long and short term goals. Therefore, Foreign  is being discharged from outpatient speech therapy at this time. SLP educated patient on home exercise program. All comments and questions addressed and comprehension noted.    Therapist Name:  Binta Lantigua MS, CCC-SLP  Speech Language Pathologist  10/10/2024

## 2024-10-12 ENCOUNTER — LAB VISIT (OUTPATIENT)
Dept: LAB | Facility: HOSPITAL | Age: 59
End: 2024-10-12
Attending: NURSE PRACTITIONER
Payer: COMMERCIAL

## 2024-10-12 DIAGNOSIS — Z79.4 UNCONTROLLED DIABETES MELLITUS WITH HYPERGLYCEMIA, WITH LONG-TERM CURRENT USE OF INSULIN: ICD-10-CM

## 2024-10-12 DIAGNOSIS — E11.9 TYPE 2 DIABETES MELLITUS WITHOUT COMPLICATION, WITH LONG-TERM CURRENT USE OF INSULIN: ICD-10-CM

## 2024-10-12 DIAGNOSIS — Z79.4 TYPE 2 DIABETES MELLITUS WITHOUT COMPLICATION, WITH LONG-TERM CURRENT USE OF INSULIN: ICD-10-CM

## 2024-10-12 DIAGNOSIS — E11.65 UNCONTROLLED DIABETES MELLITUS WITH HYPERGLYCEMIA, WITH LONG-TERM CURRENT USE OF INSULIN: ICD-10-CM

## 2024-10-12 LAB
ALBUMIN SERPL-MCNC: 4.4 G/DL (ref 3.4–4.8)
ALBUMIN/GLOB SERPL: 1.4 RATIO (ref 1.1–2)
ALP SERPL-CCNC: 83 UNIT/L (ref 40–150)
ALT SERPL-CCNC: 19 UNIT/L (ref 0–55)
ANION GAP SERPL CALC-SCNC: 8 MEQ/L
AST SERPL-CCNC: 18 UNIT/L (ref 5–34)
BILIRUB SERPL-MCNC: 0.3 MG/DL
BUN SERPL-MCNC: 13.5 MG/DL (ref 9.8–20.1)
CALCIUM SERPL-MCNC: 9.6 MG/DL (ref 8.4–10.2)
CHLORIDE SERPL-SCNC: 105 MMOL/L (ref 98–107)
CHOLEST SERPL-MCNC: 139 MG/DL
CHOLEST/HDLC SERPL: 4 {RATIO} (ref 0–5)
CO2 SERPL-SCNC: 28 MMOL/L (ref 23–31)
CREAT SERPL-MCNC: 1.02 MG/DL (ref 0.55–1.02)
CREAT UR-MCNC: 211.6 MG/DL (ref 45–106)
CREAT/UREA NIT SERPL: 13
EST. AVERAGE GLUCOSE BLD GHB EST-MCNC: 208.7 MG/DL
GFR SERPLBLD CREATININE-BSD FMLA CKD-EPI: >60 ML/MIN/1.73/M2
GLOBULIN SER-MCNC: 3.2 GM/DL (ref 2.4–3.5)
GLUCOSE SERPL-MCNC: 212 MG/DL (ref 82–115)
HBA1C MFR BLD: 8.9 %
HDLC SERPL-MCNC: 35 MG/DL (ref 35–60)
LDLC SERPL CALC-MCNC: 71 MG/DL (ref 50–140)
MICROALBUMIN UR-MCNC: 60.4 UG/ML
MICROALBUMIN/CREAT RATIO PNL UR: 28.5 MG/GM CR (ref 0–30)
POTASSIUM SERPL-SCNC: 4.5 MMOL/L (ref 3.5–5.1)
PROT SERPL-MCNC: 7.6 GM/DL (ref 5.8–7.6)
SODIUM SERPL-SCNC: 141 MMOL/L (ref 136–145)
TRIGL SERPL-MCNC: 167 MG/DL (ref 37–140)
VLDLC SERPL CALC-MCNC: 33 MG/DL

## 2024-10-12 PROCEDURE — 80061 LIPID PANEL: CPT

## 2024-10-12 PROCEDURE — 80053 COMPREHEN METABOLIC PANEL: CPT

## 2024-10-12 PROCEDURE — 83036 HEMOGLOBIN GLYCOSYLATED A1C: CPT

## 2024-10-12 PROCEDURE — 36415 COLL VENOUS BLD VENIPUNCTURE: CPT

## 2024-10-12 PROCEDURE — 82570 ASSAY OF URINE CREATININE: CPT

## 2024-10-15 ENCOUNTER — OFFICE VISIT (OUTPATIENT)
Dept: ENDOCRINOLOGY | Facility: CLINIC | Age: 59
End: 2024-10-15
Payer: COMMERCIAL

## 2024-10-15 ENCOUNTER — TELEPHONE (OUTPATIENT)
Dept: ENDOCRINOLOGY | Facility: CLINIC | Age: 59
End: 2024-10-15

## 2024-10-15 ENCOUNTER — LAB VISIT (OUTPATIENT)
Dept: LAB | Facility: HOSPITAL | Age: 59
End: 2024-10-15
Payer: COMMERCIAL

## 2024-10-15 VITALS
WEIGHT: 173.31 LBS | HEART RATE: 84 BPM | RESPIRATION RATE: 17 BRPM | HEIGHT: 64 IN | TEMPERATURE: 98 F | SYSTOLIC BLOOD PRESSURE: 147 MMHG | DIASTOLIC BLOOD PRESSURE: 81 MMHG | BODY MASS INDEX: 29.59 KG/M2

## 2024-10-15 DIAGNOSIS — C73 PAPILLARY THYROID CARCINOMA: ICD-10-CM

## 2024-10-15 DIAGNOSIS — E89.0 POSTOPERATIVE HYPOTHYROIDISM: Primary | ICD-10-CM

## 2024-10-15 DIAGNOSIS — C73 PAPILLARY THYROID CARCINOMA: Primary | ICD-10-CM

## 2024-10-15 DIAGNOSIS — E89.0 POSTOPERATIVE HYPOTHYROIDISM: ICD-10-CM

## 2024-10-15 LAB
T4 FREE SERPL-MCNC: 1.16 NG/DL (ref 0.7–1.48)
TSH SERPL-ACNC: 5.23 UIU/ML (ref 0.35–4.94)

## 2024-10-15 PROCEDURE — 84432 ASSAY OF THYROGLOBULIN: CPT

## 2024-10-15 PROCEDURE — 36415 COLL VENOUS BLD VENIPUNCTURE: CPT

## 2024-10-15 PROCEDURE — 86800 THYROGLOBULIN ANTIBODY: CPT

## 2024-10-15 PROCEDURE — 84439 ASSAY OF FREE THYROXINE: CPT

## 2024-10-15 PROCEDURE — 84443 ASSAY THYROID STIM HORMONE: CPT

## 2024-10-15 PROCEDURE — 99214 OFFICE O/P EST MOD 30 MIN: CPT | Mod: PBBFAC

## 2024-10-15 NOTE — PROGRESS NOTES
[facility] Clinic Note    Patient Name: Foreign SOUSAMEGHANB: 1965   MRN: [id number]  Date: 10/15/2024  Home Address: [address] [place] [address]      Subjective:     Chief Complaint:   No chief complaint on file.       HPI:  The patient is a 59 y.o. year old female with hx of hypercalcemia, hyperparathyroidism s/p right parathyroidectomy on 8/2/24, papillary thyroid cancer s/p total thyroidectomy on 8/2/24, HTN, DM2 on insulin, and GORDON who was referred for postoperative thyroid carcinoma management. Final pathology pT1b pN0 multifocal Papillary thyroid carcinoma greatest diameter 1.2 cm with lymphatic invasion, central compartment lymph nodes negative for disease. Patient was placed on Synthroid 112mcgs qd postoperatively however postoperative thyroid lab work and imaging were not completed yet. Most recent calcium 9.6 on 10/12/24. Patient also has family history of thyroid cancer through sister. Denies tobacco and recreational drug use. Denies prior radiation exposure.     Patient reports intermittent dysphonia has greatly improved with SLP since surgery but denies fevers, chills, changes in appetite, changes in sleep, depression, weight change, chest pain, palpitations, PO intolerance, globus, odynophagia, dysphagia, HA, focal neurological deficit, myalgias, abdominal pain, generalized pain, nausea, vomiting, diarrhea, constipation, and urinary changes. She reports 3 day history of lightheadedness which is not alleviated with water or food consumption. Symptoms are aggravated with standing from a seated position. Patient sustained 1 x fall since symptom onset but denies sustaining any injury from this fall. She does not check blood pressure or blood sugar. She admits to intermittent insulin compliance; she takes nightly insulin dose but sometimes forgets to take morning dose. Last A1c 8.9 on 10/12/24. She also takes Amlodipine and Losartan together in the mornings.       Available notes and lab  "results were reviewed.      ROS  As stated above     Objective:      Physical Exam  Vitals:    10/15/24 0911 10/15/24 0915   BP: (!) 149/83 (!) 147/81   Pulse: 84    Resp: 17    Temp: 98.2 °F (36.8 °C)    Weight: 78.6 kg (173 lb 4.5 oz)    Height: 5' 4" (1.626 m)         Wt Readings from Last 3 Encounters:   10/15/24 78.6 kg (173 lb 4.5 oz)   09/24/24 78 kg (172 lb)   09/04/24 77.5 kg (170 lb 12.8 oz)       Physical Exam   General: Well nourished w/o distress  HEENT: NC/AT; PERRLA; nasal and oral mucosa moist and clear; no thyromegaly  Neck: Full ROM  Pulm: CTA bilaterally, normal work of breathing  MSK: Full ROM of all extremities and spine w/o limitation or discomfort  Psych: Cooperative; appropriate mood and affect    Body mass index is 29.74 kg/m².    Assessment:       Plan  1. Papillary thyroid carcinoma s/p total thyroidectomy on 8/2/24   2. Postoperative hypothyroidism   - Currently on Synthroid 112 mcg qd   - Postoperative repeat thyroid U/S completed on 9/20/24 consistent with possible residual thymic tissue   - Will correlate most recent imaging findings with postoperative TSH, free T4, and thyroglobulin panel   - Initiate low iodine diet in preparation for possible PHILLIPS if repeat lab work remains elevated postoperatively     2. Parathyroid adenoma s/p R parathyroidectomy on 8/2/24   3. Hyperparathyroidism  4. Hypercalcemia - resolved   - Continue to monitor   - Last Calcium 9.6 on 10/12/24, last PTH 24.2 on 8/3/24     5. Lightheadedness   6. Fall   - Possibly related to thyroid hormone levels  - If repeat thyroid studies are normal, patient may need to be evaluated by ENT and PCP for other symptom etiology       Disposition: RTC in 4 months     Patient case and plan of care discussed with Dr. Alirio Crcokett MD   Internal Medicine - [facility]      "

## 2024-10-15 NOTE — TELEPHONE ENCOUNTER
Some of labs are back, while rest are pending, ok to notify pt her thyroid has room to go up.  Erx 125mcg po daily up from current 112mcg daily dose, #90, no refills.  Await tg panel for further recs.    If she remains dizzy, given her thyroid is not far from goal, she may benefit from PCP/ENT f/u to further evaluate.

## 2024-10-16 LAB
ENDOCRINOLOGIST REVIEW: NORMAL
THYROGLOB AB SERPL IA-ACNC: <1.8 IU/ML
THYROGLOB SERPL-MCNC: <0.1 NG/ML

## 2024-10-16 RX ORDER — LEVOTHYROXINE SODIUM 125 UG/1
125 TABLET ORAL
Qty: 90 TABLET | Refills: 0 | Status: SHIPPED | OUTPATIENT
Start: 2024-10-16 | End: 2025-10-16

## 2024-10-16 NOTE — TELEPHONE ENCOUNTER
I called pt and discussed the following results and recommendations   Some of labs are back, while rest are pending, ok to notify pt her thyroid has room to go up.  Erx 125mcg po daily up from current 112mcg daily dose, #90, no refills.  Await tg panel for further recs.     If she remains dizzy, given her thyroid is not far from goal, she may benefit from PCP/ENT f/u to further evaluate.    Pt verbalizes understanding and is in agreement with levothyroxine increase.

## 2024-10-17 DIAGNOSIS — F51.01 PRIMARY INSOMNIA: ICD-10-CM

## 2024-10-22 ENCOUNTER — OUTPATIENT CASE MANAGEMENT (OUTPATIENT)
Dept: ADMINISTRATIVE | Facility: OTHER | Age: 59
End: 2024-10-22
Payer: COMMERCIAL

## 2024-10-22 DIAGNOSIS — F51.01 PRIMARY INSOMNIA: ICD-10-CM

## 2024-10-22 NOTE — TELEPHONE ENCOUNTER
----- Message from Ananya sent at 10/22/2024  9:45 AM CDT -----  Regarding: med refill         Preferred Pharmacy: [facility]    Last Visit:  Next Visit: 11/05/24       1. Name of Medication: Ambien  Dosage:    Comments:         2. Name of Medication: Gabapentin    Dosage:

## 2024-10-22 NOTE — PROGRESS NOTES
Outpatient Care Management   - Patient Assessment    Patient: Foreign Rosas  MRN:  [id number]  Date of Service:  10/22/2024  Completed by:  [place] LCSW  Referral Date: 10/10/2024    Reason for Visit   Patient presents with    Social Work Assessment       Brief Summary:  received a referral from outpatient provider for the following Low/Mod SW psychosocial needs food, prescription assistance/medication.  The patient also requests assistance with diabetic education, oncology resources, and advance directives.  Care plan was created in collaboration with patient/caregiver input.   completed the SDOH questionnaire.

## 2024-10-23 ENCOUNTER — OUTPATIENT CASE MANAGEMENT (OUTPATIENT)
Dept: ADMINISTRATIVE | Facility: OTHER | Age: 59
End: 2024-10-23
Payer: COMMERCIAL

## 2024-10-23 RX ORDER — GABAPENTIN 300 MG/1
300 CAPSULE ORAL NIGHTLY
OUTPATIENT
Start: 2024-10-23

## 2024-10-23 RX ORDER — ZOLPIDEM TARTRATE 5 MG/1
5 TABLET ORAL NIGHTLY PRN
Qty: 30 TABLET | Refills: 5 | Status: SHIPPED | OUTPATIENT
Start: 2024-10-23

## 2024-10-23 RX ORDER — ZOLPIDEM TARTRATE 5 MG/1
5 TABLET ORAL NIGHTLY PRN
Qty: 30 TABLET | Refills: 5 | OUTPATIENT
Start: 2024-10-23

## 2024-10-24 ENCOUNTER — TELEPHONE (OUTPATIENT)
Dept: PHARMACY | Facility: CLINIC | Age: 59
End: 2024-10-24
Payer: COMMERCIAL

## 2024-10-24 DIAGNOSIS — E11.65 TYPE 2 DIABETES MELLITUS WITH HYPERGLYCEMIA, WITH LONG-TERM CURRENT USE OF INSULIN: Primary | ICD-10-CM

## 2024-10-24 DIAGNOSIS — Z79.4 TYPE 2 DIABETES MELLITUS WITH HYPERGLYCEMIA, WITH LONG-TERM CURRENT USE OF INSULIN: Primary | ICD-10-CM

## 2024-10-24 NOTE — TELEPHONE ENCOUNTER
We have reviewed Ms. Rosas current medication list and/or insurance status. Unfortunately, The Pharmacy Patient Assistance Team is unable to assist at this time due to the following reasons      Patient has Private/Commercial Insurance                Ludmila Henriquez  Pharmacy Patient Assistance Team

## 2024-10-24 NOTE — PROGRESS NOTES
Outpatient Care Management   - Care Plan Follow Up    Patient: Foreign Rosas  MRN:  [id number]  Date of Service:  10/24/2024  Completed by:  Rashaun Lake LCSW  Referral Date: 10/10/2024    Reason for Visit   Patient presents with    OPCM SW Follow Up Call       Brief Summary: MSW recvd a message from Pharm assistance that with the commercial insurance and her list of medications she would not met criteria. MSW will look for other possible resources to assist. Diabetes Care and  Tanvi Gonzales contacted MSW and pt this morning. Pt has been enrolled in a program by Lore Odonnell and and scheduled for 10/31/2024 at 7:30 with Carmen Rosas. Will follow up with the pt on progress after her appt.    Complex Care Plan     Care plan was discussed and updated today.    Patient Instructions     Diabetes Care and Education class scheduled for 10/31/2024 at 7:30 with Carmen Rosas. MSW will follow up after her appt.

## 2024-10-24 NOTE — TELEPHONE ENCOUNTER
----- Message from Rashaun Lake sent at 10/23/2024  4:10 PM CDT -----  Regarding: OPCM  Good afternoon,     I am a  with OPCM and have a Medication/Prescription financial assistance patient. This is the first time I have contacted for this program. How can I help you with what is needed for the patient?    Thank you!  Rashaun Lake, [facility] [place]  [phone]

## 2024-10-31 ENCOUNTER — CLINICAL SUPPORT (OUTPATIENT)
Dept: DIABETES | Facility: CLINIC | Age: 59
End: 2024-10-31
Payer: COMMERCIAL

## 2024-10-31 VITALS — WEIGHT: 174 LBS | BODY MASS INDEX: 29.87 KG/M2

## 2024-10-31 DIAGNOSIS — Z79.4 TYPE 2 DIABETES MELLITUS WITH HYPERGLYCEMIA, WITH LONG-TERM CURRENT USE OF INSULIN: ICD-10-CM

## 2024-10-31 DIAGNOSIS — E11.65 TYPE 2 DIABETES MELLITUS WITH HYPERGLYCEMIA, WITH LONG-TERM CURRENT USE OF INSULIN: ICD-10-CM

## 2024-10-31 PROCEDURE — 99212 OFFICE O/P EST SF 10 MIN: CPT | Mod: PBBFAC | Performed by: DIETITIAN, REGISTERED

## 2024-10-31 PROCEDURE — G0108 DIAB MANAGE TRN  PER INDIV: HCPCS | Mod: PBBFAC | Performed by: DIETITIAN, REGISTERED

## 2024-11-02 ENCOUNTER — OFFICE VISIT (OUTPATIENT)
Dept: URGENT CARE | Facility: CLINIC | Age: 59
End: 2024-11-02
Payer: COMMERCIAL

## 2024-11-02 VITALS
OXYGEN SATURATION: 97 % | RESPIRATION RATE: 18 BRPM | HEIGHT: 63 IN | SYSTOLIC BLOOD PRESSURE: 138 MMHG | TEMPERATURE: 100 F | BODY MASS INDEX: 30.3 KG/M2 | HEART RATE: 104 BPM | WEIGHT: 171 LBS | DIASTOLIC BLOOD PRESSURE: 89 MMHG

## 2024-11-02 DIAGNOSIS — R05.9 COUGH, UNSPECIFIED TYPE: ICD-10-CM

## 2024-11-02 DIAGNOSIS — J06.9 VIRAL URI: Primary | ICD-10-CM

## 2024-11-02 LAB
CTP QC/QA: YES
CTP QC/QA: YES
POC MOLECULAR INFLUENZA A AGN: NEGATIVE
POC MOLECULAR INFLUENZA B AGN: NEGATIVE
SARS-COV-2 AG RESP QL IA.RAPID: NEGATIVE

## 2024-11-02 PROCEDURE — 99215 OFFICE O/P EST HI 40 MIN: CPT | Mod: PBBFAC | Performed by: FAMILY MEDICINE

## 2024-11-02 PROCEDURE — 87811 SARS-COV-2 COVID19 W/OPTIC: CPT | Mod: PBBFAC | Performed by: FAMILY MEDICINE

## 2024-11-02 PROCEDURE — 99213 OFFICE O/P EST LOW 20 MIN: CPT | Mod: S$PBB,,, | Performed by: FAMILY MEDICINE

## 2024-11-02 PROCEDURE — 87502 INFLUENZA DNA AMP PROBE: CPT | Mod: PBBFAC | Performed by: FAMILY MEDICINE

## 2024-11-02 RX ORDER — PROMETHAZINE HYDROCHLORIDE AND DEXTROMETHORPHAN HYDROBROMIDE 6.25; 15 MG/5ML; MG/5ML
5 SYRUP ORAL
Qty: 120 ML | Refills: 0 | Status: SHIPPED | OUTPATIENT
Start: 2024-11-02

## 2024-11-04 ENCOUNTER — TELEPHONE (OUTPATIENT)
Dept: ENDOCRINOLOGY | Facility: CLINIC | Age: 59
End: 2024-11-04
Payer: COMMERCIAL

## 2024-11-04 DIAGNOSIS — C73 PAPILLARY THYROID CARCINOMA: Primary | ICD-10-CM

## 2024-11-05 ENCOUNTER — OFFICE VISIT (OUTPATIENT)
Dept: INTERNAL MEDICINE | Facility: CLINIC | Age: 59
End: 2024-11-05
Payer: COMMERCIAL

## 2024-11-05 VITALS
HEIGHT: 63 IN | WEIGHT: 172 LBS | BODY MASS INDEX: 30.48 KG/M2 | TEMPERATURE: 99 F | SYSTOLIC BLOOD PRESSURE: 134 MMHG | DIASTOLIC BLOOD PRESSURE: 82 MMHG | HEART RATE: 92 BPM

## 2024-11-05 DIAGNOSIS — Z79.4 UNCONTROLLED DIABETES MELLITUS WITH HYPERGLYCEMIA, WITH LONG-TERM CURRENT USE OF INSULIN: ICD-10-CM

## 2024-11-05 DIAGNOSIS — R91.8 MULTIPLE PULMONARY NODULES: Primary | ICD-10-CM

## 2024-11-05 DIAGNOSIS — F32.A ANXIETY AND DEPRESSION: ICD-10-CM

## 2024-11-05 DIAGNOSIS — R80.9 MICROALBUMINURIA: ICD-10-CM

## 2024-11-05 DIAGNOSIS — E87.5 HYPERKALEMIA: ICD-10-CM

## 2024-11-05 DIAGNOSIS — R89.9 ABNORMAL THYROID BIOPSY: ICD-10-CM

## 2024-11-05 DIAGNOSIS — F41.9 ANXIETY AND DEPRESSION: ICD-10-CM

## 2024-11-05 DIAGNOSIS — Z79.4 TYPE 2 DIABETES MELLITUS WITHOUT COMPLICATION, WITH LONG-TERM CURRENT USE OF INSULIN: ICD-10-CM

## 2024-11-05 DIAGNOSIS — E04.2 MULTIPLE THYROID NODULES: ICD-10-CM

## 2024-11-05 DIAGNOSIS — E11.9 TYPE 2 DIABETES MELLITUS WITHOUT COMPLICATION, WITH LONG-TERM CURRENT USE OF INSULIN: ICD-10-CM

## 2024-11-05 DIAGNOSIS — E11.65 UNCONTROLLED DIABETES MELLITUS WITH HYPERGLYCEMIA, WITH LONG-TERM CURRENT USE OF INSULIN: ICD-10-CM

## 2024-11-05 PROCEDURE — 3079F DIAST BP 80-89 MM HG: CPT | Mod: CPTII,,, | Performed by: NURSE PRACTITIONER

## 2024-11-05 PROCEDURE — 4010F ACE/ARB THERAPY RXD/TAKEN: CPT | Mod: CPTII,,, | Performed by: NURSE PRACTITIONER

## 2024-11-05 PROCEDURE — 1160F RVW MEDS BY RX/DR IN RCRD: CPT | Mod: CPTII,,, | Performed by: NURSE PRACTITIONER

## 2024-11-05 PROCEDURE — 3008F BODY MASS INDEX DOCD: CPT | Mod: CPTII,,, | Performed by: NURSE PRACTITIONER

## 2024-11-05 PROCEDURE — 99214 OFFICE O/P EST MOD 30 MIN: CPT | Mod: S$PBB,,, | Performed by: NURSE PRACTITIONER

## 2024-11-05 PROCEDURE — 3075F SYST BP GE 130 - 139MM HG: CPT | Mod: CPTII,,, | Performed by: NURSE PRACTITIONER

## 2024-11-05 PROCEDURE — 3060F POS MICROALBUMINURIA REV: CPT | Mod: CPTII,,, | Performed by: NURSE PRACTITIONER

## 2024-11-05 PROCEDURE — 3066F NEPHROPATHY DOC TX: CPT | Mod: CPTII,,, | Performed by: NURSE PRACTITIONER

## 2024-11-05 PROCEDURE — 99215 OFFICE O/P EST HI 40 MIN: CPT | Mod: PBBFAC | Performed by: NURSE PRACTITIONER

## 2024-11-05 PROCEDURE — 3052F HG A1C>EQUAL 8.0%<EQUAL 9.0%: CPT | Mod: CPTII,,, | Performed by: NURSE PRACTITIONER

## 2024-11-05 PROCEDURE — 1159F MED LIST DOCD IN RCRD: CPT | Mod: CPTII,,, | Performed by: NURSE PRACTITIONER

## 2024-11-05 RX ORDER — BLOOD-GLUCOSE SENSOR
1 EACH MISCELLANEOUS
Qty: 2 EACH | Refills: 11 | Status: SHIPPED | OUTPATIENT
Start: 2024-11-05 | End: 2025-11-05

## 2024-11-05 RX ORDER — IBUPROFEN 800 MG/1
800 TABLET ORAL 3 TIMES DAILY PRN
Qty: 90 TABLET | Refills: 2 | Status: SHIPPED | OUTPATIENT
Start: 2024-11-05

## 2024-11-05 RX ORDER — SERTRALINE HYDROCHLORIDE 50 MG/1
TABLET, FILM COATED ORAL
Qty: 30 TABLET | Refills: 6 | Status: SHIPPED | OUTPATIENT
Start: 2024-11-05

## 2024-11-05 RX ORDER — GLIPIZIDE 10 MG/1
10 TABLET ORAL
Qty: 180 TABLET | Refills: 1 | Status: SHIPPED | OUTPATIENT
Start: 2024-11-05

## 2024-11-05 RX ORDER — LOSARTAN POTASSIUM 25 MG/1
25 TABLET ORAL DAILY
Qty: 90 TABLET | Refills: 1 | Status: SHIPPED | OUTPATIENT
Start: 2024-11-05 | End: 2025-11-05

## 2024-11-05 RX ORDER — BLOOD-GLUCOSE,RECEIVER,CONT
1 EACH MISCELLANEOUS
Qty: 1 EACH | Refills: 0 | Status: SHIPPED | OUTPATIENT
Start: 2024-11-05 | End: 2025-11-05

## 2024-11-05 RX ORDER — METFORMIN HYDROCHLORIDE 1000 MG/1
1000 TABLET ORAL 2 TIMES DAILY WITH MEALS
Qty: 180 TABLET | Refills: 1 | Status: SHIPPED | OUTPATIENT
Start: 2024-11-05

## 2024-11-05 RX ORDER — PEN NEEDLE, DIABETIC 30 GX3/16"
1 NEEDLE, DISPOSABLE MISCELLANEOUS
Qty: 100 EACH | Refills: 6 | Status: SHIPPED | OUTPATIENT
Start: 2024-11-05

## 2024-11-05 NOTE — PROGRESS NOTES
Patient ID: [id number]     Chief Complaint: LAB RESULTS        HPI:     RICHMOND Rosas is a 59 y.o. female here today for a follow up.         Immunizations:   Immunization History   Administered Date(s) Administered    COVID-19, MRNA, LN-S, PF (Pfizer) (Purple Cap) 06/21/2021, 06/21/2021, 07/19/2021, 07/19/2021    DTP 1963, 1963, 1963, 10/22/1968, 07/24/1975, 05/03/1978    Influenza 10/26/2014, 09/21/2022    Influenza - Quadrivalent - PF *Preferred* (6 months and older) 09/29/2021, 10/04/2022, 10/12/2023    Influenza - Trivalent - Fluarix, Flulaval, Fluzone, Afluria - PF 10/03/2016, 12/21/2017, 09/21/2018, 10/22/2019    MMR 01/15/2010    Measles 12/07/1967, 05/03/1978    OPV 1963, 1963, 01/23/1964, 10/14/1969, 07/24/1975    Pneumococcal Polysaccharide - 23 Valent 06/15/2016    Rubella 12/18/1969    Td (ADULT) 10/26/1999, 10/12/2005        -------------------------------------    Anxiety disorder, unspecified    Glaucoma    HTN (hypertension)    Hyperparathyroidism    OAB (overactive bladder)    GORDON (obstructive sleep apnea)    Thyroid cancer    Type 2 diabetes mellitus with unspecified diabetic retinopathy without macular edema        Past Surgical History:   Procedure Laterality Date    CHOLECYSTECTOMY      HYSTERECTOMY, VAGINAL, LAPAROSCOPY-ASSISTED, WITH SALPINGO-OOPHORECTOY Bilateral 08/22/2017    PARATHYROIDECTOMY Right 8/2/2024    Procedure: PARATHYROIDECTOMY;  Surgeon: Cindy Byrne MD;  Location: [facility] OR;  Service: ENT;  Laterality: Right;    THYROIDECTOMY Bilateral 8/2/2024    Procedure: THYROIDECTOMY total;  Surgeon: Cindy Byrne MD;  Location: [facility] OR;  Service: ENT;  Laterality: Bilateral;    TUBAL LIGATION         Review of patient's allergies indicates:  No Known Allergies    Current Outpatient Medications   Medication Instructions    amLODIPine (NORVASC) 5 mg, Oral, Daily    arm brace Misc 1 each, Misc.(Non-Drug; Combo Route), Nightly, Left carpal  "tunnel wrist splint, nightly    blood-glucose meter,continuous (DEXCOM ) Misc 1 each, Misc.(Non-Drug; Combo Route), Every 14 days    blood-glucose sensor (DEXCOM G7 SENSOR) Jennifer 1 each, Misc.(Non-Drug; Combo Route), Every 14 days    famotidine (PEPCID) 40 mg, Oral, Daily    gabapentin (NEURONTIN) 300 mg, Nightly    glipiZIDE (GLUCOTROL) 10 mg, Oral, 2 times daily before meals    ibuprofen (ADVIL,MOTRIN) 800 mg, Oral, 3 times daily PRN    insulin detemir U-100, Levemir, 100 unit/mL (3 mL) SubQ InPn pen Inject 40 units sq in AM and 48 units sq in PM.    insulin lispro 100 unit/mL pen Inject 2 to 12 units under the skin per sliding scale after checking blood glucose before meals and at bedtime    levothyroxine (SYNTHROID) 125 mcg, Oral, Before breakfast    losartan (COZAAR) 25 mg, Oral, Daily    metFORMIN (GLUCOPHAGE) 1,000 mg, Oral, 2 times daily with meals    oxybutynin (DITROPAN-XL) 5 mg, Oral, Daily    pen needle, diabetic 32 gauge x 5/32" Ndle Use 1 needle five times daily    pravastatin (PRAVACHOL) 20 mg, Daily    promethazine-dextromethorphan (PROMETHAZINE-DM) 6.25-15 mg/5 mL Syrp 5 mLs, Oral, Every 6-8 hours PRN, May cause sedation.  Do not drive or operate heavy machinery.    propranoloL (INDERAL) 20 mg, Oral, 2 times daily    sertraline (ZOLOFT) 50 MG tablet Take 1 tablet by mouth at bedtime    SITagliptin phosphate (JANUVIA) 50 mg, Oral, Daily    zolpidem (AMBIEN) 5 mg, Oral, Nightly PRN       Social History     Socioeconomic History    Marital status: Single   Tobacco Use    Smoking status: Never     Passive exposure: Past    Smokeless tobacco: Never   Substance and Sexual Activity    Alcohol use: Not Currently    Drug use: Never    Sexual activity: Not Currently     Partners: Male     Birth control/protection: Abstinence, See Surgical Hx     Social Drivers of Health     Financial Resource Strain: High Risk (10/22/2024)    Overall Financial Resource Strain (CARDIA)     Difficulty of Paying Living " Expenses: Hard   Food Insecurity: Food Insecurity Present (10/22/2024)    Hunger Vital Sign     Worried About Running Out of Food in the Last Year: Often true     Ran Out of Food in the Last Year: Often true   Transportation Needs: No Transportation Needs (10/10/2024)    TRANSPORTATION NEEDS     Transportation : No   Physical Activity: Inactive (11/5/2024)    Exercise Vital Sign     Days of Exercise per Week: 0 days     Minutes of Exercise per Session: 0 min   Stress: No Stress Concern Present (11/5/2024)    [employer] - Occupational Stress Questionnaire     Feeling of Stress : Not at all   Housing Stability: High Risk (10/22/2024)    Housing Stability Vital Sign     Unable to Pay for Housing in the Last Year: Yes     Homeless in the Last Year: No        Family History   Problem Relation Name Age of Onset    Diabetes Mother Mckenzie     Hypertension Mother Mckenzie     Kidney failure Mother Mckenzie     Asthma Mother Mckenzie     Kidney disease Mother Mckenzie     Lung cancer Father Rei     Diabetes Father Rei     Hypertension Father Rei     Mental illness Father Rei     Thyroid disease Sister Astrid     Brain cancer Sister Astrid     Diabetes Sister Astrid     Hypertension Sister Astrid     Cancer Sister Danelle         Thyroid    Thyroid disease Sister Danelle     Migraines Sister Danelle     Miscarriages / Stillbirths Sister Danelle     Cancer Brother Jack         Pancreatic    Hypertension Brother Jack     Seizures Brother Jack     Diabetes Sister Roxy     Hypertension Sister Roxy         Patient Care Team:  Kelin Puga FNP as PCP - General (Family Medicine)  Kelin Puga FNP (Family Medicine)  Sunni Fernando LPN as Care Coordinator     Subjective:     Review of Systems     See HPI for details    Constitutional: Denies Change in appetite. Denies Chills. Denies Fever. Denies Night sweats.  Eye: Denies Blurred vision. Denies  "Discharge. Denies Eye pain.  ENT: Denies Decreased hearing. Denies Sore throat. Denies Swollen glands.  Respiratory: Denies Cough. Denies Shortness of breath. Denies Shortness of breath with exertion. Denies Wheezing.  Cardiovascular: DeniesChest pain at rest. Denies Chest pain with exertion. Denies Irregular heartbeat. Denies Palpitations. Denies Edema.  Gastrointestinal: Denies Abdominal pain. DeniesDiarrhea. Denies Nausea. Denies Vomiting. Denies Hematemesis or Hematochezia.  Genitourinary: Denies Dysuria. Denies Urinary frequency. Denies Urinary urgency. Denies Blood in urine.  Endocrine: Denies Cold intolerance. Denies Excessive thirst. Denies Heat intolerance. Denies Weight loss. Denies Weight gain.  Musculoskeletal: Denies Painful joints. Denies Weakness.  Integumentary: Denies Rash. Denies Itching. Denies Dry skin.  Neurologic: Denies Dizziness. Denies Fainting. Denies Headache.  Psychiatric: Denies Depression. Denies Anxiety. Denies Suicidal/Homicidal ideations.    All Other ROS: Negative except as stated in HPI.       Objective:     Visit Vitals  /82 (BP Location: Right arm, Patient Position: Sitting)   Pulse 92   Temp 99.3 °F (37.4 °C) (Oral)   Ht 5' 3" (1.6 m)   Wt 78 kg (172 lb)   BMI 30.47 kg/m²       Physical Exam    General: Alert and oriented, No acute distress.  Head: Normocephalic, Atraumatic.  Eye: Pupils are equal, round and reactive to light, Extraocular movements are intact, Sclera non-icteric.  Ears/Nose/Throat: Normal, Mucosa moist,Clear.  Neck/Thyroid: Supple, Non-tender, No carotid bruit, No lymphadenopathy, No JVD, Full range of motion.  Respiratory: Clear to auscultation bilaterally; No wheezes, rales or rhonchi,Non-labored respirations, Symmetrical chest wall expansion.  Cardiovascular: Regular rate and rhythm, S1/S2 normal, No murmurs, rubs or gallops.  Gastrointestinal: Soft, Non-tender, Non-distended, Normal bowel sounds, No palpable organomegaly.  Musculoskeletal: Normal range " of motion.  Integumentary: Warm, Dry, Intact, No suspicious lesions or rashes.  Extremities: No clubbing, cyanosis or edema  Neurologic: No focal deficits, Cranial Nerves II-XII are grossly intact, Motor strength normal upper and lower extremities, Sensory exam intact.  Psychiatric: Normal interaction, Coherent speech, Euthymic mood, Appropriate affect       Labs Reviewed:     Chemistry:  Lab Results   Component Value Date     10/12/2024    K 4.5 10/12/2024    BUN 13.5 10/12/2024    CREATININE 1.02 10/12/2024    EGFRNORACEVR >60 10/12/2024    GLUCOSE 212 (H) 10/12/2024    CALCIUM 9.6 10/12/2024    ALKPHOS 83 10/12/2024    LABPROT 7.6 10/12/2024    ALBUMIN 4.4 10/12/2024    BILIDIR 0.1 06/16/2021    IBILI 0.10 06/16/2021    AST 18 10/12/2024    ALT 19 10/12/2024    IVTRHLFT16HH 25 (L) 07/23/2024        Lab Results   Component Value Date    HGBA1C 8.9 (H) 10/12/2024        Hematology:  Lab Results   Component Value Date    WBC 11.04 08/03/2024    HGB 11.1 (L) 08/03/2024    HCT 36.0 (L) 08/03/2024     08/03/2024       Lipid Panel:  Lab Results   Component Value Date    CHOL 139 10/12/2024    HDL 35 10/12/2024    LDL 71.00 10/12/2024    TRIG 167 (H) 10/12/2024    TOTALCHOLEST 4 10/12/2024        Urine:  Lab Results   Component Value Date    APPEARANCEUA Clear 01/09/2024    SGUA 1.025 01/09/2024    PROTEINUA 30 (A) 01/09/2024    KETONESUA Negative 01/09/2024    LEUKOCYTESUR Negative 01/09/2024    RBCUA None Seen 01/09/2024    WBCUA None Seen 01/09/2024    BACTERIA Moderate (A) 01/09/2024    SQEPUA Trace (A) 02/09/2023    HYALINECASTS None Seen 02/09/2023    CREATRANDUR 211.6 (H) 10/12/2024        Assessment:       ICD-10-CM ICD-9-CM   1. Multiple pulmonary nodules  R91.8 793.19   2. Abnormal thyroid biopsy  R89.9 796.9   3. Multiple thyroid nodules  E04.2 241.1   4. Hyperkalemia  E87.5 276.7   5. Type 2 diabetes mellitus without complication, with long-term current use of insulin  E11.9 250.00    Z79.4  V58.67   6. Anxiety and depression  F41.9 300.00    F32.A 311   7. Uncontrolled diabetes mellitus with hyperglycemia, with long-term current use of insulin  E11.65 250.02    Z79.4 V58.67   8. Microalbuminuria  R80.9 791.0        Plan:     1. Multiple pulmonary nodules (Primary)  Pt had CT chest with contrast 11-1-23 showing:  CT Chest With Contrast  Order: [id number]  Status: Final result       Visible to patient: Yes (seen)       Next appt: 01/10/2024 at 08:45 AM in Neurology (Ella Benoit MD)       Dx: Lung nodule    0 Result Notes  Details     Reading Physician Reading Date Result Priority   Dean Márquez MD  [phone] 11/5/2023 Routine      Narrative & Impression  EXAMINATION:  CT CHEST WITH CONTRAST     CLINICAL HISTORY:  Prior CT chest showing 5 mm lung nodule;  Solitary pulmonary nodule     TECHNIQUE:  Multiple cross-sectional images were obtained from the thoracic inlet to the upper abdomen after the intravenous administration 100 mL of Omnipaque 350.  Coronal and sagittal reformatted images were obtained.  An automated dose exposure technique was utilized this limits radiation does the patient.     COMPARISON:  06/03/2020     FINDINGS:  Heart size is enlarged.  Coronary calcifications identified.  The course and caliber of the thoracic aorta is normal.  A 4 vessel aortic arch is identified with a great vessels being widely patent.  The main pulmonary artery is normal caliber.  No evidence for hilar or mediastinal adenopathy.     Dependent atelectatic changes lungs are identified.  No evidence for consolidation or masslike lesion.  No pleural thickening or pleural effusion.  4 mm pulmonary nodules identified in the right upper lobe.  The trachea and airways are patent.     Changes of hepatic steatosis.  No suspicious osseous lesions with spondylotic changes.  Soft tissues are grossly normal.     Impression:     Single pulmonary nodule is described above.  Recommend yearly surveillance.  Other secondary  findings as noted.        Electronically signed by: Dean Márquez MD  Date:                                            11/05/2023  Time:                                           11:26             Exam Ended: 11/01/23 11:19 Last Resulted: 11/05/23 11:26            Encouraged smoking cessation. Education provided. Will repeat in 1 year as recommended.  CT chest with contrast in 3 months due to pt starting radiation therapy.     2. Abnormal thyroid biopsy  Pt had FNA of Left thyroid nodule done 6-13-24 showing:  Cytology- FNA Radiology Guided, Bronch/EBUS, EUS/GI: [id number]  Order: [id number]  Status: Final result       Visible to patient: Yes (seen)       Next appt: 07/16/2024 at 08:00 AM in Radiology ([facility] MAMMO2)    0 Result Notes          Component Ref Range & Units     Case Report   [facility] FNA                                    Case: [id number]                                 Authorizing Provider:  Kelin Puga FNP    Collected:           06/13/2024 09:38 AM           Ordering Location:     [facility] -       Received:            06/13/2024 10:15 AM                                  Interventional Radiology                                                     Pathologist:           Sophia Pagan MD                                                         Specimen:    Thyroid, Left Thyroid                                                                       Clinical Information       Left   Final Diagnosis      Fine-needle aspiration of left thyroid nodule:     - Highly suspicious Papillary Thyroid Carcinoma.   Electronically signed by Sophia Pagan MD on 6/20/2024 at 1611   Gross Description       1. Thyroid: Left Thyroid  Received in 95% alcohol are 3 slides for pap stain, 4 air-dried slides for Diff-Quick stain, and cytology fixative specimen submitted for cytospin and cell block preparation.    Specimen 1 Interpretation   [facility] Thyroid Cytology Category V: Suspicious for  Malignancy - Suspect Papillary thyroid carcinoma Abnormal    Electronically signed by Sophia Pagan MD on 6/20/2024 at 1611   Resulting Agency   [facility]                Specimen Collected: 06/13/24 09:38 CDT Last Resulted: 06/20/24 16:11 CDT            Called ENT clinic and informed of needing URGENT referral for eval and mgmt. Appt scheduled 7-23-24 at 10 am. Keep appt.     3. Multiple thyroid nodules  Pt states she will be started on Radiation therapy in 2 weeks. Pt followed by Endo cl here. Pt followed by Onc here at [facility]. Keep appts.     4. Hyperkalemia  Resolved. Will monitor.     5. Type 2 diabetes mellitus without complication, with long-term current use of insulin  A1c 8.9. ADA diet and exercise. Cont Sitaglipitin 50 mg 1 tab po daily. Increase Levemir to 38 units sq in AM and continue 45 units sq in PM. Urine microalbumin 10-12-24. Dm foot done today. DM eye done  in Opthal cl 1-30-24.  Pt request RX for continueous glucose monitor as she will have to monitor her blood sugar levels more closely due to thyroid CA txmt. RX for Dexcom G7  and sensors sent to [facility] pharmacy.     Protective Sensation (w/ 10 gram monofilament):  Right: Intact  Left: Intact    Visual Inspection:  Normal -  Bilateral    Pedal Pulses:   Right: Present  Left: Present    Posterior Tibialis Pulses:   Right:Present  Left: Present     6. Anxiety and depression  Denies SI/HI. Cont Sertraline as prescribed.          Follow up in about 3 months (around 2/5/2025) for with labs 1 week prior to appts. . In addition to their scheduled follow up, the patient has also been instructed to follow up on as needed basis.     Future Appointments   Date Time Provider Department Center   12/3/2024  9:00 AM Carmen Rosas RD [facility] FMDEDU [facility]   1/6/2025  8:30 AM Ella Benoit MD [facility] NEURO [facility]   1/30/2025  8:30 AM PROVIDERS, MARILUZ MCGRAW OPHLEI [facility]   3/6/2025 10:30 AM Jennifer Cruz, NORBERTO [facility] GYN [facility]   3/27/2025 12:00 PM RESIDENT 2, [facility] OTORHINOLARYNGOLOGY [facility] ENT [facility]   5/27/2025  8:30 AM Estephania Cedillo MD [facility] CARD [facility]   5/27/2025  9:40 AM RESIDENTS, [facility] ENDOCRINOLOGY [facility] ENDOCR [facility]        Kelin Puga, LUPEP

## 2024-11-06 ENCOUNTER — OUTPATIENT CASE MANAGEMENT (OUTPATIENT)
Dept: ADMINISTRATIVE | Facility: OTHER | Age: 59
End: 2024-11-06
Payer: COMMERCIAL

## 2024-11-06 NOTE — PROGRESS NOTES
Outpatient Care Management   - Care Plan Follow Up    Patient: Foreign Rosas  MRN:  [id number]  Date of Service:  11/6/2024  Completed by:  Rashaun Lake LCSW  Referral Date: 10/10/2024    Reason for Visit   Patient presents with    OPCM SW Follow Up Call     11/06/2024   MSW contact pt. To follow up on resources       Brief Summary: MSW contacted pt to follow up on resources provided. Pt had an appt with her doctor yesterday and has appts on 11/18 and 11/19 with endocrinology and doctor advised she will not be able to be around others for the Thanksgiving holiday due to the testing that will be completed. She is starting a 2 week diet to prepare for the testing and has been given the information. Pt stated she met with the Diabetic team and has a follow up visit. She advised her doctor wrote an order for a dex com because she is very sensitive to the finger pricking to check her sugars. Follow up visit 12/3/24 with dietitian. Discussed the prescription assistance through [employer] she was not eligible but MSW will continue to look for resources. Pt was back at work today and was not feeling well. Discussed the medical insurance of supplemental requesting a document from billing to submit for proof of hospital stay for insurance reimbursement. She was not sure which insurance.    Complex Care Plan     Care plan was discussed and completed today with input from patient and/or caregiver.    Patient Instructions     No follow-ups on file.

## 2024-11-08 NOTE — TELEPHONE ENCOUNTER
----- Message -----   From: Michele Rico, RT   Sent: 11/7/2024   1:55 PM CST   To: Neal Cabrera MD; Moises Hurst LPN; *     Good afternoon       Patient placed on schedule for 11/20/2024 08:15 Administration of I-131   Whole Body Scan on 11/25/2024 @ 07:00 Hrs     Dr. Cabrera the Pre-screen forms in the patient's chart for your review.        Thank you   
Orders clarified and signed.  
Orders signed.    Will sign any additional needed orders for PHILLIPS as they come in.  
Phoned patient informed her Thyrogen was approved and that [employer] suggested she apply for a co-pay reimbursement card, provided her with the phone number to co-pay assistance and her case number.  Questions answered.  Patient states she has gone through menopause and has had a hysterectomy.  
Phoned patient with dates and times.  
No

## 2024-11-11 ENCOUNTER — TELEPHONE (OUTPATIENT)
Dept: ENDOCRINOLOGY | Facility: CLINIC | Age: 59
End: 2024-11-11
Payer: COMMERCIAL

## 2024-11-11 NOTE — TELEPHONE ENCOUNTER
----- Message from Neal Cabrera MD sent at 11/8/2024  3:05 PM CST -----  Pre-screen forms reviewed, looks good to go. Only thing we need to be sure to address is the incontinence issue and ensure she understands how to properly dispose of the pads.  ----- Message -----  From: Michele Rico, RT  Sent: 11/7/2024   1:55 PM CST  To: Neal Cabrera MD; Moises Hurst LPN; #    Good afternoon       Patient placed on schedule for 11/20/2024 08:15 Administration of I-131  Whole Body Scan on 11/25/2024 @ 07:00 Hrs     Dr. Cabrera the Pre-screen forms in the patient's chart for your review.        Thank you

## 2024-11-18 ENCOUNTER — CLINICAL SUPPORT (OUTPATIENT)
Dept: ENDOCRINOLOGY | Facility: CLINIC | Age: 59
End: 2024-11-18
Payer: COMMERCIAL

## 2024-11-18 DIAGNOSIS — C73 PAPILLARY THYROID CARCINOMA: Primary | ICD-10-CM

## 2024-11-18 PROCEDURE — 96372 THER/PROPH/DIAG INJ SC/IM: CPT | Mod: PBBFAC

## 2024-11-18 PROCEDURE — 99211 OFF/OP EST MAY X REQ PHY/QHP: CPT | Mod: PBBFAC

## 2024-11-18 RX ADMIN — THYROTROPIN ALFA 0.9 MG: 0.9 INJECTION, POWDER, FOR SOLUTION INTRAMUSCULAR at 08:11

## 2024-11-19 ENCOUNTER — CLINICAL SUPPORT (OUTPATIENT)
Dept: ENDOCRINOLOGY | Facility: CLINIC | Age: 59
End: 2024-11-19
Payer: COMMERCIAL

## 2024-11-19 DIAGNOSIS — C73 PAPILLARY THYROID CARCINOMA: Primary | ICD-10-CM

## 2024-11-19 PROCEDURE — 96372 THER/PROPH/DIAG INJ SC/IM: CPT | Mod: PBBFAC

## 2024-11-19 PROCEDURE — 99211 OFF/OP EST MAY X REQ PHY/QHP: CPT | Mod: PBBFAC

## 2024-11-19 RX ADMIN — THYROTROPIN ALFA 0.9 MG: 0.9 INJECTION, POWDER, FOR SOLUTION INTRAMUSCULAR at 09:11

## 2024-11-20 ENCOUNTER — HOSPITAL ENCOUNTER (OUTPATIENT)
Dept: RADIOLOGY | Facility: HOSPITAL | Age: 59
Discharge: HOME | End: 2024-11-20
Attending: INTERNAL MEDICINE
Payer: COMMERCIAL

## 2024-11-20 DIAGNOSIS — C73 PAPILLARY THYROID CARCINOMA: ICD-10-CM

## 2024-11-20 PROCEDURE — 79005 NUCLEAR RX ORAL ADMIN: CPT | Mod: TC

## 2024-11-25 ENCOUNTER — HOSPITAL ENCOUNTER (OUTPATIENT)
Dept: RADIOLOGY | Facility: HOSPITAL | Age: 59
Discharge: HOME | End: 2024-11-25
Attending: INTERNAL MEDICINE
Payer: COMMERCIAL

## 2024-11-25 DIAGNOSIS — Z79.4 TYPE 2 DIABETES MELLITUS WITHOUT COMPLICATION, WITH LONG-TERM CURRENT USE OF INSULIN: Primary | ICD-10-CM

## 2024-11-25 DIAGNOSIS — Z79.4 UNCONTROLLED DIABETES MELLITUS WITH HYPERGLYCEMIA, WITH LONG-TERM CURRENT USE OF INSULIN: Primary | ICD-10-CM

## 2024-11-25 DIAGNOSIS — E11.9 TYPE 2 DIABETES MELLITUS WITHOUT COMPLICATION, WITH LONG-TERM CURRENT USE OF INSULIN: Primary | ICD-10-CM

## 2024-11-25 DIAGNOSIS — E11.65 UNCONTROLLED DIABETES MELLITUS WITH HYPERGLYCEMIA, WITH LONG-TERM CURRENT USE OF INSULIN: Primary | ICD-10-CM

## 2024-11-25 RX ORDER — INSULIN LISPRO 100 [IU]/ML
INJECTION, SOLUTION INTRAVENOUS; SUBCUTANEOUS
Qty: 15 ML | Refills: 6 | Status: SHIPPED | OUTPATIENT
Start: 2024-11-25

## 2024-11-25 RX ORDER — INSULIN GLARGINE 100 [IU]/ML
INJECTION, SOLUTION SUBCUTANEOUS
Qty: 90 ML | Refills: 3 | Status: SHIPPED | OUTPATIENT
Start: 2024-11-25

## 2024-12-03 ENCOUNTER — CLINICAL SUPPORT (OUTPATIENT)
Dept: ENDOCRINOLOGY | Facility: CLINIC | Age: 59
End: 2024-12-03
Payer: COMMERCIAL

## 2024-12-03 ENCOUNTER — CLINICAL SUPPORT (OUTPATIENT)
Dept: DIABETES | Facility: CLINIC | Age: 59
End: 2024-12-03
Payer: COMMERCIAL

## 2024-12-03 VITALS — WEIGHT: 171 LBS | BODY MASS INDEX: 30.29 KG/M2

## 2024-12-03 DIAGNOSIS — Z79.4 TYPE 2 DIABETES MELLITUS WITHOUT COMPLICATION, WITH LONG-TERM CURRENT USE OF INSULIN: Primary | ICD-10-CM

## 2024-12-03 DIAGNOSIS — Z23 NEED FOR IMMUNIZATION AGAINST INFLUENZA: Primary | ICD-10-CM

## 2024-12-03 DIAGNOSIS — E11.9 TYPE 2 DIABETES MELLITUS WITHOUT COMPLICATION, WITH LONG-TERM CURRENT USE OF INSULIN: Primary | ICD-10-CM

## 2024-12-03 DIAGNOSIS — C73 PAPILLARY THYROID CARCINOMA: ICD-10-CM

## 2024-12-03 LAB — TSH SERPL-ACNC: 0.44 UIU/ML (ref 0.35–4.94)

## 2024-12-03 PROCEDURE — 84443 ASSAY THYROID STIM HORMONE: CPT

## 2024-12-03 PROCEDURE — G0108 DIAB MANAGE TRN  PER INDIV: HCPCS | Mod: PBBFAC | Performed by: DIETITIAN, REGISTERED

## 2024-12-03 PROCEDURE — 99212 OFFICE O/P EST SF 10 MIN: CPT | Mod: PBBFAC,27 | Performed by: DIETITIAN, REGISTERED

## 2024-12-03 PROCEDURE — 90656 IIV3 VACC NO PRSV 0.5 ML IM: CPT | Mod: PBBFAC

## 2024-12-03 PROCEDURE — 36415 COLL VENOUS BLD VENIPUNCTURE: CPT

## 2024-12-03 PROCEDURE — 90471 IMMUNIZATION ADMIN: CPT | Mod: PBBFAC

## 2024-12-03 PROCEDURE — 99211 OFF/OP EST MAY X REQ PHY/QHP: CPT | Mod: PBBFAC

## 2024-12-03 RX ADMIN — INFLUENZA VIRUS VACCINE 0.5 ML: 15; 15; 15 SUSPENSION INTRAMUSCULAR at 10:12

## 2024-12-03 NOTE — PROGRESS NOTES
Diabetes Care Specialist Follow-up Note  Author: Carmen Rosas RD  Date: 12/3/2024    Intake    Program Intake  Reason for Diabetes Program Visit:: Intervention  Type of Intervention:: Individual    Current Diabetes Treatment: Insulin  Oral Medication Type/Dose: Glipizide 10 mg BID, Metformin 1000mg BID and Januvia 50 mg  Method of insulin delivery?: Injections  Injection Type: Pens  Pen Type/Dose: Levemir taking 50 units at night + Humalog sliding scale (and uses for correction doses in absence of food)    Continuous Glucose Monitoring  Patient has CGM: Yes  Personal CGM type:: Dexcom G7 with   GMI Date: 07/04/24        Lab Results   Component Value Date    HGBA1C 8.9 (H) 10/12/2024     A1c Pre Diabetes Care Specialist Intervention:  8.9%      Weight: 77.6 kg (171 lb)       Body mass index is 30.29 kg/m².  Wt loss of 3 lbs since last visit on 10/31/24      Physical activity/Exercise:   Mostly at work    Lifestyle Coping Support & Clinical    Lifestyle/Coping/Support  Psychosocial/Coping Skills Assessment Completed: : No  Deffered due to:: Time  Area of need?: Deferred         Diabetes Self-Management Skills Assessment    Medication Skills Assessment  Patient is able to identify current diabetes medications, dosages, and appropriate timing of medications.: yes  Patient reports problems or concerns with current medication regimen.: yes  Medication regimen problems/concerns:: concerned about side effects (low blood sugar)  Patient is  aware that some diabetes medications can cause low blood sugar?: Yes  Medication Skills Assessment Completed:: Yes  Assessment indicates:: Instruction Needed  Area of need?: Yes    Diabetes Disease Process/Treatment Options  Diabetes Disease Process/Treatment Options: Skills Assessment Completed: No  Deferred due to:: Time  Area of need?: Deferred    Nutrition/Healthy Eating  Meal Plan 24 Hour Recall - Breakfast: grits with butter  Meal Plan 24 Hour Recall - Snack: fruits and  vegetables  Meal Plan 24 Hour Recall - Beverage: circul and water  Who shops/cooks?: self  Challenges to healthy eating:: other (see comments) (appetite has been poor since radiation)  Nutrition/Healthy Eating Skills Assessment Completed:: Yes  Assessment indicates:: Adequate understanding  Area of need?: No    Physical Activity/Exercise  Patient's daily activity level:: moderately active  Patient formally exercises outside of work.: no  Patient can identify forms of physical activity.: yes  Physical Activity/Exercise Skills Assessment Completed: : Yes  Assessment indicates:: Instruction Needed  Area of need?: Yes    Home Blood Glucose Monitoring  Patient states that blood sugar is checked at home daily.: yes  Personal CGM type:: Dexcom G7 with   What is your A1c Target?: <7  Home Blood Glucose Monitoring Skills Assessment Completed: : Yes  Assessment indicates:: Instruction Needed  Area of need?: Yes    Acute Complications  Have you ever had hypoglycemia (low BG 70 or less)?: yes  How often and what are your symptoms?: shaky  How do you treat hypoglycemia?: does not treat if at night or if 60-70  Have you ever had hyperglycemia (high  or more)?: yes  How do you treat hyperglycemia? : bolus correction of fast-acting  Acute Complications Skills Assessment Completed: : Yes  Assessment indicates:: Instruction Needed  Area of need?: Yes    Chronic Complications  Reviewed health maintenance: no (see comments)  Chronic Complications Skills Assessment Completed: : No  Deferred due to:: Time  Area of need?: Deferred      During today's follow-up visit,  the following areas required further assessment and content was provided/reviewed.    Based on today's diabetes care assessment, the following areas of need were identified:      Identified Areas of Need      Medication/Current Diabetes Treatment: Yes - see care plan   Lifestyle Coping/Support: Deferred   Diabetes Disease Process/Treatment Options: Deferred    Nutrition/Healthy Eating: No    Physical Activity/Exercise: Yes - reviewed adding activity after meals if blood glucose spikes.   Home Blood Glucose Monitoring: Yes - reviewed Dexcom report. GMI improved to    Acute Complications: Yes -    Chronic Complications: Deferred       Today's interventions were provided through individual discussion, instruction, and written materials were provided.    Patient verbalized understanding of instruction and written materials.  Pt was able to return back demonstration of instructions today. Patient understood key points, needs reinforcement and further instruction.     Diabetes Self-Management Care Plan Review and Evaluation of Progress:    During today's follow-up Foreign's Diabetes Self-Management Care Plan progress was reviewed and progress was evaluated including his/her input. Foreign has agreed to continue his/her journey to improve/maintain overall diabetes control by continuing to set health goals. See care plan progress below.      Care Plan: Diabetes Management   Updates made since 12/4/2023 12:00 AM        Problem: Medications         Goal: Patient Agrees to take Diabetes Medication(s) as prescribed.    Start Date: 10/31/2024   Expected End Date: 3/11/2025   This Visit's Progress: On track   Priority: High   Barriers: No Barriers Identified   Note:    10/31/24 - Pt states that she often misses her evening meds. She also works 12 hr days for 7 days and is off for 7 days, making evening meds difficult. She does not take Januvia. She has been referred to pharmacy assistance.     12/3/24 - states she is taking her oral meds as directed. She is now taking Januvia. Does not qualify for pharmacy assistance. She is taking her evening Levemir at 50 units. Noted her script states 48 units and she is experiencing some overnight hypoglycemia. Will decrease to 48 units as directed and monitor for improvement of overnight lows. She is also only to use Humalog prior to meals  and with high carb meals. She also intends to eat breakfast at home before work and take meds with breakfast       Task: Reviewed with patient all current diabetes medications and provided basic review of the purpose, dosage, frequency, side effects, and storage of both oral and injectable diabetes medications. Completed 10/31/2024        Task: Discussed guidelines for preventing, detecting and treating hypoglycemia and hyperglycemia and reviewed the importance of meal and medication timing with diabetes mediations for prevention of hypoglycemia and maximum drug benefit. Completed 10/31/2024          Follow Up Plan     Follow up in about 3 months (around 3/3/2025) for dexcom report review, chronic complications, health maintenance, Disease Process, Coping.    Today's care plan and follow up schedule was discussed with patient.  Foreign verbalized understanding of the care plan, goals, and agrees to follow up plan.        The patient was encouraged to communicate with his/her health care provider/physician and care team regarding his/her condition(s) and treatment.  I provided the patient with my contact information today and encouraged to contact me via phone or [facility]'s Patient Portal as needed.     Length of Visit   Total Time: 60 Minutes

## 2024-12-17 ENCOUNTER — OUTPATIENT CASE MANAGEMENT (OUTPATIENT)
Dept: ADMINISTRATIVE | Facility: OTHER | Age: 59
End: 2024-12-17
Payer: COMMERCIAL

## 2024-12-17 NOTE — LETTER
Foreign Rosas  [address]  [place] [address]      Dear Foreign Rosas,     I am writing from the Outpatient Care Management Department at [facility]. I have been unsuccessful at reaching you since we spoke on 12/3/2024.  I hope you received the resources mailed to you and wanted to follow up if you have any questions.    Please contact me at [phone] from 8:00AM to 4:30 PM on Monday thru Friday.     As you know, [facility] also has a program with a nurse available 24/7 to answer questions or provide medical advice.  [facility] can be reached at [phone].    Sincerely,       Rashaun Lake LCSW  Outpatient Care Management

## 2024-12-23 ENCOUNTER — TELEPHONE (OUTPATIENT)
Dept: INTERNAL MEDICINE | Facility: CLINIC | Age: 59
End: 2024-12-23
Payer: COMMERCIAL

## 2024-12-23 NOTE — PROGRESS NOTES
Outpatient Care Management   - Care Plan Follow Up    Patient: Foreign Rosas  MRN:  [id number]  Date of Service:  12/20/2024  Completed by:  Rashaun Lake LCSW  Referral Date: 10/10/2024    Reason for Visit   Patient presents with    Other     12/17/2024  1st attempt to complete Follow-Up for Outpatient Care Management, left message. Attempt letter sent.       [facility]  Follow Up Call       Brief Summary: MSW contacted the pt  and she said the Diabetic Education has helped her a lot. She got the Dexcom and her sugar levels are much better. Pt will have one more visit with the nurse. She said she is still feeling nauseous and had to leave work a few times because she did not feel well. She has reported feeling numb under her chin to her doctor. She applied to financial assistance and was denied that she did not meet criteria. She was denied prescription assistance. Pt has been in touch with [facility] billing and requested a breakdown of the medical received. The billing person told her it would take up to 30 days and she should call back for the information. Pt said she has been taking care of herself and has lost weight. MSW encouraged the pt to continue with self care. Pt has recvd the packet with resources and has the food resources and advanced directives provided she will contact.    Complex Care Plan     Care plan was discussed and completed today with input from patient and/or caregiver.    Patient Instructions     No follow-ups on file.

## 2025-01-02 NOTE — PROGRESS NOTES
[facility] Neurology Follow Up Telemedicine Visit Note    Initial Visit Date: 3/29/2023  Last Visit Date: 9/4/2024  Current Visit Date:  01/06/2025    Chief Complaint:     Chief Complaint   Patient presents with    Tremors     Left hand worsening, has trouble sleeping with the numbness.       History of Present Illness:      This is a real-time audio/video visit that was performed with the originating site at patient's home and the distant site, [facility]. Verbal consent to participate in interactive audio & video visit was obtained.    I discussed with the patient regarding the nature of our telehealth visits, that:    - Our sessions are not being recorded and that personal health information is protected  - Provider would evaluate the patient and recommend diagnostics and treatments based on my assessment  - [facility] will provide follow up care in person if/when the patient needs it.       This is a 59 y.o. right hand dominant female with history of  IDDM, Cataract, borderline glaucoma, hypercalcemia, overactive bladder, depression, insomnia, papillary thyroid cancer s/p total thyroidectomy in 8/2024 who is referred for exaggerated physiological tremor and left carpal tunnel due to left wrist tenosynovitis.  During last visit, propanolol was increased to 20 mg twice a day as needed. Wearing wrist splint for wrist pain, which has not been helpful.      Age of Onset: 57 years old      Description of movements: right hand with use, intermittent.     Exacerbation factors: denied      Relieving factors: denied      Associated Symptoms:  Changes in smell or taste: Not Applicable   Dysphagia: No   Voice/phonation change: No    Changes in gait/falls:  No    Orthostatic hypotension:  No   Sleep disorder: Yes trouble sleeping at night    Visual Hallucinations/Delusions: Not Applicable   Mood disturbance: Yes depression     Cognitive decline: Not Applicable     Handwriting changes: Yes erratic     Trouble using a fork: No   Trouble using a spoon: Yes occasional    Trouble drinking out of a cup: No       Risk Factors:   Family history of movement disorder: Yes father and sister with tremor disorder.    Cardiovascular risk factors: Yes as above    Antipsychotics/Mood stabilizer/Antidepressant/Traditional antiemetic exposure: Yes Zoloft    Stimulant use: No    Tobacco use: No   Alcohol use: Yes occasional      Recreational drug use: No    Herbicide exposure: Not Applicable   Agent Orange exposure: Not Applicable         Medications:     Current:   Gabapentin 300 mg at bedtime: sedation. Unable to wake up the next morning.   Propranolol 20 mg twice a day as needed: partially effective      Prior:   Propranolol 20 mg twice a day: nausea    Devices/Interventions:     DBS:   Gamma knife/Radiofrequency ablation:   PT/OT:     Labs:     Results for orders placed or performed in visit on 12/03/24   TSH    Collection Time: 12/03/24 10:24 AM   Result Value Ref Range    TSH 0.436 0.350 - 4.940 uIU/mL       Studies:      Imaging:   MRI Brain:     Review of Systems:     Review of Systems   All other systems reviewed and are negative.      Physical Exams:       Physical Exam  Nursing note reviewed.   Constitutional:       Appearance: Normal appearance.   HENT:      Head: Atraumatic.   Pulmonary:      Effort: Pulmonary effort is normal.   Musculoskeletal:      Cervical back: Normal range of motion.   Skin:     General: Skin is warm.   Neurological:      Mental Status: She is alert.     Telemedicine Comprehensive Neurological Exam:  Mental Status: Alert Oriented to Self, Date, and Place. Comprehension wnl. No dysarthria.   CN II - XII: LANDRY, VA grossly intact, No ptosis OU, EOMI without nystagmus, Hearing grossly intact, Face Symmetric, Tongue and Uvula midline, Trapezius symmetric bilateral.   Motor: no abnormal involuntary or voluntary movements, Fine finger movements wnl b/l, No  pronator drift.   Sensory: unable to assess.  Reflexes: unable to assess.   Cerebellar: RAHM wnl b/l.  Romberg: absent.   Gait: normal.    Phalen's and reverse Phalen's positive on the left       Assessment:     This is a 59 y.o. right hand dominant female with history of IDDM, Cataract, borderline glaucoma, hypercalcemia, overactive bladder, depression, insomnia, papillary thyroid cancer s/p total thyroidectomy in 8/2024 who is referred for exaggerated physiological tremor and left carpal tunnel due to left wrist tenosynovitis.     Problem List Items Addressed This Visit          Neuro    CTS (carpal tunnel syndrome) - Primary    Relevant Orders    Ambulatory Referral/Consult to Physical Therapy             Plan:     [] continue with Propranolol 20 mg twice a day as needed  [] stop Gabapentin   [] refer to outpatient PT for left carpal tunnel   [] carpal tunnel injection for left CTS     RTC to [facility] for Left Carpal Tunnel Steroid Injection     Visit today is associated with current or anticipated ongoing medical care related to this patient's single serious condition/complex condition as documented above.       I have explained the treatment plan, diagnosis, and prognosis to patient. All questions are answered to the best of my knowledge.     Face to face time 30 minutes, including documentation, chart review, counseling, education, review of test results, relevant medical records, and coordination of care.       Ella Benoit MD   General Neurology  01/06/2025

## 2025-01-06 ENCOUNTER — OFFICE VISIT (OUTPATIENT)
Dept: NEUROLOGY | Facility: CLINIC | Age: 60
End: 2025-01-06
Payer: COMMERCIAL

## 2025-01-06 DIAGNOSIS — R25.1 PHYSIOLOGICAL TREMOR: ICD-10-CM

## 2025-01-06 DIAGNOSIS — M65.932 TENOSYNOVITIS OF LEFT WRIST: ICD-10-CM

## 2025-01-06 DIAGNOSIS — E83.52 HYPERCALCEMIA: ICD-10-CM

## 2025-01-06 DIAGNOSIS — G56.02 CARPAL TUNNEL SYNDROME OF LEFT WRIST: Primary | ICD-10-CM

## 2025-01-06 RX ORDER — PROPRANOLOL HYDROCHLORIDE 20 MG/1
20 TABLET ORAL 2 TIMES DAILY
Qty: 180 TABLET | Refills: 1 | Status: SHIPPED | OUTPATIENT
Start: 2025-01-06 | End: 2025-07-05

## 2025-01-10 ENCOUNTER — OUTPATIENT CASE MANAGEMENT (OUTPATIENT)
Dept: ADMINISTRATIVE | Facility: OTHER | Age: 60
End: 2025-01-10
Payer: COMMERCIAL

## 2025-01-27 DIAGNOSIS — E89.0 POSTOPERATIVE HYPOTHYROIDISM: ICD-10-CM

## 2025-01-27 RX ORDER — LEVOTHYROXINE SODIUM 125 UG/1
125 TABLET ORAL
Qty: 90 TABLET | Refills: 0 | Status: SHIPPED | OUTPATIENT
Start: 2025-01-27 | End: 2025-01-29

## 2025-01-27 NOTE — TELEPHONE ENCOUNTER
Patient stopped by clinic she states she is completely out of her levothyroxine 125mcg (having missed 2 doses).

## 2025-01-29 RX ORDER — LEVOTHYROXINE SODIUM 125 UG/1
125 TABLET ORAL
Qty: 90 TABLET | Refills: 1 | Status: SHIPPED | OUTPATIENT
Start: 2025-01-29

## 2025-01-30 ENCOUNTER — OFFICE VISIT (OUTPATIENT)
Dept: OPHTHALMOLOGY | Facility: CLINIC | Age: 60
End: 2025-01-30
Payer: COMMERCIAL

## 2025-01-30 VITALS — HEIGHT: 64 IN | WEIGHT: 175 LBS | BODY MASS INDEX: 29.88 KG/M2

## 2025-01-30 DIAGNOSIS — H43.813 POSTERIOR VITREOUS DETACHMENT OF BOTH EYES: ICD-10-CM

## 2025-01-30 DIAGNOSIS — H31.8: ICD-10-CM

## 2025-01-30 DIAGNOSIS — H40.059: ICD-10-CM

## 2025-01-30 DIAGNOSIS — H25.813 COMBINED FORM OF AGE-RELATED CATARACT, BOTH EYES: ICD-10-CM

## 2025-01-30 DIAGNOSIS — E11.9 DIABETES MELLITUS TYPE 2 WITHOUT RETINOPATHY: Primary | ICD-10-CM

## 2025-01-30 DIAGNOSIS — H40.023 OPEN ANGLE WITH BORDERLINE FINDINGS AND HIGH GLAUCOMA RISK IN BOTH EYES: ICD-10-CM

## 2025-01-30 PROCEDURE — 92133 CPTRZD OPH DX IMG PST SGM ON: CPT | Mod: PBBFAC,PN

## 2025-01-30 PROCEDURE — 92133 CPTRZD OPH DX IMG PST SGM ON: CPT | Mod: PBBFAC,PN | Performed by: OPHTHALMOLOGY

## 2025-01-30 PROCEDURE — 99213 OFFICE O/P EST LOW 20 MIN: CPT | Mod: PBBFAC,PN

## 2025-01-30 RX ORDER — GABAPENTIN 300 MG/1
1 CAPSULE ORAL DAILY PRN
COMMUNITY

## 2025-01-30 NOTE — PROGRESS NOTES
HPI     Open angle glaucoma, borderline findings, high risk (3/5) O     Additional comments: - Monitor off of drops.           ID-DM type 2 without retinopathy     Additional comments: -10/12/24 HGBA1C 8.9           Comments    Here for Annual Diabetic DFE OU with OCT.   - No new complaints at present time.  - States others note that she squints at her desk at work but she doesn't   notice it. Has tried OTC Readers +1.75 - +2.25 but they all giver her a   headache.           Last edited by Niharika Puga LPN on 1/30/2025  8:41 AM.          Assessment /Plan     For exam results, see Encounter Report.    Diabetes mellitus type 2 without retinopathy    Open angle with borderline findings and high glaucoma risk in both eyes    Combined form of age-related cataract, both eyes    Hypertensive choroidopathy        OCTN   01/30/2025  RE: 117, white (T,I) rest green   LE: 103, all green   1/30/2024: 109//106 stable and full ou   2023 104//100 green or white ou    HVF 1/30/23 OD unreliable uninterpretable // OS borderline unreliable grossly full      1. Open angle glaucoma, borderline findings, high risk OU   - Increased CDR  - Fam Hx: mother was blind from glaucoma; +AA  - CCT thin 12/15/21 490 // 496  - Gonio: 9/23/2020 - open to  OU  - HVF 12/15/21 unreliable OD; OS with few nasal changes but does not correlate with OCT  -1/30/23 both unreliable, OS borderline and grossly full  - CTM off drops  - now on yearly octn given unreliable VF   - 1/30/2024: OCTN remains full stable ou, dfe wnl cont yearly octn dfe   -01/30/2025: OCTN stable, IOP great. CTM    2. Combined cataract, ou  - NVS, monitor    3. Hypertensive choroidopathy ([place] Spots) OU  - monitor  - encouraged to follow with PCP     4. Refractive error with Presbyopia  - mild can use OTC readers    5. ID-DM type 2 without retinopathy  Lab Results   Component Value Date    HGBA1C 8.9 (H) 10/12/2024   - encouraged good PCP follow up  - last fundus photos  1/30/2025  - no retinopathy on exam,  cont yearly dfe     6. PVD OU  - seen on oct mac, appears to be released over fovea.     RTC 1 year DFE, OCT RNFL

## 2025-02-05 ENCOUNTER — HOSPITAL ENCOUNTER (OUTPATIENT)
Dept: RADIOLOGY | Facility: HOSPITAL | Age: 60
Discharge: HOME | End: 2025-02-05
Attending: NURSE PRACTITIONER
Payer: COMMERCIAL

## 2025-02-05 DIAGNOSIS — R91.8 MULTIPLE PULMONARY NODULES: ICD-10-CM

## 2025-02-05 LAB
CREAT SERPL-MCNC: 0.94 MG/DL (ref 0.55–1.02)
GFR SERPLBLD CREATININE-BSD FMLA CKD-EPI: >60 ML/MIN/1.73/M2

## 2025-02-05 PROCEDURE — 25500020 PHARM REV CODE 255

## 2025-02-05 PROCEDURE — 71260 CT THORAX DX C+: CPT | Mod: TC

## 2025-02-05 PROCEDURE — 82565 ASSAY OF CREATININE: CPT | Performed by: NURSE PRACTITIONER

## 2025-02-05 RX ADMIN — IOHEXOL 75 ML: 350 INJECTION, SOLUTION INTRAVENOUS at 12:02

## 2025-02-07 ENCOUNTER — CLINICAL SUPPORT (OUTPATIENT)
Dept: DIABETES | Facility: CLINIC | Age: 60
End: 2025-02-07
Payer: COMMERCIAL

## 2025-02-07 VITALS — BODY MASS INDEX: 29.87 KG/M2 | WEIGHT: 174 LBS

## 2025-02-07 DIAGNOSIS — Z79.4 TYPE 2 DIABETES MELLITUS WITHOUT COMPLICATION, WITH LONG-TERM CURRENT USE OF INSULIN: Primary | ICD-10-CM

## 2025-02-07 DIAGNOSIS — E11.9 TYPE 2 DIABETES MELLITUS WITHOUT COMPLICATION, WITH LONG-TERM CURRENT USE OF INSULIN: Primary | ICD-10-CM

## 2025-02-07 PROCEDURE — 99212 OFFICE O/P EST SF 10 MIN: CPT | Mod: PBBFAC | Performed by: DIETITIAN, REGISTERED

## 2025-02-07 PROCEDURE — G0108 DIAB MANAGE TRN  PER INDIV: HCPCS | Mod: PBBFAC | Performed by: DIETITIAN, REGISTERED

## 2025-02-07 NOTE — PROGRESS NOTES
Diabetes Care Specialist Follow-up Note  Author: Carmen Rosas RD  Date: 2/7/2025    Intake    Program Intake  Reason for Diabetes Program Visit:: Intervention  Type of Intervention:: Individual  Individual: Education  Education: Pattern Management    Current Diabetes Treatment: Insulin, Oral Medications  Oral Medication Type/Dose: Glipizide 10 mg BID, Metformin 1000mg BID and Januvia 50 mg  Method of insulin delivery?: Injections  Pen Type/Dose: Levemir and Humalog (taking both as needed)    Continuous Glucose Monitoring  Personal CGM type:: Dexcom G7 with   GMI Date: 07/02/25      Lab Results   Component Value Date    HGBA1C 8.9 (H) 10/12/2024     A1c Pre Diabetes Care Specialist Intervention:  8.9%      Weight: 78.9 kg (174 lb)       Body mass index is 29.87 kg/m².  Wt stable  Physical activity/Exercise:   At work        Lifestyle Coping Support & Clinical    Lifestyle/Coping/Support  Does anyone in your family have diabetes or does anyone in your family support you in your diabetes care?: family/kids  Psychosocial/Coping Skills Assessment Completed: : Yes  Assessment indicates:: Adequate understanding  Area of need?: No         Diabetes Self-Management Skills Assessment    Medication Skills Assessment  Patient is able to identify current diabetes medications, dosages, and appropriate timing of medications.: yes  Patient reports problems or concerns with current medication regimen.: yes  Medication regimen problems/concerns:: concerned about side effects (hypoglycemia)  Patient is  aware that some diabetes medications can cause low blood sugar?: Yes  Medication Skills Assessment Completed:: Yes  Assessment indicates:: Instruction Needed  Area of need?: Yes    Diabetes Disease Process/Treatment Options  Diabetes Type?: Type II  Does patient understand the pathophysiology of diabetes?: Yes  Diabetes Disease Process/Treatment Options: Skills Assessment Completed: Yes  Assessment indicates:: Adequate  "understanding  Area of need?: No              Home Blood Glucose Monitoring  Patient states that blood sugar is checked at home daily.: yes  Monitoring Method:: personal continuous glucose monitor  Personal CGM type:: Dexcom G7 with    What is your current Time in Range?: 64%  What is your A1c Target?: <7  Home Blood Glucose Monitoring Skills Assessment Completed: : Yes  Assessment indicates:: Instruction Needed  Area of need?: Yes    Acute Complications  Have you ever had hypoglycemia (low BG 70 or less)?: yes  Have you ever had hyperglycemia (high  or more)?: no  Acute Complications Skills Assessment Completed: : Yes  Assessment indicates:: Instruction Needed  Area of need?: Yes    Chronic Complications  Reviewed health maintenance: no (see comments)  Chronic Complications Skills Assessment Completed: : No  Deferred due to:: Time      During today's follow-up visit,  the following areas required further assessment and content was provided/reviewed.    Based on today's diabetes care assessment, the following areas of need were identified:      Identified Areas of Need      Medication/Current Diabetes Treatment: Yes - see care plan   Lifestyle Coping/Support: No   Diabetes Disease Process/Treatment Options: No   Nutrition/Healthy Eating:      Physical Activity/Exercise:      Home Blood Glucose Monitoring: Yes - Dexcom report reviewed. TIR and GMI improved. Encouraged to discuss insulin needs with PCP for further improvement.   Acute Complications: Yes - Reviewed "Rule of 15s" to correct hypoglycemia   Chronic Complications:         Today's interventions were provided through individual discussion, instruction, and written materials were provided.    Patient verbalized understanding of instruction and written materials.  Pt was able to return back demonstration of instructions today. Patient understood key points, needs reinforcement and further instruction.     Diabetes Self-Management Care Plan Review " and Evaluation of Progress:    During today's follow-up Foreign's Diabetes Self-Management Care Plan progress was reviewed and progress was evaluated including his/her input. Foreign has agreed to continue his/her journey to improve/maintain overall diabetes control by continuing to set health goals. See care plan progress below.      Care Plan: Diabetes Management   Updates made since 2/8/2024 12:00 AM        Problem: Medications         Goal: Patient Agrees to take Diabetes Medication(s) as prescribed.    Start Date: 10/31/2024   Expected End Date: 3/11/2025   Recent Progress: On track   Priority: High   Barriers: No Barriers Identified   Note:    10/31/24 - Pt states that she often misses her evening meds. She also works 12 hr days for 7 days and is off for 7 days, making evening meds difficult. She does not take Januvia. She has been referred to pharmacy assistance.     12/3/24 - states she is taking her oral meds as directed. She is now taking Januvia. Does not qualify for pharmacy assistance. She is taking her evening Levemir at 50 units. Noted her script states 48 units and she is experiencing some overnight hypoglycemia. Will decrease to 48 units as directed and monitor for improvement of overnight lows. She is also only to use Humalog prior to meals and with high carb meals. She also intends to eat breakfast at home before work and take meds with breakfast    2/7/25 - She is taking oral meds but had to hold metformin for medical procedure but will start again after procedure. She is taking insulin as needed and has stopped Levemir for 2 weeks due to hypoglycemia episodes. Her TIR has improved to 64% and GMI improved to 7.2. Encouraged to discuss insulin needs with PCP who she will see next week. Also printed copay card for Januvia and Janumet (she will discuss with PCP if Janumet is an option)       Task: Reviewed with patient all current diabetes medications and provided basic review of the purpose,  dosage, frequency, side effects, and storage of both oral and injectable diabetes medications. Completed 10/31/2024        Task: Discussed guidelines for preventing, detecting and treating hypoglycemia and hyperglycemia and reviewed the importance of meal and medication timing with diabetes mediations for prevention of hypoglycemia and maximum drug benefit. Completed 10/31/2024          Follow Up Plan     Follow up if symptoms worsen or fail to improve.    Today's care plan and follow up schedule was discussed with patient.  Consuella verbalized understanding of the care plan, goals, and agrees to follow up plan.        The patient was encouraged to communicate with his/her health care provider/physician and care team regarding his/her condition(s) and treatment.  I provided the patient with my contact information today and encouraged to contact me via phone or [facility] Patient Portal as needed.     Length of Visit   Total Time: 40 Minutes

## 2025-02-16 ENCOUNTER — LAB VISIT (OUTPATIENT)
Dept: LAB | Facility: HOSPITAL | Age: 60
End: 2025-02-16
Attending: INTERNAL MEDICINE
Payer: COMMERCIAL

## 2025-02-16 DIAGNOSIS — C73 PAPILLARY THYROID CARCINOMA: ICD-10-CM

## 2025-02-16 LAB — TSH SERPL-ACNC: 0.74 UIU/ML (ref 0.35–4.94)

## 2025-02-16 PROCEDURE — 86800 THYROGLOBULIN ANTIBODY: CPT

## 2025-02-16 PROCEDURE — 36415 COLL VENOUS BLD VENIPUNCTURE: CPT

## 2025-02-16 PROCEDURE — 84443 ASSAY THYROID STIM HORMONE: CPT

## 2025-02-18 ENCOUNTER — OFFICE VISIT (OUTPATIENT)
Dept: INTERNAL MEDICINE | Facility: CLINIC | Age: 60
End: 2025-02-18
Payer: COMMERCIAL

## 2025-02-18 ENCOUNTER — LAB VISIT (OUTPATIENT)
Dept: LAB | Facility: HOSPITAL | Age: 60
End: 2025-02-18
Attending: NURSE PRACTITIONER
Payer: COMMERCIAL

## 2025-02-18 VITALS
DIASTOLIC BLOOD PRESSURE: 81 MMHG | HEIGHT: 64 IN | HEART RATE: 76 BPM | RESPIRATION RATE: 18 BRPM | SYSTOLIC BLOOD PRESSURE: 121 MMHG | WEIGHT: 175 LBS | BODY MASS INDEX: 29.88 KG/M2 | TEMPERATURE: 98 F

## 2025-02-18 DIAGNOSIS — E87.5 HYPERKALEMIA: ICD-10-CM

## 2025-02-18 DIAGNOSIS — Z79.4 TYPE 2 DIABETES MELLITUS WITHOUT COMPLICATION, WITH LONG-TERM CURRENT USE OF INSULIN: ICD-10-CM

## 2025-02-18 DIAGNOSIS — Z79.4 UNCONTROLLED DIABETES MELLITUS WITH HYPERGLYCEMIA, WITH LONG-TERM CURRENT USE OF INSULIN: ICD-10-CM

## 2025-02-18 DIAGNOSIS — E11.65 UNCONTROLLED DIABETES MELLITUS WITH HYPERGLYCEMIA, WITH LONG-TERM CURRENT USE OF INSULIN: Primary | ICD-10-CM

## 2025-02-18 DIAGNOSIS — Z79.4 UNCONTROLLED DIABETES MELLITUS WITH HYPERGLYCEMIA, WITH LONG-TERM CURRENT USE OF INSULIN: Primary | ICD-10-CM

## 2025-02-18 DIAGNOSIS — E11.9 TYPE 2 DIABETES MELLITUS WITHOUT COMPLICATION, WITH LONG-TERM CURRENT USE OF INSULIN: Primary | ICD-10-CM

## 2025-02-18 DIAGNOSIS — F32.A ANXIETY AND DEPRESSION: ICD-10-CM

## 2025-02-18 DIAGNOSIS — Z79.4 TYPE 2 DIABETES MELLITUS WITHOUT COMPLICATION, WITH LONG-TERM CURRENT USE OF INSULIN: Primary | ICD-10-CM

## 2025-02-18 DIAGNOSIS — I10 PRIMARY HYPERTENSION: ICD-10-CM

## 2025-02-18 DIAGNOSIS — R89.9 ABNORMAL THYROID BIOPSY: ICD-10-CM

## 2025-02-18 DIAGNOSIS — R80.9 MICROALBUMINURIA: ICD-10-CM

## 2025-02-18 DIAGNOSIS — E04.2 MULTIPLE THYROID NODULES: ICD-10-CM

## 2025-02-18 DIAGNOSIS — R91.8 MULTIPLE PULMONARY NODULES: ICD-10-CM

## 2025-02-18 DIAGNOSIS — F41.9 ANXIETY AND DEPRESSION: ICD-10-CM

## 2025-02-18 DIAGNOSIS — E11.65 UNCONTROLLED DIABETES MELLITUS WITH HYPERGLYCEMIA, WITH LONG-TERM CURRENT USE OF INSULIN: ICD-10-CM

## 2025-02-18 DIAGNOSIS — E11.9 TYPE 2 DIABETES MELLITUS WITHOUT COMPLICATION, WITH LONG-TERM CURRENT USE OF INSULIN: ICD-10-CM

## 2025-02-18 LAB
ANION GAP SERPL CALC-SCNC: 7 MEQ/L
BACTERIA #/AREA URNS AUTO: ABNORMAL /HPF
BASOPHILS # BLD AUTO: 0.04 X10(3)/MCL
BASOPHILS NFR BLD AUTO: 0.6 %
BILIRUB UR QL STRIP.AUTO: NEGATIVE
BUN SERPL-MCNC: 16.3 MG/DL (ref 9.8–20.1)
CALCIUM SERPL-MCNC: 9.5 MG/DL (ref 8.4–10.2)
CHLORIDE SERPL-SCNC: 105 MMOL/L (ref 98–107)
CLARITY UR: CLEAR
CO2 SERPL-SCNC: 26 MMOL/L (ref 23–31)
COLOR UR AUTO: COLORLESS
CREAT SERPL-MCNC: 0.92 MG/DL (ref 0.55–1.02)
CREAT/UREA NIT SERPL: 18
ENDOCRINOLOGIST REVIEW: NORMAL
EOSINOPHIL # BLD AUTO: 0.1 X10(3)/MCL (ref 0–0.9)
EOSINOPHIL NFR BLD AUTO: 1.5 %
ERYTHROCYTE [DISTWIDTH] IN BLOOD BY AUTOMATED COUNT: 14.1 % (ref 11.5–17)
EST. AVERAGE GLUCOSE BLD GHB EST-MCNC: 177.2 MG/DL
GFR SERPLBLD CREATININE-BSD FMLA CKD-EPI: >60 ML/MIN/1.73/M2
GLUCOSE SERPL-MCNC: 86 MG/DL (ref 82–115)
GLUCOSE UR QL STRIP: NORMAL
HBA1C MFR BLD: 7.8 %
HBA1C MFR BLD: 8.1 %
HCT VFR BLD AUTO: 38.1 % (ref 37–47)
HGB BLD-MCNC: 11.9 G/DL (ref 12–16)
HGB UR QL STRIP: NEGATIVE
HYALINE CASTS #/AREA URNS LPF: ABNORMAL /LPF
IMM GRANULOCYTES # BLD AUTO: 0.01 X10(3)/MCL (ref 0–0.04)
IMM GRANULOCYTES NFR BLD AUTO: 0.1 %
KETONES UR QL STRIP: NEGATIVE
LEUKOCYTE ESTERASE UR QL STRIP: NEGATIVE
LYMPHOCYTES # BLD AUTO: 2.55 X10(3)/MCL (ref 0.6–4.6)
LYMPHOCYTES NFR BLD AUTO: 38 %
MCH RBC QN AUTO: 25.9 PG (ref 27–31)
MCHC RBC AUTO-ENTMCNC: 31.2 G/DL (ref 33–36)
MCV RBC AUTO: 83 FL (ref 80–94)
MONOCYTES # BLD AUTO: 0.59 X10(3)/MCL (ref 0.1–1.3)
MONOCYTES NFR BLD AUTO: 8.8 %
MUCOUS THREADS URNS QL MICRO: ABNORMAL /LPF
NEUTROPHILS # BLD AUTO: 3.42 X10(3)/MCL (ref 2.1–9.2)
NEUTROPHILS NFR BLD AUTO: 51 %
NITRITE UR QL STRIP: NEGATIVE
NRBC BLD AUTO-RTO: 0 %
PH UR STRIP: 5.5 [PH]
PLATELET # BLD AUTO: 292 X10(3)/MCL (ref 130–400)
PMV BLD AUTO: 11.1 FL (ref 7.4–10.4)
POTASSIUM SERPL-SCNC: 4.1 MMOL/L (ref 3.5–5.1)
PROT UR QL STRIP: NEGATIVE
RBC # BLD AUTO: 4.59 X10(6)/MCL (ref 4.2–5.4)
RBC #/AREA URNS AUTO: ABNORMAL /HPF
SODIUM SERPL-SCNC: 138 MMOL/L (ref 136–145)
SP GR UR STRIP.AUTO: 1.01 (ref 1–1.03)
SQUAMOUS #/AREA URNS LPF: ABNORMAL /HPF
THYROGLOB AB SERPL IA-ACNC: <1.8 IU/ML
THYROGLOB SERPL-MCNC: <0.1 NG/ML
UROBILINOGEN UR STRIP-ACNC: NORMAL
WBC # BLD AUTO: 6.71 X10(3)/MCL (ref 4.5–11.5)
WBC #/AREA URNS AUTO: ABNORMAL /HPF

## 2025-02-18 PROCEDURE — 83036 HEMOGLOBIN GLYCOSYLATED A1C: CPT | Mod: PBBFAC | Performed by: NURSE PRACTITIONER

## 2025-02-18 PROCEDURE — 80048 BASIC METABOLIC PNL TOTAL CA: CPT

## 2025-02-18 PROCEDURE — 99214 OFFICE O/P EST MOD 30 MIN: CPT | Mod: PBBFAC | Performed by: NURSE PRACTITIONER

## 2025-02-18 PROCEDURE — 83036 HEMOGLOBIN GLYCOSYLATED A1C: CPT

## 2025-02-18 PROCEDURE — 81001 URINALYSIS AUTO W/SCOPE: CPT

## 2025-02-18 PROCEDURE — 85025 COMPLETE CBC W/AUTO DIFF WBC: CPT

## 2025-02-18 PROCEDURE — 36415 COLL VENOUS BLD VENIPUNCTURE: CPT

## 2025-02-18 RX ORDER — PEN NEEDLE, DIABETIC 30 GX3/16"
1 NEEDLE, DISPOSABLE MISCELLANEOUS
Qty: 100 EACH | Refills: 6 | Status: SHIPPED | OUTPATIENT
Start: 2025-02-18

## 2025-02-18 RX ORDER — INSULIN LISPRO 100 [IU]/ML
INJECTION, SOLUTION INTRAVENOUS; SUBCUTANEOUS
Qty: 15 ML | Refills: 6 | Status: SHIPPED | OUTPATIENT
Start: 2025-02-18

## 2025-02-18 RX ORDER — IBUPROFEN 800 MG/1
800 TABLET ORAL 3 TIMES DAILY PRN
Qty: 90 TABLET | Refills: 2 | Status: SHIPPED | OUTPATIENT
Start: 2025-02-18

## 2025-02-18 RX ORDER — METFORMIN HYDROCHLORIDE 1000 MG/1
1000 TABLET ORAL 2 TIMES DAILY WITH MEALS
Qty: 180 TABLET | Refills: 1 | Status: SHIPPED | OUTPATIENT
Start: 2025-02-18

## 2025-02-18 RX ORDER — LOSARTAN POTASSIUM 25 MG/1
25 TABLET ORAL DAILY
Qty: 90 TABLET | Refills: 1 | Status: SHIPPED | OUTPATIENT
Start: 2025-02-18 | End: 2026-02-18

## 2025-02-18 RX ORDER — BLOOD-GLUCOSE SENSOR
1 EACH MISCELLANEOUS
Qty: 3 EACH | Refills: 11 | Status: SHIPPED | OUTPATIENT
Start: 2025-02-18 | End: 2026-02-18

## 2025-02-18 RX ORDER — INSULIN GLARGINE-YFGN 100 [IU]/ML
25 INJECTION, SOLUTION SUBCUTANEOUS 2 TIMES DAILY
Qty: 45 ML | Refills: 1 | Status: SHIPPED | OUTPATIENT
Start: 2025-02-18

## 2025-02-18 RX ORDER — SERTRALINE HYDROCHLORIDE 50 MG/1
TABLET, FILM COATED ORAL
Qty: 30 TABLET | Refills: 6 | Status: SHIPPED | OUTPATIENT
Start: 2025-02-18

## 2025-02-18 NOTE — PROGRESS NOTES
Patient ID: [id number]     Chief Complaint: lab results        HPI:     RICHMOND Rosas is a 59 y.o. female here today for a follow up.         Optometrist:  Dentist:  Breast Cancer Screening:  [unfilled]   Cervical Cancer Screening:     Colorectal Cancer Screening:  Diabetic Eye Exam:  Diabetic Foot Exam:  Lung Cancer Screening:    Prostate Cancer Screening:  AAA Screening:  Osteoporosis Screening:  Medicare Wellness:   Immunizations:   Immunization History   Administered Date(s) Administered    COVID-19, MRNA, LN-S, PF (Pfizer) (Purple Cap) 06/21/2021, 06/21/2021, 07/19/2021, 07/19/2021    DTP 1963, 1963, 1963, 10/22/1968, 07/24/1975, 05/03/1978    Influenza 10/26/2014, 09/21/2022    Influenza - Quadrivalent - PF *Preferred* (6 months and older) 09/29/2021, 10/04/2022, 10/12/2023    Influenza - Trivalent - Fluarix, Flulaval, Fluzone, Afluria - PF 10/03/2016, 12/21/2017, 09/21/2018, 10/22/2019, 12/03/2024    MMR 01/15/2010    Measles 12/07/1967, 05/03/1978    OPV 1963, 1963, 01/23/1964, 10/14/1969, 07/24/1975    Pneumococcal Polysaccharide - 23 Valent 06/15/2016    Rubella 12/18/1969    Td (ADULT) 10/26/1999, 10/12/2005        -------------------------------------    Anxiety disorder, unspecified    Glaucoma    HTN (hypertension)    Hyperparathyroidism    OAB (overactive bladder)    GORDON (obstructive sleep apnea)    Thyroid cancer    Type 2 diabetes mellitus with unspecified diabetic retinopathy without macular edema        Past Surgical History:   Procedure Laterality Date    CHOLECYSTECTOMY      HYSTERECTOMY, VAGINAL, LAPAROSCOPY-ASSISTED, WITH SALPINGO-OOPHORECTOY Bilateral 08/22/2017    PARATHYROIDECTOMY Right 8/2/2024    Procedure: PARATHYROIDECTOMY;  Surgeon: Cindy Byrne MD;  Location: [facility];  Service: ENT;  Laterality: Right;    THYROIDECTOMY Bilateral 8/2/2024    Procedure: THYROIDECTOMY total;  Surgeon: Cindy Byrne MD;  Location: [facility];  Service:  "ENT;  Laterality: Bilateral;    TUBAL LIGATION         Review of patient's allergies indicates:  No Known Allergies    Current Outpatient Medications   Medication Instructions    amLODIPine (NORVASC) 5 mg, Oral, Daily    blood-glucose meter,continuous (DEXCOM ) Misc 1 each, Misc.(Non-Drug; Combo Route), Every 14 days    blood-glucose sensor (DEXCOM G7 SENSOR) Jennifer 1 each, Misc.(Non-Drug; Combo Route), Every 14 days    famotidine (PEPCID) 40 mg, Oral, Daily    gabapentin (NEURONTIN) 300 MG capsule 1 capsule, Daily PRN    ibuprofen (ADVIL,MOTRIN) 800 mg, Oral, 3 times daily PRN    insulin glargine U-100, Lantus, (LANTUS SOLOSTAR U-100 INSULIN) 100 unit/mL (3 mL) InPn pen Inject 40 units sq in AM and 48 units sq in PM    insulin lispro 100 unit/mL pen Inject 2 to 12 units under the skin per sliding scale after checking blood glucose before meals and at bedtime    levothyroxine (SYNTHROID) 125 mcg, Oral, Before breakfast    losartan (COZAAR) 25 mg, Oral, Daily    metFORMIN (GLUCOPHAGE) 1,000 mg, Oral, 2 times daily with meals    pen needle, diabetic 32 gauge x 5/32" Ndle Use 1 needle five times daily    pravastatin (PRAVACHOL) 20 mg, Daily    propranoloL (INDERAL) 20 mg, Oral, 2 times daily    sertraline (ZOLOFT) 50 MG tablet Take 1 tablet by mouth at bedtime    SITagliptin phosphate (JANUVIA) 50 mg, Oral, Daily    zolpidem (AMBIEN) 5 mg, Oral, Nightly PRN       Social History[1]     Family History   Problem Relation Name Age of Onset    Diabetes Mother Mckenzie     Hypertension Mother Mckenzie     Kidney failure Mother Mckenzie     Asthma Mother Mckenzie     Kidney disease Mother Mckenzie     Lung cancer Father Eri     Diabetes Father Rei     Hypertension Father Rei     Mental illness Father Rei     Thyroid disease Sister Astrid     Brain cancer Sister Astrid     Diabetes Sister Astrid     Hypertension Sister Astrid     Cancer Sister Danelle         Thyroid    Thyroid disease Sister Danelle     " "Migraines Sister Danelle     Miscarriages / Stillbirths Sister Danelle     Cancer Brother Jack         Pancreatic    Hypertension Brother Jack     Seizures Brother Jack     Diabetes Sister Roxy     Hypertension Sister Roxy         Patient Care Team:  Kelin Puga FNP as PCP - General (Family Medicine)  Kelin Puga FNP (Family Medicine)  Sunni Fernando LPN as Care Coordinator     Subjective:     Review of Systems     See HPI for details    Constitutional: Denies Change in appetite. Denies Chills. Denies Fever. Denies Night sweats.  Eye: Denies Blurred vision. Denies Discharge. Denies Eye pain.  ENT: Denies Decreased hearing. Denies Sore throat. Denies Swollen glands.  Respiratory: Denies Cough. Denies Shortness of breath. Denies Shortness of breath with exertion. Denies Wheezing.  Cardiovascular: DeniesChest pain at rest. Denies Chest pain with exertion. Denies Irregular heartbeat. Denies Palpitations. Denies Edema.  Gastrointestinal: Denies Abdominal pain. DeniesDiarrhea. Denies Nausea. Denies Vomiting. Denies Hematemesis or Hematochezia.  Genitourinary: Denies Dysuria. Denies Urinary frequency. Denies Urinary urgency. Denies Blood in urine.  Endocrine: Denies Cold intolerance. Denies Excessive thirst. Denies Heat intolerance. Denies Weight loss. Denies Weight gain.  Musculoskeletal: Denies Painful joints. Denies Weakness.  Integumentary: Denies Rash. Denies Itching. Denies Dry skin.  Neurologic: Denies Dizziness. Denies Fainting. Denies Headache.  Psychiatric: Denies Depression. Denies Anxiety. Denies Suicidal/Homicidal ideations.    All Other ROS: Negative except as stated in HPI.       Objective:     Visit Vitals  /81 (BP Location: Right arm, Patient Position: Sitting)   Pulse 76   Temp 98.4 °F (36.9 °C) (Oral)   Resp 18   Ht 5' 4" (1.626 m)   Wt 79.4 kg (175 lb)   BMI 30.04 kg/m²       Physical Exam    General: Alert and oriented, No acute distress.  Head: " Normocephalic, Atraumatic.  Eye: Pupils are equal, round and reactive to light, Extraocular movements are intact, Sclera non-icteric.  Ears/Nose/Throat: Normal, Mucosa moist,Clear.  Neck/Thyroid: Supple, Non-tender, No carotid bruit, No lymphadenopathy, No JVD, Full range of motion.  Respiratory: Clear to auscultation bilaterally; No wheezes, rales or rhonchi,Non-labored respirations, Symmetrical chest wall expansion.  Cardiovascular: Regular rate and rhythm, S1/S2 normal, No murmurs, rubs or gallops.  Gastrointestinal: Soft, Non-tender, Non-distended, Normal bowel sounds, No palpable organomegaly.  Musculoskeletal: Normal range of motion.  Integumentary: Warm, Dry, Intact, No suspicious lesions or rashes.  Extremities: No clubbing, cyanosis or edema  Neurologic: No focal deficits, Cranial Nerves II-XII are grossly intact, Motor strength normal upper and lower extremities, Sensory exam intact.  Psychiatric: Normal interaction, Coherent speech, Euthymic mood, Appropriate affect       Labs Reviewed:     Chemistry:  Lab Results   Component Value Date     02/18/2025    K 4.1 02/18/2025    BUN 16.3 02/18/2025    CREATININE 0.92 02/18/2025    EGFRNORACEVR >60 02/18/2025    GLUCOSE 86 02/18/2025    CALCIUM 9.5 02/18/2025    ALKPHOS 83 10/12/2024    LABPROT 7.6 10/12/2024    ALBUMIN 4.4 10/12/2024    BILIDIR 0.1 06/16/2021    IBILI 0.10 06/16/2021    AST 18 10/12/2024    ALT 19 10/12/2024    BEWKFNYY03ET 25 (L) 07/23/2024        Lab Results   Component Value Date    HGBA1C 7.8 (H) 02/18/2025        Hematology:  Lab Results   Component Value Date    WBC 6.71 02/18/2025    HGB 11.9 (L) 02/18/2025    HCT 38.1 02/18/2025     02/18/2025       Lipid Panel:  Lab Results   Component Value Date    CHOL 139 10/12/2024    HDL 35 10/12/2024    LDL 71.00 10/12/2024    TRIG 167 (H) 10/12/2024    TOTALCHOLEST 4 10/12/2024        Urine:  Lab Results   Component Value Date    APPEARANCEUA Clear 02/18/2025    SGUA 1.009  02/18/2025    PROTEINUA Negative 02/18/2025    KETONESUA Negative 02/18/2025    LEUKOCYTESUR Negative 02/18/2025    RBCUA 0-5 02/18/2025    WBCUA 0-5 02/18/2025    BACTERIA Trace (A) 02/18/2025    SQEPUA Trace (A) 02/18/2025    HYALINECASTS None Seen 02/18/2025    CREATRANDUR 211.6 (H) 10/12/2024        Assessment:       ICD-10-CM ICD-9-CM   1. Uncontrolled diabetes mellitus with hyperglycemia, with long-term current use of insulin  E11.65 250.02    Z79.4 V58.67   2. Multiple pulmonary nodules  R91.8 793.19   3. Abnormal thyroid biopsy  R89.9 796.9   4. Multiple thyroid nodules  E04.2 241.1   5. Hyperkalemia  E87.5 276.7   6. Anxiety and depression  F41.9 300.00    F32.A 311   7. Microalbuminuria  R80.9 791.0   8. Type 2 diabetes mellitus without complication, with long-term current use of insulin  E11.9 250.00    Z79.4 V58.67        Plan:     1. Uncontrolled diabetes mellitus with hyperglycemia, with long-term current use of insulin (Primary)  POC A1c today 8.1. ADA diet and exercise. Cont Sitaglipitin 50 mg 1 tab po daily. Pt was on Lantus but decreased dose to 25 units BID due to having low blood sugars. Insulin changed to Semglee inject 25 units sq BID- titrate according to CBG readings. D/C Glipizide due to low blood sugars. Cont Lispro per sliding scale as prescribed. Urine microalbumin 10-12-24. Dm foot done 11-5-24. DM eye done  in Opthal cl 2-3-25.  Pt request RX for continueous glucose monitor as she will have to monitor her blood sugar levels more closely due to thyroid CA txmt. RX for Dexcom G7  and sensors sent to [facility] pharmacy.   - POCT HEMOGLOBIN A1C    2. Multiple pulmonary nodules  Pt had CT chest with contrast 2-5-25 showing:  CT Chest With Contrast  Order: [id number]   Status: Final result       Next appt: 02/28/2025 at 01:15 PM in Neurology (RESIDENT NEURO PROCEDURES, [facility] NEURO)       Dx: Multiple pulmonary nodules    Test Result Released: Yes (seen)    0 Result Notes  Details    Reading  Physician Reading Date Result Priority   Akil Monaco MD  [phone]  2/6/2025 Routine     Narrative & Impression  EXAMINATION:  CT CHEST WITH CONTRAST     CLINICAL HISTORY:  4 mm lung nodule; Other nonspecific abnormal finding of lung field     TECHNIQUE:  Contiguous CT images of the chest after IV contrast administration. Axial, coronal and sagittal reformatted images reviewed. Dose length product is 356 mGycm. Automatic exposure control was utilized to limit radiation dose.     COMPARISON:  Chest CT 11/01/2023     FINDINGS:  Lungs and large airways: 5 mm solid nodule near the right major fissure stable since 2023.  Smaller solid nodules in the left upper lobe (series 3, image 47) and left lower lobe (series 3, image 69) are also stable.  No enlarging nodule identified.     Pleura: No pleural effusion.     Mediastinum and oleg: Normal heart size.  No pathologically enlarged lymph node.     Chest wall/axilla and lower neck: No significant findings.     Upper abdomen: No significant findings.     Bones: No acute osseous findings.     Impression:     Three small pulmonary nodules stable since 11/01/2023.        Electronically signed by:Akil Monaco  Date:                                            02/06/2025  Time:                                           13:32        Exam Ended: 02/05/25 12:15 CST Last Resulted: 02/06/25 13:32 CST     Encouraged smoking cessation. Education provided.     3. Abnormal thyroid biopsy  Pt had FNA of Left thyroid nodule done 6-13-24 showing:  Cytology- FNA Radiology Guided, Bronch/EBUS, EUS/GI: [id number]  Order: [id number]  Status: Final result       Visible to patient: Yes (seen)       Next appt: 07/16/2024 at 08:00 AM in Radiology ([facility] [id number])    0 Result Notes          Component Ref Range & Units     Case Report   [place] FNA                                    Case: [id number]                                 Authorizing Provider:  Kelin Puga FNP    Collected:            06/13/2024 09:38 AM           Ordering Location:     [facility] -       Received:            06/13/2024 10:15 AM                                  Interventional Radiology                                                     Pathologist:           Sophia Pagan MD                                                         Specimen:    Thyroid, Left Thyroid                                                                       Clinical Information       Left   Final Diagnosis      Fine-needle aspiration of left thyroid nodule:     - Highly suspicious Papillary Thyroid Carcinoma.   Electronically signed by Sophia Pagan MD on 6/20/2024 at 1611   Gross Description       1. Thyroid: Left Thyroid  Received in 95% alcohol are 3 slides for pap stain, 4 air-dried slides for Diff-Quick stain, and cytology fixative specimen submitted for cytospin and cell block preparation.    Specimen 1 Interpretation   [facility] Thyroid Cytology Category V: Suspicious for Malignancy - Suspect Papillary thyroid carcinoma Abnormal    Electronically signed by Sophia Pagan MD on 6/20/2024 at 1611   Resulting Agency   [facility]                Specimen Collected: 06/13/24 09:38 CDT Last Resulted: 06/20/24 16:11 CDT            Pt s/p bilat thyroidectomy and right parathyroidectomy.Pt followed in Endo cl- keep appt 5-27-25.  Cont Levothyroxine as prescribed per Endo cl.     4. Multiple thyroid nodules  Pt s/p bilat thyroidectomy and right parathyroidectomy. Pt followed in Endo cl. Keep appt 5-27-25. Cont Levothyroxine as prescribed per Endo cl.     5. Hyperkalemia  Resolved. Potassium 4.1. Will monitor.     6. Anxiety and depression  Denies SI/HI. Cont Sertraline as prescribed.     7. Microalbuminuria  Urine microalbumin done 10-12-24 28.5.          Follow up in about 3 months (around 5/18/2025) for with labs 1 week prior to appt. . In addition to their scheduled follow up, the patient has also been instructed to  follow up on as needed basis.     Future Appointments   Date Time Provider Department Center   2/28/2025  1:15 PM RESIDENT NEURO PROCEDURES, [facility] NEURO [facility] NEURO [place]    3[facility]6/2025 10:00 AM [facility] [place]    3/19/2025  1:20 PM Jennifer Cruz FNP [facility] GYN [place]    3/27/2025 12:00 PM RESIDENT 2, [facility] OTORHINOLARYNGOLOGY [facility] ENT [place]    5/27/2025  8:30 AM Estephania Cedillo MD [facility] CARD [place]    5/27/2025  9:40 AM RESIDENTS, [facility] ENDOCRINOLOGY [facility] ENDOCR [place]    1/30/2026  8:00 AM PROVIDERS, NORBERTO Pugh             [1]   Social History  Socioeconomic History    Marital status: Single   Tobacco Use    Smoking status: Never     Passive exposure: Past    Smokeless tobacco: Never   Substance and Sexual Activity    Alcohol use: Not Currently    Drug use: Never    Sexual activity: Not Currently     Partners: Male     Birth control/protection: Abstinence, See Surgical Hx     Social Drivers of Health     Financial Resource Strain: High Risk (10/22/2024)    Overall Financial Resource Strain (CARDIA)     Difficulty of Paying Living Expenses: Hard   Food Insecurity: Food Insecurity Present (10/22/2024)    Hunger Vital Sign     Worried About Running Out of Food in the Last Year: Often true     Ran Out of Food in the Last Year: Often true   Transportation Needs: No Transportation Needs (10/10/2024)    TRANSPORTATION NEEDS     Transportation : No   Physical Activity: Inactive (11/5/2024)    Exercise Vital Sign     Days of Exercise per Week: 0 days     Minutes of Exercise per Session: 0 min   Stress: No Stress Concern Present (11/5/2024)    [employer] - Occupational Stress Questionnaire     Feeling of Stress : Not at all   Housing Stability: High Risk (10/22/2024)    Housing Stability Vital Sign     Unable to Pay for Housing in the Last Year: Yes     Homeless in the Last Year: No

## 2025-02-28 ENCOUNTER — PROCEDURE VISIT (OUTPATIENT)
Dept: NEUROLOGY | Facility: CLINIC | Age: 60
End: 2025-02-28
Payer: COMMERCIAL

## 2025-02-28 VITALS
HEIGHT: 64 IN | RESPIRATION RATE: 16 BRPM | DIASTOLIC BLOOD PRESSURE: 77 MMHG | TEMPERATURE: 98 F | HEART RATE: 87 BPM | WEIGHT: 174 LBS | BODY MASS INDEX: 29.71 KG/M2 | OXYGEN SATURATION: 99 % | SYSTOLIC BLOOD PRESSURE: 177 MMHG

## 2025-02-28 DIAGNOSIS — G56.02 CARPAL TUNNEL SYNDROME OF LEFT WRIST: Primary | ICD-10-CM

## 2025-02-28 RX ORDER — TRIAMCINOLONE ACETONIDE 40 MG/ML
40 INJECTION, SUSPENSION INTRA-ARTICULAR; INTRAMUSCULAR
Status: COMPLETED | OUTPATIENT
Start: 2025-02-28 | End: 2025-02-28

## 2025-02-28 RX ORDER — LIDOCAINE HYDROCHLORIDE 10 MG/ML
10 INJECTION, SOLUTION EPIDURAL; INFILTRATION; INTRACAUDAL; PERINEURAL
Status: COMPLETED | OUTPATIENT
Start: 2025-02-28 | End: 2025-02-28

## 2025-02-28 RX ADMIN — LIDOCAINE HYDROCHLORIDE 10 MG: 10 INJECTION, SOLUTION EPIDURAL; INFILTRATION; INTRACAUDAL; PERINEURAL at 12:02

## 2025-02-28 RX ADMIN — TRIAMCINOLONE ACETONIDE 40 MG: 40 INJECTION, SUSPENSION INTRA-ARTICULAR; INTRAMUSCULAR at 12:02

## 2025-02-28 NOTE — PROCEDURES
Left carpal tunnel block  Both sides are symptomatic  Pt consented  ?s answered  No labs needed  No blood thinners     Procedure  Left median nerve at the wrist blocked with 40 mg and 1 cc lidocaine      No complications  Blood loss <1 cc

## 2025-03-06 ENCOUNTER — HOSPITAL ENCOUNTER (OUTPATIENT)
Dept: RADIOLOGY | Facility: HOSPITAL | Age: 60
Discharge: HOME | End: 2025-03-06
Payer: COMMERCIAL

## 2025-03-06 DIAGNOSIS — C73 PAPILLARY THYROID CARCINOMA: ICD-10-CM

## 2025-03-06 DIAGNOSIS — E04.2 MULTIPLE THYROID NODULES: ICD-10-CM

## 2025-03-06 PROCEDURE — 76536 US EXAM OF HEAD AND NECK: CPT | Mod: TC

## 2025-03-12 DIAGNOSIS — I10 PRIMARY HYPERTENSION: ICD-10-CM

## 2025-03-12 RX ORDER — AMLODIPINE BESYLATE 5 MG/1
5 TABLET ORAL DAILY
Qty: 90 TABLET | Refills: 3
Start: 2025-03-12 | End: 2026-03-12

## 2025-03-19 ENCOUNTER — OFFICE VISIT (OUTPATIENT)
Dept: GYNECOLOGY | Facility: CLINIC | Age: 60
End: 2025-03-19
Payer: COMMERCIAL

## 2025-03-19 VITALS
TEMPERATURE: 98 F | RESPIRATION RATE: 18 BRPM | OXYGEN SATURATION: 99 % | SYSTOLIC BLOOD PRESSURE: 130 MMHG | WEIGHT: 176.63 LBS | BODY MASS INDEX: 30.16 KG/M2 | DIASTOLIC BLOOD PRESSURE: 76 MMHG | HEIGHT: 64 IN | HEART RATE: 82 BPM

## 2025-03-19 DIAGNOSIS — Z12.31 SCREENING MAMMOGRAM FOR BREAST CANCER: ICD-10-CM

## 2025-03-19 DIAGNOSIS — Z01.419 WOMEN'S ANNUAL ROUTINE GYNECOLOGICAL EXAMINATION: Primary | ICD-10-CM

## 2025-03-19 PROCEDURE — 1159F MED LIST DOCD IN RCRD: CPT | Mod: CPTII,,, | Performed by: NURSE PRACTITIONER

## 2025-03-19 PROCEDURE — 99396 PREV VISIT EST AGE 40-64: CPT | Mod: S$PBB,,, | Performed by: NURSE PRACTITIONER

## 2025-03-19 PROCEDURE — 3075F SYST BP GE 130 - 139MM HG: CPT | Mod: CPTII,,, | Performed by: NURSE PRACTITIONER

## 2025-03-19 PROCEDURE — 3078F DIAST BP <80 MM HG: CPT | Mod: CPTII,,, | Performed by: NURSE PRACTITIONER

## 2025-03-19 PROCEDURE — 4010F ACE/ARB THERAPY RXD/TAKEN: CPT | Mod: CPTII,,, | Performed by: NURSE PRACTITIONER

## 2025-03-19 PROCEDURE — 3008F BODY MASS INDEX DOCD: CPT | Mod: CPTII,,, | Performed by: NURSE PRACTITIONER

## 2025-03-19 PROCEDURE — 99215 OFFICE O/P EST HI 40 MIN: CPT | Mod: PBBFAC | Performed by: NURSE PRACTITIONER

## 2025-03-19 PROCEDURE — 1160F RVW MEDS BY RX/DR IN RCRD: CPT | Mod: CPTII,,, | Performed by: NURSE PRACTITIONER

## 2025-03-19 PROCEDURE — 3052F HG A1C>EQUAL 8.0%<EQUAL 9.0%: CPT | Mod: CPTII,,, | Performed by: NURSE PRACTITIONER

## 2025-03-19 NOTE — PROGRESS NOTES
"Patient ID: Foreign Rosas is a 59 y.o. female.    Chief Complaint: Gynecologic Exam        HPI:  Pt is  here for annual gyn exam. Pt had LAVH with BSO in 2017 secondary to AUB-L. Pt denies vaginal bleeding or discharge. Denies vaginal dryness/dyspareunia. States is not sexually active. Pt states is followed by urology for OAB/urge incontinence.. Denies dysuria.. Denies any breast complaints.  Followed in med clinic for primary care. Pt works at [employer].  Denies fly hx of colon cancer.     Review of Systems:   Negative except for findings in HPI     Objective:   /76   Pulse 82   Temp 98.1 °F (36.7 °C) (Oral)   Resp 18   Ht 5' 4" (1.626 m)   Wt 80.1 kg (176 lb 9.6 oz)   SpO2 99%   BMI 30.31 kg/m²    Physical Exam:  GENERAL: Pt is aware and alert and  in no acute distress.  BREASTS: Bilateral-No masses, nipple discharge, skin changes, or tenderness.  ABDOMEN: Soft, non tender.  VULVA:  No lesions or skin changes.  URETHRA: No lesions  BLADDER: No tenderness.  VAGINA: Mucosa pale,no discharge; no lesions.  CERVIX:  absent  BIMANUAL EXAM:  The uterus is absent. Ceasar adnexa reveal no evidence of masses; no fullness   SKIN: Warm and Dry  PSYCHIATRIC: Patient is awake and alert. Mood and affect are normal.  Assessment:   Women's annual routine gynecological examination    Screening mammogram for breast cancer  -     Mammo Digital Screening Bilat w/ Eriberto (XPD); Future; Expected date: 2025            1. Women's annual routine gynecological examination    2. Screening mammogram for breast cancer             -pap not indicated secondary to hysterectomy for benign conditions    Plan:       Follow up in about 1 year (around 3/19/2026).    "

## 2025-03-27 ENCOUNTER — OFFICE VISIT (OUTPATIENT)
Dept: OTOLARYNGOLOGY | Facility: CLINIC | Age: 60
End: 2025-03-27
Payer: COMMERCIAL

## 2025-03-27 VITALS
DIASTOLIC BLOOD PRESSURE: 87 MMHG | TEMPERATURE: 98 F | WEIGHT: 172 LBS | BODY MASS INDEX: 29.52 KG/M2 | SYSTOLIC BLOOD PRESSURE: 153 MMHG | HEART RATE: 80 BPM

## 2025-03-27 DIAGNOSIS — C73 PAPILLARY THYROID CARCINOMA: Primary | ICD-10-CM

## 2025-03-27 PROCEDURE — 99214 OFFICE O/P EST MOD 30 MIN: CPT | Mod: PBBFAC

## 2025-03-27 NOTE — PROGRESS NOTES
[facility]  Otolaryngology Clinic Note    Foreign Rosas  Encounter Date: 3/27/2025  YOB: 1965    Chief Complaint:  Papillary thyroid carcinoma    HPI: Foreign Rosas is a 59 y.o. female past medical history of hypercalcemia and hyperparathyroidism who was referred for left thyroid nodule that returned as papillary thyroid carcinoma.  Patient reports she has been having coughing episodes for the past month and some gas or air.  She denies odynophagia or dysphagia.  She reports some hoarseness however that comes and goes.  Denies any lumps or bumps on her neck.  They found thyroid nodule previously on a CT scan year or 2 ago.  She does have a sister who had thyroid cancer recently.  She denies any exposure to radiation.  Denies any previous neck surgeries.  Does not smoke tobacco.  Last year patient had a sestamibi scan in a 4D CT which localize a right inferior parathyroid adenoma.  At that time her PTH was elevated to the 90s.  She had hypercalcemia to 10.8.  She is asymptomatic.     08/08/2024: Patient here today postop 1 follow up.  She underwent total thyroidectomy and parathyroidectomy on 08/02/2024.  Patient has been doing well postoperatively.  She denies any signs or symptoms of infection and no perioral or hand paresthesias.  Patient has had a dysphonic voice following surgery.  No issues with p.o. intake.    8/21/2024: Patient is here for her 2nd post-op follow up. S/p total thyroidectomy and parathyroidectomy on 08/02/2024. Continue to report dysphonia and dry cough however improving. She denies dysphagia, odynophagia, paraesthesia, myalgias, and confusion.     9/24/24:  Patient returns for 3rd postoperative visit.  She has been doing well since her surgery.  She has been seen speech therapy and feels that her voice has improved noticeably since her last visit.  She has been tolerating a regular diet without problems.    3/27/25: Returns for 6-month follow  up. Saw Endocrine Dr. Johnson and Dr. Braden and completed PHILLIPS in November. Recent thyroid US shows surgically absent thyroid with no abnormalities compared to prior imaging. TSH is normal. Patient's only complaint today is generalized fatigue. Found to have high BP in clinic today d/t running out of medication a couple weeks ago.       ROS:   General: Negative except per HPI  Skin: Denies rash, ulcer, or lesion.  Eyes: Denies vision changes or diplopia.  Ears: Negative except per HPI  Nose: Negative except per HPI  Throat/mouth: Negative except per HPI  Cardiovascular: Negative except per HPI  Respiratory: Negative except per HPI  Neck: Negative except per HPI  Endocrine: Negative except per HPI  Neurologic: Negative except per HPI    Other 10-point review of systems negative except per HPI      Review of patient's allergies indicates:  No Known Allergies    Past Medical History:   Diagnosis Date    Anxiety and depression     Anxiety disorder, unspecified     CTS (carpal tunnel syndrome)     Glaucoma     HTN (hypertension)     Hyperparathyroidism 08/03/2024    OAB (overactive bladder)     GORDON (obstructive sleep apnea) 01/24/2024    Thyroid cancer 2024    Type 2 diabetes mellitus with unspecified diabetic retinopathy without macular edema     Urge incontinence        Past Surgical History:   Procedure Laterality Date    CHOLECYSTECTOMY      HYSTERECTOMY, VAGINAL, LAPAROSCOPY-ASSISTED, WITH SALPINGO-OOPHORECTOY Bilateral 08/22/2017    PARATHYROIDECTOMY Right 08/02/2024    Procedure: PARATHYROIDECTOMY;  Surgeon: Cindy Byrne MD;  Location: [facility];  Service: ENT;  Laterality: Right;    THYROIDECTOMY Bilateral 08/02/2024    Procedure: THYROIDECTOMY total;  Surgeon: Cindy Byrne MD;  Location: [facility] OR;  Service: ENT;  Laterality: Bilateral;    TUBAL LIGATION         Social History     Socioeconomic History    Marital status: Single   Tobacco Use    Smoking status: Never     Passive exposure: Never    Smokeless  tobacco: Never   Substance and Sexual Activity    Alcohol use: Not Currently    Drug use: Never    Sexual activity: Not Currently     Partners: Male     Birth control/protection: Abstinence, See Surgical Hx     Social Drivers of Health     Financial Resource Strain: High Risk (10/22/2024)    Overall Financial Resource Strain (CARDIA)     Difficulty of Paying Living Expenses: Hard   Food Insecurity: Food Insecurity Present (10/22/2024)    Hunger Vital Sign     Worried About Running Out of Food in the Last Year: Often true     Ran Out of Food in the Last Year: Often true   Transportation Needs: No Transportation Needs (10/10/2024)    TRANSPORTATION NEEDS     Transportation : No   Physical Activity: Inactive (11/5/2024)    Exercise Vital Sign     Days of Exercise per Week: 0 days     Minutes of Exercise per Session: 0 min   Stress: No Stress Concern Present (11/5/2024)    [employer] [place] [employer] - Occupational Stress Questionnaire     Feeling of Stress : Not at all   Housing Stability: High Risk (10/22/2024)    Housing Stability Vital Sign     Unable to Pay for Housing in the Last Year: Yes     Homeless in the Last Year: No       Family History   Problem Relation Name Age of Onset    Diabetes Mother Mckenzie     Hypertension Mother Mckenzie     Kidney failure Mother Mckenzie     Asthma Mother Mckenzie     Kidney disease Mother Mckenzie     Lung cancer Father Rei     Diabetes Father Rei     Hypertension Father Rei     Mental illness Father Rei     Thyroid disease Sister Astrid     Brain cancer Sister Astrid     Diabetes Sister Astrid     Hypertension Sister Astrid     Thyroid cancer Sister Danelle     Thyroid disease Sister Danelle     Migraines Sister Danelle     Miscarriages / Stillbirths Sister Danelle     Diabetes Sister Roxy     Hypertension Sister Roxy     Cancer Brother Jack         Pancreatic    Hypertension Brother Jack     Seizures Brother Jack        Outpatient  "Encounter Medications as of 3/27/2025   Medication Sig Dispense Refill    amLODIPine (NORVASC) 5 MG tablet Take 1 tablet (5 mg total) by mouth once daily. 90 tablet 3    blood-glucose meter,continuous (DEXCOM ) Misc 1 each by Misc.(Non-Drug; Combo Route) route every 14 (fourteen) days. 1 each 0    blood-glucose sensor (DEXCOM G7 SENSOR) Jennifer 1 each by Misc.(Non-Drug; Combo Route) route every 10 days. 3 each 11    famotidine (PEPCID) 40 MG tablet Take 1 tablet (40 mg total) by mouth once daily. 30 tablet 11    gabapentin (NEURONTIN) 300 MG capsule Take 1 capsule by mouth daily as needed.      ibuprofen (ADVIL,MOTRIN) 800 MG tablet Take 1 tablet (800 mg total) by mouth 3 (three) times daily as needed for Pain. 90 tablet 2    insulin glargine-yfgn (SEMGLEE,INSULIN GLARG-YFGN,PEN) 100 unit/mL (3 mL) InPn Inject 25 Units into the skin 2 (two) times a day. 45 mL 1    insulin lispro 100 unit/mL pen Inject 2 to 12 units under the skin per sliding scale after checking blood glucose before meals and at bedtime 15 mL 6    levothyroxine (SYNTHROID) 125 MCG tablet Take 1 tablet (125 mcg total) by mouth before breakfast. 90 tablet 1    losartan (COZAAR) 25 MG tablet Take 1 tablet (25 mg total) by mouth once daily. 90 tablet 1    metFORMIN (GLUCOPHAGE) 1000 MG tablet Take 1 tablet (1,000 mg total) by mouth 2 (two) times daily with meals. 180 tablet 1    pen needle, diabetic 32 gauge x 5/32" Ndle Use 1 needle five times daily 100 each 6    pravastatin (PRAVACHOL) 20 MG tablet Take 20 mg by mouth once daily.      propranoloL (INDERAL) 20 MG tablet Take 1 tablet (20 mg total) by mouth 2 (two) times daily. 180 tablet 1    sertraline (ZOLOFT) 50 MG tablet Take 1 tablet by mouth at bedtime 30 tablet 6    SITagliptin phosphate (JANUVIA) 50 MG Tab Take 1 tablet (50 mg total) by mouth once daily. 90 tablet 1    zolpidem (AMBIEN) 5 MG Tab Take 1 tablet (5 mg total) by mouth nightly as needed (insomnia). 30 tablet 5    [DISCONTINUED] " amLODIPine (NORVASC) 5 MG tablet Take 1 tablet (5 mg total) by mouth once daily.       No facility-administered encounter medications on file as of 3/27/2025.       Physical Exam:  There were no vitals filed for this visit.      Constitutional  General Appearance: well nourished, well-developed, AAO x3, NAD  HEENT  Eyes: PEERLA, EOMI, normal conjunctivae  Neck: incision healing well, soft and not tender to palpation  Ears: Hearing well at conversation level  Nose: septum midline, no inferior turbinate hypertrophy, no polyps, moist mucosa without erythema or blue hue  OC/OP:  Upper dentures present no oral lesions, tongue/FOM/BOT- soft, no leukoplakia/ulcerations/ tenderness  Neuro: CN II - XII intact bilaterally  Cardiovascular: peripheral pulses palpable  Respiratory: non-labored respirations, voice with somewhat low projection      Pertinent Data:  TUMOR   Tumor Focality  Multifocal   Tumor Characteristics     Tumor Site  Right lobe     Left lobe   Tumor Size  Greatest Dimension (Centimeters): 1.2 cm   Histologic Tumor Types and Subtypes   Papillary Carcinoma, tall cell subtype   Tumor Proliferative Activity     Mitotic Rate  Less than 3 mitoses per 2mm2   Tumor Necrosis  Not identified   Angioinvasion (vascular invasion)  Not identified   Lymphatic Invasion  Present   Perineural Invasion  Not identified   Extrathyroidal Extension  Not identified   Margin Status  All margins negative for carcinoma   Distance from Invasive Carcinoma to Closest Margin  Less than 1 mm   REGIONAL LYMPH NODES   Regional Lymph Node Status  All regional lymph nodes negative for tumor   Number of Lymph Nodes Examined  4   Sally Level(s) Examined  Level VI       Labs:  TSH: 0.745 (2/16/25)      Imaging:  Thyroid US (3/6/25):  FINDINGS:  Patient is status post thyroidectomy.  No residual tissue identified in the thyroid beds     Impression:  Status post thyroidectomy with no residual tissue.  No significant change as compared with the  previous exam     Electronically signed by:River West  Date:                                            03/06/2025  Time:                                           13:42      Assessment/Plan:  Foreign Rosas is a 59 y.o. female s/p total thyroidectomy and parathyroidectomy on 8/2/24. Final pathology pT1b pN0 multifocal Papillary thyroid carcinoma tall cell variant greatest diameter 1.2 cm with lymphatic invasion. Central compartment lymph nodes negative for disease.  Parathyroid adenoma with return to function of PTH and calcium.  Postoperative FFL with true vocal fold motion intact.  Has been following with SLP for dysphonia following surgery which she feels is improving.      Recent TSH labs and thyroid ultrasound within normal limits.     -- Continue Pepcid daily for LPR.   -- Next appt with Endocrine on 4/1; will continue thyroid surveillance with them  -- Encouraged to contact PCP for refill of antihypertensive medication   -- Instructed to call the clinic if any new ENT symptoms develop     RTC EMETERIO Robb, PGY3  [employer] [facility]  3/27/2025 11:57 AM      Answers submitted by the patient for this visit:  Review of Symptoms Questionnaire  (Submitted on 3/20/2025)  Fatigue (Tiredness)?: Yes  Tooth/Dental Problems?: Yes  None of these : Yes  None of these: Yes  None of these : Yes  None of these: Yes  Urinating too frequently?: Yes  Muscle aches / pain?: Yes  None of these : Yes  None of these: Yes  None of these : Yes  None of these: Yes  None of these: Yes  None of these: Yes

## 2025-04-01 ENCOUNTER — PATIENT MESSAGE (OUTPATIENT)
Dept: CARDIOLOGY | Facility: CLINIC | Age: 60
End: 2025-04-01
Payer: COMMERCIAL

## 2025-04-01 DIAGNOSIS — I10 PRIMARY HYPERTENSION: ICD-10-CM

## 2025-04-01 RX ORDER — AMLODIPINE BESYLATE 5 MG/1
5 TABLET ORAL DAILY
Qty: 90 TABLET | Refills: 3 | Status: SHIPPED | OUTPATIENT
Start: 2025-04-01 | End: 2026-04-01

## 2025-04-11 ENCOUNTER — PROCEDURE VISIT (OUTPATIENT)
Dept: NEUROLOGY | Facility: CLINIC | Age: 60
End: 2025-04-11
Payer: COMMERCIAL

## 2025-04-11 VITALS
SYSTOLIC BLOOD PRESSURE: 132 MMHG | HEIGHT: 64 IN | OXYGEN SATURATION: 100 % | HEART RATE: 81 BPM | DIASTOLIC BLOOD PRESSURE: 71 MMHG | TEMPERATURE: 98 F | BODY MASS INDEX: 30.39 KG/M2 | WEIGHT: 178 LBS | RESPIRATION RATE: 18 BRPM

## 2025-04-11 DIAGNOSIS — G56.02 CARPAL TUNNEL SYNDROME OF LEFT WRIST: Primary | ICD-10-CM

## 2025-04-11 RX ORDER — TRIAMCINOLONE ACETONIDE 40 MG/ML
80 INJECTION, SUSPENSION INTRA-ARTICULAR; INTRAMUSCULAR
Status: COMPLETED | OUTPATIENT
Start: 2025-04-11 | End: 2025-04-11

## 2025-04-11 RX ORDER — LIDOCAINE HYDROCHLORIDE 10 MG/ML
10 INJECTION, SOLUTION EPIDURAL; INFILTRATION; INTRACAUDAL; PERINEURAL
Status: COMPLETED | OUTPATIENT
Start: 2025-04-11 | End: 2025-04-11

## 2025-04-11 RX ADMIN — TRIAMCINOLONE ACETONIDE 80 MG: 40 INJECTION, SUSPENSION INTRA-ARTICULAR; INTRAMUSCULAR at 01:04

## 2025-04-11 RX ADMIN — LIDOCAINE HYDROCHLORIDE 10 MG: 10 INJECTION, SOLUTION EPIDURAL; INFILTRATION; INTRACAUDAL; PERINEURAL at 01:04

## 2025-04-11 NOTE — PROCEDURES
Date: 41125  Time: 115  Name of the procedure being done: carpal tunnel blocks  Indications: carpal tunnel syndrome  Patient consent: yes   Risks and alternatives to the procedure were explained to the patient. Note that the patient was given the opportunity to ask questions and that the patient consented to the procedure in writing  Pertinent Lab Values: none  Type of Anesthesia used: none  Description of the procedure:   Bilateral carpal tunnel blocked ; 80 mg kenalog; 2 cc lidocaine total used  Complications: none  Estimated blood loss: <1 mL   Disposition: Pt tolerated the procedure well    The last blocks helped a lot

## 2025-04-17 ENCOUNTER — PATIENT OUTREACH (OUTPATIENT)
Facility: CLINIC | Age: 60
End: 2025-04-17
Payer: COMMERCIAL

## 2025-04-17 NOTE — LETTER
Dear Consuella [facility] is committed to your overall health. Periodically we review the health information in your chart to make sure you are up to date.    Our review of your chart shows that your Blood pressure, Diabetes, and Cholesterol medications are not being filled regularly.    Your health and well being are very important to us. This is a friendly reminder to take your medications daily as prescribed. Please contact your pharmacy and refill this very important medication.     This is a friendly reminder that you have an appointment scheduled with Kelin Puga FNP on 6/17/2025. If you have any questions or concerns about your medications, you may discuss them at that time if you choose to do so.    If there is anything else we can do to help you. Please respond to this message via your patient portal or give me a call at [phone]. Thank you for choosing [facility] & have a great day!      Sincerely,    ANKIT Moeller Care Coordinator  Kelin Puga FNP and your [facility] Primary [facility] Team

## 2025-04-17 NOTE — PROGRESS NOTES
Health Maintenance Topic(s) Outreach Outcomes & Actions Taken:    Medication Adherence / Statins - Outreach Outcomes & Actions Taken  : Letter mailed to pt     Additional Notes:  Next PCP F/U: 6/17/2025  SDOH Incomplete- financial, transportation & food   Health Maintenance Topics Overdue:  VBHM Score: 0     Patient is not due for any topics at this time.       HTN: uncontrolled    DM: Last A1c 7.8  Chronic med non compliance per PDC.     OPCM/CHW referral for SDOH food, financial, and pharm/medication assistance. Closed 1/2025.       Care Management, Digital Medicine, and/or Education Referrals      Next Steps - Referral Actions: Digital Medicine Outcomes and Actions Taken: Needs review

## 2025-05-27 ENCOUNTER — OFFICE VISIT (OUTPATIENT)
Dept: CARDIOLOGY | Facility: CLINIC | Age: 60
End: 2025-05-27
Payer: COMMERCIAL

## 2025-05-27 VITALS
RESPIRATION RATE: 20 BRPM | TEMPERATURE: 98 F | DIASTOLIC BLOOD PRESSURE: 79 MMHG | BODY MASS INDEX: 29.74 KG/M2 | OXYGEN SATURATION: 100 % | SYSTOLIC BLOOD PRESSURE: 130 MMHG | HEART RATE: 81 BPM | HEIGHT: 64 IN | WEIGHT: 174.19 LBS

## 2025-05-27 DIAGNOSIS — I10 PRIMARY HYPERTENSION: Primary | ICD-10-CM

## 2025-05-27 DIAGNOSIS — E78.5 HYPERLIPIDEMIA LDL GOAL <70: ICD-10-CM

## 2025-05-27 PROCEDURE — 99215 OFFICE O/P EST HI 40 MIN: CPT | Mod: PBBFAC | Performed by: INTERNAL MEDICINE

## 2025-05-27 RX ORDER — ACETAMINOPHEN 500 MG
1 TABLET ORAL DAILY PRN
Qty: 1 EACH | Refills: 0 | Status: SHIPPED | OUTPATIENT
Start: 2025-05-27 | End: 2026-05-27

## 2025-05-27 RX ORDER — LOSARTAN POTASSIUM 50 MG/1
50 TABLET ORAL DAILY
Qty: 90 TABLET | Refills: 1 | Status: SHIPPED | OUTPATIENT
Start: 2025-05-27 | End: 2026-05-27

## 2025-05-27 NOTE — PROGRESS NOTES
[facility]   Cardiology Clinic - Follow Up     Cardiology Attending: Estephania Cedillo MD  Date of Visit: 5/27/2025    Subjective:      oFreign Rosas is a 59 y.o. female with HTN, T2DM (A1c 8.1), and GORDON who presents for follow up.     The patient denies symptoms of angina or anginal equivalents.  The patient denies congestive symptoms of heart failure including dyspnea on exertion, orthopnea, PND, abdominal distention, or lower extremity edema.    The patient reports adherence with her blood pressure medication including losartan 25 mg and amlodipine 5 mg.  Additionally she takes propranolol but this is prescribed for tremors.  The patient reports her blood pressures run in the 130-140s over 70s-80s.  She intermittently takes NSAIDs for pain control.  She was advised to discontinue NSAID use.    The patient continues to complain of intermittent dizziness and lightheadedness episodes that occur at rest or with changes in position.  She denies associated hypotension.  She denies associated palpitations, chest pain, or dyspnea.  She reports associated nausea.  She reports her symptoms are worse since the last time she previously was seen in his clinic.  At that time she was taking propranolol less frequently and notes that it has gotten worse with b.i.d. dosing of propranolol; however, she does note that her tremors have significantly improved.  She has no TTE explanation for her symptoms and and previous event monitor was unremarkable for arrhythmias.  She activated the event monitor twice and was noted to be in sinus tachycardia.    Medications:   Current Medications[1]    I have reviewed and updated the patient's medications, allergies, past medical history, surgical history, social history and family history as needed.    Review of Systems:     The patient denies headaches, changes in vision, nausea, emesis, fevers, chills, chest pain, palpitations, dyspnea, abdominal pain, or changes in urinary or  "bowel habits.     Objective:     Wt Readings from Last 3 Encounters:   05/27/25 79 kg (174 lb 3.2 oz)   04/11/25 80.7 kg (178 lb)   03/27/25 78 kg (172 lb)     Temp Readings from Last 3 Encounters:   05/27/25 98.2 °F (36.8 °C) (Oral)   04/11/25 98.2 °F (36.8 °C) (Oral)   03/27/25 98.1 °F (36.7 °C) (Oral)     BP Readings from Last 3 Encounters:   05/27/25 130/79   04/11/25 132/71   03/27/25 (!) 153/87     Pulse Readings from Last 3 Encounters:   05/27/25 81   04/11/25 81   03/27/25 80       Vitals:    05/27/25 0815   BP: 130/79   BP Location: Left arm   Patient Position: Sitting   Pulse: 81   Resp: 20   Temp: 98.2 °F (36.8 °C)   TempSrc: Oral   SpO2: 100%   Weight: 79 kg (174 lb 3.2 oz)   Height: 5' 4" (1.626 m)     Body mass index is 29.9 kg/m².    Physical Exam  Vitals reviewed.   Constitutional:       General: She is not in acute distress.     Appearance: Normal appearance. She is normal weight. She is not ill-appearing.   HENT:      Head: Normocephalic and atraumatic.      Right Ear: External ear normal.      Left Ear: External ear normal.      Nose: Nose normal.      Mouth/Throat:      Mouth: Mucous membranes are moist.   Eyes:      Conjunctiva/sclera: Conjunctivae normal.   Cardiovascular:      Rate and Rhythm: Normal rate and regular rhythm.      Pulses: Normal pulses.      Heart sounds: Normal heart sounds. No murmur heard.  Pulmonary:      Effort: Pulmonary effort is normal. No respiratory distress.      Breath sounds: Normal breath sounds. No stridor. No wheezing, rhonchi or rales.   Chest:      Chest wall: No tenderness.   Abdominal:      General: Abdomen is flat. Bowel sounds are normal. There is no distension.      Palpations: Abdomen is soft.   Musculoskeletal:      Cervical back: Neck supple.      Right lower leg: No edema.      Left lower leg: No edema.   Skin:     General: Skin is warm and dry.      Capillary Refill: Capillary refill takes less than 2 seconds.   Neurological:      Mental Status: She " "is alert and oriented to person, place, and time.   Psychiatric:         Mood and Affect: Mood normal.         Behavior: Behavior normal.        Labs:   I have reviewed the following labs below:      Lab Results   Component Value Date    WBC 6.71 02/18/2025    HGB 11.9 (L) 02/18/2025    HCT 38.1 02/18/2025     02/18/2025    MCV 83.0 02/18/2025    RDW 14.1 02/18/2025     Lab Results   Component Value Date     02/18/2025    K 4.1 02/18/2025     02/18/2025    CO2 26 02/18/2025    BUN 16.3 02/18/2025    GLU 86 02/18/2025    CALCIUM 9.5 02/18/2025     Lab Results   Component Value Date    PROT 7.6 10/12/2024    ALBUMIN 4.4 10/12/2024    BILITOT 0.3 10/12/2024    AST 18 10/12/2024    ALKPHOS 83 10/12/2024    ALT 19 10/12/2024     Cholesterol Total   Date Value Ref Range Status   10/12/2024 139 <=200 mg/dL Final     HDL Cholesterol   Date Value Ref Range Status   10/12/2024 35 35 - 60 mg/dL Final     LDL Cholesterol   Date Value Ref Range Status   10/12/2024 71.00 50.00 - 140.00 mg/dL Final     Triglyceride   Date Value Ref Range Status   10/12/2024 167 (H) 37 - 140 mg/dL Final     Lab Results   Component Value Date    HGBA1C 7.8 (H) 02/18/2025    HGBA1C 8.9 (H) 10/12/2024    HGBA1C 8.4 (H) 06/29/2024    CREATININE 0.92 02/18/2025     Lab Results   Component Value Date    TSH 0.745 02/16/2025     Lab Results   Component Value Date    CHNWMLAN90 849 (H) 01/27/2021     Lab Results   Component Value Date    INR 1.12 09/24/2018    PROTIME 13.7 09/24/2018    APTT 32.4 09/24/2018      No results found for: "TROPONINI", "BNP", "CKTOTAL", "CKMB", "CKMM", "CKBB"  Cardiovascular Studies:   I have reviewed the following studies below:      TTE:   Summary  Show Result Comparison     Left Ventricle: Normal wall motion. There is low normal systolic function. Biplane (2D) method of discs ejection fraction is 53%.    Right Ventricle: Normal right ventricular cavity size. Systolic function is normal.    Aortic Valve: The " aortic valve is a trileaflet valve. There is normal leaflet mobility.    Mitral Valve: The mitral valve is structurally normal.    Pulmonary Artery: The estimated pulmonary artery systolic pressure is 16 mmHg.    IVC/SVC: Intermediate venous pressure at 8 mmHg.    LHC/Cors:  None    Assessment/Plan:   Foreign Rosas is a 59 y.o. female with HTN, T2DM (A1c 8.1), and GORDON who presents for follow up.     Plan:  Hypertension  -Increase to Losartan 50 mg daily from 25 mg daily.   -Continue Norvasc 5 mg daily.     The patient can continue propranolol at the direction of neurology for tremors. Defer to neurology persistent complaint of dizziness - not consistent with primary cardiac cause (no arrhythmias noted during monitored periods, no structural cardiac changes to explain, denies periods of hypotension or associated cardiac symptoms).     Return to clinic in 6 months. Follow up in clinic in 4-6 weeks for nurse visit with blood pressure log - sent RX for BP cuf to pharmacy. Stop NSAIDs, exacerbates hypertension    Faraz Leslie MD  [facility] Cardiology Chief Fellow, PGY-6  05/27/2025 8:56 AM  [facility]            [1]   Current Outpatient Medications   Medication Sig Dispense Refill    amLODIPine (NORVASC) 5 MG tablet Take 1 tablet (5 mg total) by mouth once daily. 90 tablet 3    famotidine (PEPCID) 40 MG tablet Take 1 tablet (40 mg total) by mouth once daily. 30 tablet 11    gabapentin (NEURONTIN) 300 MG capsule Take 1 capsule by mouth daily as needed.      ibuprofen (ADVIL,MOTRIN) 800 MG tablet Take 1 tablet (800 mg total) by mouth 3 (three) times daily as needed for Pain. 90 tablet 2    insulin glargine-yfgn (SEMGLEE,INSULIN GLARG-YFGN,PEN) 100 unit/mL (3 mL) InPn Inject 25 Units into the skin 2 (two) times a day. 45 mL 1    insulin lispro 100 unit/mL pen Inject 2 to 12 units under the skin per sliding scale after checking blood glucose before meals and at bedtime 15 mL 6    levothyroxine (SYNTHROID) 125 MCG tablet  "Take 1 tablet (125 mcg total) by mouth before breakfast. 90 tablet 1    losartan (COZAAR) 25 MG tablet Take 1 tablet (25 mg total) by mouth once daily. 90 tablet 1    metFORMIN (GLUCOPHAGE) 1000 MG tablet Take 1 tablet (1,000 mg total) by mouth 2 (two) times daily with meals. 180 tablet 1    pravastatin (PRAVACHOL) 20 MG tablet Take 20 mg by mouth once daily.      propranoloL (INDERAL) 20 MG tablet Take 1 tablet (20 mg total) by mouth 2 (two) times daily. 180 tablet 1    sertraline (ZOLOFT) 50 MG tablet Take 1 tablet by mouth at bedtime 30 tablet 6    SITagliptin phosphate (JANUVIA) 50 MG Tab Take 1 tablet (50 mg total) by mouth once daily. 90 tablet 1    zolpidem (AMBIEN) 5 MG Tab Take 1 tablet (5 mg total) by mouth nightly as needed (insomnia). 30 tablet 5    blood-glucose meter,continuous (DEXCOM ) Misc 1 each by Misc.(Non-Drug; Combo Route) route every 14 (fourteen) days. 1 each 0    blood-glucose sensor (DEXCOM G7 SENSOR) Jennifer 1 each by Misc.(Non-Drug; Combo Route) route every 10 days. 3 each 11    pen needle, diabetic 32 gauge x 5/32" Ndle Use 1 needle five times daily 100 each 6     No current facility-administered medications for this visit.     "

## 2025-06-10 ENCOUNTER — OFFICE VISIT (OUTPATIENT)
Dept: ENDOCRINOLOGY | Facility: CLINIC | Age: 60
End: 2025-06-10
Payer: COMMERCIAL

## 2025-06-10 VITALS
TEMPERATURE: 99 F | BODY MASS INDEX: 29.35 KG/M2 | DIASTOLIC BLOOD PRESSURE: 83 MMHG | RESPIRATION RATE: 17 BRPM | HEART RATE: 83 BPM | HEIGHT: 64 IN | SYSTOLIC BLOOD PRESSURE: 137 MMHG | WEIGHT: 171.94 LBS

## 2025-06-10 DIAGNOSIS — E89.0 POSTSURGICAL HYPOTHYROIDISM: ICD-10-CM

## 2025-06-10 DIAGNOSIS — C73 THYROID CANCER: Primary | ICD-10-CM

## 2025-06-10 PROCEDURE — 99214 OFFICE O/P EST MOD 30 MIN: CPT | Mod: PBBFAC

## 2025-06-10 NOTE — PROGRESS NOTES
"[facility] Visit    Chief Complaint:      6 month follow up; history of papillary thyroid cancer s/p thyroidectomy    Subjective:     HPI:  Foreign Rosas is a 59 y.o. female with PMH of x of hypercalcemia, hyperparathyroidism s/p right parathyroidectomy on 8/2/24, papillary thyroid cancer s/p total thyroidectomy on 8/2/24, HTN, DM2 on insulin, and GORDON .  She presents to clinic today for follow-up of chronic medical conditions.     Today, she is well appearing in clinic. She reports no new complaints other than nausea following meals. She denies any dizziness, palpitations, sweating, weight loss, insomnia, diarrhea, headaches, fatigue, constipation. Pharmacy records indicate that there was a few week window in which she had not refilled her levothyroxine. She says she has since filled her prescription and is compliant with her medications. Her most recent labs in February showed TSH WNL at 0.745 and thyroglobulin antibodies undetectable. Her screening US in March also showed no residual thyroid tissue, demonstrating good response to cancer treatment with no recurrence.       Review of Systems  Review of Systems   Constitutional: Negative.    HENT: Negative.     Eyes: Negative.    Respiratory: Negative.     Cardiovascular: Negative.    Gastrointestinal:  Positive for nausea. Negative for abdominal pain, constipation and diarrhea.   Genitourinary: Negative.    Skin: Negative.    Neurological:  Positive for dizziness. Negative for tingling and headaches.        Objective:   Last 24 Hour Vital Signs:  Vitals  BP: 137/83  Temp: 98.6 °F (37 °C)  Pulse: 83  Resp: 17  Height: 5' 4" (162.6 cm)  Weight: 78 kg (171 lb 15.3 oz)    Physical Examination:  Physical Exam  Constitutional:       Appearance: Normal appearance. She is normal weight.   Eyes:      Pupils: Pupils are equal, round, and reactive to light.   Cardiovascular:      Rate and Rhythm: Normal rate and regular rhythm.      Pulses: Normal pulses.      " Heart sounds: Normal heart sounds.   Pulmonary:      Effort: Pulmonary effort is normal.      Breath sounds: Normal breath sounds.   Abdominal:      General: Abdomen is flat. Bowel sounds are normal.      Palpations: Abdomen is soft.   Skin:     General: Skin is warm and dry.      Capillary Refill: Capillary refill takes less than 2 seconds.   Neurological:      Mental Status: She is alert and oriented to person, place, and time. Mental status is at baseline.   Psychiatric:         Mood and Affect: Mood normal.         Behavior: Behavior normal.          Labs  Lab Results   Component Value Date    TSH 0.745 02/16/2025        Assessment & Plan:     History of Papillary Thyroid Carcinoma s/p total thyroidectomy  -Total thyroidectomy 8/2/24  -Thyroid US 3/6/25 showed no residual tissue  -TSH 0.745 and thyroglobulin ab undetectable 2/16/25  -Continue Levothyroxine 125 mcg once daily  -Due to patient's lapse in medication, repeat TSH and thyroglobulin labs in 4 weeks once at steady state levels    Postprandial Nausea  -Pt. Encouraged to follow up with PCP    Follow up in clinic in 4-5 months, will assess if need to be sooner pending lab results in July    Miguel Zuniga, MS3  [facility] Internal Medicine

## 2025-06-17 ENCOUNTER — OFFICE VISIT (OUTPATIENT)
Dept: INTERNAL MEDICINE | Facility: CLINIC | Age: 60
End: 2025-06-17
Payer: COMMERCIAL

## 2025-06-17 ENCOUNTER — HOSPITAL ENCOUNTER (OUTPATIENT)
Dept: RADIOLOGY | Facility: HOSPITAL | Age: 60
Discharge: HOME | End: 2025-06-17
Attending: NURSE PRACTITIONER
Payer: COMMERCIAL

## 2025-06-17 VITALS
HEART RATE: 82 BPM | RESPIRATION RATE: 18 BRPM | TEMPERATURE: 99 F | DIASTOLIC BLOOD PRESSURE: 78 MMHG | WEIGHT: 174 LBS | HEIGHT: 64 IN | SYSTOLIC BLOOD PRESSURE: 118 MMHG | BODY MASS INDEX: 29.71 KG/M2

## 2025-06-17 DIAGNOSIS — F41.9 ANXIETY AND DEPRESSION: ICD-10-CM

## 2025-06-17 DIAGNOSIS — F51.01 PRIMARY INSOMNIA: ICD-10-CM

## 2025-06-17 DIAGNOSIS — R11.0 NAUSEA: ICD-10-CM

## 2025-06-17 DIAGNOSIS — R19.00 ABDOMINAL SWELLING: ICD-10-CM

## 2025-06-17 DIAGNOSIS — R80.9 MICROALBUMINURIA: ICD-10-CM

## 2025-06-17 DIAGNOSIS — E11.65 UNCONTROLLED DIABETES MELLITUS WITH HYPERGLYCEMIA, WITH LONG-TERM CURRENT USE OF INSULIN: Primary | ICD-10-CM

## 2025-06-17 DIAGNOSIS — Z79.4 UNCONTROLLED DIABETES MELLITUS WITH HYPERGLYCEMIA, WITH LONG-TERM CURRENT USE OF INSULIN: Primary | ICD-10-CM

## 2025-06-17 DIAGNOSIS — F32.A ANXIETY AND DEPRESSION: ICD-10-CM

## 2025-06-17 DIAGNOSIS — E11.9 TYPE 2 DIABETES MELLITUS WITHOUT COMPLICATION, WITH LONG-TERM CURRENT USE OF INSULIN: ICD-10-CM

## 2025-06-17 DIAGNOSIS — Z00.00 WELL ADULT EXAM: ICD-10-CM

## 2025-06-17 DIAGNOSIS — Z79.4 TYPE 2 DIABETES MELLITUS WITHOUT COMPLICATION, WITH LONG-TERM CURRENT USE OF INSULIN: ICD-10-CM

## 2025-06-17 DIAGNOSIS — E87.5 HYPERKALEMIA: ICD-10-CM

## 2025-06-17 DIAGNOSIS — R91.8 MULTIPLE PULMONARY NODULES: ICD-10-CM

## 2025-06-17 DIAGNOSIS — R42 VERTIGO: ICD-10-CM

## 2025-06-17 DIAGNOSIS — C73 PAPILLARY THYROID CARCINOMA: ICD-10-CM

## 2025-06-17 LAB — HBA1C MFR BLD: 8 %

## 2025-06-17 PROCEDURE — 3008F BODY MASS INDEX DOCD: CPT | Mod: CPTII,,, | Performed by: NURSE PRACTITIONER

## 2025-06-17 PROCEDURE — 3074F SYST BP LT 130 MM HG: CPT | Mod: CPTII,,, | Performed by: NURSE PRACTITIONER

## 2025-06-17 PROCEDURE — 1159F MED LIST DOCD IN RCRD: CPT | Mod: CPTII,,, | Performed by: NURSE PRACTITIONER

## 2025-06-17 PROCEDURE — 99215 OFFICE O/P EST HI 40 MIN: CPT | Mod: PBBFAC | Performed by: NURSE PRACTITIONER

## 2025-06-17 PROCEDURE — 74019 RADEX ABDOMEN 2 VIEWS: CPT | Mod: TC

## 2025-06-17 PROCEDURE — 3078F DIAST BP <80 MM HG: CPT | Mod: CPTII,,, | Performed by: NURSE PRACTITIONER

## 2025-06-17 PROCEDURE — 4010F ACE/ARB THERAPY RXD/TAKEN: CPT | Mod: CPTII,,, | Performed by: NURSE PRACTITIONER

## 2025-06-17 PROCEDURE — 83036 HEMOGLOBIN GLYCOSYLATED A1C: CPT | Mod: PBBFAC | Performed by: NURSE PRACTITIONER

## 2025-06-17 PROCEDURE — 99214 OFFICE O/P EST MOD 30 MIN: CPT | Mod: S$PBB,,, | Performed by: NURSE PRACTITIONER

## 2025-06-17 PROCEDURE — 3052F HG A1C>EQUAL 8.0%<EQUAL 9.0%: CPT | Mod: CPTII,,, | Performed by: NURSE PRACTITIONER

## 2025-06-17 PROCEDURE — 1160F RVW MEDS BY RX/DR IN RCRD: CPT | Mod: CPTII,,, | Performed by: NURSE PRACTITIONER

## 2025-06-17 RX ORDER — INSULIN GLARGINE-YFGN 100 [IU]/ML
28 INJECTION, SOLUTION SUBCUTANEOUS 2 TIMES DAILY
Qty: 60 ML | Refills: 1 | Status: SHIPPED | OUTPATIENT
Start: 2025-06-17

## 2025-06-17 RX ORDER — INSULIN LISPRO 100 [IU]/ML
INJECTION, SOLUTION INTRAVENOUS; SUBCUTANEOUS
Qty: 15 ML | Refills: 6 | Status: SHIPPED | OUTPATIENT
Start: 2025-06-17

## 2025-06-17 RX ORDER — ONDANSETRON 8 MG/1
8 TABLET, ORALLY DISINTEGRATING ORAL EVERY 6 HOURS PRN
Qty: 30 TABLET | Refills: 4 | Status: SHIPPED | OUTPATIENT
Start: 2025-06-17

## 2025-06-17 RX ORDER — METFORMIN HYDROCHLORIDE 1000 MG/1
1000 TABLET ORAL 2 TIMES DAILY WITH MEALS
Qty: 180 TABLET | Refills: 1 | Status: SHIPPED | OUTPATIENT
Start: 2025-06-17

## 2025-06-17 RX ORDER — PEN NEEDLE, DIABETIC 30 GX3/16"
1 NEEDLE, DISPOSABLE MISCELLANEOUS
Qty: 200 EACH | Refills: 6 | Status: SHIPPED | OUTPATIENT
Start: 2025-06-17

## 2025-06-17 RX ORDER — ZOLPIDEM TARTRATE 5 MG/1
5 TABLET ORAL NIGHTLY PRN
Qty: 30 TABLET | Refills: 5 | Status: SHIPPED | OUTPATIENT
Start: 2025-06-17

## 2025-06-17 RX ORDER — SERTRALINE HYDROCHLORIDE 50 MG/1
TABLET, FILM COATED ORAL
Qty: 30 TABLET | Refills: 6 | Status: SHIPPED | OUTPATIENT
Start: 2025-06-17

## 2025-06-17 NOTE — PROGRESS NOTES
Patient ID: [id number]     Chief Complaint: Diabetes        HPI:     RICHMOND Rosas is a 59 y.o. female here today for a follow up. Pt has hx DM, Multiple pulmonary nodules, Thyroid CA, Hyperkalemia, Anxiety/Depression, Microalbuminuria. Pt followed in GYN, Opthal, Cardio, Endo cl.         Immunizations:   Immunization History   Administered Date(s) Administered    COVID-19, MRNA, LN-S, PF (Pfizer) (Purple Cap) 06/21/2021, 06/21/2021, 07/19/2021, 07/19/2021    DTP 1963, 1963, 1963, 10/22/1968, 07/24/1975, 05/03/1978    Influenza 10/26/2014, 09/21/2022    Influenza - Quadrivalent - PF *Preferred* (6 months and older) 09/29/2021, 10/04/2022, 10/12/2023    Influenza - Trivalent - Fluarix, Flulaval, Fluzone, Afluria - PF 10/03/2016, 12/21/2017, 09/21/2018, 10/22/2019, 12/03/2024    MMR 01/15/2010    Measles 12/07/1967, 05/03/1978    OPV 1963, 1963, 01/23/1964, 10/14/1969, 07/24/1975    Pneumococcal Polysaccharide - 23 Valent 06/15/2016    Rubella 12/18/1969    Td (ADULT) 10/26/1999, 10/12/2005        -------------------------------------    Anxiety and depression    Anxiety disorder, unspecified    Cancer    CTS (carpal tunnel syndrome)    Glaucoma    HTN (hypertension)    Hyperparathyroidism    OAB (overactive bladder)    GORDON (obstructive sleep apnea)    Thyroid cancer    Type 2 diabetes mellitus with unspecified diabetic retinopathy without macular edema    Urge incontinence        Past Surgical History:   Procedure Laterality Date    CHOLECYSTECTOMY      HYSTERECTOMY      HYSTERECTOMY, VAGINAL, LAPAROSCOPY-ASSISTED, WITH SALPINGO-OOPHORECTOY Bilateral 08/22/2017    PARATHYROIDECTOMY Right 08/02/2024    Procedure: PARATHYROIDECTOMY;  Surgeon: Cindy Byrne MD;  Location: [facility];  Service: ENT;  Laterality: Right;    THYROIDECTOMY Bilateral 08/02/2024    Procedure: THYROIDECTOMY total;  Surgeon: Cindy Byrne MD;  Location: [facility];  Service: ENT;  Laterality:  "Bilateral;    TUBAL LIGATION         Review of patient's allergies indicates:  No Known Allergies    Current Outpatient Medications   Medication Instructions    amLODIPine (NORVASC) 5 mg, Oral, Daily    blood pressure test kit-large Kit 1 kit, Misc.(Non-Drug; Combo Route), Daily PRN    blood-glucose meter,continuous (DEXCOM ) Misc 1 each, Misc.(Non-Drug; Combo Route), Every 14 days    blood-glucose sensor (DEXCOM G7 SENSOR) Jennifer 1 each, Misc.(Non-Drug; Combo Route), Every 10 days    famotidine (PEPCID) 40 mg, Oral, Daily    gabapentin (NEURONTIN) 300 MG capsule 1 capsule, Daily PRN    insulin glargine-yfgn (SEMGLEE(INSULIN GLARG-YFGN)PEN) 28 Units, Subcutaneous, 2 times daily    insulin lispro 100 unit/mL pen Inject 2 to 12 units under the skin per sliding scale after checking blood glucose before meals and at bedtime    levothyroxine (SYNTHROID) 125 mcg, Oral, Before breakfast    losartan (COZAAR) 50 mg, Oral, Daily    metFORMIN (GLUCOPHAGE) 1,000 mg, Oral, 2 times daily with meals    ondansetron (ZOFRAN-ODT) 8 mg, Oral, Every 6 hours PRN    pen needle, diabetic 32 gauge x 5/32" Ndle Use 1 needle five times daily    pravastatin (PRAVACHOL) 20 mg, Daily    propranoloL (INDERAL) 20 mg, Oral, 2 times daily    sertraline (ZOLOFT) 50 MG tablet Take 1 tablet by mouth at bedtime    SITagliptin phosphate (JANUVIA) 50 mg, Oral, Daily    zolpidem (AMBIEN) 5 mg, Oral, Nightly PRN       Social History[1]     Family History   Problem Relation Name Age of Onset    Diabetes Mother Mckenzie     Hypertension Mother Mckenzie     Kidney failure Mother Mckenzie     Asthma Mother Mckenzie     Kidney disease Mother Mckenzie     Lung cancer Father Rei     Diabetes Father Rei     Hypertension Father Rei     Mental illness Father Rei     Thyroid disease Sister Astrid     Brain cancer Sister Astrid     Diabetes Sister Astrid     Hypertension Sister Astrid     Thyroid cancer Sister Danelle     Thyroid disease Sister " "Danelle     Migraines Sister Danelle     Miscarriages / Stillbirths Sister Danelle     Diabetes Sister Roxy     Hypertension Sister Roxy     Cancer Brother Jack         Pancreatic    Hypertension Brother Jack     Seizures Brother Jack         Patient Care Team:  Kelin Puga FNP as PCP - General (Family Medicine)  Kelin Puga FNP (Family Medicine)  Sunni Fernando LPN as Care Coordinator  Alirio Braden MD as Consulting Physician (Endocrinology)     Subjective:     Review of Systems     See HPI for details    Constitutional: Denies Change in appetite. Denies Chills. Denies Fever. Denies Night sweats.  Eye: Denies Blurred vision. Denies Discharge. Denies Eye pain.  ENT: Denies Decreased hearing. Denies Sore throat. Denies Swollen glands.  Respiratory: Denies Cough. Denies Shortness of breath. Denies Shortness of breath with exertion. Denies Wheezing.  Cardiovascular: DeniesChest pain at rest. Denies Chest pain with exertion. Denies Irregular heartbeat. Denies Palpitations. Denies Edema.  Gastrointestinal: Denies Abdominal pain. DeniesDiarrhea. Denies Nausea. Denies Vomiting. Denies Hematemesis or Hematochezia.  Genitourinary: Denies Dysuria. Denies Urinary frequency. Denies Urinary urgency. Denies Blood in urine.  Endocrine: Denies Cold intolerance. Denies Excessive thirst. Denies Heat intolerance. Denies Weight loss. Denies Weight gain.  Musculoskeletal: Denies Painful joints. Denies Weakness.  Integumentary: Denies Rash. Denies Itching. Denies Dry skin.  Neurologic: Denies Dizziness. Denies Fainting. Denies Headache.  Psychiatric: Denies Depression. Denies Anxiety. Denies Suicidal/Homicidal ideations.    All Other ROS: Negative except as stated in HPI.       Objective:     Visit Vitals  /78   Pulse 82   Temp 98.6 °F (37 °C) (Oral)   Resp 18   Ht 5' 4" (1.626 m)   Wt 78.9 kg (174 lb)   BMI 29.87 kg/m²       Physical Exam    General: Alert and oriented, No acute " distress.  Head: Normocephalic, Atraumatic.  Eye: Pupils are equal, round and reactive to light, Extraocular movements are intact, Sclera non-icteric.  Ears/Nose/Throat: Normal, Mucosa moist,Clear.  Neck/Thyroid: Supple, Non-tender, No carotid bruit, No lymphadenopathy, No JVD, Full range of motion.  Respiratory: Clear to auscultation bilaterally; No wheezes, rales or rhonchi,Non-labored respirations, Symmetrical chest wall expansion.  Cardiovascular: Regular rate and rhythm, S1/S2 normal, No murmurs, rubs or gallops.  Gastrointestinal: Soft, Non-tender, Non-distended, Normal bowel sounds, No palpable organomegaly.  Musculoskeletal: Normal range of motion.  Integumentary: Warm, Dry, Intact, No suspicious lesions or rashes.  Extremities: No clubbing, cyanosis or edema  Neurologic: No focal deficits, Cranial Nerves II-XII are grossly intact, Motor strength normal upper and lower extremities, Sensory exam intact.  Psychiatric: Normal interaction, Coherent speech, Euthymic mood, Appropriate affect       Labs Reviewed:     Chemistry:  Lab Results   Component Value Date     02/18/2025    K 4.1 02/18/2025    BUN 16.3 02/18/2025    CREATININE 0.92 02/18/2025    EGFRNORACEVR >60 02/18/2025    CALCIUM 9.5 02/18/2025    ALKPHOS 83 10/12/2024    ALBUMIN 4.4 10/12/2024    BILIDIR 0.1 06/16/2021    IBILI 0.10 06/16/2021    AST 18 10/12/2024    ALT 19 10/12/2024    ECZJSLMF84HG 25 (L) 07/23/2024        Lab Results   Component Value Date    HGBA1C 7.8 (H) 02/18/2025        Hematology:  Lab Results   Component Value Date    WBC 6.71 02/18/2025    HGB 11.9 (L) 02/18/2025    HCT 38.1 02/18/2025     02/18/2025       Lipid Panel:  Lab Results   Component Value Date    CHOL 139 10/12/2024    HDL 35 10/12/2024    LDL 71.00 10/12/2024    TRIG 167 (H) 10/12/2024    TOTALCHOLEST 4 10/12/2024        Urine:  Lab Results   Component Value Date    APPEARANCEUA Clear 02/18/2025    SGUA 1.009 02/18/2025    PROTEINUA Negative  02/18/2025    KETONESUA Negative 02/18/2025    LEUKOCYTESUR Negative 02/18/2025    RBCUA 0-5 02/18/2025    WBCUA 0-5 02/18/2025    BACTERIA Trace (A) 02/18/2025    SQEPUA Trace (A) 02/18/2025    HYALINECASTS None Seen 02/18/2025    CREATRANDUR 211.6 (H) 10/12/2024        Assessment:       ICD-10-CM ICD-9-CM   1. Uncontrolled diabetes mellitus with hyperglycemia, with long-term current use of insulin  E11.65 250.02    Z79.4 V58.67   2. Multiple pulmonary nodules  R91.8 793.19   3. Hyperkalemia  E87.5 276.7   4. Anxiety and depression  F41.9 300.00    F32.A 311   5. Microalbuminuria  R80.9 791.0   6. Papillary thyroid carcinoma  C73 193   7. Type 2 diabetes mellitus without complication, with long-term current use of insulin  E11.9 250.00    Z79.4 V58.67   8. Primary insomnia  F51.01 307.42   9. Well adult exam  Z00.00 V70.0   10. Nausea  R11.0 787.02   11. Abdominal swelling  R19.00 789.30   12. Vertigo  R42 780.4        Plan:     1. Uncontrolled diabetes mellitus with hyperglycemia, with long-term current use of insulin (Primary)  POC A1c today. ADA diet and exercise.  Cont Sitaglipitin 50 mg 1 tab po daily. Pt was on Lantus but decreased dose to 25 units BID due to having low blood sugars. Insulin changed to Semglee inject 25 units sq BID- titrate according to CBG readings. D/C Glipizide due to low blood sugars. Cont Lispro per sliding scale as prescribed. Urine microalbumin 10-12-24. Dm foot done 11-5-24. DM eye done  in Opthal cl 2-3-25.  Pt request RX for continueous glucose monitor as she will have to monitor her blood sugar levels more closely due to thyroid CA txmt. RX for Dexcom G7  and sensors sent to [facility] pharmacy.   - POCT HEMOGLOBIN A1C    2. Multiple pulmonary nodules  Pt had CT chest with contrast 2-5-25 showing:  CT Chest With Contrast  Order: [id number]   Status: Final result       Next appt: 02/28/2025 at 01:15 PM in Neurology (RESIDENT NEURO PROCEDURES, [facility] NEURO)       Dx: Multiple  pulmonary nodules    Test Result Released: Yes (seen)    0 Result Notes  Details     Reading Physician Reading Date Result Priority   Akil Monaco MD  [phone]  2/6/2025 Routine      Narrative & Impression  EXAMINATION:  CT CHEST WITH CONTRAST     CLINICAL HISTORY:  4 mm lung nodule; Other nonspecific abnormal finding of lung field     TECHNIQUE:  Contiguous CT images of the chest after IV contrast administration. Axial, coronal and sagittal reformatted images reviewed. Dose length product is 356 mGycm. Automatic exposure control was utilized to limit radiation dose.     COMPARISON:  Chest CT 11/01/2023     FINDINGS:  Lungs and large airways: 5 mm solid nodule near the right major fissure stable since 2023.  Smaller solid nodules in the left upper lobe (series 3, image 47) and left lower lobe (series 3, image 69) are also stable.  No enlarging nodule identified.     Pleura: No pleural effusion.     Mediastinum and oleg: Normal heart size.  No pathologically enlarged lymph node.     Chest wall/axilla and lower neck: No significant findings.     Upper abdomen: No significant findings.     Bones: No acute osseous findings.     Impression:     Three small pulmonary nodules stable since 11/01/2023.        Electronically signed by:Akil Monaco  Date:                                            02/06/2025  Time:                                           13:32              Exam Ended: 02/05/25 12:15 CST Last Resulted: 02/06/25 13:32 CST      Encouraged smoking cessation. Education provided.     3. Hyperkalemia  BMP was suppose to be done prior to today's appt but not done. Will get CMP prior to next visit.     4. Anxiety and depression  Denies SI/HI. Cont Sertraline as prescribed.     5. Microalbuminuria  Urine microalbumin in 4 months.      6. Thyroid CA  Pt followed in Endo cl. Keep appts. Cont Levothyroxine as prescribed per Endo cl.     7. Well adult  Labs in 4 months. Keep MMG. Keep GYN cl appt. Cologuard done  4-10-23 negative results- next due 4/2026.     8. Vertigo  Pt c/o dizziness for approx 2 weeks that has progressively getting worse. States symptoms last a few minutes. Refer to Audio cl for hearing eval and f/u in ENT.     Follow up in about 4 months (around 10/17/2025) for with labs 1 week prior to appt. . In addition to their scheduled follow up, the patient has also been instructed to follow up on as needed basis.     Future Appointments   Date Time Provider Department Center   6/25/2025  9:15 AM NURSE, [facility] CARDIOLOGY [facility] CARD [place]    7/24/2025  8:30 AM [facility] MAMMO1 [facility] MAMMO [place]    10/10/2025  7:40 AM RESIDENTS, [facility] ENDOCRINOLOGY [facility] ENDOCR [place]    12/1/2025  9:30 AM Estephania Cedillo MD [facility] CARD [place]    1/30/2026  8:00 AM PROVIDERS, [facility] OPHLEI [place]    3/25/2026  1:40 PM Jennifer Cruz FNP [facility] GYN [place] Un        NORBERTO Baptiste             [1]   Social History  Socioeconomic History    Marital status: Single   Tobacco Use    Smoking status: Never     Passive exposure: Never    Smokeless tobacco: Never   Substance and Sexual Activity    Alcohol use: Never    Drug use: Never    Sexual activity: Not Currently     Partners: Male     Birth control/protection: Abstinence, Injection     Social Drivers of Health     Financial Resource Strain: Low Risk  (6/10/2025)    Overall Financial Resource Strain (CARDIA)     Difficulty of Paying Living Expenses: Not very hard   Food Insecurity: No Food Insecurity (6/10/2025)    Hunger Vital Sign     Worried About Running Out of Food in the Last Year: Never true     Ran Out of Food in the Last Year: Never true   Transportation Needs: No Transportation Needs (6/10/2025)    PRAPARE - Transportation     Lack of Transportation (Medical): No     Lack of Transportation (Non-Medical): No   Physical Activity: Inactive (6/10/2025)    Exercise Vital Sign     Days of Exercise per Week: 0 days     Minutes of Exercise per  Session: 0 min   Stress: No Stress Concern Present (6/10/2025)    [employer] - Occupational Stress Questionnaire     Feeling of Stress : Only a little   Housing Stability: High Risk (6/10/2025)    Housing Stability Vital Sign     Unable to Pay for Housing in the Last Year: Yes     Homeless in the Last Year: No

## 2025-06-25 ENCOUNTER — CLINICAL SUPPORT (OUTPATIENT)
Dept: CARDIOLOGY | Facility: CLINIC | Age: 60
End: 2025-06-25
Payer: COMMERCIAL

## 2025-06-25 VITALS
SYSTOLIC BLOOD PRESSURE: 137 MMHG | WEIGHT: 173 LBS | OXYGEN SATURATION: 100 % | HEIGHT: 64 IN | RESPIRATION RATE: 18 BRPM | HEART RATE: 84 BPM | TEMPERATURE: 98 F | DIASTOLIC BLOOD PRESSURE: 80 MMHG | BODY MASS INDEX: 29.53 KG/M2

## 2025-06-25 DIAGNOSIS — I10 PRIMARY HYPERTENSION: Primary | ICD-10-CM

## 2025-06-25 PROCEDURE — 99214 OFFICE O/P EST MOD 30 MIN: CPT | Mod: PBBFAC

## 2025-06-25 NOTE — PROGRESS NOTES
Patient saw for nv after losartan increased last visit. No cardiac complaints reported. She reports compliance with medications. Home BP log was obtained. Bp check  137/80 hr 84. Pt advised to continue current meds, follow low na diet, exercise as tolerated, and ED precautions were given. Understanding verbalized.

## 2025-07-14 ENCOUNTER — OFFICE VISIT (OUTPATIENT)
Dept: OTOLARYNGOLOGY | Facility: CLINIC | Age: 60
End: 2025-07-14
Payer: COMMERCIAL

## 2025-07-14 ENCOUNTER — CLINICAL SUPPORT (OUTPATIENT)
Dept: AUDIOLOGY | Facility: HOSPITAL | Age: 60
End: 2025-07-14
Payer: COMMERCIAL

## 2025-07-14 VITALS — TEMPERATURE: 98 F | HEART RATE: 75 BPM | DIASTOLIC BLOOD PRESSURE: 82 MMHG | SYSTOLIC BLOOD PRESSURE: 135 MMHG

## 2025-07-14 DIAGNOSIS — R42 DIZZINESS: Primary | ICD-10-CM

## 2025-07-14 DIAGNOSIS — R42 VERTIGO: ICD-10-CM

## 2025-07-14 DIAGNOSIS — H91.90 HEARING DISORDER, UNSPECIFIED LATERALITY: Primary | ICD-10-CM

## 2025-07-14 PROCEDURE — 4010F ACE/ARB THERAPY RXD/TAKEN: CPT | Mod: CPTII,,, | Performed by: NURSE PRACTITIONER

## 2025-07-14 PROCEDURE — 3052F HG A1C>EQUAL 8.0%<EQUAL 9.0%: CPT | Mod: CPTII,,, | Performed by: NURSE PRACTITIONER

## 2025-07-14 PROCEDURE — 1159F MED LIST DOCD IN RCRD: CPT | Mod: CPTII,,, | Performed by: NURSE PRACTITIONER

## 2025-07-14 PROCEDURE — 3075F SYST BP GE 130 - 139MM HG: CPT | Mod: CPTII,,, | Performed by: NURSE PRACTITIONER

## 2025-07-14 PROCEDURE — 92557 COMPREHENSIVE HEARING TEST: CPT | Performed by: AUDIOLOGIST

## 2025-07-14 PROCEDURE — 99214 OFFICE O/P EST MOD 30 MIN: CPT | Mod: PBBFAC,25 | Performed by: NURSE PRACTITIONER

## 2025-07-14 PROCEDURE — 99213 OFFICE O/P EST LOW 20 MIN: CPT | Mod: S$PBB,,, | Performed by: NURSE PRACTITIONER

## 2025-07-14 PROCEDURE — 92567 TYMPANOMETRY: CPT | Performed by: AUDIOLOGIST

## 2025-07-14 PROCEDURE — 3079F DIAST BP 80-89 MM HG: CPT | Mod: CPTII,,, | Performed by: NURSE PRACTITIONER

## 2025-07-14 RX ORDER — MECLIZINE HYDROCHLORIDE 25 MG/1
25 TABLET ORAL 3 TIMES DAILY PRN
Qty: 30 TABLET | Refills: 1 | Status: SHIPPED | OUTPATIENT
Start: 2025-07-14 | End: 2025-08-13

## 2025-07-14 RX ORDER — DICLOFENAC SODIUM 10 MG/G
2 GEL TOPICAL 4 TIMES DAILY
Qty: 150 G | Refills: 1 | Status: SHIPPED | OUTPATIENT
Start: 2025-07-14

## 2025-07-14 NOTE — PROGRESS NOTES
[facility]  Otolaryngology Clinic Note    Foreign Rosas  YOB: 1965    Chief Complaint: f/u    HPI:    10/31/22: 57yoF referred for thyroid nodules. She denies any dysphonia, dysphagia, or compressive SOB. She requested referral to ENT due to family hx. Her sister had thyroid cancer with total thyroidectomy, dx at age 39. No other family hx of thyroid dz or thyroid cancer. Denies radiation exposure.      11/01/2023: Denies c/o. States she was having some left sided epistaxis recently and was seen in the ED last month. Reports she has not seen any bleeding in a few days.     7/23/24: Foreign Rosas is a 59 y.o. female past medical history of hypercalcemia and hyperparathyroidism who was referred for left thyroid nodule that returned as papillary thyroid carcinoma.  Patient reports she has been having coughing episodes for the past month and some gas or air.  She denies odynophagia or dysphagia.  She reports some hoarseness however that comes and goes.  Denies any lumps or bumps on her neck.  They found thyroid nodule previously on a CT scan year or 2 ago.  She does have a sister who had thyroid cancer recently.  She denies any exposure to radiation.  Denies any previous neck surgeries.  Does not smoke tobacco.  Last year patient had a sestamibi scan in a 4D CT which localize a right inferior parathyroid adenoma.  At that time her PTH was elevated to the 90s.  She had hypercalcemia to 10.8.  She is asymptomatic.      08/08/2024: Patient here today postop 1 follow up.  She underwent total thyroidectomy and parathyroidectomy on 08/02/2024.  Patient has been doing well postoperatively.  She denies any signs or symptoms of infection and no perioral or hand paresthesias.  Patient has had a dysphonic voice following surgery.  No issues with p.o. intake.     8/21/2024: Patient is here for her 2nd post-op follow up. S/p total thyroidectomy and parathyroidectomy on 08/02/2024.  Continue to report dysphonia and dry cough however improving. She denies dysphagia, odynophagia, paraesthesia, myalgias, and confusion.      9/24/24:  Patient returns for 3rd postoperative visit.  She has been doing well since her surgery.  She has been seen speech therapy and feels that her voice has improved noticeably since her last visit.  She has been tolerating a regular diet without problems.     3/27/25: Returns for 6-month follow up. Saw Endocrine Dr. Johnson and Dr. Braden and completed PHILLIPS in November. Recent thyroid US shows surgically absent thyroid with no abnormalities compared to prior imaging. TSH is normal. Patient's only complaint today is generalized fatigue. Found to have high BP in clinic today d/t running out of medication a couple weeks ago.     07/14/2025: Pt returns today for dizziness concerns. States she began having dizzy episodes about a year ago, but lately they have been increasing in frequency and intensity. Occurrence is sporadic. States dizziness may occur for a few consecutive days then not return for several days. Episodes are described as room spinning and difficulty with balance, as if she is on a boat. They do not typically seem to be associated with movement, however, it occasionally occurs with head turns. Episodes often occur when she is walking. States she has to hold on to things when she gets up to use the bathroom at night. Loss of balance can be to either laterality. She sometimes feels lightheaded when this occurs as well. Episodes last 10-15min and are relieved with sitting still and resting. Reports occasional right ear ache. States this occurred last week & lasted a few days.    ROS:   10-point review of systems negative except per HPI      Review of patient's allergies indicates:  No Known Allergies    Past Medical History:   Diagnosis Date    Anxiety disorder, unspecified     Glaucoma     HTN (hypertension)     Type 2 diabetes mellitus with unspecified diabetic  retinopathy without macular edema        Past Surgical History:   Procedure Laterality Date    CHOLECYSTECTOMY      HYSTERECTOMY  2015    HYSTERECTOMY, VAGINAL, LAPAROSCOPY-ASSISTED, WITH SALPINGO-OOPHORECTOY Bilateral 08/22/2017    TUBAL LIGATION         Social History     Socioeconomic History    Marital status: Single   Tobacco Use    Smoking status: Never     Passive exposure: Past    Smokeless tobacco: Never   Substance and Sexual Activity    Alcohol use: Never     Comment: special occassion    Drug use: Never    Sexual activity: Not Currently     Partners: Male     Birth control/protection: Abstinence, Injection     Social Determinants of Health     Financial Resource Strain: Low Risk  (9/22/2023)    Overall Financial Resource Strain (CARDIA)     Difficulty of Paying Living Expenses: Not hard at all   Food Insecurity: No Food Insecurity (9/22/2023)    Hunger Vital Sign     Worried About Running Out of Food in the Last Year: Never true     Ran Out of Food in the Last Year: Never true   Transportation Needs: No Transportation Needs (9/22/2023)    PRAPARE - Transportation     Lack of Transportation (Medical): No     Lack of Transportation (Non-Medical): No   Physical Activity: Inactive (9/22/2023)    Exercise Vital Sign     Days of Exercise per Week: 0 days     Minutes of Exercise per Session: 0 min   Stress: No Stress Concern Present (9/22/2023)    [employer] - Occupational Stress Questionnaire     Feeling of Stress : Not at all   Social Connections: Moderately Isolated (9/22/2023)    Social Connection and Isolation Panel [NHANES]     Frequency of Communication with Friends and Family: More than three times a week     Frequency of Social Gatherings with Friends and Family: Three times a week     Attends [employer] Services: More than 4 times per year     Active Member of Clubs or Organizations: No     Attends Club or Organization Meetings: Never     Marital Status:    Housing  Stability: Low Risk  (9/22/2023)    Housing Stability Vital Sign     Unable to Pay for Housing in the Last Year: No     Number of Places Lived in the Last Year: 1     Unstable Housing in the Last Year: No       Family History   Problem Relation Age of Onset    Diabetes Mother     Hypertension Mother     Kidney failure Mother     Asthma Mother     Kidney disease Mother     Lung cancer Father     Diabetes Father     Hypertension Father     Mental illness Father     Thyroid disease Sister     Brain cancer Sister     Diabetes Sister     Hypertension Sister     Cancer Sister         Thyroid    Thyroid disease Sister     Migraines Sister     Miscarriages / Stillbirths Sister     Cancer Brother         Pancreatic    Hypertension Brother     Seizures Brother     Diabetes Sister     Hypertension Sister        Outpatient Encounter Medications as of 11/1/2023   Medication Sig Dispense Refill    arm brace Misc 1 each by Misc.(Non-Drug; Combo Route) route every evening. Left carpal tunnel wrist splint, nightly 1 each 4    aspirin (ECOTRIN) 81 MG EC tablet Take 81 mg by mouth once daily.      gabapentin (NEURONTIN) 300 MG capsule Take 300 mg by mouth every evening.      glipiZIDE (GLUCOTROL) 10 MG tablet Take 1 tablet (10 mg total) by mouth 2 (two) times daily before meals. 180 tablet 1    ibuprofen (ADVIL,MOTRIN) 800 MG tablet Take 1 tablet (800 mg total) by mouth 3 (three) times daily as needed for Pain. 90 tablet 2    insulin detemir U-100, Levemir, (LEVEMIR FLEXPEN) 100 unit/mL (3 mL) InPn pen Inject 20 units sq in AM and 45 units sq in PM. 60 mL 3    insulin lispro 100 unit/mL pen See Instructions, Inject 2-12 units sq per s/s after CBG AC & HS., # 15 mL, 6 Refill(s), Pharmacy: [facility], 162, cm, Height/Length Dosing, 06/21/21 7:49:00 CDT, 81, kg, Weight Dosing, 06/21/21 7:49:00 CDT Strength: 100 Units/mL 15 mL 6    losartan (COZAAR) 25 MG tablet Take 1 tablet (25 mg total) by mouth once daily. 90  tablet 1    metFORMIN (GLUCOPHAGE) 1000 MG tablet Take 1 tablet (1,000 mg total) by mouth 2 (two) times daily with meals. 180 tablet 1    oxybutynin (DITROPAN-XL) 5 MG TR24 Take 1 tablet (5 mg total) by mouth once daily. 90 tablet 3    PEN NEEDLE, DIABETIC MISC 1 each by Misc.(Non-Drug; Combo Route) route 2 (two) times a day. Before AC and HS.      pravastatin (PRAVACHOL) 20 MG tablet Take 20 mg by mouth once daily.      propranoloL (INDERAL) 20 MG tablet Take 1 tablet (20 mg total) by mouth 2 (two) times daily. 60 tablet 3    sertraline (ZOLOFT) 50 MG tablet TAKE  1 TABLET po at BEDTIME 30 tablet 6    zolpidem (AMBIEN) 5 MG Tab Take 1 tablet (5 mg total) by mouth nightly as needed. 30 tablet 5    amLODIPine (NORVASC) 5 MG tablet Take 1 tablet (5 mg total) by mouth once daily. 90 tablet 3    fluconazole (DIFLUCAN) 150 MG Tab Take 1 tablet (150 mg total) by mouth once daily. Take 1 tab po X 1 dose. Repeat 2nd dose in 3 days if needed. (Patient not taking: Reported on 11/1/2023) 2 tablet 0    [DISCONTINUED] insulin detemir U-100 (LEVEMIR FLEXPEN) 100 unit/mL (3 mL) InPn pen Inject 15 units sq in AM and 42 units sq in PM. 60 mL 3     No facility-administered encounter medications on file as of 11/1/2023.       Physical Exam:  Vitals:    11/01/23 0824 11/01/23 0826   BP: (!) 153/86 (!) 148/82   BP Location: Right arm Right arm   Patient Position: Sitting Sitting   BP Method: Large (Automatic) Large (Manual)   Pulse: 83    Temp: 98.2 °F (36.8 °C)    TempSrc: Oral        General: NAD, voice normal  Neuro: AAO, CN II - XII grossly intact  Head/ Face: NCAT, symmetric, sensations intact bilaterally. + TMJ TTP  Eyes: EOMI, PERRL  Ears: externally normal with grossly normal hearing  AD: EAC patent, TM intact, no middle ear effusion, no retractions  AS: EAC patent, TM intact, no middle ear effusion, no retractions  Nose: bilateral nares patent, midline septum, no rhinorrhea, no external deformity, no turbinate hypertrophy.    OC/OP: MMM, no intraoral lesions, FOM/BOT soft, no trismus, dentition is poor (Edentulous upper), no uvular deviation, bilaterally symmetric soft palate elevation, palatoglossus and palatopharyngeal fold wnl; tonsils are symmetric and 1+  Indirect laryngoscopy: deferred due to patient intolerance  Neck: soft, supple, no LAD, normal ROM, no thyromegaly  Respiratory: nonlabored, no wheezing, bilateral chest rise  Cardiovascular: RRR  Gastrointestinal: S NT ND  Skin: warm, no lesions  Musculoskeletal: 5/5 strength  Psych: Appropriate affect/mood     Pertinent Data:  ? LABS:  ? AUDIO:                   ? PATH:      Imaging:           Assessment/Plan:  60 y.o. female PMH pT1b pN0 multifocal Papillary thyroid carcinoma tall cell variant greatest diameter 1.2 cm with lymphatic invasion s/p total thyroidectomy and parathyroidectomy on 8/2/24.  Returns today for worsening dizziness of unclear etiology. Audio WNL, type A tymps, Hallpike negative- reviewed.  - Meclizine TID prn  - TMJ exercises  - Warm/cool compresses to TMJ area PRN  - Voltaren gel QID prn for pain  - Audio for VNG  - RTC 3mo    Narda Son NP

## 2025-07-14 NOTE — PROGRESS NOTES
Hearing Evaluation      Patient History: Ms. Rosas is here for a hearing evaluation and hallpike screening. She denies hearing loss, tinnitus, and middle ear issues. Her main c/o is dizziness described more as 'light-headedness' that is unprovoked and lasts a few minutes. She reports associated nausea but denies vomiting.  The symptoms are reportedly increasing in frequency but not intensity.  All additional history is unremarkable.      Test Results:       Pure Tone Testing:     Right ear:   Normal peripheral hearing sensitivity from 250-8kHz. Speech reception threshold is in agreement with puretone findings.  Discrimination score of 100% is considered excellent.      Left ear: Normal peripheral hearing sensitivity from 250-8kHz. Speech reception threshold is in agreement with puretone findings.  Discrimination score of 100% is considered excellent.         Tympanometry:        Right ear:   Type 'A' tymp, normal middle ear pressure/function    Left ear: Type 'A' tymp, normal middle ear pressure/function       Distortion Product Otoacoustic Emission Testing (DPOAE):         Right ear: Present emissions from 1.5-6kHz      Left ear: Present emissions from 1k-6kHz         Interpretations:     Behavioral test findings indicate hearing within normal limits, bilaterally. Speech reception thresholds obtained at 20dB, AU, and are in agreement with puretone findings. Speech discrimination scores of 100%, AU, are considered excellent.  DPOAE findings indicate normal cochlear outer hair cell function, bilaterally. Immittance measures indicate normal middle ear pressure/function, bilaterally. Hallpike screening is negative for BPPV to the right and left side.        Recommendations:    Refer to ENT for further assessment

## 2025-08-21 DIAGNOSIS — Z79.4 TYPE 2 DIABETES MELLITUS WITHOUT COMPLICATION, WITH LONG-TERM CURRENT USE OF INSULIN: ICD-10-CM

## 2025-08-21 DIAGNOSIS — E11.9 TYPE 2 DIABETES MELLITUS WITHOUT COMPLICATION, WITH LONG-TERM CURRENT USE OF INSULIN: ICD-10-CM

## 2025-08-21 DIAGNOSIS — Z79.4 UNCONTROLLED DIABETES MELLITUS WITH HYPERGLYCEMIA, WITH LONG-TERM CURRENT USE OF INSULIN: ICD-10-CM

## 2025-08-21 DIAGNOSIS — E11.65 UNCONTROLLED DIABETES MELLITUS WITH HYPERGLYCEMIA, WITH LONG-TERM CURRENT USE OF INSULIN: ICD-10-CM

## 2025-08-24 RX ORDER — METFORMIN HYDROCHLORIDE 1000 MG/1
1000 TABLET ORAL 2 TIMES DAILY WITH MEALS
Qty: 180 TABLET | Refills: 1 | Status: SHIPPED | OUTPATIENT
Start: 2025-08-24

## 2025-08-24 RX ORDER — INSULIN GLARGINE-YFGN 100 [IU]/ML
28 INJECTION, SOLUTION SUBCUTANEOUS 2 TIMES DAILY
Qty: 60 ML | Refills: 1 | Status: SHIPPED | OUTPATIENT
Start: 2025-08-24

## (undated) DEVICE — Device

## (undated) DEVICE — DRAPE INSTR MAGNETIC 10X16IN

## (undated) DEVICE — SUT SILK 3-0 BLK BR SH 30IN

## (undated) DEVICE — GLOVE SENSICARE PI GRN 6.5

## (undated) DEVICE — SUT 5-0 MONO P-3 18IN

## (undated) DEVICE — DRESSING POLYSKIN II 2X2.75IN

## (undated) DEVICE — KIT SURGICAL TURNOVER

## (undated) DEVICE — SHEARS HARMONIC CRVD 9 CM

## (undated) DEVICE — DRSNG POLYSKIN TRNSPAR 4X4.75

## (undated) DEVICE — COVER MAYO STND XL 30X57IN

## (undated) DEVICE — PROBE SIMULATOR KRAFF

## (undated) DEVICE — NDL ECLIPSE SAFETY 18GX1-1/2IN

## (undated) DEVICE — STAPLER SKIN ROTATING HEAD

## (undated) DEVICE — SUT SA85H SILK 2-0

## (undated) DEVICE — SYR 10CC LUER LOCK

## (undated) DEVICE — GLOVE SIGNATURE MICRO LTX 8.5

## (undated) DEVICE — MANIFOLD 4 PORT

## (undated) DEVICE — CLOSURE SKIN STERI STRIP 1/2X4

## (undated) DEVICE — GOWN POLY REINF BRTH SLV XL

## (undated) DEVICE — SOL NACL IRR 1000ML BTL

## (undated) DEVICE — SOLIDIFIER BOTTLE 1500CC

## (undated) DEVICE — ELECTRODE BLADE INSULATED 1 IN

## (undated) DEVICE — GLOVE SIGNATURE MICRO LTX 7.5

## (undated) DEVICE — TOWEL OR DISP STRL BLUE 4/PK

## (undated) DEVICE — BLADE SURG STAINLESS STEEL #15

## (undated) DEVICE — NDL 27G X 1 1/4

## (undated) DEVICE — TRAY CATH FOL SIL URIMTR 16FR

## (undated) DEVICE — YANKAUER FLEX NO VENT REG CAP

## (undated) DEVICE — TRAY SKIN SCRUB WET PREMIUM

## (undated) DEVICE — HEMOSTAT SURGICEL 2X3IN

## (undated) DEVICE — ADHESIVE MASTISOL VIAL 48/BX

## (undated) DEVICE — SPONGE KITTNER 1/4X 5/8 L STRL

## (undated) DEVICE — SPONGE GAUZE 16PLY 4X4

## (undated) DEVICE — SUTURE SA83H SILK 4-0

## (undated) DEVICE — SUT VICRYL PLUS 3-0 SH 18IN

## (undated) DEVICE — CORD BIPOLAR 12 FOOT

## (undated) DEVICE — CORD CAUTERY BIPOLAR STERILE

## (undated) DEVICE — SUT 2/0 30IN SILK BLK BRAI

## (undated) DEVICE — GLOVE SIGNATURE ESSNTL LTX 6.5